# Patient Record
Sex: MALE | Race: WHITE | Employment: OTHER | URBAN - METROPOLITAN AREA
[De-identification: names, ages, dates, MRNs, and addresses within clinical notes are randomized per-mention and may not be internally consistent; named-entity substitution may affect disease eponyms.]

---

## 2017-01-05 ENCOUNTER — ANTICOAGULATION THERAPY VISIT (OUTPATIENT)
Dept: ANTICOAGULATION | Facility: CLINIC | Age: 82
End: 2017-01-05
Payer: COMMERCIAL

## 2017-01-05 DIAGNOSIS — Z79.01 LONG-TERM (CURRENT) USE OF ANTICOAGULANTS: Primary | ICD-10-CM

## 2017-01-05 DIAGNOSIS — I48.20 CHRONIC ATRIAL FIBRILLATION (H): ICD-10-CM

## 2017-01-05 LAB — INR PPP: 2.9

## 2017-01-05 PROCEDURE — 99207 ZZC NO CHARGE NURSE ONLY: CPT

## 2017-01-05 NOTE — PROGRESS NOTES
ANTICOAGULATION FOLLOW-UP CLINIC VISIT    Patient Name:  Francesco Killian  Date:  1/5/2017  Contact Type:  Telephone/ dosing faxed to [facility] and called to Jef in [place] [phone]    SUBJECTIVE:     Patient Findings     Positives No Problem Findings           OBJECTIVE    INR   Date Value Ref Range Status   01/05/2017 2.90  Final       ASSESSMENT / PLAN  INR assessment THER    Recheck INR In: 4 WEEKS    INR Location Outside lab      Anticoagulation Summary as of 1/5/2017     INR goal 2.0-3.0   Selected INR 2.90 (1/5/2017)   Maintenance plan 2 mg (2 mg x 1) on Tue; 4 mg (2 mg x 2) all other days   Full instructions 2 mg on Tue; 4 mg all other days   Weekly total 26 mg   Plan last modified Haley Beth RN (1/5/2017)   Next INR check 2/2/2017   Target end date Indefinite    Indications   Chronic atrial fibrillation (H) [I48.2]  Long-term (current) use of anticoagulants [Z79.01] [Z79.01]         Anticoagulation Episode Summary     INR check location     Preferred lab     Send INR reminders to [facility]    Comments             See the Encounter Report to view Anticoagulation Flowsheet and Dosing Calendar (Go to Encounters tab in chart review, and find the Anticoagulation Therapy Visit)        Haley Beth RN

## 2017-01-05 NOTE — MR AVS SNAPSHOT
Francesco Killian   1/5/2017 3:30 PM   Anticoagulation Therapy Visit    Description:  89 year old male   Provider:   ANTICOAGULATION CLINIC   Department:   Anti Coagulation           INR as of 1/5/2017     Selected INR 2.90 (1/5/2017)      Anticoagulation Summary as of 1/5/2017     INR goal 2.0-3.0   Selected INR 2.90 (1/5/2017)   Full instructions 2 mg on Tue; 4 mg all other days   Next INR check 2/2/2017    Indications   Chronic atrial fibrillation (H) [I48.2]  Long-term (current) use of anticoagulants [Z79.01] [Z79.01]         Description     Spoke with daughter in law and did not change dose as advised last time, is still taking 2mg tues and 4mg all other days.       Your next Anticoagulation Clinic appointment(s)     Feb 02, 2017 11:15 AM   Anticoagulation Visit with  ANTICOAGULATION CLINIC   [facility] ([facility]    [address] [address] [phone]              Contact Numbers     [facility]  Please call  [phone] to cancel and/or reschedule your appointment   Please call  [phone] with any problems or questions regarding your therapy.        January 2017 Details    Sun Mon Tue Wed Thu Fri Sat     1               2               3               4               5      4 mg   See details      6      4 mg         7      4 mg           8      4 mg         9      4 mg         10      2 mg         11      4 mg         12      4 mg         13      4 mg         14      4 mg           15      4 mg         16      4 mg         17      2 mg         18      4 mg         19      4 mg         20      4 mg         21      4 mg           22      4 mg         23      4 mg         24      2 mg         25      4 mg         26      4 mg         27      4 mg         28      4 mg           29      4 mg         30      4 mg         31      2 mg              Date Details   01/05 This INR check               How to take your warfarin  dose     To take:  2 mg Take 1 of the 2 mg tablets.    To take:  4 mg Take 2 of the 2 mg tablets.           February 2017 Details    Sun Mon Tue Wed Thu Fri Sat        1      4 mg         2            3               4                 5               6               7               8               9               10               11                 12               13               14               15               16               17               18                 19               20               21               22               23               24               25                 26               27               28                    Date Details   No additional details    Date of next INR:  2/2/2017         How to take your warfarin dose     To take:  4 mg Take 2 of the 2 mg tablets.

## 2017-01-06 DIAGNOSIS — I48.20 CHRONIC ATRIAL FIBRILLATION (H): Primary | ICD-10-CM

## 2017-01-06 RX ORDER — WARFARIN SODIUM 2 MG/1
TABLET ORAL
Qty: 180 TABLET | Refills: 0 | Status: SHIPPED | OUTPATIENT
Start: 2017-01-06 | End: 2017-04-05

## 2017-01-06 NOTE — TELEPHONE ENCOUNTER
warfarin (COUMADIN) 2 MG tablet    Last Written Prescription Date: 9/26/2016  Last Fill Qty: 180, # refills: 0  Last Office Visit with G, P or [facility] prescribing provider: 10/24/2016       Date and Result of Last PT/INR:   INR     2.90   1/5/2017  INR     3.04   12/15/2016  INR      2.2   11/18/2015  INR     1.68   11/11/2015

## 2017-02-02 ENCOUNTER — TRANSFERRED RECORDS (OUTPATIENT)
Dept: HEALTH INFORMATION MANAGEMENT | Facility: CLINIC | Age: 82
End: 2017-02-02

## 2017-02-02 LAB — INR PPP: 2.6 (ref 0.9–1.1)

## 2017-02-03 ENCOUNTER — APPOINTMENT (OUTPATIENT)
Dept: GENERAL RADIOLOGY | Facility: CLINIC | Age: 82
DRG: 202 | End: 2017-02-03
Attending: EMERGENCY MEDICINE
Payer: MEDICARE

## 2017-02-03 ENCOUNTER — HOSPITAL ENCOUNTER (INPATIENT)
Facility: CLINIC | Age: 82
LOS: 3 days | Discharge: HOME | DRG: 202 | End: 2017-02-06
Attending: EMERGENCY MEDICINE | Admitting: INTERNAL MEDICINE
Payer: MEDICARE

## 2017-02-03 DIAGNOSIS — J20.9 BRONCHITIS WITH BRONCHOSPASM: ICD-10-CM

## 2017-02-03 DIAGNOSIS — I10 ESSENTIAL HYPERTENSION, MALIGNANT: Primary | ICD-10-CM

## 2017-02-03 DIAGNOSIS — I50.9 ACUTE ON CHRONIC CONGESTIVE HEART FAILURE, UNSPECIFIED CONGESTIVE HEART FAILURE TYPE: ICD-10-CM

## 2017-02-03 LAB
ALBUMIN SERPL-MCNC: 3.6 G/DL (ref 3.4–5)
ALBUMIN UR-MCNC: 10 MG/DL
ALP SERPL-CCNC: 82 U/L (ref 40–150)
ALT SERPL W P-5'-P-CCNC: 23 U/L (ref 0–70)
ANION GAP SERPL CALCULATED.3IONS-SCNC: 8 MMOL/L (ref 3–14)
APPEARANCE UR: ABNORMAL
AST SERPL W P-5'-P-CCNC: 29 U/L (ref 0–45)
BASOPHILS # BLD AUTO: 0 10E9/L (ref 0–0.2)
BASOPHILS NFR BLD AUTO: 0.3 %
BILIRUB SERPL-MCNC: 0.3 MG/DL (ref 0.2–1.3)
BILIRUB UR QL STRIP: NEGATIVE
BUN SERPL-MCNC: 53 MG/DL (ref 7–30)
CALCIUM SERPL-MCNC: 8.7 MG/DL (ref 8.5–10.1)
CHLORIDE SERPL-SCNC: 103 MMOL/L (ref 94–109)
CO2 SERPL-SCNC: 23 MMOL/L (ref 20–32)
COLOR UR AUTO: YELLOW
CREAT SERPL-MCNC: 2.05 MG/DL (ref 0.66–1.25)
DIFFERENTIAL METHOD BLD: ABNORMAL
EOSINOPHIL # BLD AUTO: 0.1 10E9/L (ref 0–0.7)
EOSINOPHIL NFR BLD AUTO: 0.8 %
ERYTHROCYTE [DISTWIDTH] IN BLOOD BY AUTOMATED COUNT: 12.5 % (ref 10–15)
FLUAV+FLUBV AG SPEC QL: NEGATIVE
FLUAV+FLUBV AG SPEC QL: NORMAL
GFR SERPL CREATININE-BSD FRML MDRD: 30 ML/MIN/1.7M2
GLUCOSE SERPL-MCNC: 242 MG/DL (ref 70–99)
GLUCOSE UR STRIP-MCNC: 70 MG/DL
HCT VFR BLD AUTO: 40.9 % (ref 40–53)
HGB BLD-MCNC: 13.2 G/DL (ref 13.3–17.7)
HGB UR QL STRIP: NEGATIVE
HYALINE CASTS #/AREA URNS LPF: 1 /LPF (ref 0–2)
IMM GRANULOCYTES # BLD: 0 10E9/L (ref 0–0.4)
IMM GRANULOCYTES NFR BLD: 0.4 %
INR PPP: 3.23 (ref 0.86–1.14)
KETONES UR STRIP-MCNC: NEGATIVE MG/DL
LACTATE SERPL-SCNC: 1.5 MMOL/L (ref 0.4–2)
LEUKOCYTE ESTERASE UR QL STRIP: NEGATIVE
LYMPHOCYTES # BLD AUTO: 1.1 10E9/L (ref 0.8–5.3)
LYMPHOCYTES NFR BLD AUTO: 11 %
MCH RBC QN AUTO: 31.9 PG (ref 26.5–33)
MCHC RBC AUTO-ENTMCNC: 32.3 G/DL (ref 31.5–36.5)
MCV RBC AUTO: 99 FL (ref 78–100)
MONOCYTES # BLD AUTO: 0.8 10E9/L (ref 0–1.3)
MONOCYTES NFR BLD AUTO: 8 %
MUCOUS THREADS #/AREA URNS LPF: PRESENT /LPF
NEUTROPHILS # BLD AUTO: 8.1 10E9/L (ref 1.6–8.3)
NEUTROPHILS NFR BLD AUTO: 79.5 %
NITRATE UR QL: NEGATIVE
NRBC # BLD AUTO: 0 10*3/UL
NRBC BLD AUTO-RTO: 0 /100
NT-PROBNP SERPL-MCNC: 3710 PG/ML (ref 0–1800)
PH UR STRIP: 5 PH (ref 5–7)
PLATELET # BLD AUTO: 177 10E9/L (ref 150–450)
POTASSIUM SERPL-SCNC: 4.7 MMOL/L (ref 3.4–5.3)
PROT SERPL-MCNC: 8.2 G/DL (ref 6.8–8.8)
RBC # BLD AUTO: 4.14 10E12/L (ref 4.4–5.9)
RBC #/AREA URNS AUTO: 1 /HPF (ref 0–2)
SODIUM SERPL-SCNC: 134 MMOL/L (ref 133–144)
SP GR UR STRIP: 1.01 (ref 1–1.03)
SPECIMEN SOURCE: NORMAL
SQUAMOUS #/AREA URNS AUTO: <1 /HPF (ref 0–1)
TROPONIN I SERPL-MCNC: 0.03 UG/L (ref 0–0.04)
URN SPEC COLLECT METH UR: ABNORMAL
UROBILINOGEN UR STRIP-MCNC: NORMAL MG/DL (ref 0–2)
WBC # BLD AUTO: 10.2 10E9/L (ref 4–11)
WBC #/AREA URNS AUTO: <1 /HPF (ref 0–2)

## 2017-02-03 PROCEDURE — 25000125 ZZHC RX 250: Performed by: EMERGENCY MEDICINE

## 2017-02-03 PROCEDURE — 12000000 ZZH R&B MED SURG/OB

## 2017-02-03 PROCEDURE — A9270 NON-COVERED ITEM OR SERVICE: HCPCS | Mod: GY | Performed by: EMERGENCY MEDICINE

## 2017-02-03 PROCEDURE — 96375 TX/PRO/DX INJ NEW DRUG ADDON: CPT

## 2017-02-03 PROCEDURE — 71010 XR CHEST PORT 1 VW: CPT

## 2017-02-03 PROCEDURE — 25000128 H RX IP 250 OP 636: Performed by: EMERGENCY MEDICINE

## 2017-02-03 PROCEDURE — 87804 INFLUENZA ASSAY W/OPTIC: CPT | Performed by: EMERGENCY MEDICINE

## 2017-02-03 PROCEDURE — 25000132 ZZH RX MED GY IP 250 OP 250 PS 637: Mod: GY | Performed by: EMERGENCY MEDICINE

## 2017-02-03 PROCEDURE — 36415 COLL VENOUS BLD VENIPUNCTURE: CPT | Performed by: EMERGENCY MEDICINE

## 2017-02-03 PROCEDURE — 84484 ASSAY OF TROPONIN QUANT: CPT | Performed by: EMERGENCY MEDICINE

## 2017-02-03 PROCEDURE — 87040 BLOOD CULTURE FOR BACTERIA: CPT | Performed by: EMERGENCY MEDICINE

## 2017-02-03 PROCEDURE — 96374 THER/PROPH/DIAG INJ IV PUSH: CPT

## 2017-02-03 PROCEDURE — 83880 ASSAY OF NATRIURETIC PEPTIDE: CPT | Performed by: EMERGENCY MEDICINE

## 2017-02-03 PROCEDURE — 99223 1ST HOSP IP/OBS HIGH 75: CPT | Performed by: INTERNAL MEDICINE

## 2017-02-03 PROCEDURE — 80053 COMPREHEN METABOLIC PANEL: CPT | Performed by: EMERGENCY MEDICINE

## 2017-02-03 PROCEDURE — 81001 URINALYSIS AUTO W/SCOPE: CPT | Performed by: EMERGENCY MEDICINE

## 2017-02-03 PROCEDURE — 93005 ELECTROCARDIOGRAM TRACING: CPT | Mod: 76

## 2017-02-03 PROCEDURE — 85025 COMPLETE CBC W/AUTO DIFF WBC: CPT | Performed by: EMERGENCY MEDICINE

## 2017-02-03 PROCEDURE — 94640 AIRWAY INHALATION TREATMENT: CPT | Mod: 76

## 2017-02-03 PROCEDURE — 99285 EMERGENCY DEPT VISIT HI MDM: CPT | Mod: 25

## 2017-02-03 PROCEDURE — 83605 ASSAY OF LACTIC ACID: CPT | Performed by: EMERGENCY MEDICINE

## 2017-02-03 PROCEDURE — 85610 PROTHROMBIN TIME: CPT | Performed by: EMERGENCY MEDICINE

## 2017-02-03 RX ORDER — WARFARIN SODIUM 2 MG/1
2 TABLET ORAL WEEKLY
Status: ON HOLD | COMMUNITY
End: 2018-02-23

## 2017-02-03 RX ORDER — SENNOSIDES 8.6 MG
650 CAPSULE ORAL
COMMUNITY
End: 2018-02-28

## 2017-02-03 RX ORDER — ONDANSETRON 2 MG/ML
4 INJECTION INTRAMUSCULAR; INTRAVENOUS ONCE
Status: COMPLETED | OUTPATIENT
Start: 2017-02-03 | End: 2017-02-03

## 2017-02-03 RX ORDER — LIDOCAINE 40 MG/G
CREAM TOPICAL
Status: DISCONTINUED | OUTPATIENT
Start: 2017-02-03 | End: 2017-02-04

## 2017-02-03 RX ORDER — FUROSEMIDE 10 MG/ML
20 INJECTION INTRAMUSCULAR; INTRAVENOUS ONCE
Status: COMPLETED | OUTPATIENT
Start: 2017-02-03 | End: 2017-02-03

## 2017-02-03 RX ORDER — IPRATROPIUM BROMIDE AND ALBUTEROL SULFATE 2.5; .5 MG/3ML; MG/3ML
3 SOLUTION RESPIRATORY (INHALATION)
Status: DISCONTINUED | OUTPATIENT
Start: 2017-02-03 | End: 2017-02-04

## 2017-02-03 RX ORDER — ACETAMINOPHEN 325 MG/1
975 TABLET ORAL ONCE
Status: COMPLETED | OUTPATIENT
Start: 2017-02-03 | End: 2017-02-03

## 2017-02-03 RX ORDER — SODIUM CHLORIDE 9 MG/ML
1000 INJECTION, SOLUTION INTRAVENOUS CONTINUOUS
Status: DISCONTINUED | OUTPATIENT
Start: 2017-02-03 | End: 2017-02-03

## 2017-02-03 RX ADMIN — FUROSEMIDE 20 MG: 10 INJECTION, SOLUTION INTRAVENOUS at 20:41

## 2017-02-03 RX ADMIN — ONDANSETRON 4 MG: 2 INJECTION INTRAMUSCULAR; INTRAVENOUS at 20:03

## 2017-02-03 RX ADMIN — ACETAMINOPHEN 975 MG: 325 TABLET, FILM COATED ORAL at 19:57

## 2017-02-03 RX ADMIN — IPRATROPIUM BROMIDE AND ALBUTEROL SULFATE 3 ML: .5; 3 SOLUTION RESPIRATORY (INHALATION) at 19:37

## 2017-02-03 ASSESSMENT — ENCOUNTER SYMPTOMS
COUGH: 1
SHORTNESS OF BREATH: 1
FEVER: 1
CHILLS: 1
ROS GI COMMENTS: + INCONTINENCE

## 2017-02-03 NOTE — IP AVS SNAPSHOT
MRN:[id number]                      After Visit Summary   2/3/2017    Francesco Killian    MRN: [id number]           Thank you!     Thank you for choosing [facility] for your care. Our goal is always to provide you with excellent care. Hearing back from our patients is one way we can continue to improve our services. Please take a few minutes to complete the written survey that you may receive in the mail after you visit. If you would like to speak to someone directly about your visit please contact Patient Relations at [phone]. Thank you!          Patient Information     Date Of Birth          12/26/1920        About your hospital stay     You were admitted on:  February 3, 2017 You last received care in the:  [place] [address] [facility]    You were discharged on:  February 6, 2017        Reason for your hospital stay       Acute bronchitis with bronchospasm and acute on chronic renal failure                  Who to Call     For medical emergencies, please call 911.  For non-urgent questions about your medical care, please call your primary care provider or clinic, [phone]          Attending Provider     Provider    Eli Muse MD [place], Daniel L, MD       Primary Care Provider Office Phone # Fax #    Angel Corea [phone]       [facility] [address] [facility] [address]        After Care Instructions     Activity       Your activity upon discharge: activity as tolerated            Diet       Follow this diet upon discharge: Low salt                  Follow-up Appointments     Follow-up and recommended labs and tests        Follow up with primary care provider, Angel Corea, within 7 days for hospital follow- up.  The following labs/tests are recommended: INR and BMP.                  Your next 10 appointments already scheduled     Feb 09, 2017  3:20 PM   SHORT with Angel Corea MD   [facility]  "[facility]    [address] [id number] [phone]              Warfarin Instruction     You have started taking a medicine called warfarin. This is a blood-thinning medicine (anticoagulant). It helps prevent and treat blood clots.      Before leaving the hospital, make sure you know how much to take and how long to take it.      You will need regular blood tests to make sure your blood is clotting safely. It is very important to see your doctor for regular blood tests.    Talk to your doctor before taking any new medicine (this includes over-the-counter drugs and herbal products). Many medicines can interact with warfarin. This may cause more bleeding or too much clotting.     Eating a lot of vitamin K--found in green, leafy vegetables--can change the way warfarin works in your body. Do NOT avoid these foods. Instead, try to eat the same amount each day.     Bleeding is the most common side-effect of warfarin. You may notice bleeding gums, a bloody nose, bruises and bleeding longer when you cut yourself. See a doctor at once if:   o You cough up blood  o You find blood in your stool (poop)  o You have a deep cut, or a cut that bleeds longer than 10 minutes   o You have a bad cut, hard fall, accident or hit your head (go to urgent care or the emergency room).    For women who can get pregnant: This medicine can harm an unborn baby. Be very careful not to get pregnant while taking this medicine. If you think you might be pregnant, call your doctor right away.    For more information, read \"Guide to Warfarin Therapy,  the booklet you received in the hospital.        General Recommendations To Control Heart Failure When You Get Home     Instructions To Patients and Families: Please read and check off each of these important instructions as you do them when you get home.           Weight and symptoms      ___ Put a scale in your bathroom  ___ Post a weight " "chart or calendar next to the scale  ___Weigh yourself every day as soon as you you get up in the morning. You should only be wearing your pajamas. Write your weight on the chart/calendar.  ___ Bring your weight chart/calendar with you to all appointments    ___Call your doctor if you gain 2 pounds in 1 day or 5 pounds in 1 week from your \"dry\" weight (baseline weight). Also call your doctor if you have shortness of breath that gets worse over time, leg swelling or fatigue.         Medicines and diet     ___ Make sure to take your medicines as prescribed.    ___Bring a current list of your medicines and all of your medicine bottles with you to all appointments.    ___ Limit fluids if you still have swelling or shortness of breath, or if your doctor tells you to do so.  ___ Eat less than 2000 mg of sodium (salt) every day. Read food labels, and do not add salt to meals.   ___ Heart healthy diet with low fat and low cholesterol          Activity and suggested lifestyle changes    ___ Stay active. Talk to your doctor about an exercise program that is safe for your heart.    ___ Stop smoking. Reduce alcohol use.      ___ Lose weight if you are overweight. Extra weight puts a lot of stress on the heart.          Control for Leg Swelling   ___ Keep your legs elevated (raised) as needed for swelling. If swelling is uncomfortable or elevation doesn t help, ask your doctor about using ACE wrap or Jobst stockings.          Follow-up appointments   ___ Make a [facility] appointment with a basic metabolic panel lab draw 3 to 5 days after you leave the hospital. Call one of the following locations:   [facility]  [phone],  [facility] [phone],  [facility]  [phone].     ___ Make sure to take your medications as prescribed and bring an accurate list of your medications and your weight chart/calendar to your follow up " "appointment at the [facility] Clinic for continued education and adjustments          What is the C[facility] clinic?    The [facility].O.R.[facility] (Cardiomyopathy, Optimization, Rehabilitation, Education) Clinic is a heart failure specialty clinic within the [facility]. At [facility] you will work with nurse practitioners to carefully adjust medicines, get education and learn who and when to call if symptoms appear. [facility] nurses specialize in helping you:    better understand your disease.    slow the progress of your disease.    improve the length and quality of your life.    detect future heart problems before they become life threatening.    avoid hospital stays.            Pending Results     Date and Time Order Name Status Description    2/3/2017 1953 Blood culture Preliminary     2/3/2017 1929 Blood culture Preliminary             Statement of Approval     Ordered          02/06/17 1111  I have reviewed and agree with all the recommendations and orders detailed in this document.   EFFECTIVE NOW     Approved and electronically signed by:  Jordon Esparza MD             Admission Information        Provider Department Dept Phone    2/3/2017 Jordon Esparza MD  [facility] [phone]      Your Vitals Were     Blood Pressure Temperature Respirations    152/69 mmHg 97.7  F (36.5  C) (Oral) 16    Height Weight BMI (Body Mass Index)    1.727 m (5' 8\") 70.398 kg (155 lb 3.2 oz) 23.60 kg/m2    Pulse Oximetry          95%        MyChart Information     [employer] gives you secure access to your electronic health record. If you see a primary care provider, you can also send messages to your care team and make appointments. If you have questions, please call your primary care clinic.  If you do not have a primary care provider, please call [phone] and they will assist you.        Care EveryWhere ID     This is your Care EveryWhere ID. This could be used by other organizations to access " your [facility] medical records  [id number]           Review of your medicines      CONTINUE these medicines which may have CHANGED, or have new prescriptions. If we are uncertain of the size of tablets/capsules you have at home, strength may be listed as something that might have changed.        Dose / Directions    furosemide 20 MG tablet   Commonly known as:  LASIX   This may have changed:  when to take this   Used for:  Essential hypertension, malignant        Dose:  20 mg   Take 1 tablet (20 mg) by mouth daily   Quantity:  180 tablet   Refills:  3         CONTINUE these medicines which have NOT CHANGED        Dose / Directions    amLODIPine 2.5 MG tablet   Commonly known as:  NORVASC   Used for:  Essential hypertension with goal blood pressure less than 140/90        Dose:  2.5 mg   Take 1 tablet (2.5 mg) by mouth daily   Quantity:  90 tablet   Refills:  3       aspirin 81 MG tablet        Dose:  81 mg   Take 81 mg by mouth daily   Refills:  0       calcium carbonate 500 MG chewable tablet   Commonly known as:  TUMS        Dose:  1-2 chew tab   Take 1-2 chew tab by mouth 2 times daily as needed for heartburn   Refills:  0       diphenhydrAMINE-acetaminophen  MG tablet   Commonly known as:  TYLENOL PM        Dose:  1 tablet   Take 1 tablet by mouth At Bedtime   Refills:  0       ICAPS PO        Dose:  1 capsule   Take 1 capsule by mouth 2 times daily   Refills:  0       iron 325 (65 FE) MG tablet        Dose:  1 tablet   Take 1 tablet by mouth 2 times daily   Refills:  0       levothyroxine 100 MCG tablet   Commonly known as:  SYNTHROID/LEVOTHROID   Used for:  Other specified hypothyroidism        Dose:  100 mcg   Take 1 tablet (100 mcg) by mouth daily   Quantity:  90 tablet   Refills:  2       order for DME   Used for:  Chronic atrial fibrillation (H)        Testing being ordered: INR orders as directed to be done at [facility] To be done monthly as directed.   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        Dose:  12.5 mg   Take 0.5 tablets (12.5 mg) by mouth daily   Quantity:  45 tablet   Refills:  3       TYLENOL 8 HOUR 650 MG CR tablet   Generic drug:  acetaminophen        Dose:  650 mg   Take 650 mg by mouth every evening as needed for mild pain or fever   Refills:  0       * warfarin 2 MG tablet   Commonly known as:  COUMADIN        Dose:  2 mg   Take 2 mg by mouth once a week on Tuesday   Refills:  0       * warfarin 2 MG tablet   Commonly known as:  COUMADIN   Used for:  Chronic atrial fibrillation (H)        Take 4mg daily,except 2mg Tues , as directed by the anticoagulation clinic   Quantity:  180 tablet   Refills:  0       * Notice:  This list has 2 medication(s) that are the same as other medications prescribed for you. Read the directions carefully, and ask your doctor or other care provider to review them with you.      STOP taking     lisinopril 10 MG tablet   Commonly known as:  PRINIVIL/ZESTRIL                Where to get your medicines      Some of these will need a paper prescription and others can be bought over the counter. Ask your nurse if you have questions.     You don't need a prescription for these medications    - furosemide 20 MG tablet             Protect others around you: Learn how to safely use, store and throw away your medicines at www.disposemymeds.org.             Medication List: This is a list of all your medications and when to take them. Check marks below indicate your daily home schedule. Keep this list as a reference.      Medications           Morning Afternoon Evening Bedtime As Needed    amLODIPine 2.5 MG tablet   Commonly known as:  NORVASC   Take 1 tablet (2.5 mg) by mouth daily   Last time this was given:  2.5 mg on 2/6/2017 11:54 AM                                   aspirin 81 MG tablet   Take 81 mg by  mouth daily                                   calcium carbonate 500 MG chewable tablet   Commonly known as:  TUMS   Take 1-2 chew tab by mouth 2 times daily as needed for heartburn                                   diphenhydrAMINE-acetaminophen  MG tablet   Commonly known as:  TYLENOL PM   Take 1 tablet by mouth At Bedtime                                   furosemide 20 MG tablet   Commonly known as:  LASIX   Take 1 tablet (20 mg) by mouth daily   Last time this was given:  20 mg on 2/6/2017  8:43 AM                                   ICAPS PO   Take 1 capsule by mouth 2 times daily                                      iron 325 (65 FE) MG tablet   Take 1 tablet by mouth 2 times daily   Last time this was given:  325 mg on 2/6/2017  8:43 AM                                      levothyroxine 100 MCG tablet   Commonly known as:  SYNTHROID/LEVOTHROID   Take 1 tablet (100 mcg) by mouth daily   Last time this was given:  100 mcg on 2/6/2017  6:56 AM                                   order for DME   Testing being ordered: INR orders as directed to be done at [facility] To be done monthly as directed.                                spironolactone 25 MG tablet   Commonly known as:  ALDACTONE   Take 0.5 tablets (12.5 mg) by mouth daily   Last time this was given:  12.5 mg on 2/6/2017  8:43 AM                                   TYLENOL 8 HOUR 650 MG CR tablet   Take 650 mg by mouth every evening as needed for mild pain or fever   Generic drug:  acetaminophen                                   * warfarin 2 MG tablet   Commonly known as:  COUMADIN   Take 2 mg by mouth once a week on Tuesday                                * warfarin 2 MG tablet   Commonly known as:  COUMADIN   Take 4mg daily,except 2mg Tues , as directed by the anticoagulation clinic                                * Notice:  This list has 2 medication(s) that are the same as other medications prescribed for you. Read the directions  carefully, and ask your doctor or other care provider to review them with you.

## 2017-02-03 NOTE — LETTER
Transition Communication Hand-off for Care Transitions to Next Level of Care Provider    Name: Francesco Killian  MRN #: [id number]  Primary Care Provider: Angel Corea     Primary Clinic: [facility] [address] [facility] [address]     Reason for Hospitalization:  Bronchitis with bronchospasm [J20.9]  Acute on chronic congestive heart failure, unspecified congestive heart failure type (H) [I50.9]  Bronchitis [J40]  Admit Date/Time: 2/3/2017  7:21 PM  Discharge Date: 2/06/17   Payor Source: Payor: MEDICA / Plan: MEDICA PRIME SOLUTION / Product Type: Indemnity /     Reason for Communication Hand-off Referral: Fragility, hx of CHF    Discharge Plan:  Discharge Plan:       Most Recent Value    Concerns To Be Addressed care coordination/care conferences           Concern for non-adherence with plan of care:   None  Discharge Needs Assessment:  Needs       Most Recent Value    Equipment Currently Used at Home walker, rolling    Transportation Available family or friend will provide          Already enrolled in Tele-monitoring program and name of program:  No  Follow-up specialty is recommended: No    Follow-up plan:  Future Appointments  Date Time Provider Department Center   2/9/2017 3:20 PM Angel Corea MD [facility]   Follow up with primary care provider, Angel Corea, within 7 days for hospital follow- up.  The following labs/tests are recommended: INR and BMP    Any outstanding tests or procedures:              Ware Recommendations:  Frail, history of CHF, needs follow-up    Isatu Joel    AVS/Discharge Summary is the source of truth; this is a helpful guide for improved communication of patient story

## 2017-02-03 NOTE — IP AVS SNAPSHOT
[place] [address] [facility]    [address]    [facility] [address]    Phone:  [phone]    Fax:  [phone]                                       After Visit Summary   2/3/2017    Francesco Killian    MRN: [id number]           After Visit Summary Signature Page     I have received my discharge instructions, and my questions have been answered. I have discussed any challenges I see with this plan with the nurse or doctor.    ..........................................................................................................................................  Patient/Patient Representative Signature      ..........................................................................................................................................  Patient Representative Print Name and Relationship to Patient    ..................................................               ................................................  Date                                            Time    ..........................................................................................................................................  Reviewed by Signature/Title    ...................................................              ..............................................  Date                                                            Time

## 2017-02-04 ENCOUNTER — APPOINTMENT (OUTPATIENT)
Dept: CARDIOLOGY | Facility: CLINIC | Age: 82
DRG: 202 | End: 2017-02-04
Attending: INTERNAL MEDICINE
Payer: MEDICARE

## 2017-02-04 LAB
ANION GAP SERPL CALCULATED.3IONS-SCNC: 9 MMOL/L (ref 3–14)
BUN SERPL-MCNC: 61 MG/DL (ref 7–30)
CALCIUM SERPL-MCNC: 8.4 MG/DL (ref 8.5–10.1)
CHLORIDE SERPL-SCNC: 105 MMOL/L (ref 94–109)
CO2 SERPL-SCNC: 22 MMOL/L (ref 20–32)
CREAT SERPL-MCNC: 2.47 MG/DL (ref 0.66–1.25)
GFR SERPL CREATININE-BSD FRML MDRD: 24 ML/MIN/1.7M2
GLUCOSE SERPL-MCNC: 228 MG/DL (ref 70–99)
INTERPRETATION ECG - MUSE: NORMAL
INTERPRETATION ECG - MUSE: NORMAL
POTASSIUM SERPL-SCNC: 5.1 MMOL/L (ref 3.4–5.3)
SODIUM SERPL-SCNC: 136 MMOL/L (ref 133–144)

## 2017-02-04 PROCEDURE — 25000130 H RX MED GY IP 250 OP 259 PS 637: Mod: GY | Performed by: INTERNAL MEDICINE

## 2017-02-04 PROCEDURE — 40000274 ZZH STATISTIC RCP CONSULT EA 30 MIN

## 2017-02-04 PROCEDURE — 36415 COLL VENOUS BLD VENIPUNCTURE: CPT | Performed by: INTERNAL MEDICINE

## 2017-02-04 PROCEDURE — 25000128 H RX IP 250 OP 636: Performed by: INTERNAL MEDICINE

## 2017-02-04 PROCEDURE — 12000000 ZZH R&B MED SURG/OB

## 2017-02-04 PROCEDURE — A9270 NON-COVERED ITEM OR SERVICE: HCPCS | Mod: GY | Performed by: INTERNAL MEDICINE

## 2017-02-04 PROCEDURE — 80048 BASIC METABOLIC PNL TOTAL CA: CPT | Performed by: INTERNAL MEDICINE

## 2017-02-04 PROCEDURE — 93306 TTE W/DOPPLER COMPLETE: CPT

## 2017-02-04 PROCEDURE — 25000132 ZZH RX MED GY IP 250 OP 250 PS 637: Mod: GY | Performed by: INTERNAL MEDICINE

## 2017-02-04 PROCEDURE — 93306 TTE W/DOPPLER COMPLETE: CPT | Mod: 26 | Performed by: INTERNAL MEDICINE

## 2017-02-04 PROCEDURE — 99232 SBSQ HOSP IP/OBS MODERATE 35: CPT | Performed by: INTERNAL MEDICINE

## 2017-02-04 RX ORDER — AMLODIPINE BESYLATE 2.5 MG/1
2.5 TABLET ORAL DAILY
Status: DISCONTINUED | OUTPATIENT
Start: 2017-02-04 | End: 2017-02-04

## 2017-02-04 RX ORDER — POTASSIUM CHLORIDE 1.5 G/1.58G
20-40 POWDER, FOR SOLUTION ORAL
Status: DISCONTINUED | OUTPATIENT
Start: 2017-02-04 | End: 2017-02-06 | Stop reason: HOSPADM

## 2017-02-04 RX ORDER — POTASSIUM CHLORIDE 1500 MG/1
20-40 TABLET, EXTENDED RELEASE ORAL
Status: DISCONTINUED | OUTPATIENT
Start: 2017-02-04 | End: 2017-02-06 | Stop reason: HOSPADM

## 2017-02-04 RX ORDER — LEVOTHYROXINE SODIUM 100 UG/1
100 TABLET ORAL
Status: DISCONTINUED | OUTPATIENT
Start: 2017-02-04 | End: 2017-02-06 | Stop reason: HOSPADM

## 2017-02-04 RX ORDER — AMOXICILLIN 250 MG
1-2 CAPSULE ORAL 2 TIMES DAILY PRN
Status: DISCONTINUED | OUTPATIENT
Start: 2017-02-04 | End: 2017-02-06 | Stop reason: HOSPADM

## 2017-02-04 RX ORDER — ACETAMINOPHEN 500 MG
500 TABLET ORAL AT BEDTIME
Status: DISCONTINUED | OUTPATIENT
Start: 2017-02-04 | End: 2017-02-06 | Stop reason: HOSPADM

## 2017-02-04 RX ORDER — POTASSIUM CHLORIDE 29.8 MG/ML
20 INJECTION INTRAVENOUS
Status: DISCONTINUED | OUTPATIENT
Start: 2017-02-04 | End: 2017-02-04 | Stop reason: CLARIF

## 2017-02-04 RX ORDER — ONDANSETRON 2 MG/ML
4 INJECTION INTRAMUSCULAR; INTRAVENOUS EVERY 6 HOURS PRN
Status: DISCONTINUED | OUTPATIENT
Start: 2017-02-04 | End: 2017-02-06 | Stop reason: HOSPADM

## 2017-02-04 RX ORDER — ACETAMINOPHEN 325 MG/1
650 TABLET ORAL EVERY 4 HOURS PRN
Status: DISCONTINUED | OUTPATIENT
Start: 2017-02-04 | End: 2017-02-06 | Stop reason: HOSPADM

## 2017-02-04 RX ORDER — SPIRONOLACTONE 25 MG
12.5 TABLET ORAL DAILY
Status: DISCONTINUED | OUTPATIENT
Start: 2017-02-04 | End: 2017-02-04

## 2017-02-04 RX ORDER — FERROUS SULFATE 325(65) MG
325 TABLET ORAL 2 TIMES DAILY WITH MEALS
Status: DISCONTINUED | OUTPATIENT
Start: 2017-02-04 | End: 2017-02-06 | Stop reason: HOSPADM

## 2017-02-04 RX ORDER — IPRATROPIUM BROMIDE AND ALBUTEROL SULFATE 2.5; .5 MG/3ML; MG/3ML
3 SOLUTION RESPIRATORY (INHALATION) EVERY 4 HOURS PRN
Status: DISCONTINUED | OUTPATIENT
Start: 2017-02-04 | End: 2017-02-06 | Stop reason: HOSPADM

## 2017-02-04 RX ORDER — DIPHENHYDRAMINE HCL 25 MG
25 CAPSULE ORAL AT BEDTIME
Status: DISCONTINUED | OUTPATIENT
Start: 2017-02-04 | End: 2017-02-06 | Stop reason: HOSPADM

## 2017-02-04 RX ORDER — POTASSIUM CHLORIDE 7.45 MG/ML
10 INJECTION INTRAVENOUS
Status: DISCONTINUED | OUTPATIENT
Start: 2017-02-04 | End: 2017-02-06 | Stop reason: HOSPADM

## 2017-02-04 RX ORDER — SODIUM CHLORIDE 9 MG/ML
INJECTION, SOLUTION INTRAVENOUS CONTINUOUS
Status: DISCONTINUED | OUTPATIENT
Start: 2017-02-04 | End: 2017-02-04

## 2017-02-04 RX ORDER — NITROGLYCERIN 0.4 MG/1
0.4 TABLET SUBLINGUAL EVERY 5 MIN PRN
Status: DISCONTINUED | OUTPATIENT
Start: 2017-02-04 | End: 2017-02-06 | Stop reason: HOSPADM

## 2017-02-04 RX ORDER — ASPIRIN 81 MG/1
81 TABLET ORAL DAILY
Status: DISCONTINUED | OUTPATIENT
Start: 2017-02-04 | End: 2017-02-06 | Stop reason: HOSPADM

## 2017-02-04 RX ORDER — HYDROCODONE BITARTRATE AND ACETAMINOPHEN 5; 325 MG/1; MG/1
1-2 TABLET ORAL EVERY 4 HOURS PRN
Status: DISCONTINUED | OUTPATIENT
Start: 2017-02-04 | End: 2017-02-06 | Stop reason: HOSPADM

## 2017-02-04 RX ORDER — ONDANSETRON 4 MG/1
4 TABLET, ORALLY DISINTEGRATING ORAL EVERY 6 HOURS PRN
Status: DISCONTINUED | OUTPATIENT
Start: 2017-02-04 | End: 2017-02-06 | Stop reason: HOSPADM

## 2017-02-04 RX ORDER — SODIUM CHLORIDE 9 MG/ML
INJECTION, SOLUTION INTRAVENOUS CONTINUOUS
Status: ACTIVE | OUTPATIENT
Start: 2017-02-04 | End: 2017-02-04

## 2017-02-04 RX ORDER — LIDOCAINE 40 MG/G
CREAM TOPICAL
Status: DISCONTINUED | OUTPATIENT
Start: 2017-02-04 | End: 2017-02-06 | Stop reason: HOSPADM

## 2017-02-04 RX ORDER — ACETAMINOPHEN 325 MG/1
650 TABLET ORAL
Status: DISCONTINUED | OUTPATIENT
Start: 2017-02-04 | End: 2017-02-06 | Stop reason: HOSPADM

## 2017-02-04 RX ORDER — NALOXONE HYDROCHLORIDE 0.4 MG/ML
.1-.4 INJECTION, SOLUTION INTRAMUSCULAR; INTRAVENOUS; SUBCUTANEOUS
Status: DISCONTINUED | OUTPATIENT
Start: 2017-02-04 | End: 2017-02-06 | Stop reason: HOSPADM

## 2017-02-04 RX ADMIN — SODIUM CHLORIDE 250 ML: 9 INJECTION, SOLUTION INTRAVENOUS at 13:01

## 2017-02-04 RX ADMIN — ASPIRIN 81 MG: 81 TABLET, COATED ORAL at 08:48

## 2017-02-04 RX ADMIN — IRON 325 MG: 65 TABLET ORAL at 08:48

## 2017-02-04 RX ADMIN — Medication 12.5 MG: at 08:48

## 2017-02-04 RX ADMIN — SODIUM CHLORIDE: 9 INJECTION, SOLUTION INTRAVENOUS at 13:02

## 2017-02-04 RX ADMIN — LEVOTHYROXINE SODIUM 100 MCG: 100 TABLET ORAL at 07:15

## 2017-02-04 RX ADMIN — ACETAMINOPHEN 500 MG: 500 TABLET, FILM COATED ORAL at 21:19

## 2017-02-04 RX ADMIN — IRON 325 MG: 65 TABLET ORAL at 19:25

## 2017-02-04 RX ADMIN — DIPHENHYDRAMINE HYDROCHLORIDE 25 MG: 25 CAPSULE ORAL at 21:19

## 2017-02-04 NOTE — PHARMACY-ADMISSION MEDICATION HISTORY
Admission medication history interview status for this patient is complete. See [facility] admission navigator for allergy information, prior to admission medications and immunization status.     Medication history interview source(s):Family  Medication history resources (including written lists, pill bottles, clinic record):[facility] List  Primary pharmacy:[facility] [address]    Changes made to [facility] medication list:  Added: Tylenol PM  Deleted: Diphenhydramine  Changed: Tylenol 500mg to 650mg    Actions taken by pharmacist (provider contacted, etc):None     Additional medication history information:None    Medication reconciliation/reorder completed by provider prior to medication history? No    For patients on insulin therapy: NO    Prior to Admission medications    Medication Sig Last Dose Taking? Auth Provider   diphenhydrAMINE-acetaminophen (TYLENOL PM)  MG tablet Take 1 tablet by mouth At Bedtime 2/2/2017 at pm Yes Unknown, Entered By History   acetaminophen (TYLENOL 8 HOUR) 650 MG CR tablet Take 650 mg by mouth every evening as needed for mild pain or fever  Yes Unknown, Entered By History   warfarin (COUMADIN) 2 MG tablet Take 2 mg by mouth once a week on Tuesday 1/31/2017 at Unknown time Yes Unknown, Entered By History   warfarin (COUMADIN) 2 MG tablet Take 4mg daily,except 2mg Tues , as directed by the anticoagulation clinic 2/2/2017 at pm Yes Angel Corea MD   levothyroxine (SYNTHROID/LEVOTHROID) 100 MCG tablet Take 1 tablet (100 mcg) by mouth daily 2/3/2017 at am Yes Angel Corea MD   spironolactone (ALDACTONE) 25 MG tablet Take 0.5 tablets (12.5 mg) by mouth daily 2/3/2017 at am Yes Abby Thorpe APRN CNP   lisinopril (PRINIVIL,ZESTRIL) 10 MG tablet Take 1 tablet (10 mg) by mouth every evening 2/2/2017 at pm Yes Oliver Berry MD   amLODIPine (NORVASC) 2.5 MG tablet Take 1 tablet (2.5 mg) by mouth daily 2/2/2017 at pm Yes Oliver Berry MD   furosemide (LASIX) 20 MG tablet Take 1  tablet (20 mg) by mouth 2 times daily 2/3/2017 at am Yes Oliver Berry MD   order for DME Testing being ordered: INR orders as directed to be done at [facility]  To be done monthly as directed. Past Week at Unknown time Yes Angel Corea MD   Multiple Vitamins-Minerals (ICAPS PO) Take 1 capsule by mouth 2 times daily 2/3/2017 at am Yes Unknown, Entered By History   calcium carbonate (TUMS) 500 MG chewable tablet Take 1-2 chew tab by mouth 2 times daily as needed for heartburn 2/3/2017 at am Yes Unknown, Entered By History   Ferrous Sulfate (IRON) 325 (65 FE) MG tablet Take 1 tablet by mouth 2 times daily 2/3/2017 at am Yes Reported, Patient   aspirin 81 MG tablet Take 81 mg by mouth daily  2/3/2017 at am Yes Reported, Patient

## 2017-02-04 NOTE — PROGRESS NOTES
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  He is followed by [facility] and may have had doses of Lasix and Aldactone changed recently.  He presented to the ED on 2/3/17 for evaluation after several days of productive cough, worsening SOB and dyspnea on exertion. He was hypoxic in the upper 80% range and working very hard to breath.  He was tachycardic and had a temperature of 100.5 F at his assisted living. In the ED he was afebrile, in Afib with heart rate of 110s, wheezing, and satting 90% on RA.  CXR was clear of infiltrates and there was no elevated WBC. He did have elevated BNP of 3710 (baseline unknown), Troponin was 0.032, and Cr was 2.05. He was given a DuoNeb and 2L O2 via NC with improvement.  He was given one dose of IV Lasix in the ED and admitted for further evaluation and treatment.     Summary:  1. Acute bronchitis with acute bronchospasm.  This likely caused presenting symptoms of wheezing and shortness of breath.   Continue nebs as needed.  No pneumonia was identified so antibiotics are not being provided.  I suspect that bronchitis is viral.    2. History of chronic diastolic congestive heart failure.  He is somewhat fragile and tenuous with regards to volume status and had manipulation of diuretics.  Lasix and Aldactone doses may have been increased or changed prior to admission by cardiology.  With rising creatinine and marginal blood pressure he seems a bit dry today.  This is of particular concern because he  "only has onepartially patent cerebral vessel.  I talked with the patient's son who is a neurologist and he states that he generally does better if blood pressures are more in the 110-140 range.  I will stop Aldactone.  Lasix has been stopped.  I am holding amlodipine.  Some gentle IV fluid hydration hasen ordered.    3.  Acute renal failure with rise of creatinine to 2.5.  Lasix and Aldactone have been stopped.  Gentle IV fluid hydration has been ordered.  Repeat basic metabolic panel tomorrow. Patient has underlying chronic renal insufficiency.     4. Atrial fibrillation, now rate controlled.  Monitor on telemetry.  Continue coumadin.     5.  Supra-therapeutic INR.  Pharmacy to manage coumadin dosing.           DVT prophylaxis:  Covered with coumadin  Code Status: DNR/DNI    Disposition: I am hoping for discharge back home in 1-2 days if renal function improves        Interval History:      Patient is feeling better.  No more wheezing or shortness of breath.  Creatinine has risen.  Blood pressure has been low normal.  He has not had symptoms of cerebral hypoperfusion.                  Physical Exam:      Last Vital Signs:  /44 mmHg  Temp(Src) 98.6  F (37  C) (Oral)  Resp 20  Ht 1.727 m (5' 8\")  Wt 71.668 kg (158 lb)  BMI 24.03 kg/m2  SpO2 92%    Intake/Output Summary (Last 24 hours) at 02/04/17 1523  Last data filed at 02/04/17 1245   Gross per 24 hour   Intake    610 ml   Output    150 ml   Net    460 ml       GENERAL:  Comfortable. Cooperative.  PSYCH: pleasant, oriented, No acute distress.  EYES: PERRLA, Normal conjunctiva.  HEART:  Regular rate and rhythm. No JVD. Pulses normal. No edema.  LUNGS:  Clear to auscultation, normal Respiratory effort.  ABDOMEN:  Soft, no hepatosplenomegaly, normal bowel sounds.  EXTREMETIES: No clubbing, cyanosis or ischemia  SKIN:  Dry to touch, No rash.           Medications:      All current medications were reviewed.         Data:      All new lab and imaging data " was reviewed.   Labs:    Recent Labs  Lab 02/03/17 2013 02/03/17  1950   CULT No growth after 15 hours No growth after 15 hours          NA      136   2/4/2017  NA      134   2/3/2017  NA      137   10/24/2016 CHLORIDE      105   2/4/2017  CHLORIDE      103   2/3/2017  CHLORIDE      105   10/24/2016 BUN       61   2/4/2017  BUN       53   2/3/2017  BUN       53   10/24/2016   POTASSIUM      5.1   2/4/2017  POTASSIUM      4.7   2/3/2017  POTASSIUM      5.0   10/24/2016 CO2       22   2/4/2017  CO2       23   2/3/2017  CO2       25   10/24/2016 CR     2.47   2/4/2017  CR     2.05   2/3/2017  CR     1.72   10/24/2016       Recent Labs  Lab 02/03/17  1950   WBC 10.2   HGB 13.2*   HCT 40.9   MCV 99

## 2017-02-04 NOTE — PLAN OF CARE
Problem: Goal Outcome Summary  Goal: Goal Outcome Summary  Outcome: No Change  Pt assist x2  A&O x4, forgetful  TMAX:  100  Tachy  Denies pain  LS coarse.  RA  occ nonprod cough  +BS.  Passing flatus.  Denies n/v  Voiding w/urinal. Incont once at beginning of shift  TX;  Zosyn  TELE: A-fib w/BBB.

## 2017-02-04 NOTE — H&P
[facility]  History and Physical       Date of Admission:  2/3/2017    Assessment & Plan  Francesco Killian is a 89 year old male with PMHx significant for chronic Afib anticoagulated with coumadin, CAD s/p bypass, HTN, renal insufficiency - baseline Cr ~1.5, multiple CVAs and limited vision 2/2 macular degeneration who presents to the ED after several days of productive cough, worsening SOB and dyspnea on exertion. He was hypoxic in the upper 80s, working very hard to breath, tachycardic and had a temperature of 100.5 F at his assisted living. In the ED he was afebrile, in Afib at a rate of 110s - baseline of 70s, and wheezing and satting 90% on RA both of which improved with a DuoNeb and 2L O2 via NC. CXR was clear of infiltrates, no elevated WBC. He did have elevated BNP 3710 (baseline unknown), Troponins 0.032, and Cr 2.05. Francesco does not appear grossly volume overloaded at the time of our exam, with no LE edema or JVD noted. He was given one dose of Lasix in the ED.     He has no history of lung diseases. He is followed by [facility] cardiology, who may have changed his doses of Lasix and spironolactone recently per family. Last ECHO was 8/5/16 and was stable in comparison to 2015, as was EKG today.    1. Bronchiolitis with acute bronchospasm: hypoxic and wheezing with productive cough improved after DuoNeb & O2. Recent ill contacts with URI. CXR negative for infiltrate. Normal WBC.  -Continue DuoNebs PRN  -Oxygen via NC, wean as able  -Tylenol for pain or fever  -No steroids needed at this time    2. Acute exacerbation of underlying diastolic CHF, mild: BNP 3710 without baseline available, troponin elevated 0.032.  -ECHO 8/5/16: preserved LV EF 55-60%, moderate left ventricular hypertrophy, severe biatrial enlargement, mild mitral regurgitation, 2 to 3+ tricuspid regurgitation and moderate pulmonary hypertension and mild aortic stenosis.  -Will hold Lasix overnight, given renal insufficiency and  currently appearing euvolemic  -Continue home dose of amlodipine  -Continue spironolactone unless GFR <30 on recheck in a.m. --> then hold    3. Atrial fibrillation: no previous medications for rate control, baseline rate 70s, currently 100-110. INR 3.23 today, was 2.5 at assisted living earlier this week--> likely 2/2 to decreased PO intake  -Metoprolol PRN available for rates above 120  -Continue coumadin, per pharmacy recs    4. Renal Insufficiency: baseline Cr ~1.5, current Cr. 2.05.   -AM Cr lab draw  -Hold Lisinopril until Cr recheck in a.m.  -Re-evaluate for IVF in the a.m.    5.  Supra-therapeutic INR.  Pharmacy to manage coumadin dosing.     6.  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 They agree with the above stated assessment and plan.    Anuradha Haddad, MS4      --------------------------------------------------    Chief Complaint  SOB, cough    History obtained from daughter in-law, patient resting    History of Present Illness:  Francesco Killian is a 89 year old male with PMH significant for  PMHx significant for chonic Afib anticoagulated with coumadin, CAD s/p bypass, HTN, renal insufficiency - baseline Cr ~1.5, multiple CVAs and limited vision 2/2 macular degeneration who presents to the ED after several days of productive cough, worsening SOB and dyspnea on exertion. His wife recently was ill with a URI too. Staff at his assisted living measured O2 sats in upper 80s, tachycardia and near fever, and called EMS. In ED he was afebrile,  Afib at a rate of 110s - baseline of 70s, and wheezing and satting 90% on RA.    Per daughter in-law, he usually has a heart rate in the 70s, is in Afib, and has low blood pressure. She also mentions that his cardiologist had recently been making changes to his aldactone, lasix and amlodipine doses over the past several months and wonders if that could be affecting his kidneys and heart.       Medical History   I have reviewed this patient's medical history and updated it with pertinent information if needed.   PAST MEDICAL HISTORY:   Past Medical History   Diagnosis Date     AAA (abdominal aortic aneurysm) (H) 6/3/2013     CVA (cerebral infarction) 6/3/2013     DANILO and both vertebral art blocked-family son (neurologist) requests BP to run a bit higher since flow is via L ICA     Dysphagia 6/3/2013     HTN (hypertension) 6/3/2013     and RA stenosis, s/p stents at [facility] 6/3/2013     Hyperlipidemia LDL goal <130 6/3/2013     Chronic atrial fibrillation (H) 6/17/2013     Macular degeneration of both eyes      Unspecified cerebral artery occlusion with cerebral infarction      x 2, uses a cane     Carotid occlusion, right      see above note     Right bundle branch block (RBBB) 9/2/2015     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    PAST SURGICAL HISTORY:   Past Surgical History   Procedure Laterality Date     Gi surgery  1970s     duodenal ulcer reapair     Cardiac surgery  1980s     CABg     Eye surgery       bilat cataracts     Colonoscopy       normal exams     Hernia repair       bilat inguinal     Ir renal/visceral stent/atherect/pta       Turp       Esophagoscopy, gastroscopy, duodenoscopy (egd), combined  10/14/2013     Procedure: COMBINED ESOPHAGOSCOPY, GASTROSCOPY, DUODENOSCOPY (EGD);  COMBINED ESOPHAGOSCOPY, GASTROSCOPY,  "DUODENOSCOPY (EGD) ;  Surgeon: Aguilar Arechiga MD;  Location:  GI     Colonoscopy  11/4/2013     Procedure: COLONOSCOPY;  COLONOSCOPY ;  Surgeon: Aguilar Arechiga MD;  Location:  GI     C nonspecific procedure       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 [place] at time of RA stenting       FAMILY HISTORY:   Family History   Problem Relation Age of Onset     C.A.D. Mother      C.A.D. Father      C.A.D. Maternal Grandmother      C.A.D. Maternal Grandfather      C.A.D. Paternal Grandmother      C.A.D. Paternal Grandfather      C.A.D. Brother      C.A.D. Sister      Coronary Artery Disease Mother        SOCIAL HISTORY:   Social History   Substance Use Topics     Smoking status: Never Smoker      Smokeless tobacco: Never Used     Alcohol Use: No       ROS negative except for in HPI.       Physical Exam:    Blood pressure 128/69, temperature 99.5  F (37.5  C), temperature source Oral, resp. rate 22, height 1.727 m (5' 8\"), weight 71.668 kg (158 lb), SpO2 95 %.    GENERAL:  Resting quietly in NAD, head of bed at ~30 degrees  NECK:  no elevations in JVD noted  HEENT:  MMM  LUNGS:  no longer sounds wheezy, slightly dyspneic, rate 22-24, faint crackles on left base, no rhonchi  CARDIOVASCULAR: systolic murmur heard best at RUSB, tachycardic, in Afib, no gallops or rubs  ABDOMEN:  soft, non-distended, non-tender, BS+  EXTREMITIES:  little to no LE edema, L slightly > R, good pulses, pink  NEUROLOGIC:  patient resting, hard of hearing and has central blindness - some peripheral vision remains       Data:  Imaging:  CXR port 2/3/17:  IMPRESSION:  1. Cardiomegaly.  2. No active infiltrate    Labs:     Recent Labs  Lab 02/03/17 1950   WBC 10.2   HGB 13.2*   HCT 40.9   MCV 99          Recent Labs  Lab 02/03/17 1950      POTASSIUM 4.7   CHLORIDE 103   CO2 23   ANIONGAP 8   *   BUN 53*   CR 2.05*   GFRESTIMATED 30*   GFRESTBLACK 37*   KEZIA 8.7       Recent Labs  Lab 02/03/17 1950   NTBNPI 3710* "       Recent Labs  Lab 02/03/17 1950   CR 2.05*       Recent Labs  Lab 02/03/17  1950 02/03/17   INR 3.23* 2.60       Recent Labs  Lab 02/03/17  2112   LACT 1.5       Recent Labs  Lab 02/03/17 1950   TROPI 0.032     Staff note:  Patient was seen and examined by myself and discussed with student Anuradha.  I agree with the documentation presented above.    GENERAL:  Comfortable. Cooperative.  PSYCH: pleasant, oriented, No acute distress.  EYES: PERRLA, Normal conjunctiva.  HEART:  Regular rate and rhythm. No JVD. Pulses normal. No edema.  LUNGS:  Clear to auscultation, normal Respiratory effort.  ABDOMEN:  Soft, no hepatosplenomegaly, normal bowel sounds.  EXTREMETIES: No clubbing, cyanosis or ischemia  SKIN:  Dry to touch, No rash.

## 2017-02-04 NOTE — PHARMACY-ANTICOAGULATION SERVICE
Clinical Pharmacy - Warfarin Dosing Consult     Pharmacy has been consulted to manage this patient s warfarin therapy.  Indication: Atrial Fibrillation  Therapy Goal: INR 2-3  Warfarin Prior to Admission: Yes  Warfarin PTA Regimen: 2mg Tues, 4mg ROW  Recent documented change in oral intake/nutrition: Unknown  Dose Comments: Missed 2/3 dose    INR   Date Value Ref Range Status   02/03/2017 3.23* 0.86 - 1.14 Final   02/03/2017 2.60  Final       Recommend no warfarin overnight [overnight admission] d/t supratherapeutic INR.  Pharmacy will monitor Francesco Killian daily and order warfarin doses to achieve specified goal.      Please contact pharmacy as soon as possible if the warfarin needs to be held for a procedure or if the warfarin goals change.

## 2017-02-04 NOTE — ED PROVIDER NOTES
History     Chief Complaint:  Shortness of Breath       The history is provided by the EMS personnel and the patient.      Francesco Killian is a 89 year old male with history of atrial fibrillation, RBBB, aortic stenosis, CVA and past AAA on Coumadin who presents via EMS with shortness of breath.  Staff at the patient's assisted living facility found his oxygen saturation at 88% this evening so EMS was contacted.  They found his blood pressure at 200/110, heart rate 117, RR 28, and temperature of 100.5.  Oxygen via nasal cannula en route to the hospital was found to help.  Patient reports he has had shortness of breath for the last 4-5 days with a dry cough.  Currently endorses feeling chilled.  He denies chest pain or other complaint.  He has not had his lasix this evening.  Patient incontinence of urine en route to hospital.      Allergies:  No known drug allergies       Medications:    Coumadin  Levothyroxine  Spironolactone   Lisinopril  Amlodipine  Lasix  Multivitamin   Iron  Aspirin 81 mg    Past Medical History:    HTN  Hypothyroidism   Depression  Anticoagulated  Senile macular degeneration  RBBB  Recurrent falls  Anemia  Esophageal stricture  Atrial fibrillation  AAA  CVA  Hyperlipidemia  Aortic stenosis      Past Surgical History:    Duodenal ulcer repair  CABG  Bilateral cataract  Colonoscopy x 2  Bilateral inguinal herniorrhaphy   Turp  EGD     Family History:    CAD - Mother, Father, Brother, Sister     Social History:  Presents via EMS.  Daughter in law met in ED.   Tobacco use: Never  Alcohol use: Negative  PCP: Angel Corea    Marital Status:          Review of Systems   Constitutional: Positive for fever and chills.   Respiratory: Positive for cough and shortness of breath.    Cardiovascular: Negative for chest pain.   Gastrointestinal:        + incontinence   All other systems reviewed and are negative.      Physical Exam     Patient Vitals for the past 24 hrs:   BP Temp Temp src Heart  "Rate Resp SpO2 Height Weight   02/03/17 2230 119/69 mmHg - - 109 - 95 % - -   02/03/17 2216 - - - 97 - 95 % - -   02/03/17 2215 128/69 mmHg - - 102 - - - -   02/03/17 2200 102/65 mmHg - - 104 - 98 % - -   02/03/17 2145 114/65 mmHg - - - - - - -   02/03/17 2130 113/56 mmHg - - 94 - 93 % - -   02/03/17 2115 116/66 mmHg - - 83 - 93 % - -   02/03/17 2100 108/60 mmHg - - 104 - - - -   02/03/17 2045 112/74 mmHg - - 106 - 94 % - -   02/03/17 2030 136/78 mmHg - - - - 98 % - -   02/03/17 2015 170/89 mmHg - - - - 94 % - -   02/03/17 2000 (!) 200/107 mmHg - - - - - - -   02/03/17 1938 143/88 mmHg 99.5  F (37.5  C) Oral 103 22 96 % 1.727 m (5' 8\") 71.668 kg (158 lb)   02/03/17 1930 - - - - - 93 % - -       Physical Exam   Constitutional: He appears well-developed and well-nourished.   HENT:   Right Ear: External ear normal.   Left Ear: External ear normal.   Mouth/Throat: Oropharynx is clear and moist. No oropharyngeal exudate.   TM's clear bilaterally   Eyes: Conjunctivae and EOM are normal. Pupils are equal, round, and reactive to light. No scleral icterus.   Neck: Normal range of motion. Neck supple. No JVD present.   Cardiovascular: Normal heart sounds and intact distal pulses.  Exam reveals no gallop and no friction rub.    No murmur heard.  Irregularly irregular   Pulmonary/Chest: Effort normal. No respiratory distress. He has wheezes. He has rales.   Bibasilar crackles, mild expiratory wheeze.    Abdominal: Soft. Bowel sounds are normal. He exhibits no distension and no mass. There is no tenderness.   Musculoskeletal: Normal range of motion. He exhibits no edema.   Neurological: He is alert.   Speech clear  5/5 strength x 4   no focal weakness   Skin: Skin is warm and dry. No rash noted.   Psychiatric: He has a normal mood and affect.       Emergency Department Course   ECG (19:40:37):  Rate 107 bpm. ND interval *. QRS duration 150. QT/QTc 36/408. P-R-T axes * -11 9.  Atrial fibrillation with RVR.  RBBB.  Abnormal ECG.  " No significant change when compared to EKG dated 8/29/15.  Interpreted at 1945 by Eli Muse MD.     ECG (21:03:35):  Rate 98 bpm. MD interval *. QRS duration 156. QT/QTc 402/513. P-R-T axes * 0 17.  Atrial fibrillation.  RBBB.  Abnormal ECG.  Interpreted at 2105 by Eli Muse MD.     Imaging:  Radiographic findings were communicated with the patient and family who voiced understanding of the findings.    XR Chest:  IMPRESSION:  1. Cardiomegaly.  2. No active infiltrate.    Preliminary result per radiology.    Laboratory:  CBC: HGB 13.2 (L) ow WNL (WBC 10.2, )   CMP: Glucose 242 (H), BUN 53 (H), Creatinine 2.05 (H), GFR 30 (L) ow WNL    1950: Troponin: 0.032  INR: 3.23 (H)  Nt probnp: 3710 (H)  Blood culture x 2: pending    Influenza A/B: A Negative, B Negative    UA: Glucose 70, Albumin 10, Mucous present, o/w Negative     Interventions:  1937: Duoneb 3 mL nebulization  1957: Tylenol 975 mg PO  2003: Zofran 4 mg IV  2041: Lasix 20 mg IV    Emergency Department Course:  The patient arrived in the emergency department via EMS.  Past medical records, nursing notes, and vitals reviewed.  1924: I performed an exam of the patient as documented above.    IV inserted and blood drawn. The patient was placed on oxygen via nasal cannula and continuous cardiac monitoring and pulse oximetry.   The patient was sent for a XR while in the emergency department, findings above.   2050: I rechecked the patient. Explained findings to patient and family.   Second EKG obtained as first demonstrated artifacts.   I personally reviewed the laboratory results with the Patient and daughter and answered all related questions prior to admission.    Findings and plan explained to the Patient and daughter who consents to admission.     2209: Discussed the patient with Anuradha Chua, medical student working with Dr. Esparza, who will admit the patient to an in patient bed for further monitoring, evaluation, and treatment.       Impression & Plan    Medical Decision Making:  Francesco Killian is a 89 year old male presenting with fever, difficulty breathing, and coughing from assisted living.  He otherwise has some mild wheezing and coarse breath sounds at the bases initially so I did give him a neb which seemed to help.  He was able to lay down flat after this.  Patient is now comfortable and sleeping.  He does require oxygen via nasal cannula while laying down.  Flu swab was negative, lactate is normal.  I felt his symptoms are consistent with likely viral illness, possibly bronchitis due to lack of finding on XR.  He also has a mild bit of CHF so he received IV lasix.  He will need to be admitted to telemetry for further evaluation as he is in a-fib.  He initially was in rapid rate but has now calmed down quite a bit.  Family is updated and are comfortable with the plan.      Diagnosis:    ICD-10-CM    1. Bronchitis with bronchospasm and hypoxia J20.9    2. Acute on chronic congestive heart failure, unspecified congestive heart failure type (H) I50.9    3. Atrial fibrillation         Disposition:  Admitted to hospitalist service.      Chano Martinez  2/3/2017   [facility] EMERGENCY DEPARTMENT    I, Chano Martinez, am serving as a scribe at 7:24 PM on 2/3/2017 to document services personally performed by Eli Muse MD based on my observations and the provider's statements to me.      Eli Muse MD  02/04/17 0004

## 2017-02-04 NOTE — PROGRESS NOTES
"[facility] RCAT     Date:  2/4/17    Admission Dx:  Bronchitis / CHF    Pulmonary History  CHF    Home Nebulizer/MDI Use:  No    Home Oxygen:  No    Acuity Level (RCAT flow sheet):  4    Aerosol Therapy initiated:  Duoneb Q4 prn      Pulmonary Hygiene initiated: Coughing techniques       Volume Expansion initiated: Incentive spirometry      Current Oxygen Requirements: 1 L NC    Current SpO2:  94%    Re-evaluation date:  2/7/17    Patient Education:  Pt sleeping      See \"RT Assessments\" flow sheet for patient assessment scoring and Acuity Level Details.           "

## 2017-02-04 NOTE — CONSULTS
CTS:      Following for DC needs.  SWS is also following and met with patient/family.  I was updated by SW, there are no known needs at this time.  See SWS note for further detail.    Bella Blancas RN,BSN, CTS  [facility]  Care Coordination  [phone]

## 2017-02-04 NOTE — ED NOTES
A&Ox4. ABC's intact. Pt was found at assisted living with low O2 sats, cough  And coarse lung sounds.  Arrives via EMS. Denies pain at this time

## 2017-02-04 NOTE — PROGRESS NOTES
Care Transition Initial Assessment -   Reason For Consult: discharge planning  Met with: PATIENT and spoke with pt's son via phone    Active Problems:    CHF (congestive heart failure) (H)         DATA  Lives With: spouse  Living Arrangements: assisted living-[facility]  Who is your support system?: Wife, Children  Transportation Available: family or friend will provide    ASSESSMENT  Pt lives in [facility] with his wife.  He is a retired physician, his son Wilder is a neurologist, Wilder wife is a NP.  Pt receives meals, bathing, housekeeping services.  He has not had any falls within the last 6 months and uses a walker.       PLAN  Will continue to follow and assist with any d/c planning.  Pt/family deny any concerns at this time.  Please page SW if other needs arise.

## 2017-02-04 NOTE — PLAN OF CARE
Problem: Goal Outcome Summary  Goal: Goal Outcome Summary  Outcome: No Change  Ambulatory Status:  Pt up with assist of 1 and a walker; ambulated to the bathroom.   VS: BPs low; MD aware. Fluids running and aldactone discontinued.   Pain:  denies  Resp: LS coarse   GI:  Denies nausea.  Good appetite and on heart healthy diet diet.  BS active.  Passing flatus.  Last BM 2/3.  :  Continent of urine   Tx:  Rehydration   Labs:  Creat 2.47   Disposition:  TBD; 2/5 or 2/6

## 2017-02-05 LAB
ANION GAP SERPL CALCULATED.3IONS-SCNC: 9 MMOL/L (ref 3–14)
BUN SERPL-MCNC: 60 MG/DL (ref 7–30)
CALCIUM SERPL-MCNC: 8.2 MG/DL (ref 8.5–10.1)
CHLORIDE SERPL-SCNC: 109 MMOL/L (ref 94–109)
CO2 SERPL-SCNC: 21 MMOL/L (ref 20–32)
CREAT SERPL-MCNC: 2.03 MG/DL (ref 0.66–1.25)
GFR SERPL CREATININE-BSD FRML MDRD: 31 ML/MIN/1.7M2
GLUCOSE SERPL-MCNC: 82 MG/DL (ref 70–99)
INR PPP: 3.48 (ref 0.86–1.14)
POTASSIUM SERPL-SCNC: 4.3 MMOL/L (ref 3.4–5.3)
SODIUM SERPL-SCNC: 139 MMOL/L (ref 133–144)

## 2017-02-05 PROCEDURE — 99232 SBSQ HOSP IP/OBS MODERATE 35: CPT | Performed by: INTERNAL MEDICINE

## 2017-02-05 PROCEDURE — 80048 BASIC METABOLIC PNL TOTAL CA: CPT | Performed by: INTERNAL MEDICINE

## 2017-02-05 PROCEDURE — 25000130 H RX MED GY IP 250 OP 259 PS 637: Mod: GY | Performed by: INTERNAL MEDICINE

## 2017-02-05 PROCEDURE — 25000132 ZZH RX MED GY IP 250 OP 250 PS 637: Mod: GY | Performed by: INTERNAL MEDICINE

## 2017-02-05 PROCEDURE — 85610 PROTHROMBIN TIME: CPT | Performed by: INTERNAL MEDICINE

## 2017-02-05 PROCEDURE — A9270 NON-COVERED ITEM OR SERVICE: HCPCS | Mod: GY | Performed by: INTERNAL MEDICINE

## 2017-02-05 PROCEDURE — 36415 COLL VENOUS BLD VENIPUNCTURE: CPT | Performed by: INTERNAL MEDICINE

## 2017-02-05 PROCEDURE — 12000000 ZZH R&B MED SURG/OB

## 2017-02-05 RX ORDER — FUROSEMIDE 20 MG
20 TABLET ORAL DAILY
Status: DISCONTINUED | OUTPATIENT
Start: 2017-02-06 | End: 2017-02-06 | Stop reason: HOSPADM

## 2017-02-05 RX ORDER — SPIRONOLACTONE 25 MG
12.5 TABLET ORAL DAILY
Status: DISCONTINUED | OUTPATIENT
Start: 2017-02-05 | End: 2017-02-06 | Stop reason: HOSPADM

## 2017-02-05 RX ADMIN — IRON 325 MG: 65 TABLET ORAL at 17:26

## 2017-02-05 RX ADMIN — LEVOTHYROXINE SODIUM 100 MCG: 100 TABLET ORAL at 06:51

## 2017-02-05 RX ADMIN — DIPHENHYDRAMINE HYDROCHLORIDE 25 MG: 25 CAPSULE ORAL at 21:57

## 2017-02-05 RX ADMIN — ASPIRIN 81 MG: 81 TABLET, COATED ORAL at 08:06

## 2017-02-05 RX ADMIN — IRON 325 MG: 65 TABLET ORAL at 08:06

## 2017-02-05 RX ADMIN — Medication 12.5 MG: at 10:29

## 2017-02-05 RX ADMIN — ACETAMINOPHEN 500 MG: 500 TABLET, FILM COATED ORAL at 21:58

## 2017-02-05 NOTE — PLAN OF CARE
Problem: Goal Outcome Summary  Goal: Goal Outcome Summary  VSS, pt up with assist of 1 with walker/gait belt, A&Ox4 though forgetful, [place], tolerating cardiac diet, denies pain/nausea, LS clear with fine crackles in bases, room air, nonproductive cough, BS A&Ax4, passing gas, IVF, creat 2.47, will continue to monitor and provide supportive cares.

## 2017-02-05 NOTE — PROGRESS NOTES
[facility]  Hospitalist Progress Note  Patient Name: Francesco Killian    MRN: [id number]  Provider: Jordon Esparza MD  02/05/2017    Initial presenting complaint/issue to hospital (Diagnosis): shortness of breath         Assessment and Plan:      Summary:  Francesco Killian is a 89 year old male with history of chronic Afib anticoagulated with coumadin, CAD s/p bypass, HTN, renal insufficiency (baseline Cr ~1.5), multiple CVAs with several occluded cerebreal arteries, and limited vision 2/2 macular degeneration.  He is followed by [facility] and may have had doses of Lasix and Aldactone changed recently.  He presented to the ED on 2/3/17 for evaluation after several days of productive cough, worsening SOB and dyspnea on exertion. He was hypoxic in the upper 80% range and working very hard to breath.  He was tachycardic and had a temperature of 100.5 F at his assisted living. In the ED he was afebrile, in Afib with heart rate of 110s, wheezing, and satting 90% on RA.  CXR was clear of infiltrates and there was no elevated WBC. He did have elevated BNP of 3710 (baseline unknown), Troponin was 0.032, and Cr was 2.05. He was given a DuoNeb and 2L O2 via NC with improvement.  He was given one dose of IV Lasix in the ED and admitted for further evaluation and treatment. He seemed to improve most with neb treatment.  Lasix was not continued after admission.  Creatinine increased to 2.5 and BP became relatively low (100's systolic but patient has a goal SBP of 120-160 with his significant cerebrovascular disease) after admission. IVF was given.     Summary:  1. Acute bronchitis with acute bronchospasm.  This likely caused presenting symptoms of wheezing and shortness of breath.   Continue nebs as needed.  No pneumonia was identified so antibiotics are not being provided.  I suspect that bronchitis is viral.    2. History of chronic diastolic congestive heart failure.  He is somewhat fragile and tenuous with  "regards to volume status and had manipulation of diuretics.  Lasix and Aldactone doses may have been increased or changed prior to admission by cardiology.  With rising creatinine and marginal blood pressure diuretics were held and Francesco was hydrated some on 2/4.  BP is better and creatinine is improving. Amlodipine and Lisinopril (PTA meds) are on hold. Resume Aldactone at 12.5 mg daily today (PTA dose). Repeat BMP in am and resume Lasix at 20 mg daily tomorrow (PTA dose is 20 mg BID).     3.  Acute renal failure with chronic kidney failure.  Creatinine is down to 2 today. Resume Aldactone at 12.5 mg daily today (PTA dose). Repeat BMP in am and resume Lasix at 20 mg daily tomorrow (PTA dose is 20 mg BID). Lisinopril remains on hold.     4.  Hypertension.  Amlodipine, Lisinopril, Lasix, and Aldactone are all on hold.  BP was relatively low yesterday in 100's systolic (goal SBP is 120-160 with his cerebrovascular disease).  Resume aldactone today and Lasix tomorrow.  I suspect that Lisinopril will not be restarted while here but I am not sure about Amlodipine. Francesco will require close outpatient follow up.     5. Cerebrovascular disease.  Goal SBP is 120-160 since patient has occluded fight ICA and bilateral vertebral arteries (all cerebral flow is through left ICA).  Francesco has had watershed infarcts with low normal BP in the past according to his son who is a Neurologist at [facility] [employer].     6. Atrial fibrillation, now rate controlled.  Monitor on telemetry.  Continue coumadin.     7.  Supra-therapeutic INR.  Pharmacy to manage coumadin dosing.           DVT prophylaxis:  Covered with coumadin  Code Status: DNR/DNI    Disposition: I am hoping for discharge back home in 1-2 days if renal function improves        Interval History:      Patient is breathing well. No new problems.                   Physical Exam:      Last Vital Signs:  /59 mmHg  Temp(Src) 96.8  F (36  C) (Oral)  Resp 18  Ht 1.727 m (5' 8\")  Wt " 71.351 kg (157 lb 4.8 oz)  BMI 23.92 kg/m2  SpO2 96%    Intake/Output Summary (Last 24 hours) at 02/05/17 1037  Last data filed at 02/05/17 0941   Gross per 24 hour   Intake   1761 ml   Output    800 ml   Net    961 ml       GENERAL:  Comfortable. Cooperative.  PSYCH: pleasant, oriented, No acute distress.  EYES: PERRLA, Normal conjunctiva.  HEART:  Regular rate and rhythm. No JVD. Pulses normal. 1/4 symmetric LE edema.  LUNGS:  Clear to auscultation, normal Respiratory effort.  ABDOMEN:  Soft, no hepatosplenomegaly, normal bowel sounds.  EXTREMETIES: No clubbing, cyanosis or ischemia  SKIN:  Dry to touch, No rash.           Medications:      All current medications were reviewed.         Data:      All new lab and imaging data was reviewed.   Labs:    Recent Labs  Lab 02/03/17 2013 02/03/17  1950   CULT No growth after 2 days No growth after 2 days          NA      139   2/5/2017  NA      136   2/4/2017  NA      134   2/3/2017 CHLORIDE      109   2/5/2017  CHLORIDE      105   2/4/2017  CHLORIDE      103   2/3/2017 BUN       60   2/5/2017  BUN       61   2/4/2017  BUN       53   2/3/2017   POTASSIUM      4.3   2/5/2017  POTASSIUM      5.1   2/4/2017  POTASSIUM      4.7   2/3/2017 CO2       21   2/5/2017  CO2       22   2/4/2017  CO2       23   2/3/2017 CR     2.03   2/5/2017  CR     2.47   2/4/2017  CR     2.05   2/3/2017       Recent Labs  Lab 02/03/17  1950   WBC 10.2   HGB 13.2*   HCT 40.9   MCV 99

## 2017-02-05 NOTE — PLAN OF CARE
Problem: Goal Outcome Summary  Goal: Goal Outcome Summary  Outcome: Improving  Ambulatory Status:  Pt up with assist of 1 and walker.   VS:  /64, afebrile.   Pain:  Denies   Resp: LS clear, nonproductive cough, pt reports no SOB   GI:  Denies nausea.  Good appetite and on cardiac diet.  BS active.  Passing flatus.  Last BM 2/3.  :  WDL; aldactone restarted. Lasix to be re-started 2/6   Skin:  Dry/flaky BLE   Labs:  Creat 2.03, INR 3.48 (warfarin to be held today)   Disposition:  TBD based on labs 2/6

## 2017-02-06 ENCOUNTER — MYC MEDICAL ADVICE (OUTPATIENT)
Dept: INTERNAL MEDICINE | Facility: CLINIC | Age: 82
End: 2017-02-06

## 2017-02-06 VITALS
WEIGHT: 155.2 LBS | HEIGHT: 68 IN | TEMPERATURE: 97.7 F | BODY MASS INDEX: 23.52 KG/M2 | OXYGEN SATURATION: 95 % | SYSTOLIC BLOOD PRESSURE: 152 MMHG | RESPIRATION RATE: 16 BRPM | DIASTOLIC BLOOD PRESSURE: 69 MMHG

## 2017-02-06 PROBLEM — N17.9 ACUTE KIDNEY FAILURE (H): Status: ACTIVE | Noted: 2017-02-06

## 2017-02-06 LAB
ANION GAP SERPL CALCULATED.3IONS-SCNC: 9 MMOL/L (ref 3–14)
BUN SERPL-MCNC: 47 MG/DL (ref 7–30)
CALCIUM SERPL-MCNC: 8.8 MG/DL (ref 8.5–10.1)
CHLORIDE SERPL-SCNC: 110 MMOL/L (ref 94–109)
CO2 SERPL-SCNC: 20 MMOL/L (ref 20–32)
CREAT SERPL-MCNC: 1.49 MG/DL (ref 0.66–1.25)
GFR SERPL CREATININE-BSD FRML MDRD: 44 ML/MIN/1.7M2
GLUCOSE SERPL-MCNC: 86 MG/DL (ref 70–99)
INR PPP: 2.73 (ref 0.86–1.14)
POTASSIUM SERPL-SCNC: 4.3 MMOL/L (ref 3.4–5.3)
SODIUM SERPL-SCNC: 139 MMOL/L (ref 133–144)

## 2017-02-06 PROCEDURE — 99239 HOSP IP/OBS DSCHRG MGMT >30: CPT | Performed by: INTERNAL MEDICINE

## 2017-02-06 PROCEDURE — A9270 NON-COVERED ITEM OR SERVICE: HCPCS | Mod: GY | Performed by: INTERNAL MEDICINE

## 2017-02-06 PROCEDURE — 36415 COLL VENOUS BLD VENIPUNCTURE: CPT | Performed by: INTERNAL MEDICINE

## 2017-02-06 PROCEDURE — 25000132 ZZH RX MED GY IP 250 OP 250 PS 637: Mod: GY | Performed by: INTERNAL MEDICINE

## 2017-02-06 PROCEDURE — 80048 BASIC METABOLIC PNL TOTAL CA: CPT | Performed by: INTERNAL MEDICINE

## 2017-02-06 PROCEDURE — 85610 PROTHROMBIN TIME: CPT | Performed by: INTERNAL MEDICINE

## 2017-02-06 RX ORDER — AMLODIPINE BESYLATE 2.5 MG/1
2.5 TABLET ORAL DAILY
Status: DISCONTINUED | OUTPATIENT
Start: 2017-02-06 | End: 2017-02-06 | Stop reason: HOSPADM

## 2017-02-06 RX ORDER — FUROSEMIDE 20 MG
20 TABLET ORAL DAILY
Qty: 180 TABLET | Refills: 3
Start: 2017-02-06 | End: 2017-08-16

## 2017-02-06 RX ORDER — WARFARIN SODIUM 4 MG/1
4 TABLET ORAL
Status: DISCONTINUED | OUTPATIENT
Start: 2017-02-06 | End: 2017-02-06 | Stop reason: HOSPADM

## 2017-02-06 RX ADMIN — FUROSEMIDE 20 MG: 20 TABLET ORAL at 08:43

## 2017-02-06 RX ADMIN — LEVOTHYROXINE SODIUM 100 MCG: 100 TABLET ORAL at 06:56

## 2017-02-06 RX ADMIN — AMLODIPINE BESYLATE 2.5 MG: 2.5 TABLET ORAL at 11:54

## 2017-02-06 RX ADMIN — ASPIRIN 81 MG: 81 TABLET, COATED ORAL at 08:43

## 2017-02-06 RX ADMIN — IRON 325 MG: 65 TABLET ORAL at 08:43

## 2017-02-06 RX ADMIN — SENNOSIDES AND DOCUSATE SODIUM 1 TABLET: 8.6; 5 TABLET ORAL at 08:43

## 2017-02-06 RX ADMIN — Medication 12.5 MG: at 08:43

## 2017-02-06 NOTE — PROGRESS NOTES
SPIRITUAL HEALTH SERVICES  SPIRITUAL ASSESSMENT Progress Note  [facility].     PRIMARY FOCUS:     Goals of care    Emotional/spiritual/Episcopal distress    Support for coping    ILLNESS CIRCUMSTANCES:   Reviewed documentation. Reflective conversation shared with pt Cristopher which integrated elements of illness and family narratives.     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  He added that his blood pressure has been high.    Persons/Resources Involved - Cristopher named his spouse, their three children (all of them physicians), and six grandchildren as being central to his support [place].      DISTRESS:     Emotional/Existential/Relational Distress - Cristopher shared that his biggest concern is managing his blood pressure and keeping fluids off him.    Spiritual/Yazidi Distress - none expressed.    Social/Cultural/Economic Distress - none identified.    SPIRIT (Coping):     Hindu/Kika - Cristopher is Presbyterian and has kept his affiliation with the [employer] in [facility] [place], where he raised his family.    Spiritual Practice(s) - Cristopher welcomed prayer.    Emotional/Existential/Relational Connections - none mentioned beyond his family and kika.    SENSE-MAKING:    Goals of Care - Cristopher expects to discharge home later today.    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    PLAN: No further plans as pt expects to discharge soon.    Adrien Shields M.Div., [facility]  Staff   Pager [phone]

## 2017-02-06 NOTE — PLAN OF CARE
Problem: Goal Outcome Summary  Goal: Goal Outcome Summary  Outcome: No Change  Pt slept well during the night. VSS, afebrile. AO. Pt is [place] using bed alarms for safety. Pt denies any pain. Pt denies any sob. Transfers with ax1 and walker. Has infrequent nonproductive cough. Tele reads afib/a flutter with BBB and PVC's. Will continue to monitor.

## 2017-02-06 NOTE — PLAN OF CARE
Problem: Goal Outcome Summary  Goal: Goal Outcome Summary  Outcome: Adequate for Discharge Date Met:  02/06/17  Pt discharged home to [facility] with his daughter in law as transportation. Discharge instructions reviewed with patient and daughter in law in the room. Both verbalize understanding of instructions. Daughter in law states that her  (pts son) fills medications, medication changes reviewed with daughter in law who verbalizes understanding. No further questions at this time.

## 2017-02-06 NOTE — PHARMACY - DISCHARGE MEDICATION RECONCILIATION
Clinical Pharmacy- Warfarin Discharge Note    Warfarin discharge dose reviewed.  Patient is discharging on prior to admission dose of warfarin with INR follow up within 7 days.    Anticoagulation Dose History     Recent Dosing and Labs Latest Ref Rng 11/23/2016 12/15/2016 1/5/2017 2/3/2017 2/3/2017 2/5/2017 2/6/2017    INR 0.86 - 1.14 3.05 3.04 2.90 2.60 3.23(H) 3.48(H) 2.73(H)

## 2017-02-06 NOTE — DISCHARGE SUMMARY
"Discharge Summary    Francesco Killian MRN# [id number]   YOB: 1920 Age: 89 year old     Date of Admission:  2/3/2017  Date of Discharge:  2/6/2017  Admitting Physician:  Jordon Esparza MD  Discharge Physician:  Jordon Esparza MD  Discharging Service:  Hospitalist     Home clinic: [facility]  Primary Provider: Angel Corea          Discharge Diagnosis:   1. Acute bronchitis with acute bronchospasm.      2. History of chronic diastolic congestive heart failure, without acute exacerbation.    3.  Acute renal failure with chronic kidney failure. Improved.     4.  Hypertension.  Goal SBP is 120-160 with his cerebrovascular disease.      5. Cerebrovascular disease.     6. Atrial fibrillation, rate controlled.     7.  Supra-therapeutic INR.  INR is therapeutic on discharge.             Discharge Disposition:   Discharged to home           Allergies:   No Known Allergies             Condition on Discharge:   Discharge condition: Stable   Discharge vitals: Blood pressure 152/69, temperature 97.7  F (36.5  C), temperature source Oral, resp. rate 16, height 1.727 m (5' 8\"), weight 70.398 kg (155 lb 3.2 oz), SpO2 95 %.   Code status on discharge: DNR / DNI   Physical exam on day of discharge:   GENERAL:  Comfortable. Cooperative.  PSYCH: pleasant, oriented, No acute distress.  EYES: PERRLA, Normal conjunctiva.  HEART:  Regular rate and rhythm. No JVD. Pulses normal. No edema.  LUNGS:  Clear to auscultation, normal Respiratory effort.  ABDOMEN:  Soft, no hepatosplenomegaly, normal bowel sounds.  EXTREMETIES: No clubbing, cyanosis or ischemia  SKIN:  Dry to touch, No rash.         History of Present Illness and Hospital Course:     See detailed admission note for full details.  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  He is followed by [facility] and may have had doses of Lasix and Aldactone changed recently.  He presented to the ED on 2/3/17 for evaluation after several days of productive cough, worsening SOB and dyspnea on exertion. He was hypoxic in the upper 80% range and working very hard to breath.  He was tachycardic and had a temperature of 100.5 F at his assisted living. In the ED he was afebrile, in Afib with heart rate of 110s, wheezing, and satting 90% on RA.  CXR was clear of infiltrates and there was no elevated WBC. He did have elevated BNP of 3710 (baseline unknown), Troponin was 0.032, and Cr was 2.05. He was given a DuoNeb and 2L O2 via NC with improvement.  He was given one dose of IV Lasix in the ED and admitted for further evaluation and treatment. He seemed to improve most with neb treatment.  Lasix was not continued after admission.  Creatinine increased to 2.5 and BP became relatively low (100's systolic but patient has a goal SBP of 120-160 with his significant cerebrovascular disease) after admission. IVF was given and diuretics were held.  Creatinine improved over the next few days and blood pressure came up as well. Aldactone has been resumed at PTA dose of 12.5 mg daily.  I am recommending that he continue Lasix 20 mg daily instead of 20 mg BID as PTA.  Amlodipine has been restarted but Lisinopril will remain on hold.  Francesco will follow up with Dr. Cohn in clinic within one week for assessment of volume status and blood pressure as well as assessment of INR.           Procedures / Imaging:   Echocardiogram showed no systolic heart dysfunction and no significant change from prior in 8/16.           Consultations:   No consultations were requested during this admission             Pending Results:   None           Discharge Instructions and Follow-Up:   Discharge diet: Low salt   Discharge activity: Activity as tolerated   Discharge follow-up: Follow up with  primary care provider in <7 days with INR and BMP   Outpatient therapy: None    Other instructions: None             Discharge Medications:   Current Discharge Medication List      CONTINUE these medications which have CHANGED    Details   furosemide (LASIX) 20 MG tablet Take 1 tablet (20 mg) by mouth daily  Qty: 180 tablet, Refills: 3    Associated Diagnoses: Essential hypertension, malignant         CONTINUE these medications which have NOT CHANGED    Details   diphenhydrAMINE-acetaminophen (TYLENOL PM)  MG tablet Take 1 tablet by mouth At Bedtime      acetaminophen (TYLENOL 8 HOUR) 650 MG CR tablet Take 650 mg by mouth every evening as needed for mild pain or fever      !! warfarin (COUMADIN) 2 MG tablet Take 2 mg by mouth once a week on Tuesday      !! warfarin (COUMADIN) 2 MG tablet Take 4mg daily,except 2mg Tues , as directed by the anticoagulation clinic  Qty: 180 tablet, Refills: 0    Associated Diagnoses: Chronic atrial fibrillation (H)      levothyroxine (SYNTHROID/LEVOTHROID) 100 MCG tablet Take 1 tablet (100 mcg) by mouth daily  Qty: 90 tablet, Refills: 2    Associated Diagnoses: Other specified hypothyroidism      spironolactone (ALDACTONE) 25 MG tablet Take 0.5 tablets (12.5 mg) by mouth daily  Qty: 45 tablet, Refills: 3    Associated Diagnoses: Renovascular hypertension with goal blood pressure less than 130/85      amLODIPine (NORVASC) 2.5 MG tablet Take 1 tablet (2.5 mg) by mouth daily  Qty: 90 tablet, Refills: 3    Comments: Please note change in either dose, frequency or tablet strength and discontinue any other orders  for this medication.  Associated Diagnoses: Essential hypertension with goal blood pressure less than 140/90      order for DME Testing being ordered: INR orders as directed to be done at [facility]  To be done monthly as directed.  Qty: 1 Month, Refills: orn    Associated Diagnoses: Chronic atrial fibrillation (H)      Multiple Vitamins-Minerals (ICAPS  PO) Take 1 capsule by mouth 2 times daily      calcium carbonate (TUMS) 500 MG chewable tablet Take 1-2 chew tab by mouth 2 times daily as needed for heartburn      Ferrous Sulfate (IRON) 325 (65 FE) MG tablet Take 1 tablet by mouth 2 times daily      aspirin 81 MG tablet Take 81 mg by mouth daily        !! - Potential duplicate medications found. Please discuss with provider.      STOP taking these medications       lisinopril (PRINIVIL,ZESTRIL) 10 MG tablet Comments:   Reason for Stopping:                  Total time spent in face to face contact with the patient and coordinating discharge was:  35 Minutes

## 2017-02-06 NOTE — PLAN OF CARE
Problem: Cardiac: Heart Failure (Adult)  Goal: Signs and Symptoms of Listed Potential Problems Will be Absent or Manageable (Cardiac: Heart Failure)  Signs and symptoms of listed potential problems will be absent or manageable by discharge/transition of care (reference Cardiac: Heart Failure (Adult) CPG).  Pt up in chair and walking to bathroom with walker and assist of one. Good appetite for dinner. Denies pain. VSS and WNL. Trace lower extremity edema.

## 2017-02-06 NOTE — PLAN OF CARE
Problem: Goal Outcome Summary  Goal: Goal Outcome Summary  Outcome: Adequate for Discharge Date Met:  02/06/17  Pt VSS  Plan to D/C to home to AL facility by Daughter in law for transport  No new medications ordered for D/C  Using walker here normally using cane at home

## 2017-02-07 ENCOUNTER — CARE COORDINATION (OUTPATIENT)
Dept: CARE COORDINATION | Facility: CLINIC | Age: 82
End: 2017-02-07

## 2017-02-07 NOTE — PROGRESS NOTES
Transition Communication Hand-off for Care Transitions to Next Level of Care Provider    Name: Francesco Killian  MRN #: [id number]  Primary Care Provider: Angel Corea     Primary Clinic: [facility] [address] [facility] [address]     Reason for Hospitalization:  Bronchitis with bronchospasm [J20.9]  Acute on chronic congestive heart failure, unspecified congestive heart failure type (H) [I50.9]  Bronchitis [J40]  Admit Date/Time: 2/3/2017  7:21 PM  Discharge Date: 2/06/17   Payor Source: Payor: MEDICA / Plan: MEDICA PRIME SOLUTION / Product Type: Indemnity /     Reason for Communication Hand-off Referral: Fragility, hx of CHF    Discharge Plan:  Discharge Plan:       Most Recent Value    Concerns To Be Addressed care coordination/care conferences           Concern for non-adherence with plan of care:   None  Discharge Needs Assessment:  Needs       Most Recent Value    Equipment Currently Used at Home walker, rolling    Transportation Available family or friend will provide          Already enrolled in Tele-monitoring program and name of program:  No  Follow-up specialty is recommended: No    Follow-up plan:  Future Appointments  Date Time Provider Department Center   2/9/2017 3:20 PM Angel Corea MD [facility]   Follow up with primary care provider, Angel Corea, within 7 days for hospital follow- up.  The following labs/tests are recommended: INR and BMP    Any outstanding tests or procedures:              Ware Recommendations:  Frail, history of CHF, needs follow-up    Isatu Marycruz  [phone]    AVS/Discharge Summary is the source of truth; this is a helpful guide for improved communication of patient story

## 2017-02-07 NOTE — PROGRESS NOTES
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  Patient has a history of CHF, no exacerbation at this time. His acute renal failure has improved. Patient has a history multiple CVA's, has anticoagulation. He has limited vision due to macular degeneration. He is followed by [facility]. 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 He and his spouse live in assisted living with services as needed. His spouse has dementia.      Patient has an appointment with PCP on 2/9/17. Transportation will be provided by family.      Clinic care coordination is not indicated at this time.     Oriana Leger RN, CCM - Care Coordinator     2/7/2017    1:38 PM  [phone]

## 2017-02-09 ENCOUNTER — OFFICE VISIT (OUTPATIENT)
Dept: INTERNAL MEDICINE | Facility: CLINIC | Age: 82
End: 2017-02-09
Payer: COMMERCIAL

## 2017-02-09 VITALS
HEART RATE: 78 BPM | BODY MASS INDEX: 26.21 KG/M2 | OXYGEN SATURATION: 95 % | SYSTOLIC BLOOD PRESSURE: 150 MMHG | HEIGHT: 65 IN | DIASTOLIC BLOOD PRESSURE: 70 MMHG | TEMPERATURE: 98.2 F | WEIGHT: 157.3 LBS

## 2017-02-09 DIAGNOSIS — Z09 HOSPITAL DISCHARGE FOLLOW-UP: Primary | ICD-10-CM

## 2017-02-09 DIAGNOSIS — I50.9 CONGESTIVE HEART FAILURE, UNSPECIFIED CONGESTIVE HEART FAILURE CHRONICITY, UNSPECIFIED CONGESTIVE HEART FAILURE TYPE: ICD-10-CM

## 2017-02-09 DIAGNOSIS — I48.20 CHRONIC ATRIAL FIBRILLATION (H): ICD-10-CM

## 2017-02-09 LAB
BACTERIA SPEC CULT: NO GROWTH
BACTERIA SPEC CULT: NO GROWTH
Lab: NORMAL
Lab: NORMAL
MICRO REPORT STATUS: NORMAL
MICRO REPORT STATUS: NORMAL
SPECIMEN SOURCE: NORMAL
SPECIMEN SOURCE: NORMAL

## 2017-02-09 PROCEDURE — 99215 OFFICE O/P EST HI 40 MIN: CPT | Performed by: INTERNAL MEDICINE

## 2017-02-09 RX ORDER — LISINOPRIL 10 MG/1
10 TABLET ORAL DAILY
Qty: 90 TABLET | Refills: 3
Start: 2017-02-09 | End: 2017-08-10

## 2017-02-09 NOTE — MR AVS SNAPSHOT
After Visit Summary   2/9/2017    Francesco Killian    MRN: [id number]           Patient Information     Date Of Birth          12/26/1920        Visit Information        Provider Department      2/9/2017 3:20 PM Angel Corea MD [facility]        Today's Diagnoses     Hospital discharge follow-up    -  1     Congestive heart failure, unspecified congestive heart failure chronicity, unspecified congestive heart failure type (H)         Chronic atrial fibrillation (H)            Follow-ups after your visit        Future tests that were ordered for you today     Open Future Orders        Priority Expected Expires Ordered    Basic metabolic panel Routine 2/26/2017 3/31/2017 2/9/2017            Who to contact     If you have questions or need follow up information about today's clinic visit or your schedule please contact [facility] directly at [phone].  Normal or non-critical lab and imaging results will be communicated to you by [employer], letter or phone within 4 business days after the clinic has received the results. If you do not hear from us within 7 days, please contact the clinic through [employer] or phone. If you have a critical or abnormal lab result, we will notify you by phone as soon as possible.  Submit refill requests through [email or web address] or call your pharmacy and they will forward the refill request to us. Please allow 3 business days for your refill to be completed.          Additional Information About Your Visit        MyChart Information     [email or web address] gives you secure access to your electronic health record. If you see a primary care provider, you can also send messages to your care team and make appointments. If you have questions, please call your primary care clinic.  If you do not have a primary care provider, please call [phone] and they will assist you.        Care EveryWhere ID     This is your Care EveryWhere ID. This could be  "used by other organizations to access your [place] medical records  [id number]        Your Vitals Were     Pulse Temperature Height BMI (Body Mass Index) Pulse Oximetry       78 98.2  F (36.8  C) (Oral) 5' 5\" (1.651 m) 26.18 kg/m2 95%        Blood Pressure from Last 3 Encounters:   02/09/17 150/70   02/06/17 152/69   10/24/16 115/60    Weight from Last 3 Encounters:   02/09/17 157 lb 4.8 oz (71.351 kg)   02/06/17 155 lb 3.2 oz (70.398 kg)   10/24/16 159 lb 6.4 oz (72.303 kg)              We Performed the Following     DEPRESSION ACTION PLAN (DAP)          Today's Medication Changes          These changes are accurate as of: 2/9/17  3:50 PM.  If you have any questions, ask your nurse or doctor.               Start taking these medicines.        Dose/Directions    lisinopril 10 MG tablet   Commonly known as:  PRINIVIL/ZESTRIL   Used for:  Hospital discharge follow-up, Congestive heart failure, unspecified congestive heart failure chronicity, unspecified congestive heart failure type (H)   Started by:  Angel Corea MD        Dose:  10 mg   Take 1 tablet (10 mg) by mouth daily   Quantity:  90 tablet   Refills:  3            Where to get your medicines      Some of these will need a paper prescription and others can be bought over the counter.  Ask your nurse if you have questions.     You don't need a prescription for these medications    - lisinopril 10 MG tablet             Primary Care Provider Office Phone # Fax #    Angel Croea [phone]       [facility] [address] [place] [address]        Thank you!     Thank you for choosing [facility]  for your care. Our goal is always to provide you with excellent care. Hearing back from our patients is one way we can continue to improve our services. Please take a few minutes to complete the written survey that you may receive in the mail after your visit with us. Thank you!             Your Updated " Medication List - Protect others around you: Learn how to safely use, store and throw away your medicines at www.disposemymeds.org.          This list is accurate as of: 2/9/17  3:50 PM.  Always use your most recent med list.                   Brand Name Dispense Instructions for use    amLODIPine 2.5 MG tablet    NORVASC    90 tablet    Take 1 tablet (2.5 mg) by mouth daily       aspirin 81 MG tablet      Take 81 mg by mouth daily       calcium carbonate 500 MG chewable tablet    TUMS     Take 1-2 chew tab by mouth 2 times daily as needed for heartburn       diphenhydrAMINE-acetaminophen  MG tablet    TYLENOL PM     Take 1 tablet by mouth At Bedtime       furosemide 20 MG tablet    LASIX    180 tablet    Take 1 tablet (20 mg) by mouth daily       ICAPS PO      Take 1 capsule by mouth 2 times daily       iron 325 (65 FE) MG tablet      Take 1 tablet by mouth 2 times daily       levothyroxine 100 MCG tablet    SYNTHROID/LEVOTHROID    90 tablet    Take 1 tablet (100 mcg) by mouth daily       lisinopril 10 MG tablet    PRINIVIL/ZESTRIL    90 tablet    Take 1 tablet (10 mg) by mouth daily       order for DME     1 Month    Testing being ordered: INR orders as directed to be done at [facility] To be done monthly as directed.       spironolactone 25 MG tablet    ALDACTONE    45 tablet    Take 0.5 tablets (12.5 mg) by mouth daily       TYLENOL 8 HOUR 650 MG CR tablet   Generic drug:  acetaminophen      Take 650 mg by mouth every evening as needed for mild pain or fever       * warfarin 2 MG tablet    COUMADIN     Take 2 mg by mouth once a week on Tuesday       * warfarin 2 MG tablet    COUMADIN    180 tablet    Take 4mg daily,except 2mg Tues , as directed by the anticoagulation clinic       * Notice:  This list has 2 medication(s) that are the same as other medications prescribed for you. Read the directions carefully, and ask your doctor or other care provider to review them with you.

## 2017-02-09 NOTE — NURSING NOTE
"Chief Complaint   Patient presents with     Hospital F/U       Initial /70 mmHg  Pulse 78  Temp(Src) 98.2  F (36.8  C) (Oral)  Ht 5' 5\" (1.651 m)  Wt 157 lb 4.8 oz (71.351 kg)  BMI 26.18 kg/m2  SpO2 95% Estimated body mass index is 26.18 kg/(m^2) as calculated from the following:    Height as of this encounter: 5' 5\" (1.651 m).    Weight as of this encounter: 157 lb 4.8 oz (71.351 kg).  Medication Reconciliation: complete   Gypsy Baig CMA      "

## 2017-02-09 NOTE — Clinical Note
My Depression Action Plan  Name: Francesco Killian   Date of Birth 12/26/1920  Date: 2/9/2017    My doctor: Angel Coera   My clinic: [address] [address] [phone]          GREEN    ZONE   Good Control    What it looks like:     Things are going generally well. You have normal up s and down s. You may even feel depressed from time to time, but bad moods usually last less than a day.   What you need to do:  1. Continue to care for yourself (see self care plan)  2. Check your depression survival kit and update it as needed  3. Follow your physician s recommendations including any medication.  4. Do not stop taking medication unless you consult with your physician first.           YELLOW         ZONE Getting Worse    What it looks like:     Depression is starting to interfere with your life.     It may be hard to get out of bed; you may be starting to isolate yourself from others.    Symptoms of depression are starting to last most all day and this has happened for several days.     You may have suicidal thoughts but they are not constant.   What you need to do:     1. Call your care team, your response to treatment will improve if you keep your care team informed of your progress. Yellow periods are signs an adjustment may need to be made.     2. Continue your self-care, even if you have to fake it!    3. Talk to someone in your support network    4. Open up your depression survival kit           RED    ZONE Medical Alert - Get Help    What it looks like:     Depression is seriously interfering with your life.     You may experience these or other symptoms: You can t get out of bed most days, can t work or engage in other necessary activities, you have trouble taking care of basic hygiene, or basic responsibilities, thoughts of suicide or death that will not go away, self-injurious behavior.     What you need to do:  1. Call your care  team and request a same-day appointment. If they are not available (weekends or after hours) call your local crisis line, emergency room or 911.      Electronically signed by: Gypsy Baig, February 9, 2017    Depression Self Care Plan / Survival Kit    Self-Care for Depression  Here s the deal. Your body and mind are really not as separate as most people think.  What you do and think affects how you feel and how you feel influences what you do and think. This means if you do things that people who feel good do, it will help you feel better.  Sometimes this is all it takes.  There is also a place for medication and therapy depending on how severe your depression is, so be sure to consult with your medical provider and/ or Behavioral Health Consultant if your symptoms are worsening or not improving.     In order to better manage my stress, I will:    Exercise  Get some form of exercise, every day. This will help reduce pain and release endorphins, the  feel good  chemicals in your brain. This is almost as good as taking antidepressants!  This is not the same as joining a gym and then never going! (they count on that by the way ) It can be as simple as just going for a walk or doing some gardening, anything that will get you moving.      Hygiene   Maintain good hygiene (Get out of bed in the morning, Make your bed, Brush your teeth, Take a shower, and Get dressed like you were going to work, even if you are unemployed).  If your clothes don't fit try to get ones that do.    Diet  I will strive to eat foods that are good for me, drink plenty of water, and avoid excessive sugar, caffeine, alcohol, and other mood-altering substances.  Some foods that are helpful in depression are: complex carbohydrates, B vitamins, flaxseed, fish or fish oil, fresh fruits and vegetables.    Psychotherapy  I agree to participate in Individual Therapy (if recommended).    Medication  If prescribed medications, I agree to take them.   Missing doses can result in serious side effects.  I understand that drinking alcohol, or other illicit drug use, may cause potential side effects.  I will not stop my medication abruptly without first discussing it with my provider.    Staying Connected With Others  I will stay in touch with my friends, family members, and my primary care provider/team.    Use your imagination  Be creative.  We all have a creative side; it doesn t matter if it s oil painting, sand castles, or mud pies! This will also kick up the endorphins.    Witness Beauty  (AKA stop and smell the roses) Take a look outside, even in mid-winter. Notice colors, textures. Watch the squirrels and birds.     Service to others  Be of service to others.  There is always someone else in need.  By helping others we can  get out of ourselves  and remember the really important things.  This also provides opportunities for practicing all the other parts of the program.    Humor  Laugh and be silly!  Adjust your TV habits for less news and crime-drama and more comedy.    Control your stress  Try breathing deep, massage therapy, biofeedback, and meditation. Find time to relax each day.     My support system    Clinic Contact:  Phone number:    Contact 1:  Phone number:    Contact 2:  Phone number:    Sikhism/:  Phone number:    Therapist:  Phone number:    Local crisis center:    Phone number:    Other community support:  Phone number:

## 2017-02-09 NOTE — PROGRESS NOTES
SUBJECTIVE:                                                    Francesco Killian is a 89 year old male who presents to clinic today for the following health issues:    Hospital Follow-up Visit:    Hospital/Nursing Home/IP Rehab Facility: [facility]  Date of Admission: 02/03/17  Date of Discharge: 02/06/17  Reason(s) for Admission: CHF, shortness of breath            Problems taking medications regularly:  None       Medication changes since discharge: None       Problems adhering to non-medication therapy:  None    Summary of hospitalization:  [facility] discharge summary reviewed  Diagnostic Tests/Treatments reviewed.  Follow up needed: Cardiology  Other Healthcare Providers Involved in Patient s Care:         None  Update since discharge: stable.     Post Discharge Medication Reconciliation: discharge medications reconciled, continue medications without change.  Plan of care communicated with patient, family and caregiver     Coding guidelines for this visit:  Type of Medical   Decision Making Face-to-Face Visit       within 7 Days of discharge Face-to-Face Visit        within 14 days of discharge   Moderate Complexity 28108 [id number]   High Complexity 16198 85817            Problem list and histories reviewed & adjusted, as indicated.  Additional history: as documented    Patient Active Problem List   Diagnosis     Hyperlipidemia LDL goal <130     AAA (abdominal aortic aneurysm) (H)     Dysphagia     Cerebral infarction (H)     Chronic atrial fibrillation (H)     Health Care Home     ACP (advance care planning)     Esophageal stricture     Anemia     Other specified hypothyroidism     Right bundle branch block (RBBB)     Recurrent falls~occulovestibular syndrom     Wet senile macular degeneration (H)     Long-term (current) use of anticoagulants [Z79.01]     Major depressive disorder, single episode, mild (H)     Essential hypertension with goal blood pressure less than 140/90     Acquired  hypothyroidism     CHF (congestive heart failure) (H)     Acute kidney failure (H)     Past Surgical History   Procedure Laterality Date     Gi surgery  1970s     duodenal ulcer reapair     Cardiac surgery  1980s     CABg     Eye surgery       bilat cataracts     Colonoscopy       normal exams     Hernia repair       bilat inguinal     Ir renal/visceral stent/atherect/pta       Turp       Esophagoscopy, gastroscopy, duodenoscopy (egd), combined  10/14/2013     Procedure: COMBINED ESOPHAGOSCOPY, GASTROSCOPY, DUODENOSCOPY (EGD);  COMBINED ESOPHAGOSCOPY, GASTROSCOPY, DUODENOSCOPY (EGD) ;  Surgeon: Aguilar Arechiga MD;  Location:  GI     Colonoscopy  11/4/2013     Procedure: COLONOSCOPY;  COLONOSCOPY ;  Surgeon: Aguilar Arechiga MD;  Location:  GI     C nonspecific procedure       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 Mother      C.A.D. Father      C.A.D. Maternal Grandmother      C.A.D. Maternal Grandfather      C.A.D. Paternal Grandmother      C.A.D. Paternal Grandfather      C.A.D. Brother      C.A.D. Sister      Coronary Artery Disease Mother          Current Outpatient Prescriptions   Medication Sig Dispense Refill     furosemide (LASIX) 20 MG tablet Take 1 tablet (20 mg) by mouth daily 180 tablet 3     diphenhydrAMINE-acetaminophen (TYLENOL PM)  MG tablet Take 1 tablet by mouth At Bedtime       acetaminophen (TYLENOL 8 HOUR) 650 MG CR tablet Take 650 mg by mouth every evening as needed for mild pain or fever       warfarin (COUMADIN) 2 MG tablet Take 2 mg by mouth once a week on Tuesday       warfarin (COUMADIN) 2 MG tablet Take 4mg daily,except 2mg Tues , as directed by the anticoagulation clinic 180 tablet 0     levothyroxine (SYNTHROID/LEVOTHROID) 100 MCG tablet Take 1  "tablet (100 mcg) by mouth daily 90 tablet 2     spironolactone (ALDACTONE) 25 MG tablet Take 0.5 tablets (12.5 mg) by mouth daily 45 tablet 3     amLODIPine (NORVASC) 2.5 MG tablet Take 1 tablet (2.5 mg) by mouth daily 90 tablet 3     order for DME Testing being ordered: INR orders as directed to be done at [facility]  To be done monthly as directed. 1 Month orn     Multiple Vitamins-Minerals (ICAPS PO) Take 1 capsule by mouth 2 times daily       calcium carbonate (TUMS) 500 MG chewable tablet Take 1-2 chew tab by mouth 2 times daily as needed for heartburn       Ferrous Sulfate (IRON) 325 (65 FE) MG tablet Take 1 tablet by mouth 2 times daily       aspirin 81 MG tablet Take 81 mg by mouth daily        No Active Allergies  BP Readings from Last 3 Encounters:   02/06/17 152/69   10/24/16 115/60   08/22/16 136/64    Wt Readings from Last 3 Encounters:   02/06/17 155 lb 3.2 oz (70.398 kg)   10/24/16 159 lb 6.4 oz (72.303 kg)   08/22/16 155 lb (70.308 kg)                    ROS:  C: NEGATIVE for fever, chills, change in weight  E/M: NEGATIVE for ear, mouth and throat problems  R: NEGATIVE for significant cough or SOB  CV: NEGATIVE for chest pain, palpitations or peripheral edema  GI: NEGATIVE for nausea, abdominal pain, heartburn, or change in bowel habits  : NEGATIVE for frequency, dysuria, or hematuria  M: NEGATIVE for significant arthralgias or myalgia  H: NEGATIVE for bleeding problems  P: NEGATIVE for changes in mood or affect    OBJECTIVE:                                                    /70 mmHg  Pulse 78  Temp(Src) 98.2  F (36.8  C) (Oral)  Ht 5' 5\" (1.651 m)  Wt 157 lb 4.8 oz (71.351 kg)  BMI 26.18 kg/m2  SpO2 95%  Body mass index is 26.18 kg/(m^2).  GENERAL:  Alert, frail  EYES: Eyes grossly normal to inspection, extraocular movements - intact, and PERRL  HENT: ear canals- normal; TMs- normal; Nose- normal; Mouth- no ulcers, no lesions  NECK: no tenderness, no adenopathy, " no asymmetry, no masses, no stiffness; thyroid- normal to palpation  RESP: lungs clear to auscultation - no rales, no rhonchi, no wheezes  CV: irregular rates and rhythm, normal S1 S2 and no click or rub, noted AI  MS: extremities- no gross deformities noted  PSYCH: Alert and oriented times 3; speech- coherent , normal rate and volume; able to articulate logical thoughts, able to abstract reason, no tangential thoughts, no hallucinations or delusions, affect- normal  PSYCH: baseline memory changes noted    Component      Latest Ref Rng 2/5/2017 2/6/2017   Sodium      133 - 144 mmol/L 139 139   Potassium      3.4 - 5.3 mmol/L 4.3 4.3   Chloride      94 - 109 mmol/L 109 110 (H)   Carbon Dioxide      20 - 32 mmol/L 21 20   Anion Gap      3 - 14 mmol/L 9 9   Glucose      70 - 99 mg/dL 82 86   Urea Nitrogen      7 - 30 mg/dL 60 (H) 47 (H)   Creatinine      0.66 - 1.25 mg/dL 2.03 (H) 1.49 (H)   GFR Estimate      >60 mL/min/1.7m2 31 (L) 44 (L)   GFR Estimate If Black      >60 mL/min/1.7m2 37 (L) 53 (L)   Calcium      8.5 - 10.1 mg/dL 8.2 (L) 8.8   INR      0.86 - 1.14 3.48 (H) 2.73 (H)        ASSESSMENT/PLAN:                                                      (Z09) Hospital discharge follow-up  (primary encounter diagnosis)  Comment: stable and doing well  Plan: lisinopril (PRINIVIL/ZESTRIL) 10 MG tablet            (I50.9) Congestive heart failure, unspecified congestive heart failure chronicity, unspecified congestive heart failure type (H)  Comment: appears euvolemic  Plan: lisinopril (PRINIVIL/ZESTRIL) 10 MG tablet,         Basic metabolic panel        Start back low dose ACE    (I48.2) Chronic atrial fibrillation (H)  Comment: rate controlled  Plan:       See Patient Instructions    Angel Corea MD  [facility]    THE MEDICATION LIST HAS BEEN FULLY RECONCILED BY THE M.D. AND THE NURSING STAFF.

## 2017-02-28 ENCOUNTER — MEDICAL CORRESPONDENCE (OUTPATIENT)
Dept: HEALTH INFORMATION MANAGEMENT | Facility: CLINIC | Age: 82
End: 2017-02-28

## 2017-03-09 ENCOUNTER — ANTICOAGULATION THERAPY VISIT (OUTPATIENT)
Dept: ANTICOAGULATION | Facility: CLINIC | Age: 82
End: 2017-03-09
Payer: COMMERCIAL

## 2017-03-09 DIAGNOSIS — Z79.01 LONG-TERM (CURRENT) USE OF ANTICOAGULANTS: ICD-10-CM

## 2017-03-09 DIAGNOSIS — I48.20 CHRONIC ATRIAL FIBRILLATION (H): ICD-10-CM

## 2017-03-09 LAB — INR PPP: 3.44

## 2017-03-09 PROCEDURE — 99207 ZZC NO CHARGE NURSE ONLY: CPT

## 2017-03-09 NOTE — MR AVS SNAPSHOT
Francesco Killian   3/9/2017 3:30 PM   Anticoagulation Therapy Visit    Description:  89 year old male   Provider:   ANTICOAGULATION CLINIC   Department:   Anti Coagulation           INR as of 3/9/2017     Today's INR 3.44!      Anticoagulation Summary as of 3/9/2017     INR goal 2.0-3.0   Today's INR 3.44!   Full instructions 3/9: 2 mg; Otherwise 2 mg on Tue; 4 mg all other days   Next INR check 3/23/2017    Indications   Chronic atrial fibrillation (H) [I48.2]  Long-term (current) use of anticoagulants [Z79.01] [Z79.01]         Your next Anticoagulation Clinic appointment(s)     Mar 22, 2017  8:45 AM CDT   Anticoagulation Visit with  ANTICOAGULATION CLINIC   [facility]    [address] [place] [address] [phone]              Contact Numbers     [facility]  Please call  [phone] to cancel and/or reschedule your appointment   Please call  [phone] with any problems or questions regarding your therapy.        March 2017 Details    Sun Mon Tue Wed Thu Fri Sat        1               2               3               4                 5               6               7               8               9      2 mg   See details      10      4 mg         11      4 mg           12      4 mg         13      4 mg         14      2 mg         15      4 mg         16      4 mg         17      4 mg         18      4 mg           19      4 mg         20      4 mg         21      2 mg         22      4 mg         23            24               25                 26               27               28               29               30               31                 Date Details   03/09 This INR check       Date of next INR:  3/23/2017         How to take your warfarin dose     To take:  2 mg Take 1 of the 2 mg tablets.    To take:  4 mg Take 2 of the 2 mg tablets.

## 2017-03-09 NOTE — PROGRESS NOTES
ANTICOAGULATION FOLLOW-UP CLINIC VISIT    Patient Name:  Francesco Killian  Date:  3/9/2017  Contact Type:  Telephone/ dosing called to daughter in law lujan and faxed to [facility] [phone]    SUBJECTIVE:        OBJECTIVE    INR   Date Value Ref Range Status   02/06/2017 2.73 (H) 0.86 - 1.14 Final       ASSESSMENT / PLAN  No question data found.  Anticoagulation Summary as of 3/9/2017     INR goal 2.0-3.0   Today's INR    Plan last modified Haley Beth RN (1/5/2017)   Next INR check    Target end date Indefinite    Indications   Chronic atrial fibrillation (H) [I48.2]  Long-term (current) use of anticoagulants [Z79.01] [Z79.01]         Anticoagulation Episode Summary     INR check location     Preferred lab     Send INR reminders to [facility]    Comments             See the Encounter Report to view Anticoagulation Flowsheet and Dosing Calendar (Go to Encounters tab in chart review, and find the Anticoagulation Therapy Visit)    Dosage adjustment made based on physician directed care plan.    Haley Beth RN

## 2017-03-23 ENCOUNTER — ANTICOAGULATION THERAPY VISIT (OUTPATIENT)
Dept: ANTICOAGULATION | Facility: CLINIC | Age: 82
End: 2017-03-23
Payer: COMMERCIAL

## 2017-03-23 DIAGNOSIS — Z79.01 LONG-TERM (CURRENT) USE OF ANTICOAGULANTS: ICD-10-CM

## 2017-03-23 DIAGNOSIS — I48.20 CHRONIC ATRIAL FIBRILLATION (H): ICD-10-CM

## 2017-03-23 LAB — INR PPP: 3.21

## 2017-03-23 PROCEDURE — 99207 ZZC NO CHARGE NURSE ONLY: CPT

## 2017-03-23 NOTE — MR AVS SNAPSHOT
Francesco Killian   3/23/2017 9:45 AM   Anticoagulation Therapy Visit    Description:  89 year old male   Provider:   ANTICOAGULATION CLINIC   Department:   Anti Coagulation           INR as of 3/23/2017     Today's INR 3.21!      Anticoagulation Summary as of 3/23/2017     INR goal 2.0-3.0   Today's INR 3.21!   Full instructions 3/23: 2 mg; Otherwise 2 mg on Mon, Thu; 4 mg all other days   Next INR check 4/6/2017    Indications   Chronic atrial fibrillation (H) [I48.2]  Long-term (current) use of anticoagulants [Z79.01] [Z79.01]         Your next Anticoagulation Clinic appointment(s)     Apr 06, 2017 11:45 AM CDT   Anticoagulation Visit with  [facility]ULATION CLINIC   [facility]    [address] [place] [address] [phone]              Contact Numbers     [facility]  Please call  [phone] to cancel and/or reschedule your appointment   Please call  [phone] with any problems or questions regarding your therapy.        March 2017 Details    Sun Mon Tue Wed Thu Fri Sat        1               2               3               4                 5               6               7               8               9               10               11                 12               13               14               15               16               17               18                 19               20               21               22               23      2 mg   See details      24      4 mg         25      4 mg           26      4 mg         27      2 mg         28      4 mg         29      4 mg         30      2 mg         31      4 mg           Date Details   03/23 This INR check               How to take your warfarin dose     To take:  2 mg Take 1 of the 2 mg tablets.    To take:  4 mg Take 2 of the 2 mg tablets.           April 2017 Details    Sun Mon Tue Wed Thu Fri Sat           1      4 mg           2      4 mg          3      2 mg         4      4 mg         5      4 mg         6            7               8                 9               10               11               12               13               14               15                 16               17               18               19               20               21               22                 23               24               25               26               27               28               29                 30                      Date Details   No additional details    Date of next INR:  4/6/2017         How to take your warfarin dose     To take:  2 mg Take 1 of the 2 mg tablets.    To take:  4 mg Take 2 of the 2 mg tablets.

## 2017-03-23 NOTE — PROGRESS NOTES
ANTICOAGULATION FOLLOW-UP CLINIC VISIT    Patient Name:  Francesco Killian  Date:  3/23/2017  Contact Type:  Telephone/ dosing faxed to [facility], [phone] and called to Daughter in Law Sherman     SUBJECTIVE:     Patient Findings     Positives No Problem Findings           OBJECTIVE    INR   Date Value Ref Range Status   03/23/2017 3.21  Final       ASSESSMENT / PLAN  INR assessment SUPRA    Recheck INR In: 2 WEEKS    INR Location Outside lab      Anticoagulation Summary as of 3/23/2017     INR goal 2.0-3.0   Today's INR 3.21!   Maintenance plan 2 mg (2 mg x 1) on Mon, Thu; 4 mg (2 mg x 2) all other days   Full instructions 3/23: 2 mg; Otherwise 2 mg on Mon, Thu; 4 mg all other days   Weekly total 24 mg   Plan last modified Haley Beth RN (3/23/2017)   Next INR check 4/6/2017   Target end date Indefinite    Indications   Chronic atrial fibrillation (H) [I48.2]  Long-term (current) use of anticoagulants [Z79.01] [Z79.01]         Anticoagulation Episode Summary     INR check location     Preferred lab     Send [facility] reminders to [facility]    Comments             See the Encounter Report to view Anticoagulation Flowsheet and Dosing Calendar (Go to Encounters tab in chart review, and find the Anticoagulation Therapy Visit)    Dosage adjustment made based on physician directed care plan.    Haley Beth RN

## 2017-04-05 ENCOUNTER — TELEPHONE (OUTPATIENT)
Dept: INTERNAL MEDICINE | Facility: CLINIC | Age: 82
End: 2017-04-05

## 2017-04-05 ENCOUNTER — TRANSFERRED RECORDS (OUTPATIENT)
Dept: HEALTH INFORMATION MANAGEMENT | Facility: CLINIC | Age: 82
End: 2017-04-05

## 2017-04-05 DIAGNOSIS — I48.20 CHRONIC ATRIAL FIBRILLATION (H): ICD-10-CM

## 2017-04-05 RX ORDER — WARFARIN SODIUM 2 MG/1
TABLET ORAL
Qty: 180 TABLET | Refills: 0 | Status: SHIPPED | OUTPATIENT
Start: 2017-04-05 | End: 2017-07-02

## 2017-04-05 NOTE — TELEPHONE ENCOUNTER
warfarin (COUMADIN) 2 MG tablet    Last Written Prescription Date: 1/06/2017  Last Fill Qty: 180, # refills: 0  Last Office Visit with [facility], [facility] or [facility] prescribing provider: 2/09/2017       Date and Result of Last PT/INR:   Lab Results   Component Value Date    INR 3.21 03/23/2017    INR 3.44 03/09/2017

## 2017-04-05 NOTE — TELEPHONE ENCOUNTER
Prior authorization    Medication name warfarin  Insurance medicareblue  Insurance ID number [id number]  Prior authorization faxed through cover my meds

## 2017-04-06 ENCOUNTER — ANTICOAGULATION THERAPY VISIT (OUTPATIENT)
Dept: ANTICOAGULATION | Facility: CLINIC | Age: 82
End: 2017-04-06
Payer: COMMERCIAL

## 2017-04-06 DIAGNOSIS — I48.20 CHRONIC ATRIAL FIBRILLATION (H): ICD-10-CM

## 2017-04-06 DIAGNOSIS — Z79.01 LONG-TERM (CURRENT) USE OF ANTICOAGULANTS: ICD-10-CM

## 2017-04-06 LAB — INR PPP: 2.86

## 2017-04-06 PROCEDURE — 99207 ZZC NO CHARGE NURSE ONLY: CPT

## 2017-04-06 NOTE — MR AVS SNAPSHOT
Francesco Killian   4/6/2017 11:45 AM   Anticoagulation Therapy Visit    Description:  89 year old male   Provider:   ANTICOAGULATION CLINIC   Department:   Anti Coagulation           INR as of 4/6/2017     Today's INR 2.86      Anticoagulation Summary as of 4/6/2017     INR goal 2.0-3.0   Today's INR 2.86   Full instructions 2 mg on Mon, Thu; 4 mg all other days   Next INR check 4/27/2017    Indications   Chronic atrial fibrillation (H) [I48.2]  Long-term (current) use of anticoagulants [Z79.01] [Z79.01]         Your next Anticoagulation Clinic appointment(s)     Apr 27, 2017  9:15 AM CDT   Anticoagulation Visit with  ANTICOAGULATION CLINIC   [facility]    [address] [place] [address] [phone]              Contact Numbers     [facility]  Please call  [phone] to cancel and/or reschedule your appointment   Please call  [phone] with any problems or questions regarding your therapy.        April 2017 Details    Sun Mon Tue Wed Thu Fri Sat           1                 2               3               4               5               6      2 mg   See details      7      4 mg         8      4 mg           9      4 mg         10      2 mg         11      4 mg         12      4 mg         13      2 mg         14      4 mg         15      4 mg           16      4 mg         17      2 mg         18      4 mg         19      4 mg         20      2 mg         21      4 mg         22      4 mg           23      4 mg         24      2 mg         25      4 mg         26      4 mg         27            28               29                 30                      Date Details   04/06 This INR check       Date of next INR:  4/27/2017         How to take your warfarin dose     To take:  2 mg Take 1 of the 2 mg tablets.    To take:  4 mg Take 2 of the 2 mg tablets.

## 2017-04-06 NOTE — PROGRESS NOTES
ANTICOAGULATION FOLLOW-UP CLINIC VISIT    Patient Name:  Francesco Killian  Date:  4/6/2017  Contact Type:  Telephone/ faxed to [facility] [phone] and dosing called to daughter in law Sherman at [phone]    SUBJECTIVE:     Patient Findings     Positives No Problem Findings           OBJECTIVE    INR   Date Value Ref Range Status   04/06/2017 2.86  Final       ASSESSMENT / PLAN  INR assessment THER    Recheck INR In: 3 WEEKS    INR Location Outside lab      Anticoagulation Summary as of 4/6/2017     INR goal 2.0-3.0   Today's INR 2.86   Maintenance plan 2 mg (2 mg x 1) on Mon, Thu; 4 mg (2 mg x 2) all other days   Full instructions 2 mg on Mon, Thu; 4 mg all other days   Weekly total 24 mg   No change documented Haley Beth RN   Plan last modified Haley Beth RN (3/23/2017)   Next INR check 4/27/2017   Target end date Indefinite    Indications   Chronic atrial fibrillation (H) [I48.2]  Long-term (current) use of anticoagulants [Z79.01] [Z79.01]         Anticoagulation Episode Summary     INR check location     Preferred lab     Send INR reminders to [facility]    Comments             See the Encounter Report to view Anticoagulation Flowsheet and Dosing Calendar (Go to Encounters tab in chart review, and find the Anticoagulation Therapy Visit)    Dosage adjustment made based on physician directed care plan.    Haley Beth RN

## 2017-05-01 ENCOUNTER — MEDICAL CORRESPONDENCE (OUTPATIENT)
Dept: HEALTH INFORMATION MANAGEMENT | Facility: CLINIC | Age: 82
End: 2017-05-01

## 2017-05-08 ENCOUNTER — ANTICOAGULATION THERAPY VISIT (OUTPATIENT)
Dept: ANTICOAGULATION | Facility: CLINIC | Age: 82
End: 2017-05-08
Payer: COMMERCIAL

## 2017-05-08 DIAGNOSIS — I48.20 CHRONIC ATRIAL FIBRILLATION (H): ICD-10-CM

## 2017-05-08 DIAGNOSIS — Z79.01 LONG-TERM (CURRENT) USE OF ANTICOAGULANTS: ICD-10-CM

## 2017-05-08 LAB — INR PPP: 3.4

## 2017-05-08 PROCEDURE — 99207 ZZC NO CHARGE NURSE ONLY: CPT | Performed by: INTERNAL MEDICINE

## 2017-05-08 NOTE — MR AVS SNAPSHOT
Francesco Killian   5/8/2017   Anticoagulation Therapy Visit    Description:  89 year old male   Provider:  Angel Corea MD   Department:  Ox Anti Coagulation           INR as of 5/8/2017     Today's INR 3.4!      Anticoagulation Summary as of 5/8/2017     INR goal 2.0-3.0   Today's INR 3.4!   Full instructions 2 mg on Mon, Thu; 4 mg all other days   Next INR check 5/17/2017    Indications   Chronic atrial fibrillation (H) [I48.2]  Long-term (current) use of anticoagulants [Z79.01] [Z79.01]         Your next Anticoagulation Clinic appointment(s)     May 17, 2017  9:00 AM CDT   Anticoagulation Visit with  ANTICOAGULATION CLINIC   [facility]    [address] [place] [address] [phone]              Contact Numbers     [facility]  Please call  [phone] to cancel and/or reschedule your appointment   Please call  [phone] with any problems or questions regarding your therapy.        May 2017 Details    Sun Mon Tue Wed Thu Fri Sat      1               2               3               4               5               6                 7               8      2 mg   See details      9      4 mg         10      4 mg         11      2 mg         12      4 mg         13      4 mg           14      4 mg         15      2 mg         16      4 mg         17            18               19               20                 21               22               23               24               25               26               27                 28               29               30               31                   Date Details   05/08 This INR check       Date of next INR:  5/17/2017         How to take your warfarin dose     To take:  2 mg Take 1 of the 2 mg tablets.    To take:  4 mg Take 2 of the 2 mg tablets.

## 2017-05-08 NOTE — PROGRESS NOTES
ANTICOAGULATION FOLLOW-UP CLINIC VISIT    Patient Name:  Francesco Killian  Date:  5/8/2017  Contact Type:  Telephone/ dosing faxed and called to [facility]    SUBJECTIVE:     Patient Findings     Positives No Problem Findings    Comments Pt had INR checked on 4/27/17, fax never received.  Pt has had no changes.  Will keep dosing the same and recheck on 5/17/17           OBJECTIVE    INR   Date Value Ref Range Status   05/08/2017 3.4  Final       ASSESSMENT / PLAN  No question data found.  Anticoagulation Summary as of 5/8/2017     INR goal 2.0-3.0   Today's INR 3.4!   Maintenance plan 2 mg (2 mg x 1) on Mon, Thu; 4 mg (2 mg x 2) all other days   Full instructions 2 mg on Mon, Thu; 4 mg all other days   Weekly total 24 mg   Plan last modified Haley Beth RN (3/23/2017)   Next INR check 5/17/2017   Target end date Indefinite    Indications   Chronic atrial fibrillation (H) [I48.2]  Long-term (current) use of anticoagulants [Z79.01] [Z79.01]         Anticoagulation Episode Summary     INR check location     Preferred lab     Send INR reminders to [facility]    Comments             See the Encounter Report to view Anticoagulation Flowsheet and Dosing Calendar (Go to Encounters tab in chart review, and find the Anticoagulation Therapy Visit)    Dosage adjustment made based on physician directed care plan.    Haley Beth RN

## 2017-05-18 ENCOUNTER — ANTICOAGULATION THERAPY VISIT (OUTPATIENT)
Dept: ANTICOAGULATION | Facility: CLINIC | Age: 82
End: 2017-05-18
Payer: COMMERCIAL

## 2017-05-18 DIAGNOSIS — Z79.01 LONG-TERM (CURRENT) USE OF ANTICOAGULANTS: ICD-10-CM

## 2017-05-18 DIAGNOSIS — I48.20 CHRONIC ATRIAL FIBRILLATION (H): ICD-10-CM

## 2017-05-18 LAB — INR PPP: 3.51

## 2017-05-18 PROCEDURE — 99207 ZZC NO CHARGE NURSE ONLY: CPT

## 2017-05-18 NOTE — PROGRESS NOTES
ANTICOAGULATION FOLLOW-UP CLINIC VISIT    Patient Name:  Francesco Killian  Date:  5/18/2017  Contact Type:  Telephone/  faxed to [facility] [phone] and dosing called to daughter in law Sherman at [phone]    SUBJECTIVE:     Patient Findings     Positives No Problem Findings           OBJECTIVE    INR   Date Value Ref Range Status   05/18/2017 3.51  Final       ASSESSMENT / PLAN  No question data found.  Anticoagulation Summary as of 5/18/2017     INR goal 2.0-3.0   Today's INR 3.51!   Maintenance plan 2 mg (2 mg x 1) on Mon, Wed, Fri; 4 mg (2 mg x 2) all other days   Full instructions 5/18: 1 mg; Otherwise 2 mg on Mon, Wed, Fri; 4 mg all other days   Weekly total 22 mg   Plan last modified Haley Beth RN (5/18/2017)   Next INR check 6/1/2017   Target end date Indefinite    Indications   Chronic atrial fibrillation (H) [I48.2]  Long-term (current) use of anticoagulants [Z79.01] [Z79.01]         Anticoagulation Episode Summary     INR check location     Preferred lab     Send [facility] to [facility]    Comments             See the Encounter Report to view Anticoagulation Flowsheet and Dosing Calendar (Go to Encounters tab in chart review, and find the Anticoagulation Therapy Visit)    Dosage adjustment made based on physician directed care plan.    Haley Beth RN

## 2017-05-18 NOTE — MR AVS SNAPSHOT
Francesco Killian   5/18/2017 9:15 AM   Anticoagulation Therapy Visit    Description:  89 year old male   Provider:   ANTICOAGULATION CLINIC   Department:   Anti Coagulation           INR as of 5/18/2017     Today's INR 3.51!      Anticoagulation Summary as of 5/18/2017     INR goal 2.0-3.0   Today's INR 3.51!   Full instructions 5/18: 1 mg; Otherwise 2 mg on Mon, Wed, Fri; 4 mg all other days   Next INR check 6/1/2017    Indications   Chronic atrial fibrillation (H) [I48.2]  Long-term (current) use of anticoagulants [Z79.01] [Z79.01]         Your next Anticoagulation Clinic appointment(s)     May 18, 2017  9:15 AM CDT   Anticoagulation Visit with  ANTICOAGULATION CLINIC   [facility] ([facility]    [address] [place] [id number] [phone]            Jun 01, 2017  8:00 AM CDT   Anticoagulation Visit with  ANTICOAGULATION CLINIC   [facility] ([facility])    [address] [place] [id number] [phone]              Contact Numbers     [facility]  Please call  [phone] to cancel and/or reschedule your appointment   Please call  [phone] with any problems or questions regarding your therapy.        May 2017 Details    Sun Mon Tue Wed Thu Fri Sat      1               2               3               4               5               6                 7               8               9               10               11               12               13                 14               15               16               17               18      1 mg   See details      19      2 mg         20      4 mg           21      4 mg         22      2 mg         23      4 mg         24      2 mg         25      4 mg         26      2 mg         27      4 mg           28      4 mg         29      2 mg         30      4 mg         31      2 mg             Date Details   05/18 This  INR check               How to take your warfarin dose     To take:  1 mg Take 0.5 of a 2 mg tablet.    To take:  2 mg Take 1 of the 2 mg tablets.    To take:  4 mg Take 2 of the 2 mg tablets.           June 2017 Details    Sun Mon Tue Wed Thu Fri Sat         1            2               3                 4               5               6               7               8               9               10                 11               12               13               14               15               16               17                 18               19               20               21               22               23               24                 25               26               27               28               29               30                 Date Details   No additional details    Date of next INR:  6/1/2017         How to take your warfarin dose     To take:  4 mg Take 2 of the 2 mg tablets.

## 2017-06-01 ENCOUNTER — ANTICOAGULATION THERAPY VISIT (OUTPATIENT)
Dept: ANTICOAGULATION | Facility: CLINIC | Age: 82
End: 2017-06-01
Payer: COMMERCIAL

## 2017-06-01 DIAGNOSIS — Z79.01 LONG-TERM (CURRENT) USE OF ANTICOAGULANTS: ICD-10-CM

## 2017-06-01 DIAGNOSIS — I48.20 CHRONIC ATRIAL FIBRILLATION (H): ICD-10-CM

## 2017-06-01 LAB — INR PPP: 3.54

## 2017-06-01 PROCEDURE — 99207 ZZC NO CHARGE NURSE ONLY: CPT

## 2017-06-01 NOTE — MR AVS SNAPSHOT
Francesco Killian   6/1/2017 8:00 AM   Anticoagulation Therapy Visit    Description:  89 year old male   Provider:   ANTICOAGULATION CLINIC   Department:   Anti Coagulation           INR as of 6/1/2017     Today's INR 3.54!      Anticoagulation Summary as of 6/1/2017     INR goal 2.0-3.0   Today's INR 3.54!   Full instructions 6/1: 2 mg; Otherwise 4 mg on Mon, Wed, Fri; 2 mg all other days   Next INR check 6/15/2017    Indications   Chronic atrial fibrillation (H) [I48.2]  Long-term (current) use of anticoagulants [Z79.01] [Z79.01]         Your next Anticoagulation Clinic appointment(s)     Mohit 15, 2017  8:45 AM CDT   Anticoagulation Visit with  ANTICOAGULATION CLINIC   [facility] ([facility])    [address] [place] [id number] [phone]              Contact Numbers     [facility]  Please call  [phone] to cancel and/or reschedule your appointment   Please call  [phone] with any problems or questions regarding your therapy.        June 2017 Details    Sun Mon Tue Wed Thu Fri Sat         1      2 mg   See details      2      4 mg         3      2 mg           4      2 mg         5      4 mg         6      2 mg         7      4 mg         8      2 mg         9      4 mg         10      2 mg           11      2 mg         12      4 mg         13      2 mg         14      4 mg         15            16               17                 18               19               20               21               22               23               24                 25               26               27               28               29               30                 Date Details   06/01 This INR check       Date of next INR:  6/15/2017         How to take your warfarin dose     To take:  2 mg Take 1 of the 2 mg tablets.    To take:  4 mg Take 2 of the 2 mg tablets.

## 2017-06-01 NOTE — PROGRESS NOTES
ANTICOAGULATION FOLLOW-UP CLINIC VISIT    Patient Name:  Francesco Killian  Date:  6/1/2017  Contact Type:  Telephone/ dosing called to daughter in law Whit [phone] orders faxed to [facility] [phone]    SUBJECTIVE:     Patient Findings     Positives No Problem Findings           OBJECTIVE    INR   Date Value Ref Range Status   06/01/2017 3.54  Final       ASSESSMENT / PLAN  No question data found.  Anticoagulation Summary as of 6/1/2017     INR goal 2.0-3.0   Today's INR 3.54!   Maintenance plan 4 mg (2 mg x 2) on Mon, Wed, Fri; 2 mg (2 mg x 1) all other days   Full instructions 6/1: 2 mg; Otherwise 4 mg on Mon, Wed, Fri; 2 mg all other days   Weekly total 20 mg   Plan last modified Haley Beth RN (6/1/2017)   Next INR check 6/15/2017   Target end date Indefinite    Indications   Chronic atrial fibrillation (H) [I48.2]  Long-term (current) use of anticoagulants [Z79.01] [Z79.01]         Anticoagulation Episode Summary     INR check location     Preferred lab     Send INR reminders to [facility]    Comments             See the Encounter Report to view Anticoagulation Flowsheet and Dosing Calendar (Go to Encounters tab in chart review, and find the Anticoagulation Therapy Visit)    Dosage adjustment made based on physician directed care plan.    Haley Beth RN

## 2017-06-14 ENCOUNTER — TRANSFERRED RECORDS (OUTPATIENT)
Dept: HEALTH INFORMATION MANAGEMENT | Facility: CLINIC | Age: 82
End: 2017-06-14

## 2017-06-14 ENCOUNTER — ANTICOAGULATION THERAPY VISIT (OUTPATIENT)
Dept: ANTICOAGULATION | Facility: CLINIC | Age: 82
End: 2017-06-14
Payer: COMMERCIAL

## 2017-06-14 DIAGNOSIS — I48.20 CHRONIC ATRIAL FIBRILLATION (H): ICD-10-CM

## 2017-06-14 DIAGNOSIS — Z79.01 LONG-TERM (CURRENT) USE OF ANTICOAGULANTS: ICD-10-CM

## 2017-06-14 LAB — INR PPP: 2.26

## 2017-06-14 PROCEDURE — 99207 ZZC NO CHARGE NURSE ONLY: CPT

## 2017-06-14 NOTE — MR AVS SNAPSHOT
Francesco Killian   6/14/2017 2:30 PM   Anticoagulation Therapy Visit    Description:  89 year old male   Provider:   ANTICOAGULATION CLINIC   Department:   Anti Coagulation           INR as of 6/14/2017     Today's INR 2.26      Anticoagulation Summary as of 6/14/2017     INR goal 2.0-3.0   Today's INR 2.26   Full instructions 4 mg on Mon, Wed, Fri; 2 mg all other days   Next INR check 6/28/2017    Indications   Chronic atrial fibrillation (H) [I48.2]  Long-term (current) use of anticoagulants [Z79.01] [Z79.01]         Contact Numbers     [facility]  Please call  [phone] to cancel and/or reschedule your appointment   Please call  [phone] with any problems or questions regarding your therapy.        June 2017 Details    Sun Mon Tue Wed Thu Fri Sat         1               2               3                 4               5               6               7               8               9               10                 11               12               13               14      4 mg   See details      15      2 mg         16      4 mg         17      2 mg           18      2 mg         19      4 mg         20      2 mg         21      4 mg         22      2 mg         23      4 mg         24      2 mg           25      2 mg         26      4 mg         27      2 mg         28            29               30                 Date Details   06/14 This INR check       Date of next INR:  6/28/2017         How to take your warfarin dose     To take:  2 mg Take 1 of the 2 mg tablets.    To take:  4 mg Take 2 of the 2 mg tablets.

## 2017-06-15 NOTE — PROGRESS NOTES
ANTICOAGULATION FOLLOW-UP CLINIC VISIT    Patient Name:  Francesco Killian  Date:  6/14/2017  Contact Type:  Telephone    SUBJECTIVE:     Patient Findings     Positives No Problem Findings           OBJECTIVE    INR   Date Value Ref Range Status   06/14/2017 2.26  Final       ASSESSMENT / PLAN  INR assessment THER    Recheck INR In: 2 WEEKS    INR Location [facility]      Anticoagulation Summary as of 6/14/2017     INR goal 2.0-3.0   Today's INR 2.26   Maintenance plan 4 mg (2 mg x 2) on Mon, Wed, Fri; 2 mg (2 mg x 1) all other days   Full instructions 4 mg on Mon, Wed, Fri; 2 mg all other days   Weekly total 20 mg   No change documented Annamarie Dejesus, RN   Plan last modified Haley Beth RN (6/1/2017)   Next INR check 6/28/2017   Target end date Indefinite    Indications   Chronic atrial fibrillation (H) [I48.2]  Long-term (current) use of anticoagulants [Z79.01] [Z79.01]         Anticoagulation Episode Summary     INR check location     Preferred lab     Send INR reminders to [facility]    Comments             See the Encounter Report to view Anticoagulation Flowsheet and Dosing Calendar (Go to Encounters tab in chart review, and find the Anticoagulation Therapy Visit)        Annamarie Dejesus, RN

## 2017-06-19 ENCOUNTER — TELEPHONE (OUTPATIENT)
Dept: NURSING | Facility: CLINIC | Age: 82
End: 2017-06-19

## 2017-06-19 NOTE — TELEPHONE ENCOUNTER
Patient's daughter-in-law, Whit, calling concerned of patient's recent low blood pressure readings.  On Friday patient's BP and pulse were as follows; 90/46 and 64 BPM in AM, 114/68 and 74 BPM in PM.  Today BP was 82/52 with pulse of 54.  Patient asymptomatic.  Denies dizziness, light-headedness, etc.  Whit is asking if you would like to change any of patient's medications.  Informed Whit that you are out of town until Wednesday but that I could pass message along to partner.  Whit declined and asked that you address issue on Wednesday.  Advised Whit to call if patient's condition changes.  Whit understood.  Please advise.

## 2017-07-11 ENCOUNTER — OFFICE VISIT (OUTPATIENT)
Dept: INTERNAL MEDICINE | Facility: CLINIC | Age: 82
End: 2017-07-11
Payer: COMMERCIAL

## 2017-07-11 VITALS
WEIGHT: 143.7 LBS | BODY MASS INDEX: 23.91 KG/M2 | SYSTOLIC BLOOD PRESSURE: 118 MMHG | TEMPERATURE: 98.2 F | DIASTOLIC BLOOD PRESSURE: 59 MMHG | OXYGEN SATURATION: 99 % | HEART RATE: 61 BPM

## 2017-07-11 DIAGNOSIS — I48.20 CHRONIC ATRIAL FIBRILLATION (H): ICD-10-CM

## 2017-07-11 DIAGNOSIS — F39 EPISODIC MOOD DISORDER (H): ICD-10-CM

## 2017-07-11 DIAGNOSIS — I10 ESSENTIAL HYPERTENSION WITH GOAL BLOOD PRESSURE LESS THAN 140/90: Primary | ICD-10-CM

## 2017-07-11 DIAGNOSIS — I50.9 CONGESTIVE HEART FAILURE, UNSPECIFIED CONGESTIVE HEART FAILURE CHRONICITY, UNSPECIFIED CONGESTIVE HEART FAILURE TYPE: ICD-10-CM

## 2017-07-11 LAB — HGB BLD-MCNC: 10.8 G/DL (ref 13.3–17.7)

## 2017-07-11 PROCEDURE — 80048 BASIC METABOLIC PNL TOTAL CA: CPT | Performed by: INTERNAL MEDICINE

## 2017-07-11 PROCEDURE — 36415 COLL VENOUS BLD VENIPUNCTURE: CPT | Performed by: INTERNAL MEDICINE

## 2017-07-11 PROCEDURE — 84439 ASSAY OF FREE THYROXINE: CPT | Performed by: INTERNAL MEDICINE

## 2017-07-11 PROCEDURE — 99214 OFFICE O/P EST MOD 30 MIN: CPT | Performed by: INTERNAL MEDICINE

## 2017-07-11 PROCEDURE — 85018 HEMOGLOBIN: CPT | Performed by: INTERNAL MEDICINE

## 2017-07-11 PROCEDURE — 84443 ASSAY THYROID STIM HORMONE: CPT | Performed by: INTERNAL MEDICINE

## 2017-07-11 RX ORDER — AMLODIPINE BESYLATE 2.5 MG/1
2.5 TABLET ORAL DAILY
Qty: 90 TABLET | Refills: 3 | Status: SHIPPED | OUTPATIENT
Start: 2017-07-11 | End: 2017-08-18

## 2017-07-11 RX ORDER — CITALOPRAM HYDROBROMIDE 10 MG/1
10 TABLET ORAL DAILY
Qty: 90 TABLET | Refills: 1 | Status: SHIPPED | OUTPATIENT
Start: 2017-07-11 | End: 2017-12-22

## 2017-07-11 NOTE — PROGRESS NOTES
SUBJECTIVE:                                                    Francesco Killian is a 89 year old male who presents to clinic today for the following health issues:    Hypertension Follow-up- holding lisinopril       Outpatient blood pressures are being checked at home.  Results are 110-80/50's.    Low Salt Diet: low salt      Amount of exercise or physical activity: None    Problems taking medications regularly: No    Medication side effects: none    Diet: regular (no restrictions)    Other concerns:  1. Family members have noticed that patient is more withdrawn recently. Pt states he has been more fatigued.   2. Updated POLST brought in for provider review/ signature     Problem list and histories reviewed & adjusted, as indicated.  Additional history: as documented    Patient Active Problem List   Diagnosis     Hyperlipidemia LDL goal <130     AAA (abdominal aortic aneurysm) (H)     Dysphagia     Cerebral infarction (H)     Chronic atrial fibrillation (H)     Health Care Home     ACP (advance care planning)     Esophageal stricture     Anemia     Other specified hypothyroidism     Right bundle branch block (RBBB)     Recurrent falls~occulovestibular syndrom     Wet senile macular degeneration (H)     Long-term (current) use of anticoagulants [Z79.01]     Major depressive disorder, single episode, mild (H)     Essential hypertension with goal blood pressure less than 140/90     Acquired hypothyroidism     CHF (congestive heart failure) (H)     Acute kidney failure (H)     Past Surgical History:   Procedure Laterality Date     C NONSPECIFIC PROCEDURE      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 [place] at time of RA stenting     CARDIAC SURGERY  1980s    CABg     COLONOSCOPY      normal exams     COLONOSCOPY  11/4/2013    Procedure: COLONOSCOPY;  COLONOSCOPY ;  Surgeon: Aguilar Arechiga MD;  Location:  GI     ESOPHAGOSCOPY, GASTROSCOPY, DUODENOSCOPY (EGD), COMBINED  10/14/2013    Procedure: COMBINED  ESOPHAGOSCOPY, GASTROSCOPY, DUODENOSCOPY (EGD);  COMBINED ESOPHAGOSCOPY, GASTROSCOPY, DUODENOSCOPY (EGD) ;  Surgeon: Aguilar Arechiga MD;  Location:  GI     EYE SURGERY      bilat cataracts     GI SURGERY  1970s    duodenal ulcer reapair     HERNIA REPAIR      bilat inguinal     IR RENAL/VISCERAL STENT/ATHERECT/PTA       TURP         Social History   Substance Use Topics     Smoking status: Never Smoker     Smokeless tobacco: Never Used     Alcohol use No     Family History   Problem Relation Age of Onset     C.A.D. Mother      C.A.D. Father      C.A.D. Maternal Grandmother      C.A.D. Maternal Grandfather      C.A.D. Paternal Grandmother      C.A.D. Paternal Grandfather      C.A.D. Brother      C.A.D. Sister      Coronary Artery Disease Mother          Current Outpatient Prescriptions   Medication Sig Dispense Refill     warfarin (COUMADIN) 2 MG tablet TAKE 1 TABLET BY MOUTH ON MON/THURS AND 2 TABLETS ALL OTHER DAYS AS DIRECTED BY THE ANTICOAGULATION CLINIC 180 tablet 0     furosemide (LASIX) 20 MG tablet Take 1 tablet (20 mg) by mouth daily 180 tablet 3     diphenhydrAMINE-acetaminophen (TYLENOL PM)  MG tablet Take 1 tablet by mouth At Bedtime       warfarin (COUMADIN) 2 MG tablet Take 2 mg by mouth once a week on Tuesday       levothyroxine (SYNTHROID/LEVOTHROID) 100 MCG tablet Take 1 tablet (100 mcg) by mouth daily 90 tablet 2     spironolactone (ALDACTONE) 25 MG tablet Take 0.5 tablets (12.5 mg) by mouth daily 45 tablet 3     Multiple Vitamins-Minerals (ICAPS PO) Take 1 capsule by mouth 2 times daily       calcium carbonate (TUMS) 500 MG chewable tablet Take 1-2 chew tab by mouth 2 times daily as needed for heartburn       Ferrous Sulfate (IRON) 325 (65 FE) MG tablet Take 1 tablet by mouth 2 times daily       aspirin 81 MG tablet Take 81 mg by mouth daily        lisinopril (PRINIVIL/ZESTRIL) 10 MG tablet Take 1 tablet (10 mg) by mouth daily 90 tablet 3     acetaminophen (TYLENOL 8 HOUR) 650 MG CR  tablet Take 650 mg by mouth every evening as needed for mild pain or fever       amLODIPine (NORVASC) 2.5 MG tablet Take 1 tablet (2.5 mg) by mouth daily 90 tablet 3     order for DME Testing being ordered: [facility]R [facility] as [facility]  To be done monthly as directed. 1 Month orn     No Known Allergies  BP Readings from Last 3 Encounters:   02/09/17 150/70   02/06/17 152/69   10/24/16 115/60    Wt Readings from Last 3 Encounters:   02/09/17 157 lb 4.8 oz (71.4 kg)   02/06/17 155 lb 3.2 oz (70.4 kg)   10/24/16 159 lb 6.4 oz (72.3 kg)            Reviewed and updated as needed this visit by clinical staff       Reviewed and updated as needed this visit by Provider       ROS:  C: NEGATIVE for fever, chills, change in weight  E/M: NEGATIVE for ear, mouth and throat problems  R: NEGATIVE for significant cough or SOB  CV: NEGATIVE for chest pain, palpitations or peripheral edema  GI: NEGATIVE for nausea, abdominal pain, heartburn, or change in bowel habits  : NEGATIVE for frequency, dysuria, or hematuria  M: NEGATIVE for significant arthralgias or myalgia  H: NEGATIVE for bleeding problems  P: NEGATIVE for changes in mood or affect of question as discussed.    OBJECTIVE:                                                    /59  Pulse 61  Temp 98.2  F (36.8  C) (Oral)  Wt 143 lb 11.2 oz (65.2 kg)  SpO2 99%  BMI 23.91 kg/m2  Body mass index is 23.91 kg/(m^2).  GENERAL:  alert and no distress  EYES: Eyes grossly normal to inspection, extraocular movements - intact, and PERRL  HENT: ear canals- normal; TMs- normal; Nose- normal; Mouth- no ulcers, no lesions  NECK: no tenderness, no adenopathy, no asymmetry, no masses, no stiffness; thyroid- normal to palpation  RESP: lungs clear to auscultation - no rales, no rhonchi, no wheezes  CV: irregular rates and rhythm, normal S1 S2, no click or rub -  ABDOMEN: soft, no tenderness, no  hepatosplenomegaly, no masses, normal bowel  sounds  MS: extremities- no gross deformities noted  NEURO:  No focal changes  PSYCH: Alert and oriented times 3; speech- coherent , normal rate and volume; able to articulate logical thoughts, able to abstract reason, no tangential thoughts, no hallucinations or delusions, affect- flat       ASSESSMENT/PLAN:                                                      (I10) Essential hypertension with goal blood pressure less than 140/90  (primary encounter diagnosis)  Comment: stable at present off ACE and holding Amlodipine  Plan: amLODIPine (NORVASC) 2.5 MG tablet, Basic         metabolic panel, Hemoglobin            (I48.2) Chronic atrial fibrillation (H)  Comment: rate controlled on therapy  Plan:     (I50.9) Congestive heart failure, unspecified congestive heart failure chronicity, unspecified congestive heart failure type (H)  Comment: appear euvolmeic to a little volume down  Plan: Hemoglobin, TSH with free T4 reflex            (F39) Episodic mood disorder (H)  Comment: discussed with family  Plan: citalopram (CELEXA) 10 MG tablet        Suggested low dose trial.  F/u 3 months    I've explained to him that drugs of the SSRI class can have side effects such as weight gain, sexual dysfunction, insomnia, headache, nausea. These medications are generally effective at alleviating symptoms of anxiety and/or depression. Let me know if significant side effects do occur.    See Patient Instructions and POST reviewed and signed and scanned    Angel Corea MD  [facility]    THE MEDICATION LIST HAS BEEN FULLY RECONCILED BY THE [facility] AND THE NURSING STAFF.  25 minutes spent with this patient, face to face, discussing treatment options for listed problems above as well as side effects of appropriate medications.  Counseling time extended beyond 50% of the clinic visit.  Medication dosing, treatment plan and follow-up were discussed. Also reviewed need for primary care testing for patient.

## 2017-07-11 NOTE — NURSING NOTE
"Chief Complaint   Patient presents with     Hypertension       Initial /59  Pulse 61  Temp 98.2  F (36.8  C) (Oral)  Wt 143 lb 11.2 oz (65.2 kg)  SpO2 99%  BMI 23.91 kg/m2 Estimated body mass index is 23.91 kg/(m^2) as calculated from the following:    Height as of 2/9/17: 5' 5\" (1.651 m).    Weight as of this encounter: 143 lb 11.2 oz (65.2 kg).  Medication Reconciliation: complete   Gypsy Baig CMA      "

## 2017-07-11 NOTE — MR AVS SNAPSHOT
After Visit Summary   7/11/2017    Francesco Killian    MRN: [id number]           Patient Information     Date Of Birth          12/26/1920        Visit Information        Provider Department      7/11/2017 2:40 PM Angel Corea MD [facility]        Today's Diagnoses     Essential hypertension with goal blood pressure less than 140/90    -  1    Chronic atrial fibrillation (H)        Congestive heart failure, unspecified congestive heart failure chronicity, unspecified congestive heart failure type (H)        Episodic mood disorder (H)           Follow-ups after your visit        Follow-up notes from your care team     Return if symptoms worsen or fail to improve.      Who to contact     If you have questions or need follow up information about today's clinic visit or your schedule please contact [facility] directly at [phone].  Normal or non-critical lab and imaging results will be communicated to you by MyChart, letter or phone within 4 business days after the clinic has received the results. If you do not hear from us within 7 days, please contact the clinic through [employer] or phone. If you have a critical or abnormal lab result, we will notify you by phone as soon as possible.  Submit refill requests through [employer] or call your pharmacy and they will forward the refill request to us. Please allow 3 business days for your refill to be completed.          Additional Information About Your Visit        MyChart Information     [employer] gives you secure access to your electronic health record. If you see a primary care provider, you can also send messages to your care team and make appointments. If you have questions, please call your primary care clinic.  If you do not have a primary care provider, please call [phone] and they will assist you.        Care EveryWhere ID     This is your Care EveryWhere ID. This could be used by other  organizations to access your [place] medical records  [id number]        Your Vitals Were     Pulse Temperature Pulse Oximetry BMI (Body Mass Index)          61 98.2  F (36.8  C) (Oral) 99% 23.91 kg/m2         Blood Pressure from Last 3 Encounters:   07/11/17 118/59   02/09/17 150/70   02/06/17 152/69    Weight from Last 3 Encounters:   07/11/17 143 lb 11.2 oz (65.2 kg)   02/09/17 157 lb 4.8 oz (71.4 kg)   02/06/17 155 lb 3.2 oz (70.4 kg)              We Performed the Following     Basic metabolic panel     Hemoglobin     TSH with free T4 reflex          Today's Medication Changes          These changes are accurate as of: 7/11/17  3:35 PM.  If you have any questions, ask your nurse or doctor.               Start taking these medicines.        Dose/Directions    citalopram 10 MG tablet   Commonly known as:  celeXA   Used for:  Episodic mood disorder (H)   Started by:  Angel Corea MD        Dose:  10 mg   Take 1 tablet (10 mg) by mouth daily   Quantity:  90 tablet   Refills:  1            Where to get your medicines      These medications were sent to [facility] [address] [place] [address] [address] AT [place] [address], [facility] [address]    Hours:  24-hours Phone:  [phone]     amLODIPine 2.5 MG tablet    citalopram 10 MG tablet                Primary Care Provider Office Phone # Fax #    Angel Corea [phone]       [facility] [address] [facility] [address]        Equal Access to Services     IMAN MULTANI AH: Hadii aditi clinton Sokathleen, waaxda luqadaha, qaybta kaalmada ishan, singh lemon. So [facility] [phone].    ATENCIÓN: Si habla español, tiene a costa disposición servicios gratuitos de asistencia lingüística. Llame al [phone].    We comply with applicable federal civil rights laws and [place] laws. We do not discriminate on the basis of race, color, national origin, age,  disability sex, sexual orientation or gender identity.            Thank you!     Thank you for choosing [facility]  for your care. Our goal is always to provide you with excellent care. Hearing back from our patients is one way we can continue to improve our services. Please take a few minutes to complete the written survey that you may receive in the mail after your visit with us. Thank you!             Your Updated Medication List - Protect others around you: Learn how to safely use, store and throw away your medicines at www.disposemymeds.org.          This list is accurate as of: 7/11/17  3:35 PM.  Always use your most recent med list.                   Brand Name Dispense Instructions for use Diagnosis    amLODIPine 2.5 MG tablet    NORVASC    90 tablet    Take 1 tablet (2.5 mg) by mouth daily    Essential hypertension with goal blood pressure less than 140/90       aspirin 81 MG tablet      Take 81 mg by mouth daily        calcium carbonate 500 MG chewable tablet    TUMS     Take 1-2 chew tab by mouth 2 times daily as needed for heartburn        citalopram 10 MG tablet    celeXA    90 tablet    Take 1 tablet (10 mg) by mouth daily    Episodic mood disorder (H)       diphenhydrAMINE-acetaminophen  MG tablet    TYLENOL PM     Take 1 tablet by mouth At Bedtime        furosemide 20 MG tablet    LASIX    180 tablet    Take 1 tablet (20 mg) by mouth daily    Essential hypertension, malignant       ICAPS PO      Take 1 capsule by mouth 2 times daily        iron 325 (65 FE) MG tablet      Take 1 tablet by mouth 2 times daily        levothyroxine 100 MCG tablet    SYNTHROID/LEVOTHROID    90 tablet    Take 1 tablet (100 mcg) by mouth daily    Other specified hypothyroidism       lisinopril 10 MG tablet    PRINIVIL/ZESTRIL    90 tablet    Take 1 tablet (10 mg) by mouth daily    Hospital discharge follow-up, Congestive heart failure, unspecified congestive heart failure chronicity,  unspecified congestive heart failure type (H)       order for DME     1 Month    Testing being ordered: INR orders as directed to be done at [facility] To be done monthly as directed.    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 Read the directions carefully, and ask your doctor or other care provider to review them with you.

## 2017-07-12 ENCOUNTER — TELEPHONE (OUTPATIENT)
Dept: INTERNAL MEDICINE | Facility: CLINIC | Age: 82
End: 2017-07-12

## 2017-07-12 DIAGNOSIS — E87.5 HYPERKALEMIA: Primary | ICD-10-CM

## 2017-07-12 LAB
ANION GAP SERPL CALCULATED.3IONS-SCNC: 9 MMOL/L (ref 3–14)
BUN SERPL-MCNC: 39 MG/DL (ref 7–30)
CALCIUM SERPL-MCNC: 8.8 MG/DL (ref 8.5–10.1)
CHLORIDE SERPL-SCNC: 105 MMOL/L (ref 94–109)
CO2 SERPL-SCNC: 23 MMOL/L (ref 20–32)
CREAT SERPL-MCNC: 1.89 MG/DL (ref 0.66–1.25)
GFR SERPL CREATININE-BSD FRML MDRD: 33 ML/MIN/1.7M2
GLUCOSE SERPL-MCNC: 104 MG/DL (ref 70–99)
POTASSIUM SERPL-SCNC: 6.1 MMOL/L (ref 3.4–5.3)
SODIUM SERPL-SCNC: 137 MMOL/L (ref 133–144)
TSH SERPL DL<=0.005 MIU/L-ACNC: 0.26 MU/L (ref 0.4–4)

## 2017-07-13 ENCOUNTER — ANTICOAGULATION THERAPY VISIT (OUTPATIENT)
Dept: ANTICOAGULATION | Facility: CLINIC | Age: 82
End: 2017-07-13
Payer: COMMERCIAL

## 2017-07-13 ENCOUNTER — TELEPHONE (OUTPATIENT)
Dept: INTERNAL MEDICINE | Facility: CLINIC | Age: 82
End: 2017-07-13

## 2017-07-13 DIAGNOSIS — D50.9 IRON DEFICIENCY ANEMIA, UNSPECIFIED IRON DEFICIENCY ANEMIA TYPE: ICD-10-CM

## 2017-07-13 DIAGNOSIS — Z79.01 LONG-TERM (CURRENT) USE OF ANTICOAGULANTS: ICD-10-CM

## 2017-07-13 DIAGNOSIS — E87.5 HYPERKALEMIA: ICD-10-CM

## 2017-07-13 DIAGNOSIS — I48.20 CHRONIC ATRIAL FIBRILLATION (H): ICD-10-CM

## 2017-07-13 DIAGNOSIS — D50.9 IRON DEFICIENCY ANEMIA, UNSPECIFIED IRON DEFICIENCY ANEMIA TYPE: Primary | ICD-10-CM

## 2017-07-13 LAB
ANION GAP SERPL CALCULATED.3IONS-SCNC: 8 MMOL/L (ref 3–14)
BASOPHILS # BLD AUTO: 0 10E9/L (ref 0–0.2)
BASOPHILS NFR BLD AUTO: 0.5 %
BUN SERPL-MCNC: 40 MG/DL (ref 7–30)
CALCIUM SERPL-MCNC: 8.7 MG/DL (ref 8.5–10.1)
CHLORIDE SERPL-SCNC: 102 MMOL/L (ref 94–109)
CO2 SERPL-SCNC: 25 MMOL/L (ref 20–32)
CREAT SERPL-MCNC: 1.91 MG/DL (ref 0.66–1.25)
DIFFERENTIAL METHOD BLD: ABNORMAL
EOSINOPHIL # BLD AUTO: 0.3 10E9/L (ref 0–0.7)
EOSINOPHIL NFR BLD AUTO: 4.2 %
ERYTHROCYTE [DISTWIDTH] IN BLOOD BY AUTOMATED COUNT: 12.8 % (ref 10–15)
GFR SERPL CREATININE-BSD FRML MDRD: 33 ML/MIN/1.7M2
GLUCOSE SERPL-MCNC: 100 MG/DL (ref 70–99)
HCT VFR BLD AUTO: 32.1 % (ref 40–53)
HGB BLD-MCNC: 10.2 G/DL (ref 13.3–17.7)
INR PPP: 2.53
LYMPHOCYTES # BLD AUTO: 1.3 10E9/L (ref 0.8–5.3)
LYMPHOCYTES NFR BLD AUTO: 20.3 %
MCH RBC QN AUTO: 32.8 PG (ref 26.5–33)
MCHC RBC AUTO-ENTMCNC: 31.8 G/DL (ref 31.5–36.5)
MCV RBC AUTO: 103 FL (ref 78–100)
MONOCYTES # BLD AUTO: 0.8 10E9/L (ref 0–1.3)
MONOCYTES NFR BLD AUTO: 12.9 %
NEUTROPHILS # BLD AUTO: 3.9 10E9/L (ref 1.6–8.3)
NEUTROPHILS NFR BLD AUTO: 62.1 %
PLATELET # BLD AUTO: 160 10E9/L (ref 150–450)
POTASSIUM SERPL-SCNC: 5.3 MMOL/L (ref 3.4–5.3)
RBC # BLD AUTO: 3.11 10E12/L (ref 4.4–5.9)
RETICS # AUTO: 28.5 10E9/L (ref 25–95)
RETICS/RBC NFR AUTO: 0.9 % (ref 0.5–2)
SODIUM SERPL-SCNC: 135 MMOL/L (ref 133–144)
T4 FREE SERPL-MCNC: 1.35 NG/DL (ref 0.76–1.46)
VIT B12 SERPL-MCNC: 1109 PG/ML (ref 193–986)
WBC # BLD AUTO: 6.2 10E9/L (ref 4–11)

## 2017-07-13 PROCEDURE — 84165 PROTEIN E-PHORESIS SERUM: CPT | Performed by: INTERNAL MEDICINE

## 2017-07-13 PROCEDURE — 00000402 ZZHCL STATISTIC TOTAL PROTEIN: Performed by: INTERNAL MEDICINE

## 2017-07-13 PROCEDURE — 85060 BLOOD SMEAR INTERPRETATION: CPT | Performed by: INTERNAL MEDICINE

## 2017-07-13 PROCEDURE — 82607 VITAMIN B-12: CPT | Performed by: INTERNAL MEDICINE

## 2017-07-13 PROCEDURE — 80048 BASIC METABOLIC PNL TOTAL CA: CPT | Performed by: INTERNAL MEDICINE

## 2017-07-13 PROCEDURE — 85045 AUTOMATED RETICULOCYTE COUNT: CPT | Performed by: INTERNAL MEDICINE

## 2017-07-13 PROCEDURE — 83550 IRON BINDING TEST: CPT | Performed by: INTERNAL MEDICINE

## 2017-07-13 PROCEDURE — 99207 ZZC NO CHARGE NURSE ONLY: CPT

## 2017-07-13 PROCEDURE — 83540 ASSAY OF IRON: CPT | Performed by: INTERNAL MEDICINE

## 2017-07-13 PROCEDURE — 82728 ASSAY OF FERRITIN: CPT | Performed by: INTERNAL MEDICINE

## 2017-07-13 PROCEDURE — 85025 COMPLETE CBC W/AUTO DIFF WBC: CPT | Performed by: INTERNAL MEDICINE

## 2017-07-13 PROCEDURE — 36415 COLL VENOUS BLD VENIPUNCTURE: CPT | Performed by: INTERNAL MEDICINE

## 2017-07-13 NOTE — TELEPHONE ENCOUNTER
Got lexa from lab, delay in running BMP, potassium now 6.1, discussed with son who is Neurologist, will dose Kayexalate 30 g po times 1.  Dose called to pharmacy, son will . Repeat orders for BMP placed for AM tomorrow.  Advised to hold Spironolactone/ACR therapy for now

## 2017-07-13 NOTE — PROGRESS NOTES
ANTICOAGULATION FOLLOW-UP CLINIC VISIT    Patient Name:  Francesco Killian  Date:  7/13/2017  Contact Type:  Telephone/ dosing faxed to [place] [phone] and called to RAMIRO Sherman [phone]    SUBJECTIVE:     Patient Findings     Positives No Problem Findings           OBJECTIVE    INR   Date Value Ref Range Status   07/13/2017 2.53  Final       ASSESSMENT / PLAN  No question data found.  Anticoagulation Summary as of 7/13/2017     INR goal 2.0-3.0   Today's INR 2.53   Maintenance plan 4 mg (2 mg x 2) on Mon, Wed, Fri; 2 mg (2 mg x 1) all other days   Full instructions 4 mg on Mon, Wed, Fri; 2 mg all other days   Weekly total 20 mg   No change documented Haley Beth RN   Plan last modified Haley Beth RN (6/1/2017)   Next INR check 8/3/2017   Target end date Indefinite    Indications   Chronic atrial fibrillation (H) [I48.2]  Long-term (current) use of anticoagulants [Z79.01] [Z79.01]         Anticoagulation Episode Summary     INR check location     Preferred lab     Send INR reminders to [facility]    Comments             See the Encounter Report to view Anticoagulation Flowsheet and Dosing Calendar (Go to Encounters tab in chart review, and find the Anticoagulation Therapy Visit)    Dosage adjustment made based on physician directed care plan.    Haley Beth RN

## 2017-07-13 NOTE — MR AVS SNAPSHOT
Francesco Killian   7/13/2017 8:15 AM   Anticoagulation Therapy Visit    Description:  89 year old male   Provider:   ANTICOAGULATION CLINIC   Department:   Anti Coagulation           INR as of 7/13/2017     Today's INR 2.53      Anticoagulation Summary as of 7/13/2017     INR goal 2.0-3.0   Today's INR 2.53   Full instructions 4 mg on Mon, Wed, Fri; 2 mg all other days   Next INR check 8/3/2017    Indications   Chronic atrial fibrillation (H) [I48.2]  Long-term (current) use of anticoagulants [Z79.01] [Z79.01]         Your next Anticoagulation Clinic appointment(s)     Jul 13, 2017  8:15 AM CDT   [facility]    [address] [address] [phone]            Aug 03, 2017 12:00 PM CDT   Anticoagulation Visit [facility]  [facility]IC   [facility]    [address] [address] [phone]              Contact Numbers     [facility]  Please call  [phone] to cancel and/or reschedule your appointment   Please call  [phone] with any problems or questions regarding your therapy.        July 2017 Details    Sun Mon Tue Wed Thu Fri Sat           1                 2               3               4               5               6               7               8                 9               10               11               12               13      2 mg   See details      14      4 mg         15      2 mg           16      2 mg         17      4 mg         18      2 mg         19      4 mg         20      2 mg         21      4 mg         22      2 mg           23      2 mg         24      4 mg         25      2 mg         26      4 mg         27      2 mg         28      4 mg         29      2 mg           30      2 mg         31      4 mg               Date Details    07/13 This INR check               How to take your warfarin dose     To take:  2 mg Take 1 of the 2 mg tablets.    To take:  4 mg Take 2 of the 2 mg tablets.           August 2017 Details    Sun Mon Tue Wed Thu Fri Sat       1      2 mg         2      4 mg         3            4               5                 6               7               8               9               10               11               12                 13               14               15               16               17               18               19                 20               21               22               23               24               25               26                 27               28               29               30               31                  Date Details   No additional details    Date of next INR:  8/3/2017         How to take your warfarin dose     To take:  2 mg Take 1 of the 2 mg tablets.    To take:  4 mg Take 2 of the 2 mg tablets.

## 2017-07-14 ENCOUNTER — TRANSFERRED RECORDS (OUTPATIENT)
Dept: HEALTH INFORMATION MANAGEMENT | Facility: CLINIC | Age: 82
End: 2017-07-14

## 2017-07-14 LAB
ALBUMIN SERPL ELPH-MCNC: 3.8 G/DL (ref 3.7–5.1)
ALPHA1 GLOB SERPL ELPH-MCNC: 0.3 G/DL (ref 0.2–0.4)
ALPHA2 GLOB SERPL ELPH-MCNC: 0.7 G/DL (ref 0.5–0.9)
B-GLOBULIN SERPL ELPH-MCNC: 1 G/DL (ref 0.6–1)
COPATH REPORT: NORMAL
CREAT SERPL-MCNC: 1.84 MG/DL (ref 0.7–1.3)
FERRITIN SERPL-MCNC: 70 NG/ML (ref 26–388)
GAMMA GLOB SERPL ELPH-MCNC: 1.1 G/DL (ref 0.7–1.6)
GFR SERPL CREATININE-BSD FRML MDRD: 34 ML/MIN/1.73M2
GLUCOSE SERPL-MCNC: 110 MG/DL (ref 70–125)
IRON SATN MFR SERPL: 19 % (ref 15–46)
IRON SERPL-MCNC: 60 UG/DL (ref 35–180)
M PROTEIN SERPL ELPH-MCNC: 0 G/DL
POTASSIUM SERPL-SCNC: 4.5 MMOL/L (ref 3.5–5)
PROT PATTERN SERPL ELPH-IMP: NORMAL
TIBC SERPL-MCNC: 312 UG/DL (ref 240–430)

## 2017-08-03 ENCOUNTER — ANTICOAGULATION THERAPY VISIT (OUTPATIENT)
Dept: ANTICOAGULATION | Facility: CLINIC | Age: 82
End: 2017-08-03
Payer: COMMERCIAL

## 2017-08-03 DIAGNOSIS — Z79.01 LONG-TERM (CURRENT) USE OF ANTICOAGULANTS: ICD-10-CM

## 2017-08-03 DIAGNOSIS — I48.20 CHRONIC ATRIAL FIBRILLATION (H): ICD-10-CM

## 2017-08-03 LAB — INR PPP: 2.82

## 2017-08-03 PROCEDURE — 99207 ZZC NO CHARGE NURSE ONLY: CPT

## 2017-08-03 NOTE — MR AVS SNAPSHOT
Francesco Killian   8/3/2017 12:00 PM   Anticoagulation Therapy Visit    Description:  89 year old male   Provider:   ANTICOAGULATION CLINIC   Department:   Anti Coagulation           INR as of 8/3/2017     Today's INR 2.82      Anticoagulation Summary as of 8/3/2017     INR goal 2.0-3.0   Today's INR 2.82   Full instructions 4 mg on Mon, Wed, Fri; 2 mg all other days   Next INR check 8/30/2017    Indications   Chronic atrial fibrillation (H) [I48.2]  Long-term (current) use of anticoagulants [Z79.01] [Z79.01]         Contact Numbers     [facility]  Please call  [phone] to cancel and/or reschedule your appointment   Please call  [phone] with any problems or questions regarding your therapy.        August 2017 Details    Sun Mon Tue Wed Thu Fri Sat       1               2               3      2 mg   See details      4      4 mg         5      2 mg           6      2 mg         7      4 mg         8      2 mg         9      4 mg         10      2 mg         11      4 mg         12      2 mg           13      2 mg         14      4 mg         15      2 mg         16      4 mg         17      2 mg         18      4 mg         19      2 mg           20      2 mg         21      4 mg         22      2 mg         23      4 mg         24      2 mg         25      4 mg         26      2 mg           27      2 mg         28      4 mg         29      2 mg         30            31                  Date Details   08/03 This INR check       Date of next INR:  8/30/2017         How to take your warfarin dose     To take:  2 mg Take 1 of the 2 mg tablets.    To take:  4 mg Take 2 of the 2 mg tablets.

## 2017-08-03 NOTE — PROGRESS NOTES
ANTICOAGULATION FOLLOW-UP CLINIC VISIT    Patient Name:  Francesco Killian  Date:  8/3/2017  Contact Type:  Telephone/ dosing faxed to [facility] and called to RAMIRO Sherman.     SUBJECTIVE:     Patient Findings     Positives No Problem Findings           OBJECTIVE    INR   Date Value Ref Range Status   08/03/2017 2.82  Final       ASSESSMENT / PLAN  No question data found.  Anticoagulation Summary as of 8/3/2017     INR goal 2.0-3.0   Today's INR 2.82   Maintenance plan 4 mg (2 mg x 2) on Mon, Wed, Fri; 2 mg (2 mg x 1) all other days   Full instructions 4 mg on Mon, Wed, Fri; 2 mg all other days   Weekly total 20 mg   Plan last modified Haley Beth RN (6/1/2017)   Next INR check 8/31/2017   Target end date Indefinite    Indications   Chronic atrial fibrillation (H) [I48.2]  Long-term (current) use of anticoagulants [Z79.01] [Z79.01]         Anticoagulation Episode Summary     INR check location     Preferred lab     Send INR reminders to  [facility]    Comments             See the Encounter Report to view Anticoagulation Flowsheet and Dosing Calendar (Go to Encounters tab in chart review, and find the Anticoagulation Therapy Visit)    Dosage adjustment made based on physician directed care plan.    Haley Beth RN

## 2017-08-10 DIAGNOSIS — Z09 HOSPITAL DISCHARGE FOLLOW-UP: ICD-10-CM

## 2017-08-10 DIAGNOSIS — I50.9 CONGESTIVE HEART FAILURE, UNSPECIFIED CONGESTIVE HEART FAILURE CHRONICITY, UNSPECIFIED CONGESTIVE HEART FAILURE TYPE: ICD-10-CM

## 2017-08-10 RX ORDER — LISINOPRIL 10 MG/1
10 TABLET ORAL DAILY
Qty: 90 TABLET | Refills: 3 | Status: ON HOLD | OUTPATIENT
Start: 2017-08-10 | End: 2018-02-22

## 2017-08-10 NOTE — TELEPHONE ENCOUNTER
Routing refill request to provider for review/approval because:  Labs out of range:  creatinine

## 2017-08-10 NOTE — TELEPHONE ENCOUNTER
Refills done prior, unclear of request:    lisinopril (PRINIVIL/ZESTRIL) 10 MG tablet 90 tablet 3 2/9/2017  --   Sig: Take 1 tablet (10 mg) by mouth daily   Class: No Print Out   Route: Oral   Order: [id number]

## 2017-08-10 NOTE — TELEPHONE ENCOUNTER
lisinopril (PRINIVIL/ZESTRIL) 10 MG tablet      Last Written Prescription Date: 2/09/2017  Last Fill Quantity: 90, # refills: 3  Last Office Visit with G, P or [facility] prescribing provider: 7/114/2017       Potassium   Date Value Ref Range Status   07/13/2017 5.3 3.4 - 5.3 mmol/L Final     Creatinine   Date Value Ref Range Status   07/13/2017 1.91 (H) 0.66 - 1.25 mg/dL Final     BP Readings from Last 3 Encounters:   07/11/17 118/59   02/09/17 150/70   02/06/17 152/69

## 2017-08-10 NOTE — TELEPHONE ENCOUNTER
"The prescription done on 2/9/17 was done as 'No print out\" so did not go to any pharmacy.  Ok to fill thru 2/18?  "

## 2017-08-13 DIAGNOSIS — E03.8 OTHER SPECIFIED HYPOTHYROIDISM: ICD-10-CM

## 2017-08-14 NOTE — TELEPHONE ENCOUNTER
LEVOTHYROXINE 0.100MG (100MCG) TAB     Last Written Prescription Date: 12/05/16  Last Quantity: 90, # refills: 2  Last Office Visit with G, P or [facility] prescribing provider: 07/11/17        TSH   Date Value Ref Range Status   07/11/2017 0.26 (L) 0.40 - 4.00 mU/L Final

## 2017-08-15 RX ORDER — LEVOTHYROXINE SODIUM 100 UG/1
TABLET ORAL
Qty: 90 TABLET | Refills: 0 | Status: SHIPPED | OUTPATIENT
Start: 2017-08-15 | End: 2017-11-06

## 2017-08-16 ENCOUNTER — TELEPHONE (OUTPATIENT)
Dept: INTERNAL MEDICINE | Facility: CLINIC | Age: 82
End: 2017-08-16

## 2017-08-16 DIAGNOSIS — I10 ESSENTIAL HYPERTENSION, MALIGNANT: ICD-10-CM

## 2017-08-16 RX ORDER — FUROSEMIDE 20 MG
20 TABLET ORAL DAILY
Qty: 180 TABLET | Refills: 3 | Status: SHIPPED | OUTPATIENT
Start: 2017-08-16 | End: 2018-03-02

## 2017-08-16 NOTE — TELEPHONE ENCOUNTER
Patient had a fall yesterday, le states that he did not hurt himself, says patient has been weak lately, has no energy she would like for you to give Deepak a call @ [phone] to discuss a possible step for patient.

## 2017-08-16 NOTE — TELEPHONE ENCOUNTER
Called patient's son Deepak (emergency contact/consent to communicate).  Deepak reports patient fell last evening while Deepak's spouse Lea was with patient.  Patient did not hit his head, he fell back onto his buttocks.  Denies any injury.  Deepak stated his spouse Lea who is a NP assessed patient and did not feel the patient needed to be evaluated in the ER.  Deepak requesting for patient be seen in clinic to assess increased fatigue and weakness and have labs drawn.  Patient has been experiencing fatigue and weakness over the past few months.  Appointment made for earliest available - Friday 8/18/17 with PA.  Wondering if patient can be worked into PCP's schedule tomorrow.  Advised if patient's symptoms worsen, change, or new symptoms develop to call back as soon as possible.  Deepak stated understanding.

## 2017-08-16 NOTE — TELEPHONE ENCOUNTER
furosemide (LASIX) 20 MG tablet      Last Written Prescription Date: 02/06/2017  Last Fill Quantity: 180, # refills: 3  Last Office Visit with G, P or [facility] prescribing provider: 07/11/2017       Potassium   Date Value Ref Range Status   07/14/2017 4.5 3.5 - 5.0 mmol/L Final     Creatinine   Date Value Ref Range Status   07/14/2017 1.84 (H) 0.70 - 1.30 mg/dL Final     BP Readings from Last 3 Encounters:   07/11/17 118/59   02/09/17 150/70   02/06/17 152/69

## 2017-08-16 NOTE — TELEPHONE ENCOUNTER
Routing refill request to provider for review/approval because:  Labs out of range:  penelope

## 2017-08-17 ENCOUNTER — TELEPHONE (OUTPATIENT)
Dept: INTERNAL MEDICINE | Facility: CLINIC | Age: 82
End: 2017-08-17

## 2017-08-17 NOTE — TELEPHONE ENCOUNTER
Prior authorization    Medication name furesemide  Insurance medicareblue  Insurance ID number [id number]  Prior authorization faxed through cover my meds

## 2017-08-18 ENCOUNTER — OFFICE VISIT (OUTPATIENT)
Dept: INTERNAL MEDICINE | Facility: CLINIC | Age: 82
End: 2017-08-18
Payer: COMMERCIAL

## 2017-08-18 VITALS
WEIGHT: 145.4 LBS | BODY MASS INDEX: 24.22 KG/M2 | HEART RATE: 64 BPM | HEIGHT: 65 IN | SYSTOLIC BLOOD PRESSURE: 120 MMHG | DIASTOLIC BLOOD PRESSURE: 60 MMHG | OXYGEN SATURATION: 96 % | TEMPERATURE: 98.2 F

## 2017-08-18 DIAGNOSIS — L98.9 SKIN LESION: ICD-10-CM

## 2017-08-18 DIAGNOSIS — D64.9 ANEMIA, UNSPECIFIED TYPE: Primary | ICD-10-CM

## 2017-08-18 DIAGNOSIS — Z85.828 HX OF SQUAMOUS CELL CARCINOMA OF SKIN: ICD-10-CM

## 2017-08-18 PROCEDURE — 99213 OFFICE O/P EST LOW 20 MIN: CPT | Performed by: PHYSICIAN ASSISTANT

## 2017-08-18 NOTE — NURSING NOTE
"Chief Complaint   Patient presents with     Fatigue     Derm Problem       Initial /60 (BP Location: Left arm, Patient Position: Chair, Cuff Size: Adult Regular)  Pulse 64  Temp 98.2  F (36.8  C) (Oral)  Ht 5' 5\" (1.651 m)  Wt 145 lb 6.4 oz (66 kg)  SpO2 96%  BMI 24.2 kg/m2 Estimated body mass index is 24.2 kg/(m^2) as calculated from the following:    Height as of this encounter: 5' 5\" (1.651 m).    Weight as of this encounter: 145 lb 6.4 oz (66 kg).  Medication Reconciliation: complete     Noe LEONE      "

## 2017-08-18 NOTE — PROGRESS NOTES
SUBJECTIVE:   Francesco Killian is a 89 year old male who presents to clinic today for the following health issues:      Rash      Duration: 1 month    Description  Location: Upper R cheek.  Itching: no    Intensity:  moderate    Accompanying signs and symptoms: Mild crusting    History (similar episodes/previous evaluation): None    Precipitating or alleviating factors:  New exposures:  None  Recent travel: no      Therapies tried and outcome: Just keeping it cleaned.  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    Fatigue      Duration: Many years, worsened over past 2 months.    Description (location/character/radiation): Has been worked up for Anemia and advised Hematology consult earlier    Intensity:  severe    Accompanying signs and symptoms: Feeling weak and Frail    History (similar episodes/previous evaluation): yes    Precipitating or alleviating factors: Walking    Therapies tried and outcome:  Has had workup with PCP regarding fatigue/ anemia.     See EPIC. Son with today to appt ( he is neurologist ) and plan was to get referral to hematology as next step.     Labs so far showing likely small amount of blood loss ( on two blood thinners), decreased kidney function, and probably anemia of chronic disease.     Family would like to consider EPO to help with anemia - patient has been having weakness and falls            -------------------------------------    Problem list and histories reviewed & adjusted, as indicated.  Additional history: as documented    Labs reviewed in EPIC    Reviewed and updated as needed this visit by clinical staff     Reviewed and updated as needed this visit by Provider  Allergies  Meds         ROS:  Constitutional, HEENT, cardiovascular, pulmonary, gi and gu systems are negative, except as otherwise  "noted.      OBJECTIVE:   /60 (BP Location: Left arm, Patient Position: Chair, Cuff Size: Adult Regular)  Pulse 64  Temp 98.2  F (36.8  C) (Oral)  Ht 5' 5\" (1.651 m)  Wt 145 lb 6.4 oz (66 kg)  SpO2 96%  BMI 24.2 kg/m2  Body mass index is 24.2 kg/(m^2).  GENERAL: healthy, alert and no distress  RESP: lungs clear to auscultation - no rales, rhonchi or wheezes  CV: regular rates and rhythm and normal S1 S2, no S3 or S4  SKIN: right face TMJ area there is a dark scabbed skin lesion noted.  Several actinic keratosis noted.      Diagnostic Test Results:  None for this visit.     ASSESSMENT/PLAN:             1. Anemia, unspecified type    - ONC/HEME ADULT REFERRAL    2. Skin lesion  Non healing lesion   Hx of skin cancer   - DERMATOLOGY REFERRAL    See Patient Instructions    Whit Mcintosh PA-C  [facility]    "

## 2017-08-18 NOTE — MR AVS SNAPSHOT
After Visit Summary   8/18/2017    Francesco Killian    MRN: [id number]           Patient Information     Date Of Birth          12/26/1920        Visit Information        Provider Department      8/18/2017 3:20 PM Whit Mcintosh PA-C [facility] [place]        Today's Diagnoses     Anemia, unspecified type    -  1    Skin lesion           Follow-ups after your visit        Additional Services     DERMATOLOGY REFERRAL       Your provider has referred you to: [facility]: [facility] Dermatology [facility] [phone]   [email or web address]      Please be aware that coverage of these services is subject to the terms and limitations of your health insurance plan.  Call member services at your health plan with any benefit or coverage questions.      Please bring the following with you to your appointment:    (1) Any X-Rays, CTs or MRIs which have been performed.  Contact the facility where they were done to arrange for  prior to your scheduled appointment.    (2) List of current medications  (3) This referral request   (4) Any documents/labs given to you for this referral            ONC/HEME ADULT REFERRAL       Your provider has referred you to: [facility]: [facility] [phone]   [email or web address]    Please be aware that coverage of these services is subject to the terms and limitations of your health insurance plan.  Call member services at your health plan with any benefit or coverage questions.      Please bring the following with you to your appointment:    (1) Any X-Rays, CTs or MRIs which have been performed.  Contact the facility where they were done to arrange for  prior to your scheduled appointment.   (2) List of current medications  (3) This referral request   (4) Any documents/labs given to you for this referral          "         Your next 10 appointments already scheduled     Aug 31, 2017 12:00 PM CDT   Anticoagulation Visit with [facility] ANTICOAGULATION CLINIC   [facility]    [address] [address] [phone]              Who to contact     If you have questions or need follow up information about today's clinic visit or your schedule please contact [facility] directly at [phone].  Normal or non-critical lab and imaging results will be communicated to you by [employer]shart, letter or phone within 4 business days after the clinic has received the results. If you do not hear from us within 7 days, please contact the clinic through LumaStreamt or phone. If you have a critical or abnormal lab result, we will notify you by phone as soon as possible.  Submit refill requests through Froont or call your pharmacy and they will forward the refill request to us. Please allow 3 business days for your refill to be completed.          Additional Information About Your Visit        [employer]art Information     Froont gives you secure access to your electronic health record. If you see a primary care provider, you can also send messages to your care team and make appointments. If you have questions, please call your primary care clinic.  If you do not have a primary care provider, please call [phone] and they will assist you.        Care EveryWhere ID     This is your Care EveryWhere ID. This could be used by other organizations to access your [place] medical records  [id number]        Your Vitals Were     Pulse Temperature Height Pulse Oximetry BMI (Body Mass Index)       64 98.2  F (36.8  C) (Oral) 5' 5\" (1.651 m) 96% 24.2 kg/m2        Blood Pressure from Last 3 Encounters:   08/18/17 120/60   07/11/17 118/59   02/09/17 150/70    Weight from Last 3 Encounters:   08/18/17 145 lb 6.4 oz (66 kg)   07/11/17 143 lb 11.2 oz (65.2 kg) "   02/09/17 157 lb 4.8 oz (71.4 kg)              We Performed the Following     DERMATOLOGY REFERRAL     ONC/HEME ADULT REFERRAL        Primary Care Provider Office Phone # Fax #    Angel Corea [phone]       [address] [facility] [address]        Equal Access to Services     IMAN MULTANI : Hadii aad ku hadasho Soomaali, waaxda luqadaha, qaybta kaalmada adeegyada, waxay idiin hayaan adefeli ellis la'aan . So [facility] [phone].    ATENCIÓN: Si habla español, tiene a costa disposición servicios gratuitos de asistencia lingüística. Rossame al [phone].    We comply with applicable federal civil rights laws and [place] laws. We do not discriminate on the basis of race, color, national origin, age, disability sex, sexual orientation or gender identity.            Thank you!     Thank you for choosing [facility]  for your care. Our goal is always to provide you with excellent care. Hearing back from our patients is one way we can continue to improve our services. Please take a few minutes to complete the written survey that you may receive in the mail after your visit with us. Thank you!             Your Updated Medication List - Protect others around you: Learn how to safely use, store and throw away your medicines at www.disposemymeds.org.          This list is accurate as of: 8/18/17  3:57 PM.  Always use your most recent med list.                   Brand Name Dispense Instructions for use Diagnosis    amLODIPine 2.5 MG tablet    NORVASC    90 tablet    Take 1 tablet (2.5 mg) by mouth daily    Essential hypertension with goal blood pressure less than 140/90       aspirin 81 MG tablet      Take 81 mg by mouth daily        calcium carbonate 500 MG chewable tablet    TUMS     Take 1-2 chew tab by mouth 2 times daily as needed for heartburn        citalopram 10 MG tablet    celeXA    90 tablet    Take 1 tablet (10 mg) by mouth daily    Episodic mood disorder (H)        diphenhydrAMINE-acetaminophen  MG tablet    TYLENOL PM     Take 1 tablet by mouth At Bedtime        furosemide 20 MG tablet    LASIX    180 tablet    Take 1 tablet (20 mg) by mouth daily    Essential hypertension, malignant       ICAPS PO      Take 1 capsule by mouth 2 times daily        iron 325 (65 FE) MG tablet      Take 1 tablet by mouth 2 times daily        levothyroxine 100 MCG tablet    SYNTHROID/LEVOTHROID    90 tablet    TAKE 1 TABLET BY MOUTH EVERY DAY    Other specified hypothyroidism       lisinopril 10 MG tablet    PRINIVIL/ZESTRIL    90 tablet    Take 1 tablet (10 mg) by mouth daily    Hospital discharge follow-up, Congestive heart failure, unspecified congestive heart failure chronicity, unspecified congestive heart failure type (H)       order for DME     1 Month    Testing being ordered: INR orders as directed to be done at [facility] To be done monthly as directed.    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 Read the directions carefully, and ask your doctor or other care provider to review them with you.

## 2017-08-30 LAB — INR PPP: 2.18

## 2017-08-31 ENCOUNTER — ANTICOAGULATION THERAPY VISIT (OUTPATIENT)
Dept: ANTICOAGULATION | Facility: CLINIC | Age: 82
End: 2017-08-31
Payer: COMMERCIAL

## 2017-08-31 DIAGNOSIS — I48.20 CHRONIC ATRIAL FIBRILLATION (H): ICD-10-CM

## 2017-08-31 DIAGNOSIS — Z79.01 LONG-TERM (CURRENT) USE OF ANTICOAGULANTS: ICD-10-CM

## 2017-08-31 PROCEDURE — 99207 ZZC NO CHARGE NURSE ONLY: CPT

## 2017-08-31 NOTE — MR AVS SNAPSHOT
Francesco Killian   8/31/2017 12:00 PM   Anticoagulation Therapy Visit    Description:  89 year old male   Provider:   ANTICOAGULATION CLINIC   Department:   Anti Coagulation           INR as of 8/31/2017     Today's INR 2.18 (8/30/2017)      Anticoagulation Summary as of 8/31/2017     INR goal 2.0-3.0   Today's INR 2.18 (8/30/2017)   Full instructions 4 mg on Mon, Wed, Fri; 2 mg all other days   Next INR check 9/28/2017    Indications   Chronic atrial fibrillation (H) [I48.2]  Long-term (current) use of anticoagulants [Z79.01] [Z79.01]         Your next Anticoagulation Clinic appointment(s)     Aug 31, 2017 12:00 PM CDT   Anticoagulation Visit with  ANTICOAGULATION CLINIC   [facility]    [address] [address] [phone]            Sep 28, 2017  4:15 PM CDT   Anticoagulation Visit with  ANTICOAGULATION CLINIC   [facility] ([facility])    [address] [place] [address] [phone]              Contact Numbers     [facility]  Please call  [phone] to cancel and/or reschedule your appointment   Please call  [phone] with any problems or questions regarding your therapy.        August 2017 Details    Sun Mon Tue Wed Thu Fri Sat       1               2               3               4               5                 6               7               8               9               10               11               12                 13               14               15               16               17               18               19                 20               21               22               23               24               25               26                 27               28               29               30               31      2 mg   See details         Date Details   08/31 This INR check               How to take your  warfarin dose     To take:  2 mg Take 1 of the 2 mg tablets.           September 2017 Details    Sun Mon Tue Wed Thu Fri Sat          1      4 mg         2      2 mg           3      2 mg         4      4 mg         5      2 mg         6      4 mg         7      2 mg         8      4 mg         9      2 mg           10      2 mg         11      4 mg         12      2 mg         13      4 mg         14      2 mg         15      4 mg         16      2 mg           17      2 mg         18      4 mg         19      2 mg         20      4 mg         21      2 mg         22      4 mg         23      2 mg           24      2 mg         25      4 mg         26      2 mg         27      4 mg         28            29               30                Date Details   No additional details    Date of next INR:  9/28/2017         How to take your warfarin dose     To take:  2 mg Take 1 of the 2 mg tablets.    To take:  4 mg Take 2 of the 2 mg tablets.

## 2017-08-31 NOTE — PROGRESS NOTES
ANTICOAGULATION FOLLOW-UP CLINIC VISIT    Patient Name:  Francesco Killian  Date:  8/30/2017  Contact Type:  Telephone    SUBJECTIVE:     Patient Findings     Positives No Problem Findings           OBJECTIVE    INR   Date Value Ref Range Status   08/30/2017 2.18  Final       ASSESSMENT / PLAN  INR assessment THER    Recheck INR In: 4 WEEKS    INR Location [facility]      Anticoagulation Summary as of 8/31/2017     INR goal 2.0-3.0   Today's INR 2.18 (8/30/2017)   Maintenance plan 4 mg (2 mg x 2) on Mon, Wed, Fri; 2 mg (2 mg x 1) all other days   Full instructions 4 mg on Mon, Wed, Fri; 2 mg all other days   Weekly total 20 mg   No change documented Annamarie Dejesus, RN   Plan last modified Haley Beth RN (6/1/2017)   Next INR check 9/28/2017   Target end date Indefinite    Indications   Chronic atrial fibrillation (H) [I48.2]  Long-term (current) use of anticoagulants [Z79.01] [Z79.01]         Anticoagulation Episode Summary     INR check location     Preferred lab     Send INR reminders to [facility]    Comments             See the Encounter Report to view Anticoagulation Flowsheet and Dosing Calendar (Go to Encounters tab in chart review, and find the Anticoagulation Therapy Visit)        Annamarie Dejesus, RN

## 2017-09-07 ENCOUNTER — HOSPITAL ENCOUNTER (OUTPATIENT)
Facility: CLINIC | Age: 82
Setting detail: SPECIMEN
Discharge: HOME | End: 2017-09-07
Attending: INTERNAL MEDICINE | Admitting: INTERNAL MEDICINE
Payer: MEDICARE

## 2017-09-07 ENCOUNTER — ONCOLOGY VISIT (OUTPATIENT)
Dept: ONCOLOGY | Facility: CLINIC | Age: 82
End: 2017-09-07
Attending: INTERNAL MEDICINE
Payer: COMMERCIAL

## 2017-09-07 VITALS
RESPIRATION RATE: 16 BRPM | BODY MASS INDEX: 23.66 KG/M2 | TEMPERATURE: 97.8 F | HEIGHT: 65 IN | DIASTOLIC BLOOD PRESSURE: 67 MMHG | SYSTOLIC BLOOD PRESSURE: 134 MMHG | HEART RATE: 60 BPM | OXYGEN SATURATION: 97 % | WEIGHT: 142 LBS

## 2017-09-07 DIAGNOSIS — D53.9 MACROCYTIC ANEMIA: Primary | ICD-10-CM

## 2017-09-07 LAB
ALBUMIN SERPL-MCNC: 3.6 G/DL (ref 3.4–5)
ALP SERPL-CCNC: 105 U/L (ref 40–150)
ALT SERPL W P-5'-P-CCNC: 22 U/L (ref 0–70)
ANION GAP SERPL CALCULATED.3IONS-SCNC: 7 MMOL/L (ref 3–14)
AST SERPL W P-5'-P-CCNC: 26 U/L (ref 0–45)
BASOPHILS # BLD AUTO: 0.1 10E9/L (ref 0–0.2)
BASOPHILS NFR BLD AUTO: 0.7 %
BILIRUB SERPL-MCNC: 0.4 MG/DL (ref 0.2–1.3)
BUN SERPL-MCNC: 28 MG/DL (ref 7–30)
CALCIUM SERPL-MCNC: 9 MG/DL (ref 8.5–10.1)
CHLORIDE SERPL-SCNC: 101 MMOL/L (ref 94–109)
CO2 SERPL-SCNC: 28 MMOL/L (ref 20–32)
CREAT SERPL-MCNC: 1.51 MG/DL (ref 0.66–1.25)
DIFFERENTIAL METHOD BLD: ABNORMAL
EOSINOPHIL # BLD AUTO: 0.2 10E9/L (ref 0–0.7)
EOSINOPHIL NFR BLD AUTO: 3 %
ERYTHROCYTE [DISTWIDTH] IN BLOOD BY AUTOMATED COUNT: 12.4 % (ref 10–15)
FOLATE SERPL-MCNC: 90 NG/ML
GFR SERPL CREATININE-BSD FRML MDRD: 43 ML/MIN/1.7M2
GLUCOSE SERPL-MCNC: 101 MG/DL (ref 70–99)
HCT VFR BLD AUTO: 41.7 % (ref 40–53)
HGB BLD-MCNC: 13.7 G/DL (ref 13.3–17.7)
IMM GRANULOCYTES # BLD: 0 10E9/L (ref 0–0.4)
IMM GRANULOCYTES NFR BLD: 0.3 %
LYMPHOCYTES # BLD AUTO: 1.2 10E9/L (ref 0.8–5.3)
LYMPHOCYTES NFR BLD AUTO: 16.8 %
MCH RBC QN AUTO: 32.6 PG (ref 26.5–33)
MCHC RBC AUTO-ENTMCNC: 32.9 G/DL (ref 31.5–36.5)
MCV RBC AUTO: 99 FL (ref 78–100)
MONOCYTES # BLD AUTO: 0.8 10E9/L (ref 0–1.3)
MONOCYTES NFR BLD AUTO: 11.4 %
NEUTROPHILS # BLD AUTO: 5 10E9/L (ref 1.6–8.3)
NEUTROPHILS NFR BLD AUTO: 67.8 %
NRBC # BLD AUTO: 0 10*3/UL
NRBC BLD AUTO-RTO: 0 /100
PLATELET # BLD AUTO: 161 10E9/L (ref 150–450)
POTASSIUM SERPL-SCNC: 4.4 MMOL/L (ref 3.4–5.3)
PROT SERPL-MCNC: 8.1 G/DL (ref 6.8–8.8)
RBC # BLD AUTO: 4.2 10E12/L (ref 4.4–5.9)
RETICS # AUTO: 23.9 10E9/L (ref 25–95)
RETICS/RBC NFR AUTO: 0.6 % (ref 0.5–2)
SODIUM SERPL-SCNC: 136 MMOL/L (ref 133–144)
VIT B12 SERPL-MCNC: 972 PG/ML (ref 193–986)
WBC # BLD AUTO: 7.3 10E9/L (ref 4–11)

## 2017-09-07 PROCEDURE — 99204 OFFICE O/P NEW MOD 45 MIN: CPT | Performed by: INTERNAL MEDICINE

## 2017-09-07 PROCEDURE — 82607 VITAMIN B-12: CPT | Performed by: INTERNAL MEDICINE

## 2017-09-07 PROCEDURE — 40000847 ZZHCL STATISTIC MORPHOLOGY W/INTERP HISTOLOGY TC 85060: Performed by: INTERNAL MEDICINE

## 2017-09-07 PROCEDURE — 82746 ASSAY OF FOLIC ACID SERUM: CPT | Performed by: INTERNAL MEDICINE

## 2017-09-07 PROCEDURE — 85025 COMPLETE CBC W/AUTO DIFF WBC: CPT | Performed by: INTERNAL MEDICINE

## 2017-09-07 PROCEDURE — 85060 BLOOD SMEAR INTERPRETATION: CPT | Performed by: INTERNAL MEDICINE

## 2017-09-07 PROCEDURE — 99211 OFF/OP EST MAY X REQ PHY/QHP: CPT

## 2017-09-07 PROCEDURE — 80053 COMPREHEN METABOLIC PANEL: CPT | Performed by: INTERNAL MEDICINE

## 2017-09-07 PROCEDURE — 82668 ASSAY OF ERYTHROPOIETIN: CPT | Performed by: INTERNAL MEDICINE

## 2017-09-07 PROCEDURE — 85045 AUTOMATED RETICULOCYTE COUNT: CPT | Performed by: INTERNAL MEDICINE

## 2017-09-07 ASSESSMENT — PAIN SCALES - GENERAL: PAINLEVEL: NO PAIN (0)

## 2017-09-07 NOTE — MR AVS SNAPSHOT
After Visit Summary   9/7/2017    Francesco Killian    MRN: [id number]           Patient Information     Date Of Birth          12/26/1920        Visit Information        Provider Department      9/7/2017 1:45 PM Julissa Martinez MD [facility]  Oncology [facility]      Today's Diagnoses     Macrocytic anemia    -  1      Care Instructions        -labs today- BC    -schedule abdomen ultrasound scheduled Annamarie Guerrero    -return to clinic in 3 months with labs scheduled Annamarie Guerrero          Follow-ups after your visit        Your next 10 appointments already scheduled     Sep 25, 2017 10:30 AM CDT   US ABDOMEN COMPLETE with [email or web address]   [facility] [place] ([facility])    [address] [address] [phone]           Please bring a list of your medicines (including vitamins, minerals and over-the-counter drugs). Also, tell your doctor about any allergies you may have. Wear comfortable clothes and leave your valuables at home.  Adults: No eating or drinking for 8 hours before the exam. You may take medicine with a small sip of water.  Children: - Children 6+ years: No food or drink for 6 hours before exam. - Children 1-5 years: No food or drink for 4 hours before exam. - Infants, breast-fed: may have breast milk up to 2 hours before exam. - Infants, formula: may have bottle until 4 hours before exam.  Please call the Imaging Department at your exam site with any questions.            Sep 28, 2017  4:15 PM CDT   Anticoagulation Visit with OX ANTICOAGULATION CLINIC   [facility] ([facility])    [address] [address] [phone]            Oct 03, 2017  4:15 PM CDT   New Visit with Miranda Avendaño PA-C   [facility] ([facility])    [address] [address]  "  [phone]            Dec 07, 2017  2:15 PM CST   Return Visit with Julissa Martinez MD   [facility] ([facility])    [facility]  [address]  [place] [address] [phone]              Future tests that were ordered for you today     Open Future Orders        Priority Expected Expires Ordered    US Abdomen Complete Routine  9/7/2018 9/7/2017            Who to contact     If you have questions or need follow up information about today's clinic visit or your schedule please contact [facility] directly at [phone].  Normal or non-critical lab and imaging results will be communicated to you by Narvaloushart, letter or phone within 4 business days after the clinic has received the results. If you do not hear from us within 7 days, please contact the clinic through Odd Geologyt or phone. If you have a critical or abnormal lab result, we will notify you by phone as soon as possible.  Submit refill requests through Regaalo or call your pharmacy and they will forward the refill request to us. Please allow 3 business days for your refill to be completed.          Additional Information About Your Visit        NarvalousharVice Media Information     Regaalo gives you secure access to your electronic health record. If you see a primary care provider, you can also send messages to your care team and make appointments. If you have questions, please call your primary care clinic.  If you do not have a primary care provider, please call [phone] and they will assist you.        Care EveryWhere ID     This is your Care EveryWhere ID. This could be used by other organizations to access your [place] medical records  [id number]        Your Vitals Were     Pulse Temperature Respirations Height Pulse Oximetry BMI (Body Mass Index)    60 97.8  F (36.6  C) (Tympanic) 16 1.651 m (5' 5\") 97% 23.63 kg/m2       Blood Pressure from Last 3 " Encounters:   09/07/17 134/67   08/18/17 120/60   07/11/17 118/59    Weight from Last 3 Encounters:   09/07/17 64.4 kg (142 lb)   08/18/17 66 kg (145 lb 6.4 oz)   07/11/17 65.2 kg (143 lb 11.2 oz)              We Performed the Following     Blood Morphology Pathologist Review     CBC with platelets differential     Comprehensive metabolic panel     Erythropoietin     Folate     Reticulocyte count     Vitamin B12        Primary Care Provider Office Phone # Fax #    Angel Corea [phone]       [address] [facility] [address]        Equal Access to Services     [facility] : Hadii aad sohan hadasho Sokathleen, waaxda luqadaha, qaybta kaalmada adefeliyada, singh allan . So [facility] [phone].    ATENCIÓN: Si habla español, tiene a costa disposición servicios gratuitos de asistencia lingüística. [facility] [phone].    We comply with applicable federal civil rights laws and [place] laws. We do not discriminate on the basis of race, color, national origin, age, disability sex, sexual orientation or gender identity.            Thank you!     Thank you for choosing [facility]  for your care. Our goal is always to provide you with excellent care. Hearing back from our patients is one way we can continue to improve our services. Please take a few minutes to complete the written survey that you may receive in the mail after your visit with us. Thank you!             Your Updated Medication List - Protect others around you: Learn how to safely use, store and throw away your medicines at www.disposemymeds.org.          This list is accurate as of: 9/7/17  3:32 PM.  Always use your most recent med list.                   Brand Name Dispense Instructions for use Diagnosis    aspirin 81 MG tablet      Take 81 mg by mouth daily        calcium carbonate 500 MG chewable tablet    TUMS     Take 1-2 chew tab by mouth 2 times daily as needed for heartburn         citalopram 10 MG tablet    celeXA    90 tablet    Take 1 tablet (10 mg) by mouth daily    Episodic mood disorder (H)       diphenhydrAMINE-acetaminophen  MG tablet    TYLENOL PM     Take 1 tablet by mouth At Bedtime        furosemide 20 MG tablet    LASIX    180 tablet    Take 1 tablet (20 mg) by mouth daily    Essential hypertension, malignant       ICAPS PO      Take 1 capsule by mouth 2 times daily        iron 325 (65 FE) MG tablet      Take 1 tablet by mouth 2 times daily        levothyroxine 100 MCG tablet    SYNTHROID/LEVOTHROID    90 tablet    TAKE 1 TABLET BY MOUTH EVERY DAY    Other specified hypothyroidism       lisinopril 10 MG tablet    PRINIVIL/ZESTRIL    90 tablet    Take 1 tablet (10 mg) by mouth daily    Hospital discharge follow-up, Congestive heart failure, unspecified congestive heart failure chronicity, unspecified congestive heart failure type (H)       order for DME     1 Month    Testing being ordered: INR orders as directed to be done at [facility] To be done monthly as directed.    Chronic atrial fibrillation (H)       TYLENOL 8 HOUR 650 MG CR tablet   Generic drug:  acetaminophen      Take 650 mg by mouth every evening as needed for mild pain or fever        * warfarin 2 MG tablet    COUMADIN     Take 2 mg by mouth once a week on Tuesday        * warfarin 2 MG tablet    COUMADIN    180 tablet    TAKE 1 TABLET BY MOUTH ON MON/THURS AND 2 TABLETS ALL OTHER DAYS AS DIRECTED BY THE ANTICOAGULATION CLINIC    Chronic atrial fibrillation (H)       * Notice:  This list has 2 medication(s) that are the same as other medications prescribed for you. Read the directions carefully, and ask your doctor or other care provider to review them with you.

## 2017-09-07 NOTE — LETTER
9/7/2017         RE: Francesco Killian  [address]   [place] [address]        Dear Colleague,    Thank you for referring your patient, Francesco Killian, to the [facility]. Please see a copy of my visit note below.    [facility]    Hematology/Oncology New Patient Note      Today's Date: 09/07/17    Reason for Consult: anemia      HISTORY OF PRESENT ILLNESS: Francesco Killian is a 89 year old male with PMHx of hypothyroidism, HLD, HTN, CVA, atrial fibrillation, aortic stenosis, abdominal aortic aneurysm, CAD s/p CABG, who presents with anemia.  On chart review, he has had a mild anemia since at least 2013.  He developed macrocytosis more recently in 2017, but his MCV had been increasing since 2013.  He has had mild thrombocytopenia off and on as well.      Today, he is here with his son.  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  His son is a neurologist.  He lives with his wife.  He says that he overall feels well.  He has some fatigue.  Appetite is good.  He has not have fevers or gets frequent infections.  He denies alcohol use or smoking.          REVIEW OF SYSTEMS:   14 point ROS was reviewed and is negative other than as noted above in HPI.       HOME MEDICATIONS:  Current Outpatient Prescriptions   Medication Sig Dispense Refill     furosemide (LASIX) 20 MG tablet Take 1 tablet (20 mg) by mouth daily 180 tablet 3     levothyroxine (SYNTHROID/LEVOTHROID) 100 MCG tablet TAKE 1 TABLET BY MOUTH EVERY DAY 90 tablet 0     lisinopril (PRINIVIL/ZESTRIL) 10 MG tablet Take 1 tablet (10 mg) by mouth daily 90 tablet 3     citalopram (CELEXA) 10 MG tablet Take 1 tablet (10 mg) by mouth daily 90 tablet 1     warfarin (COUMADIN) 2 MG tablet TAKE 1 TABLET BY MOUTH ON MON/THURS AND 2 TABLETS ALL OTHER DAYS AS DIRECTED BY THE ANTICOAGULATION CLINIC 180 tablet 0     diphenhydrAMINE-acetaminophen (TYLENOL PM)   MG tablet Take 1 tablet by mouth At Bedtime       acetaminophen (TYLENOL 8 HOUR) 650 MG CR tablet Take 650 mg by mouth every evening as needed for mild pain or fever       warfarin (COUMADIN) 2 MG tablet Take 2 mg by mouth once a week on Tuesday       order for DME Testing being ordered: INR [facility] as [facility] to be [facility] at [facility]  To be done monthly as directed. 1 Month orn     Multiple Vitamins-Minerals (ICAPS PO) Take 1 capsule by mouth 2 times daily       calcium carbonate (TUMS) 500 MG chewable tablet Take 1-2 chew tab by mouth 2 times daily as needed for heartburn       Ferrous Sulfate (IRON) 325 (65 FE) MG tablet Take 1 tablet by mouth 2 times daily       aspirin 81 MG tablet Take 81 mg by mouth daily            ALLERGIES:  No Known Allergies      PAST MEDICAL HISTORY:  Past Medical History:   Diagnosis Date     AAA (abdominal aortic aneurysm) (H) 6/3/2013     Aortic stenosis      Bradycardia     Dr Stanford didn't think pacer needed at this time     Carotid occlusion, right     see above note     Chronic atrial fibrillation (H) 6/17/2013     CVA (cerebral infarction) 6/3/2013    DANILO and both vertebral art blocked-family son (neurologist) requests BP to run a bit higher since flow is via L ICA     Dysphagia 6/3/2013     HTN (hypertension) 6/3/2013    and RA stenosis, s/p stents at [place]     Hyperlipidemia LDL goal <130 6/3/2013     Hypothyroidism 6/3/2013     Macular degeneration of both eyes      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     Right bundle branch block (RBBB) 9/2/2015     Unspecified cerebral artery occlusion with cerebral infarction     x 2, uses a cane         PAST SURGICAL HISTORY:  Past Surgical History:   Procedure Laterality Date     C NONSPECIFIC PROCEDURE      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 [place] at time of RA stenting     CARDIAC SURGERY  1980s    CABg     COLONOSCOPY      normal exams     COLONOSCOPY  11/4/2013    Procedure:  "COLONOSCOPY;  COLONOSCOPY ;  Surgeon: Aguilar Arechiga MD;  Location:  GI     ESOPHAGOSCOPY, GASTROSCOPY, DUODENOSCOPY (EGD), COMBINED  10/14/2013    Procedure: COMBINED ESOPHAGOSCOPY, GASTROSCOPY, DUODENOSCOPY (EGD);  COMBINED ESOPHAGOSCOPY, GASTROSCOPY, DUODENOSCOPY (EGD) ;  Surgeon: Aguilar Arechiga MD;  Location:  GI     EYE SURGERY      bilat cataracts     GI SURGERY  1970s    duodenal ulcer reapair     HERNIA REPAIR      bilat inguinal     IR RENAL/VISCERAL STENT/ATHERECT/PTA       TURP           SOCIAL HISTORY:  Social History     Social History     Marital status:      Spouse name: N/A     Number of children: N/A     Years of education: N/A     Occupational History     Not on file.     Social History Main Topics     Smoking status: Never Smoker     Smokeless tobacco: Never Used     Alcohol use No     Drug use: No     Sexual activity: No     Other Topics Concern     Caffeine Concern No     decaff tea/green tea     Sleep Concern No     Special Diet No     low carbs     Exercise Yes     extremities - stretches every day,  some walking     Social History Narrative         FAMILY HISTORY:  Family History   Problem Relation Age of Onset     C.A.D. Mother      Coronary Artery Disease Mother      C.A.D. Father      C.A.D. Maternal Grandmother      C.A.D. Maternal Grandfather      C.A.D. Paternal Grandmother      C.A.D. Paternal Grandfather      C.A.D. Brother      C.A.D. Sister          PHYSICAL EXAM:  Vital signs:  /67  Pulse 60  Temp 97.8  F (36.6  C) (Tympanic)  Resp 16  Ht 1.651 m (5' 5\")  Wt 64.4 kg (142 lb)  SpO2 97%  BMI 23.63 kg/m2   GENERAL/CONSTITUTIONAL: No acute distress. Accompanied by son.  EYES: No scleral icterus.  ENT: Hard of hearing.  RESPIRATORY: Clear to auscultation bilaterally. No crackles or wheezing.   CARDIOVASCULAR: Irregular irregular; +murmur.  GASTROINTESTINAL: No tenderness. The patient has normal bowel sounds. No guarding.  No distention.  MUSCULOSKELETAL: " Warm and well-perfused.  NEUROLOGIC: Alert, oriented, answers questions appropriately.  INTEGUMENTARY: No jaundice.      LABS:  CBC RESULTS:   Recent Labs   Lab Test  09/07/17   1512   WBC  7.3   RBC  4.20*   HGB  13.7   HCT  41.7   MCV  99   MCH  32.6   MCHC  32.9   RDW  12.4   PLT  161     Recent Labs   Lab Test  09/07/17   1512 07/14/17 07/13/17   1500   NA  136   --   135   POTASSIUM  4.4  4.5  5.3   CHLORIDE  101   --   102   CO2  28   --   25   ANIONGAP  7   --   8   GLC  101*  110  100*   BUN  28   --   40*   CR  1.51*  1.84*  1.91*   KEZIA  9.0   --   8.7     Lab Results   Component Value Date    AST 26 09/07/2017     Lab Results   Component Value Date    ALT 22 09/07/2017     No results found for: BILICONJ   Lab Results   Component Value Date    BILITOTAL 0.4 09/07/2017     Lab Results   Component Value Date    ALBUMIN 3.6 09/07/2017     Lab Results   Component Value Date    PROTTOTAL 8.1 09/07/2017      Lab Results   Component Value Date    ALKPHOS 105 09/07/2017       Component      Latest Ref Rng & Units 7/13/2017   Iron      35 - 180 ug/dL 60   Iron Binding Cap      240 - 430 ug/dL 312   Iron Saturation Index      15 - 46 % 19   % Retic      0.5 - 2.0 % 0.9   Absolute Retic      25 - 95 10e9/L 28.5   Ferritin      26 - 388 ng/mL 70   Vitamin B12      193 - 986 pg/mL 1109 (H)       PATHOLOGY:  Peripheral smear 7/13/17:  FINAL DIAGNOSIS:   Peripheral blood demonstrating macrocytic anemia (see comment)     COMMENT:   Causes for macrocytic anemia typically include B12/folate deficiency,   liver disease, alcohol, medication reactions, hypothyroidism,   myelodysplasia, and reticulocytosis.  Neither dysplastic features or   reticulocytosis are identified in the current material. Clinical   correlation is required.           ASSESSMENT/PLAN:  Francesco Killian is a 89 year old male with:    1) Macrocytic anemia: He has had a chronic mild anemia since at least 2013, and more recently developed a macrocytosis.  With  his age, he could have a myelodysplastic syndrome, which can often manifest as a macrocytic anemia.  His vitamin and B12 levels were normal.  He has no history of liver disease or new medications.  He has history of hypothyroidism, but that is being controlled with medications.  He has chronic disease, which could be contributing to the chronic anemia, but usually not macrocytic.  We discussed that with this mild level of anemia, I would not do any specific interventions.  If we wanted a more definitive diagnosis, we could pursue a bone marrow biopsy.  Cristopher says that he does not feel strongly about this, especially as it likely would not change his management.  Even if he were diagnosed with MDS, we would continue observing his CBC's and provide supportive cares.  He would not be a candidate for chemotherapy.  We would consider hypomethylating agents, but would not be indicated unless he was requiring frequent transfusions and symptomatic, which he is not and it would not affect his overall survival.  We also discussed erythropoietin, but I also did not recommend it at this time, as his anemia is mild, and it comes with risk of thrombosis and stroke, and he has history of both heart disease and stroke.  With his advanced age and co-morbidities, I would be more conservative, and monitor his counts for now, and he and his son agree.    He is willing to undergo abdomen ultrasound to evaluate his liver and spleen, so he will get that scheduled.    -Will repeat labs today  -RTC in 3 months with repeat CBC        I spent a total of 45 minutes with the patient, with over >50% of the time in counseling and/or coordination of care.       Julissa Martinez MD  Hematology/Oncology  [facility]      Again, thank you for allowing me to participate in the care of your patient.        Sincerely,        Julissa Martinez MD

## 2017-09-07 NOTE — PROGRESS NOTES
[facility]    Hematology/Oncology New Patient Note      Today's Date: 09/07/17    Reason for Consult: anemia      HISTORY OF PRESENT ILLNESS: Francesco Killian is a 89 year old male with PMHx of hypothyroidism, HLD, HTN, CVA, atrial fibrillation, aortic stenosis, abdominal aortic aneurysm, CAD s/p CABG, who presents with anemia.  On chart review, he has had a mild anemia since at least 2013.  He developed macrocytosis more recently in 2017, but his MCV had been increasing since 2013.  He has had mild thrombocytopenia off and on as well.      Today, he is here with his son.  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  His son is a neurologist.  He lives with his wife.  He says that he overall feels well.  He has some fatigue.  Appetite is good.  He has not have fevers or gets frequent infections.  He denies alcohol use or smoking.          REVIEW OF SYSTEMS:   14 point ROS was reviewed and is negative other than as noted above in HPI.       HOME MEDICATIONS:  Current Outpatient Prescriptions   Medication Sig Dispense Refill     furosemide (LASIX) 20 MG tablet Take 1 tablet (20 mg) by mouth daily 180 tablet 3     levothyroxine (SYNTHROID/LEVOTHROID) 100 MCG tablet TAKE 1 TABLET BY MOUTH EVERY DAY 90 tablet 0     lisinopril (PRINIVIL/ZESTRIL) 10 MG tablet Take 1 tablet (10 mg) by mouth daily 90 tablet 3     citalopram (CELEXA) 10 MG tablet Take 1 tablet (10 mg) by mouth daily 90 tablet 1     warfarin (COUMADIN) 2 MG tablet TAKE 1 TABLET BY MOUTH ON MON/THURS AND 2 TABLETS ALL OTHER DAYS AS DIRECTED BY THE ANTICOAGULATION CLINIC 180 tablet 0     diphenhydrAMINE-acetaminophen (TYLENOL PM)  MG tablet Take 1 tablet by mouth At Bedtime       acetaminophen (TYLENOL 8 HOUR) 650 MG CR tablet Take 650 mg by mouth every evening as needed for mild pain or fever       warfarin (COUMADIN) 2 MG tablet Take 2 mg by mouth once a week on Tuesday       order for  DME Testing being ordered: INR orders as directed to be done at [facility]  To be done monthly as directed. 1 Month orn     Multiple Vitamins-Minerals (ICAPS PO) Take 1 capsule by mouth 2 times daily       calcium carbonate (TUMS) 500 MG chewable tablet Take 1-2 chew tab by mouth 2 times daily as needed for heartburn       Ferrous Sulfate (IRON) 325 (65 FE) MG tablet Take 1 tablet by mouth 2 times daily       aspirin 81 MG tablet Take 81 mg by mouth daily            ALLERGIES:  No Known Allergies      PAST MEDICAL HISTORY:  Past Medical History:   Diagnosis Date     AAA (abdominal aortic aneurysm) (H) 6/3/2013     Aortic stenosis      Bradycardia     Dr Stanford didn't think pacer needed at this time     Carotid occlusion, right     see above note     Chronic atrial fibrillation (H) 6/17/2013     CVA (cerebral infarction) 6/3/2013    DANILO and both vertebral art blocked-family son (neurologist) requests BP to run a bit higher since flow is via L ICA     Dysphagia 6/3/2013     HTN (hypertension) 6/3/2013    and RA stenosis, s/p stents at [place]     Hyperlipidemia LDL goal <130 6/3/2013     Hypothyroidism 6/3/2013     Macular degeneration of both eyes      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     Right bundle branch block (RBBB) 9/2/2015     Unspecified cerebral artery occlusion with cerebral infarction     x 2, uses a cane         PAST SURGICAL HISTORY:  Past Surgical History:   Procedure Laterality Date     C NONSPECIFIC PROCEDURE      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 [place] at time of RA stenting     CARDIAC SURGERY  1980s    CABg     COLONOSCOPY      normal exams     COLONOSCOPY  11/4/2013    Procedure: COLONOSCOPY;  COLONOSCOPY ;  Surgeon: Aguilar Arechiga MD;  Location:  GI     ESOPHAGOSCOPY, GASTROSCOPY, DUODENOSCOPY (EGD), COMBINED  10/14/2013    Procedure: COMBINED ESOPHAGOSCOPY, GASTROSCOPY, DUODENOSCOPY (EGD);  COMBINED ESOPHAGOSCOPY, GASTROSCOPY, DUODENOSCOPY  "(EGD) ;  Surgeon: Aguilar Arechiga MD;  Location:  GI     EYE SURGERY      bilat cataracts     GI SURGERY  1970s    duodenal ulcer reapair     HERNIA REPAIR      bilat inguinal     IR RENAL/VISCERAL STENT/ATHERECT/PTA       TURP           SOCIAL HISTORY:  Social History     Social History     Marital status:      Spouse name: N/A     Number of children: N/A     Years of education: N/A     Occupational History     Not on file.     Social History Main Topics     Smoking status: Never Smoker     Smokeless tobacco: Never Used     Alcohol use No     Drug use: No     Sexual activity: No     Other Topics Concern     Caffeine Concern No     decaff tea/green tea     Sleep Concern No     Special Diet No     low carbs     Exercise Yes     extremities - stretches every day,  some walking     Social History Narrative         FAMILY HISTORY:  Family History   Problem Relation Age of Onset     C.A.D. Mother      Coronary Artery Disease Mother      C.A.D. Father      C.A.D. Maternal Grandmother      C.A.D. Maternal Grandfather      C.A.D. Paternal Grandmother      C.A.D. Paternal Grandfather      C.A.D. Brother      C.A.D. Sister          PHYSICAL EXAM:  Vital signs:  /67  Pulse 60  Temp 97.8  F (36.6  C) (Tympanic)  Resp 16  Ht 1.651 m (5' 5\")  Wt 64.4 kg (142 lb)  SpO2 97%  BMI 23.63 kg/m2   GENERAL/CONSTITUTIONAL: No acute distress. Accompanied by son.  EYES: No scleral icterus.  ENT: Hard of hearing.  RESPIRATORY: Clear to auscultation bilaterally. No crackles or wheezing.   CARDIOVASCULAR: Irregular irregular; +murmur.  GASTROINTESTINAL: No tenderness. The patient has normal bowel sounds. No guarding.  No distention.  MUSCULOSKELETAL: Warm and well-perfused.  NEUROLOGIC: Alert, oriented, answers questions appropriately.  INTEGUMENTARY: No jaundice.      LABS:  CBC RESULTS:   Recent Labs   Lab Test  09/07/17   1512   WBC  7.3   RBC  4.20*   HGB  13.7   HCT  41.7   MCV  99   MCH  32.6   MCHC  32.9   RDW  " 12.4   PLT  161     Recent Labs   Lab Test  09/07/17   1512 07/14/17 07/13/17   1500   NA  136   --   135   POTASSIUM  4.4  4.5  5.3   CHLORIDE  101   --   102   CO2  28   --   25   ANIONGAP  7   --   8   GLC  101*  110  100*   BUN  28   --   40*   CR  1.51*  1.84*  1.91*   KEZIA  9.0   --   8.7     Lab Results   Component Value Date    AST 26 09/07/2017     Lab Results   Component Value Date    ALT 22 09/07/2017     No results found for: BILICONJ   Lab Results   Component Value Date    BILITOTAL 0.4 09/07/2017     Lab Results   Component Value Date    ALBUMIN 3.6 09/07/2017     Lab Results   Component Value Date    PROTTOTAL 8.1 09/07/2017      Lab Results   Component Value Date    ALKPHOS 105 09/07/2017       Component      Latest Ref Rng & Units 7/13/2017   Iron      35 - 180 ug/dL 60   Iron Binding Cap      240 - 430 ug/dL 312   Iron Saturation Index      15 - 46 % 19   % Retic      0.5 - 2.0 % 0.9   Absolute Retic      25 - 95 10e9/L 28.5   Ferritin      26 - 388 ng/mL 70   Vitamin B12      193 - 986 pg/mL 1109 (H)       PATHOLOGY:  Peripheral smear 7/13/17:  FINAL DIAGNOSIS:   Peripheral blood demonstrating macrocytic anemia (see comment)     COMMENT:   Causes for macrocytic anemia typically include B12/folate deficiency,   liver disease, alcohol, medication reactions, hypothyroidism,   myelodysplasia, and reticulocytosis.  Neither dysplastic features or   reticulocytosis are identified in the current material. Clinical   correlation is required.           ASSESSMENT/PLAN:  Francesco Killian is a 89 year old male with:    1) Macrocytic anemia: He has had a chronic mild anemia since at least 2013, and more recently developed a macrocytosis.  With his age, he could have a myelodysplastic syndrome, which can often manifest as a macrocytic anemia.  His vitamin and B12 levels were normal.  He has no history of liver disease or new medications.  He has history of hypothyroidism, but that is being controlled with  medications.  He has chronic disease, which could be contributing to the chronic anemia, but usually not macrocytic.  We discussed that with this mild level of anemia, I would not do any specific interventions.  If we wanted a more definitive diagnosis, we could pursue a bone marrow biopsy.  Cristopher says that he does not feel strongly about this, especially as it likely would not change his management.  Even if he were diagnosed with MDS, we would continue observing his CBC's and provide supportive cares.  He would not be a candidate for chemotherapy.  We would consider hypomethylating agents, but would not be indicated unless he was requiring frequent transfusions and symptomatic, which he is not and it would not affect his overall survival.  We also discussed erythropoietin, but I also did not recommend it at this time, as his anemia is mild, and it comes with risk of thrombosis and stroke, and he has history of both heart disease and stroke.  With his advanced age and co-morbidities, I would be more conservative, and monitor his counts for now, and he and his son agree.    He is willing to undergo abdomen ultrasound to evaluate his liver and spleen, so he will get that scheduled.    -Will repeat labs today  -RTC in 3 months with repeat CBC        I spent a total of 45 minutes with the patient, with over >50% of the time in counseling and/or coordination of care.       Julissa Martinez MD  Hematology/Oncology  [facility]

## 2017-09-07 NOTE — NURSING NOTE
"Oncology Rooming Note    September 7, 2017 2:26 PM   Francesco Killian is a 89 year old male who presents for:    Chief Complaint   Patient presents with     Oncology Clinic Visit     New Patient     Initial Vitals: /67  Pulse 60  Temp 97.8  F (36.6  C) (Tympanic)  Resp 16  Ht 1.651 m (5' 5\")  Wt 64.4 kg (142 lb)  SpO2 97%  BMI 23.63 kg/m2 Estimated body mass index is 23.63 kg/(m^2) as calculated from the following:    Height as of this encounter: 1.651 m (5' 5\").    Weight as of this encounter: 64.4 kg (142 lb). Body surface area is 1.72 meters squared.  No Pain (0) Comment: Data Unavailable   No LMP for male patient.  Allergies reviewed: Yes  Medications reviewed: Yes    Medications: Medication refills not needed today.  Pharmacy [facility] entered into [employer] [address] [place] [address] [address] AT [facility] [address]    Clinical concerns: Follow up     8 minutes for nursing intake (face to face time)     Lashell Pantoja CMA     DISCHARGE PLAN:  Next appointments: See patient instruction section  Departure Mode: Ambulatory  Accompanied by: self  0 minutes for nursing discharge (face to face time)   Lashell Pantoja CMA                  "

## 2017-09-07 NOTE — PATIENT INSTRUCTIONS
-labs today- BC    -schedule abdomen ultrasound scheduled Annamarie Guerrero    -return to clinic in 3 months with labs scheduled Annamarie Guerrero

## 2017-09-08 LAB
COPATH REPORT: NORMAL
EPO SERPL-ACNC: 10 MU/ML (ref 4–27)

## 2017-09-25 ENCOUNTER — HOSPITAL ENCOUNTER (OUTPATIENT)
Dept: ULTRASOUND IMAGING | Facility: CLINIC | Age: 82
Discharge: HOME | End: 2017-09-25
Attending: INTERNAL MEDICINE | Admitting: INTERNAL MEDICINE
Payer: MEDICARE

## 2017-09-25 DIAGNOSIS — D53.9 MACROCYTIC ANEMIA: ICD-10-CM

## 2017-09-25 PROCEDURE — 76700 US EXAM ABDOM COMPLETE: CPT

## 2017-09-28 LAB — INR PPP: 2.17

## 2017-09-29 ENCOUNTER — ANTICOAGULATION THERAPY VISIT (OUTPATIENT)
Dept: ANTICOAGULATION | Facility: CLINIC | Age: 82
End: 2017-09-29
Payer: COMMERCIAL

## 2017-09-29 DIAGNOSIS — Z79.01 LONG-TERM (CURRENT) USE OF ANTICOAGULANTS: Primary | ICD-10-CM

## 2017-09-29 PROCEDURE — 99207 ZZC NO CHARGE NURSE ONLY: CPT

## 2017-09-29 NOTE — PROGRESS NOTES
ANTICOAGULATION FOLLOW-UP CLINIC VISIT    Patient Name:  Francesco Killian  Date:  9/29/2017  Contact Type:  Telephone    SUBJECTIVE:     Patient Findings     Positives No Problem Findings           OBJECTIVE    INR   Date Value Ref Range Status   09/28/2017 2.17  Final       ASSESSMENT / PLAN  INR assessment THER    Recheck INR In: 4 WEEKS    INR Location Clinic      Anticoagulation Summary as of 9/29/2017     INR goal 2.0-3.0   Today's INR 2.17 (9/28/2017)   Maintenance plan 4 mg (2 mg x 2) on Mon, Wed, Fri; 2 mg (2 mg x 1) all other days   Full instructions 4 mg on Mon, Wed, Fri; 2 mg all other days   Weekly total 20 mg   No change documented Jeni Juan, RN   Plan last modified Haley Beth RN (6/1/2017)   Next INR check 10/27/2017   Target end date Indefinite    Indications   Chronic atrial fibrillation (H) [I48.2]  Long-term (current) use of anticoagulants [Z79.01] [Z79.01]         Anticoagulation Episode Summary     INR check location     Preferred lab     Send INR reminders to [facility]    Comments             See the Encounter Report to view Anticoagulation Flowsheet and Dosing Calendar (Go to Encounters tab in chart review, and find the Anticoagulation Therapy Visit)        Jeni Juan, RN

## 2017-09-29 NOTE — MR AVS SNAPSHOT
Francesco Killian   9/29/2017 12:15 PM   Anticoagulation Therapy Visit    Description:  89 year old male   Provider:   ANTICOAGULATION CLINIC   Department:   Anti Coagulation           INR as of 9/29/2017     Today's INR 2.17 (9/28/2017)      Anticoagulation Summary as of 9/29/2017     INR goal 2.0-3.0   Today's INR 2.17 (9/28/2017)   Full instructions 4 mg on Mon, Wed, Fri; 2 mg all other days   Next INR check 10/27/2017    Indications   Chronic atrial fibrillation (H) [I48.2]  Long-term (current) use of anticoagulants [Z79.01] [Z79.01]         Your next Anticoagulation Clinic appointment(s)     Sep 29, 2017 12:15 PM CDT   Anticoagulation Visit with  ANTICOAGULATION CLINIC   [facility] ([facility])    [address] [address] [phone]            Oct 26, 2017  1:45 PM CDT   Anticoagulation Visit with  ANTICOAGULATION CLINIC   [facility] ([facility])    [address] [place] [address] [phone]              Contact Numbers     [facility]  Please call  [phone] to cancel and/or reschedule your appointment   Please call  [phone] with any problems or questions regarding your therapy.        September 2017 Details    Sun Mon Tue Wed Thu Fri Sat          1               2                 3               4               5               6               7               8               9                 10               11               12               13               14               15               16                 17               18               19               20               21               22               23                 24               25               26               27               28               29      4 mg   See details      30      2 mg          Date Details   09/29 This INR check               How to take your warfarin dose      To take:  2 mg Take 1 of the 2 mg tablets.    To take:  4 mg Take 2 of the 2 mg tablets.           October 2017 Details    Sun Mon Tue Wed Thu Fri Sat     1      2 mg         2      4 mg         3      2 mg         4      4 mg         5      2 mg         6      4 mg         7      2 mg           8      2 mg         9      4 mg         10      2 mg         11      4 mg         12      2 mg         13      4 mg         14      2 mg           15      2 mg         16      4 mg         17      2 mg         18      4 mg         19      2 mg         20      4 mg         21      2 mg           22      2 mg         23      4 mg         24      2 mg         25      4 mg         26      2 mg         27            28                 29               30               31                    Date Details   No additional details    Date of next INR:  10/27/2017         How to take your warfarin dose     To take:  2 mg Take 1 of the 2 mg tablets.    To take:  4 mg Take 2 of the 2 mg tablets.

## 2017-10-01 DIAGNOSIS — I48.20 CHRONIC ATRIAL FIBRILLATION (H): ICD-10-CM

## 2017-10-02 RX ORDER — WARFARIN SODIUM 2 MG/1
TABLET ORAL
Qty: 180 TABLET | Refills: 0 | Status: SHIPPED | OUTPATIENT
Start: 2017-10-02 | End: 2018-01-09

## 2017-10-02 NOTE — TELEPHONE ENCOUNTER
warfarin (COUMADIN) 2 MG tablet    Last Written Prescription Date: 7/05/2017  Last Fill Qty: 180, # refills: 0  Last Office Visit with G, [facility] or [facility] prescribing provider: 8/18/2017  Next 5 appointments (look out 90 days)     Dec 07, 2017  2:15 PM CST   Return Visit with Julissa Martinez MD   [facility] ([facility])    [facility] [place]  [address]  [facility] [id number] [phone]                   Date and Result of Last PT/INR:   Lab Results   Component Value Date    INR 2.17 09/28/2017    INR 2.18 08/30/2017

## 2017-10-03 ENCOUNTER — OFFICE VISIT (OUTPATIENT)
Dept: DERMATOLOGY | Facility: CLINIC | Age: 82
End: 2017-10-03
Payer: COMMERCIAL

## 2017-10-03 VITALS — HEART RATE: 80 BPM | OXYGEN SATURATION: 97 % | DIASTOLIC BLOOD PRESSURE: 90 MMHG | SYSTOLIC BLOOD PRESSURE: 187 MMHG

## 2017-10-03 DIAGNOSIS — D48.5 NEOPLASM OF UNCERTAIN BEHAVIOR OF SKIN: Primary | ICD-10-CM

## 2017-10-03 DIAGNOSIS — L21.9 DERMATITIS, SEBORRHEIC: ICD-10-CM

## 2017-10-03 PROCEDURE — 11100 HC BIOPSY SKIN/SUBQ/MUC MEM, SINGLE LESION: CPT | Performed by: PHYSICIAN ASSISTANT

## 2017-10-03 PROCEDURE — 88305 TISSUE EXAM BY PATHOLOGIST: CPT | Performed by: PHYSICIAN ASSISTANT

## 2017-10-03 PROCEDURE — 99213 OFFICE O/P EST LOW 20 MIN: CPT | Mod: 25 | Performed by: PHYSICIAN ASSISTANT

## 2017-10-03 PROCEDURE — 11101 HC BIOPSY SKIN/SUBQ/MUC MEM, EACH ADDTL LESION: CPT | Performed by: PHYSICIAN ASSISTANT

## 2017-10-03 RX ORDER — KETOCONAZOLE 20 MG/G
CREAM TOPICAL
Qty: 30 G | Refills: 1 | Status: SHIPPED | OUTPATIENT
Start: 2017-10-03

## 2017-10-03 NOTE — PATIENT INSTRUCTIONS
Wound Care Instructions     FOR SUPERFICIAL WOUNDS     [facility] [phone]                 AFTER 24 HOURS YOU SHOULD REMOVE THE BANDAGE AND BEGIN DAILY DRESSING CHANGES AS FOLLOWS:     1) Remove Dressing.     2) Clean and dry the area with tap water using a Q-tip or sterile gauze pad.     3) Apply Vaseline, Aquaphor, Polysporin ointment or Bacitracin ointment over entire wound.  Do NOT use Neosporin ointment.     4) Cover the wound with a band-aid, or a sterile non-stick gauze pad and micropore paper tape      REPEAT THESE INSTRUCTIONS AT LEAST ONCE A DAY UNTIL THE WOUND HAS COMPLETELY HEALED.    It is an old wives tale that a wound heals better when it is exposed to air and allowed to dry out. The wound will heal faster with a better cosmetic result if it is kept moist with ointment and covered with a bandage.    **Do not let the wound dry out.**      Supplies Needed:      *Cotton tipped applicators (Q-tips)    *Polysporin Ointment or Bacitracin Ointment (NOT NEOSPORIN)    *Band-aids or non-stick gauze pads and micropore paper tape.      PATIENT INFORMATION:    During the healing process you will notice a number of changes. All wounds develop a small halo of redness surrounding the wound.  This means healing is occurring. Severe itching with extensive redness usually indicates sensitivity to the ointment or bandage tape used to dress the wound.  You should call our office if this develops.      Swelling  and/or discoloration around your surgical site is common, particularly when performed around the eye.    All wounds normally drain.  The larger the wound the more drainage there will be.  After 7-10 days, you will notice the wound beginning to shrink and new skin will begin to grow.  The wound is healed when you can see skin has formed over the entire area.  A healed wound has a healthy, shiny look to the surface and is red to dark pink in color to normalize.  Wounds may take approximately 4-6  weeks to heal.  Larger wounds may take 6-8 weeks.  After the wound is healed you may discontinue dressing changes.    You may experience a sensation of tightness as your wound heals. This is normal and will gradually subside.    Your healed wound may be sensitive to temperature changes. This sensitivity improves with time, but if you re having a lot of discomfort, try to avoid temperature extremes.    Patients frequently experience itching after their wound appears to have healed because of the continue healing under the skin.  Plain Vaseline will help relieve the itching.        POSSIBLE COMPLICATIONS    BLEEDIN. Leave the bandage in place.  2. Use tightly rolled up gauze or a cloth to apply direct pressure over the bandage for 30  minutes.  3. Reapply pressure for an additional 30 minutes if necessary  4. Use additional gauze and tape to maintain pressure once the bleeding has stopped.

## 2017-10-03 NOTE — PROGRESS NOTES
HPI:   Francesco Killian is a 89 year old male who presents for evaluation of a spot on the face   chief complaint  Location: right cheek - is an are that will not heal   Condition present for:  months.   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 Here today with daughter in law who is an NP at [employer]    Review Of Systems  Eyes: negative  Ears/Nose/Throat: negative  Respiratory: No shortness of breath, dyspnea on exertion, cough, or hemoptysis  Cardiovascular: negative  Gastrointestinal: negative  Genitourinary: negative  Musculoskeletal: negative  Neurologic: negative  Psychiatric: negative        PHYSICAL EXAM:      Skin exam performed as follows: Type 2 skin. Mood appropriate  Alert and Oriented X 3. Well developed, well nourished in no distress.  General appearance: Normal  Head including face: Normal  Eyes: conjunctiva and lids: Normal  Mouth: Lips, teeth, gums: Normal  Neck: Normal  Chest-breast/axillae: Normal  Back: Normal  Spleen and liver: Normal  Cardiovascular: Exam of peripheral vascular system by observation for swelling, varicosities, edema: Normal  Genitalia: groin, buttocks: Normal  Extremities: digits/nails (clubbing): Normal  Eccrine and Apocrine glands: Normal  Right upper extremity: Normal  Left upper extremity: Normal  Right lower extremity: Normal  Left lower extremity: Normal  Skin: Scalp and body hair: See below    1. 10 mm pink ulcerated plaque on right zygoma  2. 14 mm pink plaque on right forearm  3. 6 mm pink papule on right hairline    ASSESSMENT/PLAN:     1. R/o BCC/SCC on right zygoma, right forearm, right hairline. Shave bx in typical fashion .  Area cleaned with betadyne and anesthetized with 1% lidocaine with epi .  Dermablade used to remove the lesion and sent to pathology. Bleeding was cauterized. Pt tolerated procedure well.  2. Seborrheic dermatitis - advised on chronic, recurrent condition. Discussed that it is a reaction to the  normal yeast on the skin. Has tried nothing in the past.   --Start ketoconazole cream QD-BID as needed          Follow-up: pending path/PRN sooner  CC:   Scribed By: Miranda Avendaño, MS, PA-C

## 2017-10-03 NOTE — NURSING NOTE
"Chief Complaint   Patient presents with     Derm Problem     lesion on right cheek - h/o SCC on right ear        Initial /90  Pulse 80  SpO2 97% Estimated body mass index is 23.63 kg/(m^2) as calculated from the following:    Height as of 9/7/17: 1.651 m (5' 5\").    Weight as of 9/7/17: 64.4 kg (142 lb).  Medication Reconciliation: complete    "

## 2017-10-03 NOTE — MR AVS SNAPSHOT
After Visit Summary   10/3/2017    Francesco Killian    MRN: [id number]           Patient Information     Date Of Birth          12/26/1920        Visit Information        Provider Department      10/3/2017 4:15 PM Miranda Avendaño PA-C [facility]        Today's Diagnoses     Neoplasm of uncertain behavior of skin    -  1    Dermatitis, seborrheic          Care Instructions      Wound Care Instructions     FOR SUPERFICIAL WOUNDS     [facility] [phone]                 AFTER 24 HOURS YOU SHOULD REMOVE THE BANDAGE AND BEGIN DAILY DRESSING CHANGES AS FOLLOWS:     1) Remove Dressing.     2) Clean and dry the area with tap water using a Q-tip or sterile gauze pad.     3) Apply Vaseline, Aquaphor, Polysporin ointment or Bacitracin ointment over entire wound.  Do NOT use Neosporin ointment.     4) Cover the wound with a band-aid, or a sterile non-stick gauze pad and micropore paper tape      REPEAT THESE INSTRUCTIONS AT LEAST ONCE A DAY UNTIL THE WOUND HAS COMPLETELY HEALED.    It is an old wives tale that a wound heals better when it is exposed to air and allowed to dry out. The wound will heal faster with a better cosmetic result if it is kept moist with ointment and covered with a bandage.    **Do not let the wound dry out.**      Supplies Needed:      *Cotton tipped applicators (Q-tips)    *Polysporin Ointment or Bacitracin Ointment (NOT NEOSPORIN)    *Band-aids or non-stick gauze pads and micropore paper tape.      PATIENT INFORMATION:    During the healing process you will notice a number of changes. All wounds develop a small halo of redness surrounding the wound.  This means healing is occurring. Severe itching with extensive redness usually indicates sensitivity to the ointment or bandage tape used to dress the wound.  You should call our office if this develops.      Swelling  and/or discoloration around your surgical site is common, particularly when  performed around the eye.    All wounds normally drain.  The larger the wound the more drainage there will be.  After 7-10 days, you will notice the wound beginning to shrink and new skin will begin to grow.  The wound is healed when you can see skin has formed over the entire area.  A healed wound has a healthy, shiny look to the surface and is red to dark pink in color to normalize.  Wounds may take approximately 4-6 weeks to heal.  Larger wounds may take 6-8 weeks.  After the wound is healed you may discontinue dressing changes.    You may experience a sensation of tightness as your wound heals. This is normal and will gradually subside.    Your healed wound may be sensitive to temperature changes. This sensitivity improves with time, but if you re having a lot of discomfort, try to avoid temperature extremes.    Patients frequently experience itching after their wound appears to have healed because of the continue healing under the skin.  Plain Vaseline will help relieve the itching.        POSSIBLE COMPLICATIONS    BLEEDIN. Leave the bandage in place.  2. Use tightly rolled up gauze or a cloth to apply direct pressure over the bandage for 30  minutes.  3. Reapply pressure for an additional 30 minutes if necessary  4. Use additional gauze and tape to maintain pressure once the bleeding has stopped.            Follow-ups after your visit        Your next 10 appointments already scheduled     Oct 26, 2017  1:45 PM CDT   Antico[facility]    [address] [address]   [phone]            Dec 07, 2017  2:15 PM CST   Return Visit with Julissa Martinez MD   [facility] ([facility])    [facility]  [address]  [facility] [address]   [phone]              Who to contact     If you have  questions or need follow up information about today's clinic visit or your schedule please contact [facility] directly at [phone].  Normal or non-critical lab and imaging results will be communicated to you by [employer], letter or phone within 4 business days after the clinic has received the results. If you do not hear from us within 7 days, please contact the clinic through [employer] or phone. If you have a critical or abnormal lab result, we will notify you by phone as soon as possible.  Submit refill requests through [employer] or call your pharmacy and they will forward the refill request to us. Please allow 3 business days for your refill to be completed.          Additional Information About Your Visit        [employer] Information     [employer] gives you secure access to your electronic health record. If you see a primary care provider, you can also send messages to your care team and make appointments. If you have questions, please call your primary care clinic.  If you do not have a primary care provider, please call [phone] and they will assist you.        Care EveryWhere ID     This is your Care EveryWhere ID. This could be used by other organizations to access your [place] medical records  [id number]        Your Vitals Were     Pulse Pulse Oximetry                80 97%           Blood Pressure from Last 3 Encounters:   10/03/17 187/90   09/07/17 134/67   08/18/17 120/60    Weight from Last 3 Encounters:   09/07/17 64.4 kg (142 lb)   08/18/17 66 kg (145 lb 6.4 oz)   07/11/17 65.2 kg (143 lb 11.2 oz)              We Performed the Following     BIOPSY SKIN/SUBQ/MUC MEM, EACH ADDTL LESION     BIOPSY SKIN/SUBQ/MUC MEM, SINGLE LESION     Surgical pathology exam          Today's Medication Changes          These changes are accurate as of: 10/3/17  5:20 PM.  If you have any questions, ask your nurse or doctor.               Start taking these medicines.        Dose/Directions     ketoconazole 2 % cream   Commonly known as:  NIZORAL   Used for:  Dermatitis, seborrheic   Started by:  Miranda Avendaño PA-C        Apply to face BID PRN   Quantity:  30 g   Refills:  1         These medicines have changed or have updated prescriptions.        Dose/Directions    furosemide 20 MG tablet   Commonly known as:  LASIX   This may have changed:  when to take this   Used for:  Essential hypertension, malignant        Dose:  20 mg   Take 1 tablet (20 mg) by mouth daily   Quantity:  180 tablet   Refills:  3            Where to get your medicines      These medications were sent to [facility] [address] AT [place] [address], [facility] [address]    Hours:  24-hours Phone:  [phone]     ketoconazole 2 % cream                Primary Care Provider Office Phone # Fax #    Angel Corea [phone]       [address] [facility] [address]        Equal Access to Services     IMAN MULTANI AH: Hadii aad ku hadasho Soomaali, waaxda luqadaha, qaybta kaalmada adeegyada, waxay idiin hayaan loretta khcecilia lemon. So [facility] [phone].    ATENCIÓN: Si habla español, tiene a costa disposición servicios gratuitos de asistencia lingüística. Llame al [phone].    We comply with applicable federal civil rights laws and [place] laws. We do not discriminate on the basis of race, color, national origin, age, disability, sex, sexual orientation, or gender identity.            Thank you!     Thank you for choosing [facility]  for your care. Our goal is always to provide you with excellent care. Hearing back from our patients is one way we can continue to improve our services. Please take a few minutes to complete the written survey that you may receive in the mail after your visit with us. Thank you!             Your Updated Medication List - Protect others around you: Learn how to safely use, store and  throw away your medicines at www.disposemymeds.org.          This list is accurate as of: 10/3/17  5:20 PM.  Always use your most recent med list.                   Brand Name Dispense Instructions for use Diagnosis    aspirin 81 MG tablet      Take 81 mg by mouth daily        calcium carbonate 500 MG chewable tablet    TUMS     Take 1-2 chew tab by mouth 2 times daily as needed for heartburn        citalopram 10 MG tablet    celeXA    90 tablet    Take 1 tablet (10 mg) by mouth daily    Episodic mood disorder (H)       diphenhydrAMINE-acetaminophen  MG tablet    TYLENOL PM     Take 1 tablet by mouth At Bedtime        furosemide 20 MG tablet    LASIX    180 tablet    Take 1 tablet (20 mg) by mouth daily    Essential hypertension, malignant       ICAPS PO      Take 1 capsule by mouth 2 times daily        iron 325 (65 FE) MG tablet      Take 1 tablet by mouth 2 times daily        ketoconazole 2 % cream    NIZORAL    30 g    Apply to face BID PRN    Dermatitis, seborrheic       levothyroxine 100 MCG tablet    SYNTHROID/LEVOTHROID    90 tablet    TAKE 1 TABLET BY MOUTH EVERY DAY    Other specified hypothyroidism       lisinopril 10 MG tablet    PRINIVIL/ZESTRIL    90 tablet    Take 1 tablet (10 mg) by mouth daily    Hospital discharge follow-up, Congestive heart failure, unspecified congestive heart failure chronicity, unspecified congestive heart failure type (H)       order for DME     1 Month    Testing being ordered: INR orders as directed to be done at [facility] To be done monthly as directed.    Chronic atrial fibrillation (H)       TYLENOL 8 HOUR 650 MG CR tablet   Generic drug:  acetaminophen      Take 650 mg by mouth every evening as needed for mild pain or fever        * warfarin 2 MG tablet    COUMADIN     Take 2 mg by mouth once a week on Tuesday        * warfarin 2 MG tablet    COUMADIN    180 tablet    Take one tablet daily except 2 tablets on Mon/Wed/Fri as directed by the  anticoagulation clinic    Chronic atrial fibrillation (H)       * Notice:  This list has 2 medication(s) that are the same as other medications prescribed for you. Read the directions carefully, and ask your doctor or other care provider to review them with you.

## 2017-10-04 ENCOUNTER — APPOINTMENT (OUTPATIENT)
Dept: CT IMAGING | Facility: CLINIC | Age: 82
End: 2017-10-04
Attending: EMERGENCY MEDICINE
Payer: MEDICARE

## 2017-10-04 ENCOUNTER — HOSPITAL ENCOUNTER (EMERGENCY)
Facility: CLINIC | Age: 82
Discharge: HOME | End: 2017-10-05
Attending: EMERGENCY MEDICINE | Admitting: EMERGENCY MEDICINE
Payer: MEDICARE
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
## 2017-10-04 DIAGNOSIS — I10 ESSENTIAL HYPERTENSION: ICD-10-CM

## 2017-10-04 LAB
ANION GAP SERPL CALCULATED.3IONS-SCNC: 2 MMOL/L (ref 3–14)
BASOPHILS # BLD AUTO: 0 10E9/L (ref 0–0.2)
BASOPHILS NFR BLD AUTO: 0.7 %
BUN SERPL-MCNC: 30 MG/DL (ref 7–30)
CALCIUM SERPL-MCNC: 9.1 MG/DL (ref 8.5–10.1)
CHLORIDE SERPL-SCNC: 104 MMOL/L (ref 94–109)
CO2 SERPL-SCNC: 32 MMOL/L (ref 20–32)
CREAT SERPL-MCNC: 1.5 MG/DL (ref 0.66–1.25)
DIFFERENTIAL METHOD BLD: ABNORMAL
EOSINOPHIL # BLD AUTO: 0.3 10E9/L (ref 0–0.7)
EOSINOPHIL NFR BLD AUTO: 4.9 %
ERYTHROCYTE [DISTWIDTH] IN BLOOD BY AUTOMATED COUNT: 12.5 % (ref 10–15)
GFR SERPL CREATININE-BSD FRML MDRD: 43 ML/MIN/1.7M2
GLUCOSE SERPL-MCNC: 105 MG/DL (ref 70–99)
HCT VFR BLD AUTO: 41.3 % (ref 40–53)
HGB BLD-MCNC: 13.5 G/DL (ref 13.3–17.7)
IMM GRANULOCYTES # BLD: 0 10E9/L (ref 0–0.4)
IMM GRANULOCYTES NFR BLD: 0.3 %
INR PPP: 1.78 (ref 0.86–1.14)
LYMPHOCYTES # BLD AUTO: 1.4 10E9/L (ref 0.8–5.3)
LYMPHOCYTES NFR BLD AUTO: 23.6 %
MCH RBC QN AUTO: 32.3 PG (ref 26.5–33)
MCHC RBC AUTO-ENTMCNC: 32.7 G/DL (ref 31.5–36.5)
MCV RBC AUTO: 99 FL (ref 78–100)
MONOCYTES # BLD AUTO: 0.8 10E9/L (ref 0–1.3)
MONOCYTES NFR BLD AUTO: 13.4 %
NEUTROPHILS # BLD AUTO: 3.4 10E9/L (ref 1.6–8.3)
NEUTROPHILS NFR BLD AUTO: 57.1 %
NRBC # BLD AUTO: 0 10*3/UL
NRBC BLD AUTO-RTO: 0 /100
PLATELET # BLD AUTO: 138 10E9/L (ref 150–450)
POTASSIUM SERPL-SCNC: 5 MMOL/L (ref 3.4–5.3)
RBC # BLD AUTO: 4.18 10E12/L (ref 4.4–5.9)
SODIUM SERPL-SCNC: 138 MMOL/L (ref 133–144)
WBC # BLD AUTO: 5.9 10E9/L (ref 4–11)

## 2017-10-04 PROCEDURE — 85025 COMPLETE CBC W/AUTO DIFF WBC: CPT | Performed by: EMERGENCY MEDICINE

## 2017-10-04 PROCEDURE — 99285 EMERGENCY DEPT VISIT HI MDM: CPT | Mod: 25

## 2017-10-04 PROCEDURE — 70450 CT HEAD/BRAIN W/O DYE: CPT

## 2017-10-04 PROCEDURE — 85610 PROTHROMBIN TIME: CPT | Performed by: EMERGENCY MEDICINE

## 2017-10-04 PROCEDURE — 80048 BASIC METABOLIC PNL TOTAL CA: CPT | Performed by: EMERGENCY MEDICINE

## 2017-10-04 PROCEDURE — 93005 ELECTROCARDIOGRAM TRACING: CPT

## 2017-10-04 ASSESSMENT — ENCOUNTER SYMPTOMS
HEADACHES: 0
NECK PAIN: 0
DIZZINESS: 0
COUGH: 0
NAUSEA: 0
LIGHT-HEADEDNESS: 0
WOUND: 1
BLOOD IN STOOL: 0
BACK PAIN: 0

## 2017-10-04 NOTE — ED AVS SNAPSHOT
[facility]    [address]    [facility] [address]    Phone:  [phone]    Fax:  [phone]                                       Francesco Killian   MRN: [id number]    Department:  [facility]   Date of Visit:  10/4/2017           After Visit Summary Signature Page     I have received my discharge instructions, and my questions have been answered. I have discussed any challenges I see with this plan with the nurse or doctor.    ..........................................................................................................................................  Patient/Patient Representative Signature      ..........................................................................................................................................  Patient Representative Print Name and Relationship to Patient    ..................................................               ................................................  Date                                            Time    ..........................................................................................................................................  Reviewed by Signature/Title    ...................................................              ..............................................  Date                                                            Time

## 2017-10-04 NOTE — ED AVS SNAPSHOT
[facility] Emergency Department    [address]    [facility] [address]    Phone:  [phone]    Fax:  [phone]                                       Francesco Killian   MRN: [id number]    Department:  [facility] Emergency Department   Date of Visit:  10/4/2017           Patient Information     Date Of Birth          12/26/1920        Your diagnoses for this visit were:     Fall, initial encounter     Essential hypertension        You were seen by Kiki Jaffe MD.      Follow-up Information     Follow up with Angel Corea MD. Schedule an appointment as soon as possible for a visit in 2 days.    Specialty:  Internal Medicine    Why:  For recheck    Contact information:    [address] [facility] [address] [phone]          Discharge Instructions        should have his blood pressure checked daily. He should have his INR rechecked on Friday and results called to the provider that manages his INR.    If he has any different or worse symptoms as listed below he should return immediately to the ER.    Discharge Instructions  Head Injury    You have been seen today for a head injury. Your evaluation included a history and physical examination. You may have had a CT (CAT) scan performed, though most head injuries do not require a scan. Based on this evaluation, your provider today does not feel that your head injury is serious.    Generally, every Emergency Department visit should have a follow-up clinic visit with either a primary or a specialty clinic/provider. Please follow-up as instructed by your emergency provider today.  Return to the Emergency Department if:    You are confused or you are not acting right.    Your headache gets worse or you start to have a really bad headache even with your recommended treatment plan.    You vomit (throw up) more than once.    You have a seizure.    You have trouble walking.    You have weakness or paralysis  (cannot move) in an arm or a leg.    You have blood or fluid coming from your ears or nose.    You have new symptoms or anything that worries you.    Sleeping:  It is okay for you to sleep, but someone should wake you up if instructed by your provider, and someone should check on you at your usual time to wake up.     Activity:    Do not drive for at least 24 hours.    Do not drive if you have dizzy spells or trouble concentrating, or remembering things.    Do not return to any contact sports until cleared by your regular provider.     MORE INFORMATION:    Concussion:  A concussion is a minor head injury that may cause temporary problems with the way the brain works. Although concussions are important, they are generally not an emergency or a reason that a person needs to be hospitalized. Some concussion symptoms include confusion, amnesia (forgetful), nausea (sick to your stomach) and vomiting (throwing up), dizziness, fatigue, memory or concentration problems, irritability and sleep problems. For most people, concussions are mild and temporary but some will have more severe and persistent symptoms that require on-going care and treatment.  CT Scans: Your evaluation today may have included a CT scan (CAT scan) to look for things like bleeding or a skull fracture (broken bone).  CT scans involve radiation and too many CT scans can cause serious health problems like cancer, especially in children.  Because of this, your provider may not have ordered a CT scan today if they think you are at low risk for a serious or life threatening problem.    If you were given a prescription for medicine here today, be sure to read all of the information (including the package insert) that comes with your prescription.  This will include important information about the medicine, its side effects, and any warnings that you need to know about.  The pharmacist who fills the prescription can provide more information and answer questions  you may have about the medicine.  If you have questions or concerns that the pharmacist cannot address, please call or return to the Emergency Department.     Remember that you can always come back to the Emergency Department if you are not able to see your regular provider in the amount of time listed above, if you get any new symptoms, or if there is anything that worries you.      Established High Blood Pressure    High blood pressure (hypertension) is a chronic disease. Often, healthcare providers don t know what causes it. But it can be caused by certain health conditions and medicines.  If you have high blood pressure, you may not have any symptoms. If you do have symptoms, they may include headache, dizziness, changes in your vision, chest pain, and shortness of breath. But even without symptoms, high blood pressure that s not treated raises your risk for heart attack and stroke. High blood pressure is a serious health risk and shouldn t be ignored.  A blood pressure reading is made up of two numbers: a higher number over a lower number. The top number is the systolic pressure. The bottom number is the diastolic pressure. A normal blood pressure is a systolic pressure of  less than 120 over a diastolic pressure of less than 80. You will see your blood pressure readings written together. For example, a person with a systolic pressure of 188 and a diastolic pressure of 78 will have 118/78 written in the medical record.  High blood pressure is when either the top number is 140 or higher, or the bottom number is 90 or higher. This must be the result when taking your blood pressure a number of times. The blood pressures between normal and high are called prehypertension.  Home care  If you have high blood pressure, you should do what is listed below to lower your blood pressure. If you are taking medicines for high blood pressure, these methods may reduce or end your need for medicines in the future.    Begin a  weight-loss program if you are overweight.    Cut back on how much salt you get in your diet. Here s how to do this:    Don t eat foods that have a lot of salt. These include olives, pickles, smoked meats, and salted potato chips.    Don t add salt to your food at the table.    Use only small amounts of salt when cooking.    Start an exercise program. Talk with your healthcare provider about the type of exercise program that would be best for you. It doesn't have to be hard. Even brisk walking for 20 minutes 3 times a week is a good form of exercise.    Don t take medicines that stimulate the heart. This includes many over-the-counter cold and sinus decongestant pills and sprays, as well as diet pills. Check the warnings about hypertension on the label. Before buying any over-the-counter medicines or supplements, always ask the pharmacist about the product's potential interaction with your high blood pressure and your high blood pressure medicines.    Stimulants such as amphetamine or cocaine could be deadly for someone with high blood pressure. Never take these.    Limit how much caffeine you get in your diet. Switch to caffeine-free products.    Stop smoking. If you are a long-time smoker, this can be hard. Talk to your healthcare provider about medicines and nicotine replacement options to help you. Also, enroll in a stop-smoking program to make it more likely that you will quit for good.    Learn how to handle stress. This is an important part of any program to lower blood pressure. Learn about relaxation methods like meditation, yoga, or biofeedback.    If your provider prescribed medicines, take them exactly as directed. Missing doses may cause your blood pressure get out of control.    If you miss a dose or doses, check with your healthcare provider or pharmacist about what to do.    Consider buying an automatic blood pressure machine. Ask your provider for a recommendation. You can get one of these at most  pharmacies.     The American Heart Association recommends the following guidelines for home blood pressure monitoring:    Don't smoke or drink coffee for 30 minutes before taking your blood pressure.    Go to the bathroom before the test.    Relax for 5 minutes before taking the measurement.    Sit with your back supported (don't sit on a couch or soft chair); keep your feet on the floor uncrossed. Place your arm on a solid flat surface (like a table) with the upper part of the arm at heart level. Place the middle of the cuff directly above the eye of the elbow. Check the monitor's instruction manual for an illustration.    Take multiple readings. When you measure, take 2 to 3 readings one minute apart and record all of the results.    Take your blood pressure at the same time every day, or as your healthcare provider recommends.    Record the date, time, and blood pressure reading.    Take the record with you to your next medical appointment. If your blood pressure monitor has a built-in memory, simply take the monitor with you to your next appointment.    Call your provider if you have several high readings. Don't be frightened by a single high blood pressure reading, but if you get several high readings, check in with your healthcare provider.    Note: When blood pressure reaches a systolic (top number) of 180 or higher OR diastolic (bottom number) of 110 or higher, seek emergency medical treatment.  Follow-up care  You will need to see your healthcare provider regularly. This is to check your blood pressure and to make changes to your medicines. Make a follow-up appointment as directed. Bring the record of your home blood pressure readings to the appointment.  When to seek medical advice  Call your healthcare provider right away if any of these occur:    Blood pressure reaches a systolic (upper number) of 180 or higher OR a diastolic (bottom number) of 110 or higher    Chest pain or shortness of breath    Severe  headache    Throbbing or rushing sound in the ears    Nosebleed    Sudden severe pain in your belly (abdomen)    Extreme drowsiness, confusion, or fainting    Dizziness or spinning sensation (vertigo)    Weakness of an arm or leg or one side of the face    You have problems speaking or seeing   Date Last Reviewed: 12/1/2016 2000-2017 [employer]. [address] [place] [address]. All rights reserved. This information is not intended as a substitute for professional medical care. Always follow your healthcare professional's instructions.          Future Appointments        Provider Department Dept Phone Center    10/26/2017 1:45 PM [facility] [phone]     12/7/2017 2:15 PM Julissa Martinez MD [facility] [phone] [facility]      24 Hour Appointment Hotline       To make an appointment at any [facility], call [phone] ([phone]). If you don't have a family doctor or clinic, we will help you find one. [facility] ([facility] are conveniently located to serve the needs of you and your family.             Review of your medicines      Our records show that you are taking the medicines listed below. If these are incorrect, please call your family doctor or clinic.        Dose / Directions Last dose taken    aspirin 81 MG tablet   Dose:  81 mg        Take 81 mg by mouth daily   Refills:  0        calcium carbonate 500 MG chewable tablet   Commonly known as:  TUMS   Dose:  1-2 chew tab        Take 1-2 chew tab by mouth 2 times daily as needed for heartburn   Refills:  0        citalopram 10 MG tablet   Commonly known as:  celeXA   Dose:  10 mg   Quantity:  90 tablet        Take 1 tablet (10 mg) by mouth daily   Refills:  1        diphenhydrAMINE-acetaminophen  MG tablet   Commonly known as:  TYLENOL PM   Dose:  1 tablet        Take 1 tablet by mouth At Bedtime   Refills:  0         furosemide 20 MG tablet   Commonly known as:  LASIX   Dose:  20 mg   Quantity:  180 tablet        Take 1 tablet (20 mg) by mouth daily   Refills:  3        ICAPS PO   Dose:  1 capsule        Take 1 capsule by mouth 2 times daily   Refills:  0        iron 325 (65 FE) MG tablet   Dose:  1 tablet        Take 1 tablet by mouth 2 times daily   Refills:  0        ketoconazole 2 % cream   Commonly known as:  NIZORAL   Quantity:  30 g        Apply to face BID PRN   Refills:  1        levothyroxine 100 MCG tablet   Commonly known as:  SYNTHROID/LEVOTHROID   Quantity:  90 tablet        TAKE 1 TABLET BY MOUTH EVERY DAY   Refills:  0        lisinopril 10 MG tablet   Commonly known as:  PRINIVIL/ZESTRIL   Dose:  10 mg   Quantity:  90 tablet        Take 1 tablet (10 mg) by mouth daily   Refills:  3        order for DME   Quantity:  1 Month        Testing being ordered: INR orders as directed to be done at [facility] To be done monthly as directed.   Refills:  orn        TYLENOL 8 HOUR 650 MG CR tablet   Dose:  650 mg   Generic drug:  acetaminophen        Take 650 mg by mouth every evening as needed for mild pain or fever   Refills:  0        * warfarin 2 MG tablet   Commonly known as:  COUMADIN   Dose:  2 mg        Take 2 mg by mouth once a week on Tuesday   Refills:  0        * warfarin 2 MG tablet   Commonly known as:  COUMADIN   Quantity:  180 tablet        Take one tablet daily except 2 tablets on Mon/Wed/Fri as directed by the anticoagulation clinic   Refills:  0        * Notice:  This list has 2 medication(s) that are the same as other medications prescribed for you. Read the directions carefully, and ask your doctor or other care provider to review them with you.            Procedures and tests performed during your visit     Basic metabolic panel    CBC with platelets differential    EKG 12-lead, tracing only    Head CT w/o contrast    INR    Strict intake and output      Orders Needing Specimen  Collection     None      Pending Results     Date and Time Order Name Status Description    10/4/2017 2235 EKG 12-lead, tracing only Preliminary     10/3/2017 1702 SURGICAL PATHOLOGY EXAM In process             Pending Culture Results     Date and Time Order Name Status Description    10/3/2017 1702 SURGICAL PATHOLOGY EXAM In process             Pending Results Instructions     If you had any lab results that were not finalized at the time of your Discharge, you can call the ED Lab Result RN at [phone]. You will be contacted by this team for any positive Lab results or changes in treatment. The nurses are available 7 days a week from 10A to 6:30P.  You can leave a message 24 hours per day and they will return your call.        Test Results From Your Hospital Stay        10/4/2017 10:58 PM      Component Results     Component Value Ref Range & Units Status    WBC 5.9 4.0 - 11.0 10e9/L Final    RBC Count 4.18 (L) 4.4 - 5.9 10e12/L Final    Hemoglobin 13.5 13.3 - 17.7 g/dL Final    Hematocrit 41.3 40.0 - 53.0 % Final    MCV 99 78 - 100 fl Final    MCH 32.3 26.5 - 33.0 pg Final    MCHC 32.7 31.5 - 36.5 g/dL Final    RDW 12.5 10.0 - 15.0 % Final    Platelet Count 138 (L) 150 - 450 10e9/L Final    Diff Method Automated Method  Final    % Neutrophils 57.1 % Final    % Lymphocytes 23.6 % Final    % Monocytes 13.4 % Final    % Eosinophils 4.9 % Final    % Basophils 0.7 % Final    % Immature Granulocytes 0.3 % Final    Nucleated RBCs 0 0 /100 Final    Absolute Neutrophil 3.4 1.6 - 8.3 10e9/L Final    Absolute Lymphocytes 1.4 0.8 - 5.3 10e9/L Final    Absolute Monocytes 0.8 0.0 - 1.3 10e9/L Final    Absolute Eosinophils 0.3 0.0 - 0.7 10e9/L Final    Absolute Basophils 0.0 0.0 - 0.2 10e9/L Final    Abs Immature Granulocytes 0.0 0 - 0.4 10e9/L Final    Absolute Nucleated RBC 0.0  Final         10/4/2017 11:11 PM      Component Results     Component Value Ref Range & Units Status    INR 1.78 (H) 0.86 - 1.14 Final          10/4/2017 11:12 PM      Component Results     Component Value Ref Range & Units Status    Sodium 138 133 - 144 mmol/L Final    Potassium 5.0 3.4 - 5.3 mmol/L Final    Chloride 104 94 - 109 mmol/L Final    Carbon Dioxide 32 20 - 32 mmol/L Final    Anion Gap 2 (L) 3 - 14 mmol/L Final    Glucose 105 (H) 70 - 99 mg/dL Final    Urea Nitrogen 30 7 - 30 mg/dL Final    Creatinine 1.50 (H) 0.66 - 1.25 mg/dL Final    GFR Estimate 43 (L) >60 mL/min/1.7m2 Final    Non  GFR Calc    GFR Estimate If Black 52 (L) >60 mL/min/1.7m2 Final    African American GFR Calc    Calcium 9.1 8.5 - 10.1 mg/dL Final         10/5/2017 12:39 AM      Narrative     CT HEAD W/O CONTRAST  10/4/2017 11:15 PM     HISTORY: CHI on Coumadin.    TECHNIQUE: Axial images of the head and coronal reformations without  IV contrast material. Radiation dose for this scan was reduced using  automated exposure control, adjustment of the mA and/or kV according  to patient size, or iterative reconstruction technique.    COMPARISON: 8/29/2015.    FINDINGS: No intracranial hemorrhage, mass or mass effect. No acute  infarct identified. No shift of midline structures.    Cortical atrophy. Patchy low attenuation areas are present in white  matter of the cerebral hemispheres that are nonspecific but consistent  with chronic small vessel ischemic changes in this age patient.  Moderate-sized area of encephalomalacia right occipital region  compatible with old infarct. Mild right posterior scalp soft tissue  swelling/hematoma. No skull fracture.         Impression     IMPRESSION:  1. No acute intracranial findings.  2. 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  3. Mild right posterior scalp soft tissue swelling/hematoma.    EDGARD ABTES MD                Clinical Quality Measure: Blood Pressure Screening     Your blood pressure was checked while you were in the emergency  department today. The last reading we obtained was  BP: 143/89 . Please read the guidelines below about what these numbers mean and what you should do about them.  If your systolic blood pressure (the top number) is less than 120 and your diastolic blood pressure (the bottom number) is less than 80, then your blood pressure is normal. There is nothing more that you need to do about it.  If your systolic blood pressure (the top number) is 120-139 or your diastolic blood pressure (the bottom number) is 80-89, your blood pressure may be higher than it should be. You should have your blood pressure rechecked within a year by a primary care provider.  If your systolic blood pressure (the top number) is 140 or greater or your diastolic blood pressure (the bottom number) is 90 or greater, you may have high blood pressure. High blood pressure is treatable, but if left untreated over time it can put you at risk for heart attack, stroke, or kidney failure. You should have your blood pressure rechecked by a primary care provider within the next 4 weeks.  If your provider in the emergency department today gave you specific instructions to follow-up with your doctor or provider even sooner than that, you should follow that instruction and not wait for up to 4 weeks for your follow-up visit.        Thank you for choosing [facility]       Thank you for choosing [facility] for your care. Our goal is always to provide you with excellent care. Hearing back from our patients is one way we can continue to improve our services. Please take a few minutes to complete the written survey that you may receive in the mail after you visit with us. Thank you!        [employer] Information     [employer] gives you secure access to your electronic health record. If you see a primary care provider, you can also send messages to your care team and make appointments. If you have questions, please call your primary care clinic.  If you do not have a primary  care provider, please call [phone] and they will assist you.        Care EveryWhere ID     This is your Care EveryWhere ID. This could be used by other organizations to access your [place] medical records  [id number]        Equal Access to Services     IMAN MULTANI : Anant Salmon, wamasha daugherty, gely kaalleelee olmstead, singh lemon. So [facility] [phone].    ATENCIÓN: Si habla español, tiene a costa disposición servicios gratuitos de asistencia lingüística. Llame al [phone].    We comply with applicable federal civil rights laws and [place] laws. We do not discriminate on the basis of race, color, national origin, age, disability, sex, sexual orientation, or gender identity.            After Visit Summary       This is your record. Keep this with you and show to your community pharmacist(s) and doctor(s) at your next visit.

## 2017-10-05 VITALS
RESPIRATION RATE: 14 BRPM | OXYGEN SATURATION: 97 % | HEART RATE: 66 BPM | TEMPERATURE: 98.3 F | DIASTOLIC BLOOD PRESSURE: 89 MMHG | SYSTOLIC BLOOD PRESSURE: 143 MMHG

## 2017-10-05 LAB — INTERPRETATION ECG - MUSE: NORMAL

## 2017-10-05 PROCEDURE — 25000132 ZZH RX MED GY IP 250 OP 250 PS 637: Mod: GY | Performed by: EMERGENCY MEDICINE

## 2017-10-05 PROCEDURE — A9270 NON-COVERED ITEM OR SERVICE: HCPCS | Mod: GY | Performed by: EMERGENCY MEDICINE

## 2017-10-05 RX ORDER — CLONIDINE HYDROCHLORIDE 0.1 MG/1
0.1 TABLET ORAL ONCE
Status: COMPLETED | OUTPATIENT
Start: 2017-10-05 | End: 2017-10-05

## 2017-10-05 RX ORDER — FUROSEMIDE 20 MG
20 TABLET ORAL ONCE
Status: COMPLETED | OUTPATIENT
Start: 2017-10-05 | End: 2017-10-05

## 2017-10-05 RX ADMIN — CLONIDINE HYDROCHLORIDE 0.1 MG: 0.1 TABLET ORAL at 03:06

## 2017-10-05 RX ADMIN — FUROSEMIDE 20 MG: 20 TABLET ORAL at 01:52

## 2017-10-05 RX ADMIN — CLONIDINE HYDROCHLORIDE 0.1 MG: 0.1 TABLET ORAL at 01:49

## 2017-10-05 ASSESSMENT — ENCOUNTER SYMPTOMS: COLOR CHANGE: 1

## 2017-10-05 NOTE — ED PROVIDER NOTES
History     Chief Complaint:  Fall    The history is provided by the patient and a relative.      Francesco Killian is an anticoagulated 89 year old male on Coumadin who presents after a fall. Approximately 7 hours prior patient suffered a fall while getting out of chair in which he hit his posterior head on a chair. He denies loss of consciousness. Prior to the fall he did not have any chest pain, dizziness, or lightheadedness. He has subsequently had tenderness at the spot he struck but denies true headache. Patient has been ambulatory since the fall. Daughter-in-law was concerned about area of contusion where he struck his head given anticoagulated status prompting visit to the emergency department. Currently denies any complaint including neck or back pain after the fall, visual disturbance, nausea, black/bloody stools, or recent cough/cold symptoms. INR last checked 9/28/17 was 2.17.     Daughter in law reports that he is not currently on antihypertensives since the makayla of July and his assisted living facility is monitoring pressures. Patient also has several wounds on face and arm from skin biopsies performed yesterday.  Son later also confirms the patient is known to have very labile blood pressures.  He typically is close to normal at baseline but blood pressures can go very high under conditions stress, but typically resolved very quickly.  They checked daily blood pressures and there has been no trend of increased recently.    Allergies:  No known drug allergies     Medications:    Nizoral cream  Coumadin  Lasix  Levothyroxine  Lisinopril  Citalopram   MVI  Tums  Iron  Aspirin 81 mg    Past Medical History:    AAA  Aortic stenosis  Bradycardia  Carotid occlusion, right  Chronic atrial fibrillation  CVA  Dysphagia  HTN  HLD  Hypothyroidism   Macular degeneration of both eyes  Recurrent falls - oculovestibular syndrome  RBBB  Squamous cell carcinoma   Esophageal stricture   Major depressive  disorder  Macrocytic anemia   CHF    Past Surgical History:    Surgical repair of left arm pseudo aneurysm  CABG  Colonoscopy   EGD  Bilateral cataract surgery  Duodenal ulcer repair  Bilateral inguinal hernia repair  IR Renal/visceral stent/atherect/pta  TURP    Family History:    CAD    Social History:  Presents with daughter in law   Former Primary Care Physician  Tobacco use: Never  Alcohol use: Negative  PCP: Angel Corea    Marital Status:      Review of Systems   HENT: Negative for congestion.    Eyes: Negative for visual disturbance.   Respiratory: Negative for cough.    Gastrointestinal: Negative for blood in stool and nausea.   Musculoskeletal: Negative for back pain and neck pain.   Skin: Positive for color change and wound.   Neurological: Negative for dizziness, light-headedness and headaches.   All other systems reviewed and are negative.      Physical Exam     Patient Vitals for the past 24 hrs:   BP Temp Temp src Pulse Resp SpO2   10/05/17 0347 143/89 - - - - 97 %   10/05/17 0344 132/87 - - - - 91 %   10/05/17 0336 141/83 - - - - 95 %   10/05/17 0332 137/87 - - - - 97 %   10/05/17 0331 184/79 - - - - 97 %   10/05/17 0315 - - - - - 94 %   10/05/17 0300 - - - - - 96 %   10/05/17 0256 (!) 197/93 - - - - 96 %   10/05/17 0245 - - - - - 92 %   10/05/17 0230 - - - - - 95 %   10/05/17 0215 - - - - - 95 %   10/05/17 0200 - - - - - 96 %   10/05/17 0145 - - - - - 95 %   10/05/17 0130 - - - - - 95 %   10/05/17 0115 - - - - - 94 %   10/05/17 0100 - - - - - 96 %   10/05/17 0059 (!) 208/100 - - 66 14 -   10/05/17 0045 - - - - - 98 %   10/05/17 0030 - - - - - 96 %   10/05/17 0015 - - - - - 96 %   10/05/17 0000 - - - - - 95 %   10/04/17 2324 (!) 219/102 - - - - 98 %   10/04/17 2244 (!) 208/101 - - - - 98 %   10/04/17 2200 (!) 202/111 98.3  F (36.8  C) Oral 70 18 97 %      Physical Exam  Constitutional:  Frail elderly male, completely comfortable appearing, GCS = 15   Eyes:  PERRL, conjunctiva normal,  EOMI  HENT: No hemotympanum. 3x4 cm contusion over the right occipital scalp, mild underlying edema but no crepitus.  Respiratory:  No respiratory distress, normal breath sounds, no wheezing.   Cardiovascular:  RRR. 3/4 hollow systolic murmur heard throughout.  GI:  Abdomen is nondistended, soft, nontender to palpation  Musculoskeletal:  Otherwise, no gross deformities of bilateral UE or LE noted. Otherwise able to range bilateral UE and LE without difficulty. No pain with ROM. C-spine: No midline tenderness to palpation. Meets NEXUS criteria. T-spine and L-spine are without midline ttp, stepoff, contusion or abrasion.   Integument:  Dressing are clean, dry, and intact over 2 facial biopsy sites. 1 cm biopsy site on the right forearm has mild oozing, no kike bleeding.    Neurologic: Alert & oriented x 3, CN 2-12 normal, normal motor function, normal sensory function, no focal deficits noted   Psychiatric:  Normal affect.      Emergency Department Course   ECG (22:50:04):  Rate 67 bpm. WV interval 136. QRS duration 166. QT/QTc 480/507. P-R-T axes * -38 21. Sinus rhythm with premature atrial complexes with aberrant conduction. Left axis deviation. RBBB. Abnormal ECG. Agree with computer interpretation. No significant change when compared to EKG dated 2/3/12. Interpreted at 2255 by Kiki Jaffe MD.     Imaging:  Radiographic findings were communicated with the patient and family who voiced understanding of the findings.    CT Head without contrast:  IMPRESSION:  1. No acute intracranial findings.  2. Chronic small vessel ischemic changes and moderate encephalomalacia right occipital region compatible with an old infarct.  3. Mild right posterior scalp soft tissue swelling/hematoma.    Imaging independently reviewed and agree with radiologist interpretation.      Laboratory:  CBC:  (L) ow WNL (WBC 5.9, HGB 13.5)   BMP: AG 2 (L), Glucose 105 (H), Creatinine 1.50 (H), GFR 43 (L) ow WNL    INR: 1.78  (H)    Interventions:  0149: Clonidine 0.1 mg PO  0152: Lasix 20 mg PO     0306: Clonidine 0.1 mg PO     Emergency Department Course:  Past medical records, nursing notes, and vitals reviewed.  2216: I performed an exam of the patient and obtained history, as documented above.  IV inserted and blood drawn.   Above interventions provided.   The patient was sent for a CT while in the emergency department, findings above.   0020: I rechecked the patient. Explained findings to patient and son.   0259: I rechecked the patient. Explained findings to patient. He is feeling well without any pain.   I personally reviewed the laboratory results with the Patient and son and answered all related questions prior to discharge.    0404: I rechecked the patient.  He is resting comfortably, easily arousable, has no complaints.  Findings and plan explained to the Patient and son. Patient discharged home with instructions regarding supportive care, medications, and reasons to return. The importance of close follow-up was reviewed.      Impression & Plan    Medical Decision Making:  Francesco Killian is a 89 year old male who presents for evaluation of a fall.  This was clearly a mechanical fall, not syncope. There were no prodromal symptoms so I doubt stroke, cardiac arrhythmia or other serious etiology. Detailed exam shows contusion to occipital scalp.  Given anticoagulated status I did do basic blood work and measured INR. Results as above. I had the tech ambulate the patient and he did well. I doubt serious underlying fractures, intracerebral issues, spinal fractures.  I would not do workup for syncope, stroke, ACS, PE at this point. Patient was found to be hypertensive here in the emergency department. He is not currently on any antihypertensives though does have history of hypertension. The workup here is negative and the patient does not have any clinical, laboratory, ecg or historical signs of end-organ dysfunction. I talked at  length with son who is a physician and he checks pressure everyday. It has been running around 120 regularly without recent trend up.  This appears to be his typical pattern of very high numbers under stress. Will continue to have son monitor.  He had a slightly subtherapeutic INR, but given his head injury with evidence of contusion, would not give him an extra dose of warfarin at this time.  Rather, they will check his INR closely.  We discussed symptoms of delayed intracranial bleeding to watch for.  Supportive outpatient management is therefore indicated with close follow-up of primary care physician.  The patient and his son are comfortable with plan.    Diagnosis:    ICD-10-CM    1. Fall, initial encounter W19.XXXA    2. Essential hypertension I10        Disposition:  Discharged to home with plan as outlined.      Chano Martinez  10/4/2017   [facility] EMERGENCY DEPARTMENT  I, Chano Martinez, am serving as a scribe at 10:16 PM on 10/4/2017 to document services personally performed by Kiki Jaffe MD based on my observations and the provider's statements to me.       Kiki Jaffe MD  10/05/17 0702

## 2017-10-05 NOTE — DISCHARGE INSTRUCTIONS
should have his blood pressure checked daily. He should have his INR rechecked on Friday and results called to the provider that manages his INR.    If he has any different or worse symptoms as listed below he should return immediately to the ER.    Discharge Instructions  Head Injury    You have been seen today for a head injury. Your evaluation included a history and physical examination. You may have had a CT (CAT) scan performed, though most head injuries do not require a scan. Based on this evaluation, your provider today does not feel that your head injury is serious.    Generally, every Emergency Department visit should have a follow-up clinic visit with either a primary or a specialty clinic/provider. Please follow-up as instructed by your emergency provider today.  Return to the Emergency Department if:    You are confused or you are not acting right.    Your headache gets worse or you start to have a really bad headache even with your recommended treatment plan.    You vomit (throw up) more than once.    You have a seizure.    You have trouble walking.    You have weakness or paralysis (cannot move) in an arm or a leg.    You have blood or fluid coming from your ears or nose.    You have new symptoms or anything that worries you.    Sleeping:  It is okay for you to sleep, but someone should wake you up if instructed by your provider, and someone should check on you at your usual time to wake up.     Activity:    Do not drive for at least 24 hours.    Do not drive if you have dizzy spells or trouble concentrating, or remembering things.    Do not return to any contact sports until cleared by your regular provider.     MORE INFORMATION:    Concussion:  A concussion is a minor head injury that may cause temporary problems with the way the brain works. Although concussions are important, they are generally not an emergency or a reason that a person needs to be hospitalized. Some concussion symptoms  include confusion, amnesia (forgetful), nausea (sick to your stomach) and vomiting (throwing up), dizziness, fatigue, memory or concentration problems, irritability and sleep problems. For most people, concussions are mild and temporary but some will have more severe and persistent symptoms that require on-going care and treatment.  CT Scans: Your evaluation today may have included a CT scan (CAT scan) to look for things like bleeding or a skull fracture (broken bone).  CT scans involve radiation and too many CT scans can cause serious health problems like cancer, especially in children.  Because of this, your provider may not have ordered a CT scan today if they think you are at low risk for a serious or life threatening problem.    If you were given a prescription for medicine here today, be sure to read all of the information (including the package insert) that comes with your prescription.  This will include important information about the medicine, its side effects, and any warnings that you need to know about.  The pharmacist who fills the prescription can provide more information and answer questions you may have about the medicine.  If you have questions or concerns that the pharmacist cannot address, please call or return to the Emergency Department.     Remember that you can always come back to the Emergency Department if you are not able to see your regular provider in the amount of time listed above, if you get any new symptoms, or if there is anything that worries you.      Established High Blood Pressure    High blood pressure (hypertension) is a chronic disease. Often, healthcare providers don t know what causes it. But it can be caused by certain health conditions and medicines.  If you have high blood pressure, you may not have any symptoms. If you do have symptoms, they may include headache, dizziness, changes in your vision, chest pain, and shortness of breath. But even without symptoms, high  blood pressure that s not treated raises your risk for heart attack and stroke. High blood pressure is a serious health risk and shouldn t be ignored.  A blood pressure reading is made up of two numbers: a higher number over a lower number. The top number is the systolic pressure. The bottom number is the diastolic pressure. A normal blood pressure is a systolic pressure of  less than 120 over a diastolic pressure of less than 80. You will see your blood pressure readings written together. For example, a person with a systolic pressure of 188 and a diastolic pressure of 78 will have 118/78 written in the medical record.  High blood pressure is when either the top number is 140 or higher, or the bottom number is 90 or higher. This must be the result when taking your blood pressure a number of times. The blood pressures between normal and high are called prehypertension.  Home care  If you have high blood pressure, you should do what is listed below to lower your blood pressure. If you are taking medicines for high blood pressure, these methods may reduce or end your need for medicines in the future.    Begin a weight-loss program if you are overweight.    Cut back on how much salt you get in your diet. Here s how to do this:    Don t eat foods that have a lot of salt. These include olives, pickles, smoked meats, and salted potato chips.    Don t add salt to your food at the table.    Use only small amounts of salt when cooking.    Start an exercise program. Talk with your healthcare provider about the type of exercise program that would be best for you. It doesn't have to be hard. Even brisk walking for 20 minutes 3 times a week is a good form of exercise.    Don t take medicines that stimulate the heart. This includes many over-the-counter cold and sinus decongestant pills and sprays, as well as diet pills. Check the warnings about hypertension on the label. Before buying any over-the-counter medicines or  supplements, always ask the pharmacist about the product's potential interaction with your high blood pressure and your high blood pressure medicines.    Stimulants such as amphetamine or cocaine could be deadly for someone with high blood pressure. Never take these.    Limit how much caffeine you get in your diet. Switch to caffeine-free products.    Stop smoking. If you are a long-time smoker, this can be hard. Talk to your healthcare provider about medicines and nicotine replacement options to help you. Also, enroll in a stop-smoking program to make it more likely that you will quit for good.    Learn how to handle stress. This is an important part of any program to lower blood pressure. Learn about relaxation methods like meditation, yoga, or biofeedback.    If your provider prescribed medicines, take them exactly as directed. Missing doses may cause your blood pressure get out of control.    If you miss a dose or doses, check with your healthcare provider or pharmacist about what to do.    Consider buying an automatic blood pressure machine. Ask your provider for a recommendation. You can get one of these at most pharmacies.     The American Heart Association recommends the following guidelines for home blood pressure monitoring:    Don't smoke or drink coffee for 30 minutes before taking your blood pressure.    Go to the bathroom before the test.    Relax for 5 minutes before taking the measurement.    Sit with your back supported (don't sit on a couch or soft chair); keep your feet on the floor uncrossed. Place your arm on a solid flat surface (like a table) with the upper part of the arm at heart level. Place the middle of the cuff directly above the eye of the elbow. Check the monitor's instruction manual for an illustration.    Take multiple readings. When you measure, take 2 to 3 readings one minute apart and record all of the results.    Take your blood pressure at the same time every day, or as your  healthcare provider recommends.    Record the date, time, and blood pressure reading.    Take the record with you to your next medical appointment. If your blood pressure monitor has a built-in memory, simply take the monitor with you to your next appointment.    Call your provider if you have several high readings. Don't be frightened by a single high blood pressure reading, but if you get several high readings, check in with your healthcare provider.    Note: When blood pressure reaches a systolic (top number) of 180 or higher OR diastolic (bottom number) of 110 or higher, seek emergency medical treatment.  Follow-up care  You will need to see your healthcare provider regularly. This is to check your blood pressure and to make changes to your medicines. Make a follow-up appointment as directed. Bring the record of your home blood pressure readings to the appointment.  When to seek medical advice  Call your healthcare provider right away if any of these occur:    Blood pressure reaches a systolic (upper number) of 180 or higher OR a diastolic (bottom number) of 110 or higher    Chest pain or shortness of breath    Severe headache    Throbbing or rushing sound in the ears    Nosebleed    Sudden severe pain in your belly (abdomen)    Extreme drowsiness, confusion, or fainting    Dizziness or spinning sensation (vertigo)    Weakness of an arm or leg or one side of the face    You have problems speaking or seeing   Date Last Reviewed: 12/1/2016 2000-2017 [employer]. [address] [place] [address], [place] [address]. All rights reserved. This information is not intended as a substitute for professional medical care. Always follow your healthcare professional's instructions.

## 2017-10-05 NOTE — ED NOTES
Here because of a fall about 7 hours ago  Patient on coumadin  Struck the back of his head when he fell lives in asssited living. Has several biopsy sites that are bleeding since yesterday  Patient says he had a INR about 3 to 5 days ago . Daughter says his BP meds are on hold or are DC because he did not need them

## 2017-10-06 LAB — COPATH REPORT: NORMAL

## 2017-10-09 ENCOUNTER — OFFICE VISIT (OUTPATIENT)
Dept: INTERNAL MEDICINE | Facility: CLINIC | Age: 82
End: 2017-10-09
Payer: COMMERCIAL

## 2017-10-09 VITALS
DIASTOLIC BLOOD PRESSURE: 69 MMHG | SYSTOLIC BLOOD PRESSURE: 155 MMHG | TEMPERATURE: 97.7 F | HEART RATE: 75 BPM | BODY MASS INDEX: 24 KG/M2 | OXYGEN SATURATION: 96 % | WEIGHT: 144.2 LBS
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
## 2017-10-09 DIAGNOSIS — I10 ESSENTIAL HYPERTENSION WITH GOAL BLOOD PRESSURE LESS THAN 140/90: ICD-10-CM

## 2017-10-09 DIAGNOSIS — Z79.01 ANTICOAGULATED ON COUMADIN: ICD-10-CM

## 2017-10-09 PROCEDURE — 99214 OFFICE O/P EST MOD 30 MIN: CPT | Performed by: PHYSICIAN ASSISTANT

## 2017-10-09 NOTE — PROGRESS NOTES
"  SUBJECTIVE:   Francesco Killian is a 89 year old male who presents to clinic today for the following health issues:  lisinopril is on hold    ED/UC Followup:    Facility:  [facility]  Date of visit: 10/4/17  Reason for visit: fall, htn  Current Status: pt has been feeling well, denies headache     Review of charts shows patient was seen at [facility] on the 4th following a fall.     \"Francesco Killian is a 89 year old male who presents for evaluation of a fall.  This was clearly a mechanical fall, not syncope. There were no prodromal symptoms so I doubt stroke, cardiac arrhythmia or other serious etiology. Detailed exam shows contusion to occipital scalp.  Given anticoagulated status I did do basic blood work and measured INR. Results as above. I had the tech ambulate the patient and he did well. I doubt serious underlying fractures, intracerebral issues, spinal fractures.  I would not do workup for syncope, stroke, ACS, PE at this point. Patient was found to be hypertensive here in the emergency department. He is not currently on any antihypertensives though does have history of hypertension. The workup here is negative and the patient does not have any clinical, laboratory, ecg or historical signs of end-organ dysfunction. I talked at length with son who is a physician and he checks pressure everyday. It has been running around 120 regularly without recent trend up.  This appears to be his typical pattern of very high numbers under stress. Will continue to have son monitor.  He had a slightly subtherapeutic INR, but given his head injury with evidence of contusion, would not give him an extra dose of warfarin at this time.  Rather, they will check his INR closely.  We discussed symptoms of delayed intracranial bleeding to watch for.  Supportive outpatient management is therefore indicated with close follow-up of primary care physician.  The patient and his son are comfortable with plan.\"    Patient has been getting his " blood pressure checked at his assisted living facility by ab LEONE on staff. His blood pressure ranges of 125-140/70 at home. It is noted he gets quite worked up in the clinic. Francesco reports that he has been feeling good without any new symptoms.  He has had no headache, lightheadedness or dizziness. He also denies chest pain, shortness of breath or any pain. It is noted that overall he has been declining. There has been some weight loss, increase falls and unsteadiness- unchanged since ER.  Its reported that his area of bruising has enlarged on the back of his head.     Problem list and histories reviewed & adjusted, as indicated.  Additional history: as documented    Reviewed and updated as needed this visit by clinical staff  Tobacco  Allergies  Meds  Med Hx  Soc Hx      Reviewed and updated as needed this visit by Provider  Tobacco  Meds  Med Hx  Soc Hx      ROS: as above in HPI       OBJECTIVE:   /69  Pulse 75  Temp 97.7  F (36.5  C) (Oral)  Wt 144 lb 3.2 oz (65.4 kg)  SpO2 96%  BMI 24 kg/m2  Body mass index is 24 kg/(m^2).  GENERAL: healthy, alert and no distress  EYES: Eyes grossly normal to inspection, PERRL and conjunctivae and sclerae normal  HENT: head- large area of ecchymosis on posterior scalp- no bone deformity or excessive swelling-some tenderness.  ear canals and TM's normal, nose and mouth without ulcers or lesions  NECK: no adenopathy, no asymmetry, masses, or scars and thyroid normal to palpation  RESP: lungs clear to auscultation - no rales, rhonchi or wheezes  CV: regular rate and rhythm, normal S1 S2, no S3 or S4, no murmur, click or rub, l  MS: no gross musculoskeletal defects noted, no edema  NEURO: Normal strength and tone, mentation intact and speech normal. CN2-12 intact. Point to point touch intact, but not perfect his finger hits a mm below my tip. DIAMOND itnact. No pronator drift.   PSYCH: mentation appears normal, affect normal/bright    Diagnostic Test Results:  none      ASSESSMENT/PLAN:       1. Fall, subsequent encounter  2. Anticoagulated on Coumadin  -s/p fall on 10/04/17. Patient was seen in the ER with normal imaging of the brain. He is now asymptomatic outside of continued bruising and tenderness to the bruised area. I do not identify focal neurological abnormalities that would prompt repeat imaging. Continue to monitor for now and if symptoms change or worsen, follow up necessary.     3.Hypertension:  -good control at home despite elevation in clinic.  -as long as blood pressure remains stable within the home, I do not see a benefit to adding on extra therapy.      Patient agreed to the above plan and all questions were answered. The AVS was printed and reviewed with the patient.        Geni Jacome PA-C  [facility]

## 2017-10-09 NOTE — Clinical Note
Dr. Shields,   I saw Cristopher for ER follow up. He wanted me to check in with you. Specifically, he was concerned for rebound bleeding s/p fall on 10/04/17. He does continue to have post-cephalic bruising with tenderness, but otherwise is asymptomatic with a normal neurological exam. I did not think further imaging was needed, but did offer that I would check in with you as well. He had a normal CT in the ER.   Thank you,  Geni SCHNEIDER

## 2017-10-09 NOTE — MR AVS SNAPSHOT
After Visit Summary   10/9/2017    Francesco Killian    MRN: [id number]           Patient Information     Date Of Birth          12/26/1920        Visit Information        Provider Department      10/9/2017 3:40 PM Geni Jacome PA-C [facility]        Care Instructions    196/76 initial reading  Repeat reading [id number]   Normal neurological exam.                  Follow-ups after your visit        Your next 10 appointments already scheduled     Oct 26, 2017  1:45 PM CDT   Anticoagulation Visit with [facility]   [facility] ([facility]    [address] [place] [id number]   [phone]            Dec 07, 2017  7:00 AM CST   MOHS with Noble Pozo MD   [facility] ([facility]    [address] [place] [id number] [phone]            Dec 07, 2017  2:15 PM CST   Return Visit with Julissa Martinez MD   [facility] ([facility])    [facility]  [address]  [facility] [address]  [facility] [id number] [phone]              Who to contact     If you have questions or need follow up information about today's clinic visit or your schedule please contact [facility] directly at [phone].  Normal or non-critical lab and imaging results will be communicated to you by MyChart, letter or phone within 4 business days after the clinic has received the results. If you do not hear from us within 7 days, please contact the clinic through MyChart or phone. If you have a critical or abnormal lab result, we will notify you by phone as soon as possible.  Submit refill requests through [employer] or call your pharmacy and they will forward the refill request to us. Please allow 3 business days for your refill to be completed.          Additional  Information About Your Visit        [employer] Information     [employer] gives you secure access to your electronic health record. If you see a primary care provider, you can also send messages to your care team and make appointments. If you have questions, please call your primary care clinic.  If you do not have a primary care provider, please call [phone] and they will assist you.        Care EveryWhere ID     This is your Care EveryWhere ID. This could be used by other organizations to access your [facility] medical records  [id number]        Your Vitals Were     Pulse Temperature Pulse Oximetry BMI (Body Mass Index)          75 97.7  F (36.5  C) (Oral) 96% 24 kg/m2         Blood Pressure from Last 3 Encounters:   10/09/17 155/69   10/05/17 143/89   10/03/17 187/90    Weight from Last 3 Encounters:   10/09/17 144 lb 3.2 oz (65.4 kg)   09/07/17 142 lb (64.4 kg)   08/18/17 145 lb 6.4 oz (66 kg)              Today, you had the following     No orders found for display         Today's Medication Changes          These changes are accurate as of: 10/9/17  4:35 PM.  If you have any questions, ask your nurse or doctor.               These medicines have changed or have updated prescriptions.        Dose/Directions    furosemide 20 MG tablet   Commonly known as:  LASIX   This may have changed:  when to take this   Used for:  Essential hypertension, malignant        Dose:  20 mg   Take 1 tablet (20 mg) by mouth daily   Quantity:  180 tablet   Refills:  3                Primary Care Provider Office Phone # Fax #    Angel Corea [phone]       [address] [place] [phone]        Equal Access to Services     [facility]: Hadii aditi Salmon, waaxda luqadaha, qaybta kaalmada singh olmstead. So [facility] [phone].    ATENCIÓN: Si habla español, tiene a costa disposición servicios gratuitos de asistencia lingüística. Llame al [phone].    We  comply with applicable federal civil rights laws and [place] laws. We do not discriminate on the basis of race, color, national origin, age, disability, sex, sexual orientation, or gender identity.            Thank you!     Thank you for choosing [facility]  for your care. Our goal is always to provide you with excellent care. Hearing back from our patients is one way we can continue to improve our services. Please take a few minutes to complete the written survey that you may receive in the mail after your visit with us. Thank you!             Your Updated Medication List - Protect others around you: Learn how to safely use, store and throw away your medicines at www.disposemymeds.org.          This list is accurate as of: 10/9/17  4:35 PM.  Always use your most recent med list.                   Brand Name Dispense Instructions for use Diagnosis    aspirin 81 MG tablet      Take 81 mg by mouth daily        calcium carbonate 500 MG chewable tablet    TUMS     Take 1-2 chew tab by mouth 2 times daily as needed for heartburn        citalopram 10 MG tablet    celeXA    90 tablet    Take 1 tablet (10 mg) by mouth daily    Episodic mood disorder (H)       diphenhydrAMINE-acetaminophen  MG tablet    TYLENOL PM     Take 1 tablet by mouth At Bedtime        furosemide 20 MG tablet    LASIX    180 tablet    Take 1 tablet (20 mg) by mouth daily    Essential hypertension, malignant       ICAPS PO      Take 1 capsule by mouth 2 times daily        iron 325 (65 FE) MG tablet      Take 1 tablet by mouth 2 times daily        ketoconazole 2 % cream    NIZORAL    30 g    Apply to face BID PRN    Dermatitis, seborrheic       levothyroxine 100 MCG tablet    SYNTHROID/LEVOTHROID    90 tablet    TAKE 1 TABLET BY MOUTH EVERY DAY    Other specified hypothyroidism       lisinopril 10 MG tablet    PRINIVIL/ZESTRIL    90 tablet    Take 1 tablet (10 mg) by mouth daily    Hospital discharge follow-up, Congestive  heart failure, unspecified congestive heart failure chronicity, unspecified congestive heart failure type (H)       order for DME     1 Month    Testing being ordered: INR orders as directed to be done at [facility] To be done monthly as directed.    Chronic atrial fibrillation (H)       TYLENOL 8 HOUR 650 MG CR tablet   Generic drug:  acetaminophen      Take 650 mg by mouth every evening as needed for mild pain or fever        * warfarin 2 MG tablet    COUMADIN     Take 2 mg by mouth once a week on Tuesday        * warfarin 2 MG tablet    COUMADIN    180 tablet    Take one tablet daily except 2 tablets on Mon/Wed/Fri as directed by the anticoagulation clinic    Chronic atrial fibrillation (H)       * Notice:  This list has 2 medication(s) that are the same as other medications prescribed for you. Read the directions carefully, and ask your doctor or other care provider to review them with you.

## 2017-10-09 NOTE — NURSING NOTE
"Chief Complaint   Patient presents with     ER F/U       Initial /76  Pulse 75  Temp 97.7  F (36.5  C) (Oral)  Wt 144 lb 3.2 oz (65.4 kg)  SpO2 96%  BMI 24 kg/m2 Estimated body mass index is 24 kg/(m^2) as calculated from the following:    Height as of 9/7/17: 5' 5\" (1.651 m).    Weight as of this encounter: 144 lb 3.2 oz (65.4 kg).  Medication Reconciliation: complete   Kimberly Wiggins CMA  '    "

## 2017-11-06 DIAGNOSIS — E03.8 OTHER SPECIFIED HYPOTHYROIDISM: ICD-10-CM

## 2017-11-07 RX ORDER — LEVOTHYROXINE SODIUM 100 UG/1
TABLET ORAL
Qty: 90 TABLET | Refills: 0 | Status: SHIPPED | OUTPATIENT
Start: 2017-11-07 | End: 2018-02-04

## 2017-11-30 ENCOUNTER — ANTICOAGULATION THERAPY VISIT (OUTPATIENT)
Dept: ANTICOAGULATION | Facility: CLINIC | Age: 82
End: 2017-11-30
Payer: COMMERCIAL

## 2017-11-30 DIAGNOSIS — I48.20 CHRONIC ATRIAL FIBRILLATION (H): ICD-10-CM

## 2017-11-30 DIAGNOSIS — Z79.01 LONG-TERM (CURRENT) USE OF ANTICOAGULANTS: ICD-10-CM

## 2017-11-30 PROCEDURE — 99207 ZZC NO CHARGE NURSE ONLY: CPT

## 2017-11-30 NOTE — MR AVS SNAPSHOT
Francesco Killian   11/30/2017 4:15 PM   Anticoagulation Therapy Visit    Description:  89 year old male   Provider:   ANTICOAGULATION CLINIC   Department:   Anti Coagulation           INR as of 11/30/2017     Today's INR 2.18      Anticoagulation Summary as of 11/30/2017     INR goal 2.0-3.0   Today's INR 2.18   Full instructions 4 mg on Mon, Wed, Fri; 2 mg all other days   Next INR check 12/29/2017    Indications   Chronic atrial fibrillation (H) [I48.2]  Long-term (current) use of anticoagulants [Z79.01] [Z79.01]         Contact Numbers     [facility]  Please call  [phone] to cancel and/or reschedule your appointment   Please call  [phone] with any problems or questions regarding your therapy.        November 2017 Details    Sun Mon Tue Wed Thu Fri Sat        1               2               3               4                 5               6               7               8               9               10               11                 12               13               14               15               16               17               18                 19               20               21               22               23               24               25                 26               27               28               29               30      2 mg   See details         Date Details   11/30 This INR check               How to take your warfarin dose     To take:  2 mg Take 1 of the 2 mg tablets.           December 2017 Details    Sun Mon Tue Wed Thu Fri Sat          1      4 mg         2      2 mg           3      2 mg         4      4 mg         5      2 mg         6      4 mg         7      2 mg         8      4 mg         9      2 mg           10      2 mg         11      4 mg         12      2 mg         13      4 mg         14      2 mg         15      4 mg         16      2 mg           17      2 mg         18      4 mg         19      2 mg         20      4 mg         21      2 mg          22      4 mg         23      2 mg           24      2 mg         25      4 mg         26      2 mg         27      4 mg         28      2 mg         29            30                 31                      Date Details   No additional details    Date of next INR:  12/29/2017         How to take your warfarin dose     To take:  2 mg Take 1 of the 2 mg tablets.    To take:  4 mg Take 2 of the 2 mg tablets.

## 2017-12-01 LAB — INR PPP: 2.18

## 2017-12-02 NOTE — PROGRESS NOTES
ANTICOAGULATION FOLLOW-UP CLINIC VISIT    Patient Name:  Francesco Killian  Date:  12/1/2017  Contact Type:  Face to Face    SUBJECTIVE:     Patient Findings     Positives No Problem Findings           OBJECTIVE    INR   Date Value Ref Range Status   11/30/2017 2.18  Final       ASSESSMENT / PLAN  INR assessment THER    Recheck INR In: 4 WEEKS    INR Location [facility]      Anticoagulation Summary as of 11/30/2017     INR goal 2.0-3.0   Today's INR 2.18   Maintenance plan 4 mg (2 mg x 2) on Mon, Wed, Fri; 2 mg (2 mg x 1) all other days   Full instructions 4 mg on Mon, Wed, Fri; 2 mg all other days   Weekly total 20 mg   Plan last modified Haley Beth RN (6/1/2017)   Next INR check    Target end date Indefinite    Indications   Chronic atrial fibrillation (H) [I48.2]  Long-term (current) use of anticoagulants [Z79.01] [Z79.01]         Anticoagulation Episode Summary     INR check location     Preferred lab     Send INR reminders to  [facility]    Comments             See the Encounter Report to view Anticoagulation Flowsheet and Dosing Calendar (Go to Encounters tab in chart review, and find the Anticoagulation Therapy Visit)    Dosage adjustment made based on physician directed care plan.    Annamarie Dejesus RN

## 2017-12-07 ENCOUNTER — OFFICE VISIT (OUTPATIENT)
Dept: DERMATOLOGY | Facility: CLINIC | Age: 82
End: 2017-12-07
Payer: COMMERCIAL

## 2017-12-07 VITALS — TEMPERATURE: 98.6 F | WEIGHT: 144 LBS | BODY MASS INDEX: 23.96 KG/M2 | RESPIRATION RATE: 12 BRPM

## 2017-12-07 DIAGNOSIS — D04.39 SQUAMOUS CELL CARCINOMA IN SITU OF SKIN OF FOREHEAD: Primary | ICD-10-CM

## 2017-12-07 DIAGNOSIS — C44.319 BASAL CELL CARCINOMA OF RIGHT CHEEK: ICD-10-CM

## 2017-12-07 PROCEDURE — 17311 MOHS 1 STAGE H/N/HF/G: CPT | Mod: 59 | Performed by: DERMATOLOGY

## 2017-12-07 PROCEDURE — 17311 MOHS 1 STAGE H/N/HF/G: CPT | Performed by: DERMATOLOGY

## 2017-12-07 NOTE — PROGRESS NOTES
Francesco Killian is a 89 year old year old male patient here today for evaluation and managment of squamous cell carcinoma in situ and basal cell carcinoma.  Associated symptoms: none.  Patient has no other skin complaints today.  Remainder of the HPI, Meds, PMH, Allergies, FH, and SH was reviewed in chart.      Past Medical History:   Diagnosis Date     AAA (abdominal aortic aneurysm) (H) 6/3/2013     Aortic stenosis      Bradycardia     Dr Stanford didn't think pacer needed at this time     Carotid occlusion, right     see above note     Chronic atrial fibrillation (H) 6/17/2013     CVA (cerebral infarction) 6/3/2013    DANILO and both vertebral art blocked-family son (neurologist) requests BP to run a bit higher since flow is via L ICA     Dysphagia 6/3/2013     HTN (hypertension) 6/3/2013    and RA stenosis, s/p stents at [facility]     Hyperlipidemia LDL goal <130 6/3/2013     Hypothyroidism 6/3/2013     Macular degeneration of both eyes      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     Right bundle branch block (RBBB) 9/2/2015     Squamous cell carcinoma      Unspecified cerebral artery occlusion with cerebral infarction     x 2, uses a cane       Past Surgical History:   Procedure Laterality Date     C NONSPECIFIC PROCEDURE      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 [facility] at time of RA stenting     CARDIAC SURGERY  1980s    CABg     COLONOSCOPY      normal exams     COLONOSCOPY  11/4/2013    Procedure: COLONOSCOPY;  COLONOSCOPY ;  Surgeon: Aguilar Arechiga MD;  Location:  GI     ESOPHAGOSCOPY, GASTROSCOPY, DUODENOSCOPY (EGD), COMBINED  10/14/2013    Procedure: COMBINED ESOPHAGOSCOPY, GASTROSCOPY, DUODENOSCOPY (EGD);  COMBINED ESOPHAGOSCOPY, GASTROSCOPY, DUODENOSCOPY (EGD) ;  Surgeon: Aguilar Arechiga MD;  Location:  GI     EYE SURGERY      bilat cataracts     GI SURGERY  1970s    duodenal ulcer reapair     HERNIA REPAIR      bilat inguinal     IR RENAL/VISCERAL STENT/ATHERECT/PTA       TURP           Family History   Problem Relation Age of Onset     C.A.D. Mother      Coronary Artery Disease Mother      C.A.D. Father      C.A.D. Maternal Grandmother      CANDE. Maternal Grandfather      CANDE. Paternal Grandmother      DELILAHACECILIA. Paternal Grandfather      CAMRON.ACECILIA. Brother      C.A.TOBIN. Sister        Social History     Social History     Marital status:      Spouse name: N/A     Number of children: N/A     Years of education: N/A     Occupational History     Not on file.     Social History Main Topics     Smoking status: Never Smoker     Smokeless tobacco: Never Used     Alcohol use No     Drug use: No     Sexual activity: No     Other Topics Concern     Caffeine Concern No     decaff tea/green tea     Sleep Concern No     Special Diet No     low carbs     Exercise Yes     extremities - stretches every day,  some walking     Social History Narrative       Outpatient Encounter Prescriptions as of 12/7/2017   Medication Sig Dispense Refill     levothyroxine (SYNTHROID/LEVOTHROID) 100 MCG tablet TAKE 1 TABLET BY MOUTH EVERY DAY. 90 tablet 0     ketoconazole (NIZORAL) 2 % cream Apply to face BID PRN 30 g 1     warfarin (COUMADIN) 2 MG tablet Take one tablet daily except 2 tablets on Mon/Wed/Fri as directed by the anticoagulation clinic 180 tablet 0     furosemide (LASIX) 20 MG tablet Take 1 tablet (20 mg) by mouth daily (Patient taking differently: Take 20 mg by mouth 2 times daily ) 180 tablet 3     lisinopril (PRINIVIL/ZESTRIL) 10 MG tablet Take 1 tablet (10 mg) by mouth daily 90 tablet 3     citalopram (CELEXA) 10 MG tablet Take 1 tablet (10 mg) by mouth daily 90 tablet 1     diphenhydrAMINE-acetaminophen (TYLENOL PM)  MG tablet Take 1 tablet by mouth At Bedtime       acetaminophen (TYLENOL 8 HOUR) 650 MG CR tablet Take 650 mg by mouth every evening as needed for mild pain or fever       warfarin (COUMADIN) 2 MG tablet Take 2 mg by mouth once a week on Tuesday       order for DME Testing being ordered:  [facility]R [facility] as [facility]  To be done monthly as directed. 1 Month orn     Multiple Vitamins-Minerals (ICAPS PO) Take 1 capsule by mouth 2 times daily       calcium carbonate (TUMS) 500 MG chewable tablet Take 1-2 chew tab by mouth 2 times daily as needed for heartburn       Ferrous Sulfate (IRON) 325 (65 FE) MG tablet Take 1 tablet by mouth 2 times daily       aspirin 81 MG tablet Take 81 mg by mouth daily        No facility-administered encounter medications on file as of 12/7/2017.              Review Of Systems  Skin: As above  Eyes: negative  Ears/Nose/Throat: negative  Respiratory: No shortness of breath, dyspnea on exertion, cough, or hemoptysis  Cardiovascular: negative  Gastrointestinal: negative  Genitourinary: negative  Musculoskeletal: negative  Neurologic: negative  Psychiatric: negative  Hematologic/Lymphatic/Immunologic: negative  Endocrine: negative      O:   NAD, WDWN, Alert & Oriented, Mood & Affect wnl, Vitals stable   Here today alone   Temp 98.6  F (37  C) (Oral)  Resp 12  Wt 65.3 kg (144 lb)  BMI 23.96 kg/m2   General appearance normal   Vitals stable   Alert, oriented and in no acute distress      Following lymph nodes palpated: Occipital, Cervical, Supraclavicular no lad   r hairline 6mm ill-deifned scaly papule    R cheek 10mm scaly papule       Eyes: Conjunctivae/lids:Normal     ENT: Lips, buccal mucosa, tongue: normal    MSK:Normal    Cardiovascular: peripheral edema none    Pulm: Breathing Normal    Neuro/Psych: Orientation:Normal; Mood/Affect:Normal      A/P:  1. 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 Indication for Mohs was Location. Patient confirmed the site with Dr. Pozo.  After anesthesia with LEC, the tumor was excised using standard Mohs technique in 1 stages(s).  CLEAR MARGINS OBTAINED and Final defect size was 1.4* cm.       REPAIR  SECOND INTENT: We discussed the options for wound management in full with the patient including risks/benefits/possible outcomes. Decision made to allow the wound to heal by second intention. EBL minimal; complications none; wound care routine.  The patient was discharged in good condition and will return in one month or prn for wound evaluation.    2. R cheek basal cell carcinoma   MOHS:   Ill-defined margins    After PGACAC discussed with patient, decision for Mohs surgery was made. Indication for Mohs was Ill-defined margins. Patient confirmed the site with Dr. Pozo.  After anesthesia with LEC, the tumor was excised using standard Mohs technique in 1 stages(s).  CLEAR MARGINS OBTAINED and Final defect size was 1.2 cm.       REPAIR SECOND INTENT: We discussed the options for wound management in full with the patient including risks/benefits/possible outcomes. Decision made to allow the wound to heal by second intention. EBL minimal; complications none; wound care routine.  The patient was discharged in good condition and will return in one month or prn for wound evaluation.    BENIGN LESIONS DISCUSSED WITH PATIENT:  I discussed the specifics of tumor, prognosis, and genetics of benign lesions.  I explained that treatment of these lesions would be purely cosmetic and not medically neccessary.  I discussed with patient different removal options including excision, cautery and /or laser.      Nature and genetics of benign skin lesions dicussed with patient.  Signs and Symptoms of skin cancer discussed with patient.  Patient encouraged to perform monthly skin exams.  UV precautions reviewed with patient.  Skin care regimen reviewed with patient: Eliminate harsh soaps, i.e. Dial, zest, irsih spring; Mild soaps such as Cetaphil or Dove sensitive skin, avoid hot or cold showers, aggressive use of emollients including vanicream, cetaphil or cerave discussed with patient.    Risks of non-melanoma skin cancer discussed  with patient   Return to clinic 6 months

## 2017-12-07 NOTE — MR AVS SNAPSHOT
After Visit Summary   2017    Francesco Killian    MRN: [id number]           Patient Information     Date Of Birth          1920        Visit Information        Provider Department      2017 7:00 AM Noble Pozo MD [facility]        Care Instructions    Open Wound Care     for __RIGHT FOREHEAD AND RIGHT CHEEK____________        ? No strenuous activity for 48 hours    ? Take Tylenol as needed for discomfort.                                                .         ? Do not drink alcoholic beverages for 48 hours.    ? Keep the pressure bandage in place for 24 hours. If the bandage becomes blood tinged or loose, reinforce it with gauze and tape.        (Refer to the reverse side of this page for management of bleeding).    ? Remove bandage in 24 hours and begin wound care as follows:     1. Clean area with tap water using a Q tip or gauze pad, (shower / bathe normally)  2. Dry wound with Q tip or gauze pad  3. Apply Aquaphor, Vaseline, Polysporin or Bacitracin Ointment with a Q tip    Do NOT use Neosporin Ointment *  4. Cover the wound with a band-aid or nonstick gauze pad and paper tape.  5. Repeat wound care once a day until wound is completely healed.    It is an old wives tale that a wound heals better when it is exposed to air and allowed to dry out. The wound will heal faster with a better cosmetic result if it is kept moist with ointment and covered with a bandage.  Do not let the wound dry out.      Supplies Needed:                Qtips or gauze pads                Polysporin or Bacitracin Ointment                Bandaids or nonstick gauze pads and paper tape    Wound care kits and brown paper tape are available for purchase at   the pharmacy.    BLEEDIN. Use tightly rolled up gauze or cloth to apply direct pressure over the bandage for 20   minutes.  2. Reapply pressure for an additional 20 minutes if necessary  3. Call the office or go to  the nearest emergency room if pressure fails to stop the bleeding.  4. Use additional gauze and tape to maintain pressure once the bleeding has stopped.  5. Begin wound care 24 hours after surgery as directed.                  WOUND HEALING    1. One week after surgery a pink / red halo will form around the outside of the wound.   This is new skin.  2. The center of the wound will appear yellowish white and produce some drainage.  3. The pink halo will slowly migrate in toward the center of the wound until the wound is covered with new shiny pink skin.  4. There will be no more drainage when the wound is completely healed.  5. It will take six months to one year for the redness to fade.  6. The scar may be itchy, tight and sensitive to extreme temperatures for a year after the surgery.  7. Massaging the area several times a day for several minutes after the wound is completely healed will help the scar soften and normalize faster. Begin massage only after healing is complete.      In case of emergency call: Dr Pozo: [phone]     [facility]: [phone]    [facility]: [phone]            Follow-ups after your visit        Who to contact     If you have questions or need follow up information about today's clinic visit or your schedule please contact [facility] directly at [phone].  Normal or non-critical lab and imaging results will be communicated to you by MyChart, letter or phone within 4 business days after the clinic has received the results. If you do not hear from us within 7 days, please contact the clinic through MyChart or phone. If you have a critical or abnormal lab result, we will notify you by phone as soon as possible.  Submit refill requests through [employer] or call your pharmacy and they will forward the refill request to us. Please allow 3 business days for your refill to be completed.          Additional Information About Your Visit         [employer] Information     [employer] gives you secure access to your electronic health record. If you see a primary care provider, you can also send messages to your care team and make appointments. If you have questions, please call your primary care clinic.  If you do not have a primary care provider, please call [phone] and they will assist you.        Care EveryWhere ID     This is your Care EveryWhere ID. This could be used by other organizations to access your [facility] medical records  [id number]         Blood Pressure from Last 3 Encounters:   10/09/17 155/69   10/05/17 143/89   10/03/17 187/90    Weight from Last 3 Encounters:   10/09/17 65.4 kg (144 lb 3.2 oz)   09/07/17 64.4 kg (142 lb)   08/18/17 66 kg (145 lb 6.4 oz)              Today, you had the following     No orders found for display         Today's Medication Changes          These changes are accurate as of: 12/7/17  8:22 AM.  If you have any questions, ask your nurse or doctor.               These medicines have changed or have updated prescriptions.        Dose/Directions    furosemide 20 MG tablet   Commonly known as:  LASIX   This may have changed:  when to take this   Used for:  Essential hypertension, malignant        Dose:  20 mg   Take 1 tablet (20 mg) by mouth daily   Quantity:  180 tablet   Refills:  3                Primary Care Provider Office Phone # Fax #    Angel Corea [phone]       [address] [facility] [address]        Equal Access to Services     IMAN MULTANI AH: Anant clinton Sokathleen, waaxda luqadaha, qaybta kaalmada adeegyada, waxjordy sandrita lemon. So [facility] [phone].    ATENCIÓN: Si habla español, tiene a costa disposición servicios gratuitos de asistencia lingüística. Llame al [phone].    We comply with applicable federal civil rights laws and [place] laws. We do not discriminate on the basis of race, color, national origin, age, disability, sex, sexual  orientation, or gender identity.            Thank you!     Thank you for choosing [facility]  for your care. Our goal is always to provide you with excellent care. Hearing back from our patients is one way we can continue to improve our services. Please take a few minutes to complete the written survey that you may receive in the mail after your visit with us. Thank you!             Your Updated Medication List - Protect others around you: Learn how to safely use, store and throw away your medicines at www.disposemymeds.org.          This list is accurate as of: 12/7/17  8:22 AM.  Always use your most recent med list.                   Brand Name Dispense Instructions for use Diagnosis    aspirin 81 MG tablet      Take 81 mg by mouth daily        calcium carbonate 500 MG chewable tablet    TUMS     Take 1-2 chew tab by mouth 2 times daily as needed for heartburn        citalopram 10 MG tablet    celeXA    90 tablet    Take 1 tablet (10 mg) by mouth daily    Episodic mood disorder (H)       diphenhydrAMINE-acetaminophen  MG tablet    TYLENOL PM     Take 1 tablet by mouth At Bedtime        furosemide 20 MG tablet    LASIX    180 tablet    Take 1 tablet (20 mg) by mouth daily    Essential hypertension, malignant       ICAPS PO      Take 1 capsule by mouth 2 times daily        iron 325 (65 FE) MG tablet      Take 1 tablet by mouth 2 times daily        ketoconazole 2 % cream    NIZORAL    30 g    Apply to face BID PRN    Dermatitis, seborrheic       levothyroxine 100 MCG tablet    SYNTHROID/LEVOTHROID    90 tablet    TAKE 1 TABLET BY MOUTH EVERY DAY.    Other specified hypothyroidism       lisinopril 10 MG tablet    PRINIVIL/ZESTRIL    90 tablet    Take 1 tablet (10 mg) by mouth daily    Hospital discharge follow-up, Congestive heart failure, unspecified congestive heart failure chronicity, unspecified congestive heart failure type (H)       order for DME     1 Month    Testing being  ordered: INR orders as directed to be done at [facility] To be done monthly as directed.    Chronic atrial fibrillation (H)       TYLENOL 8 HOUR 650 MG CR tablet   Generic drug:  acetaminophen      Take 650 mg by mouth every evening as needed for mild pain or fever        * warfarin 2 MG tablet    COUMADIN     Take 2 mg by mouth once a week on Tuesday        * warfarin 2 MG tablet    COUMADIN    180 tablet    Take one tablet daily except 2 tablets on Mon/Wed/Fri as directed by the anticoagulation clinic    Chronic atrial fibrillation (H)       * Notice:  This list has 2 medication(s) that are the same as other medications prescribed for you. Read the directions carefully, and ask your doctor or other care provider to review them with you.

## 2017-12-07 NOTE — PATIENT INSTRUCTIONS
Open Wound Care     for __RIGHT FOREHEAD AND RIGHT CHEEK____________        ? No strenuous activity for 48 hours    ? Take Tylenol as needed for discomfort.                                                .         ? Do not drink alcoholic beverages for 48 hours.    ? Keep the pressure bandage in place for 24 hours. If the bandage becomes blood tinged or loose, reinforce it with gauze and tape.        (Refer to the reverse side of this page for management of bleeding).    ? Remove bandage in 24 hours and begin wound care as follows:     1. Clean area with tap water using a Q tip or gauze pad, (shower / bathe normally)  2. Dry wound with Q tip or gauze pad  3. Apply Aquaphor, Vaseline, Polysporin or Bacitracin Ointment with a Q tip    Do NOT use Neosporin Ointment *  4. Cover the wound with a band-aid or nonstick gauze pad and paper tape.  5. Repeat wound care once a day until wound is completely healed.    It is an old wives tale that a wound heals better when it is exposed to air and allowed to dry out. The wound will heal faster with a better cosmetic result if it is kept moist with ointment and covered with a bandage.  Do not let the wound dry out.      Supplies Needed:                Qtips or gauze pads                Polysporin or Bacitracin Ointment                Bandaids or nonstick gauze pads and paper tape    Wound care kits and brown paper tape are available for purchase at   the pharmacy.    BLEEDIN. Use tightly rolled up gauze or cloth to apply direct pressure over the bandage for 20   minutes.  2. Reapply pressure for an additional 20 minutes if necessary  3. Call the office or go to the nearest emergency room if pressure fails to stop the bleeding.  4. Use additional gauze and tape to maintain pressure once the bleeding has stopped.  5. Begin wound care 24 hours after surgery as directed.                  WOUND HEALING    1. One week after surgery a pink / red halo will form around the outside  of the wound.   This is new skin.  2. The center of the wound will appear yellowish white and produce some drainage.  3. The pink halo will slowly migrate in toward the center of the wound until the wound is covered with new shiny pink skin.  4. There will be no more drainage when the wound is completely healed.  5. It will take six months to one year for the redness to fade.  6. The scar may be itchy, tight and sensitive to extreme temperatures for a year after the surgery.  7. Massaging the area several times a day for several minutes after the wound is completely healed will help the scar soften and normalize faster. Begin massage only after healing is complete.      In case of emergency call: Dr Pozo: [phone]     [facility]: [phone]    [facility]: [phone]

## 2017-12-07 NOTE — LETTER
12/7/2017         RE: Francesco Killian  [address]   [facility] [place] [address]        Dear Colleague,    Thank you for referring your patient, Francesco Killian, to the [facility]. Please see a copy of my visit note below.    Francesco Killian is a 89 year old year old male patient here today for evaluation and managment of squamous cell carcinoma in situ and basal cell carcinoma.  Associated symptoms: none.  Patient has no other skin complaints today.  Remainder of the HPI, Meds, PMH, Allergies, FH, and SH was reviewed in chart.      Past Medical History:   Diagnosis Date     AAA (abdominal aortic aneurysm) (H) 6/3/2013     Aortic stenosis      Bradycardia     Dr Stanford didn't think pacer needed at this time     Carotid occlusion, right     see above note     Chronic atrial fibrillation (H) 6/17/2013     CVA (cerebral infarction) 6/3/2013    ADNILO and both vertebral art blocked-family son (neurologist) requests BP to run a bit higher since flow is via L ICA     Dysphagia 6/3/2013     HTN (hypertension) 6/3/2013    and RA stenosis, s/p stents at [place]     Hyperlipidemia LDL goal <130 6/3/2013     Hypothyroidism 6/3/2013     Macular degeneration of both eyes      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     Right bundle branch block (RBBB) 9/2/2015     Squamous cell carcinoma      Unspecified cerebral artery occlusion with cerebral infarction     x 2, uses a cane       Past Surgical History:   Procedure Laterality Date     C NONSPECIFIC PROCEDURE      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 [place] at time of RA stenting     CARDIAC SURGERY  1980s    CABg     COLONOSCOPY      normal exams     COLONOSCOPY  11/4/2013    Procedure: COLONOSCOPY;  COLONOSCOPY ;  Surgeon: Aguilar Arechiga MD;  Location:  GI     ESOPHAGOSCOPY, GASTROSCOPY, DUODENOSCOPY (EGD), COMBINED  10/14/2013    Procedure: COMBINED ESOPHAGOSCOPY, GASTROSCOPY, DUODENOSCOPY (EGD);  COMBINED  ESOPHAGOSCOPY, GASTROSCOPY, DUODENOSCOPY (EGD) ;  Surgeon: Aguilar Arechiga MD;  Location:  GI     EYE SURGERY      bilat cataracts     GI SURGERY  1970s    duodenal ulcer reapair     HERNIA REPAIR      bilat inguinal     IR RENAL/VISCERAL STENT/ATHERECT/PTA       TURP          Family History   Problem Relation Age of Onset     C.A.D. Mother      Coronary Artery Disease Mother      C.A.D. Father      C.A.D. Maternal Grandmother      C.A.D. Maternal Grandfather      C.A.D. Paternal Grandmother      C.A.D. Paternal Grandfather      C.A.D. Brother      C.A.D. Sister        Social History     Social History     Marital status:      Spouse name: N/A     Number of children: N/A     Years of education: N/A     Occupational History     Not on file.     Social History Main Topics     Smoking status: Never Smoker     Smokeless tobacco: Never Used     Alcohol use No     Drug use: No     Sexual activity: No     Other Topics Concern     Caffeine Concern No     decaff tea/green tea     Sleep Concern No     Special Diet No     low carbs     Exercise Yes     extremities - stretches every day,  some walking     Social History Narrative       Outpatient Encounter Prescriptions as of 12/7/2017   Medication Sig Dispense Refill     levothyroxine (SYNTHROID/LEVOTHROID) 100 MCG tablet TAKE 1 TABLET BY MOUTH EVERY DAY. 90 tablet 0     ketoconazole (NIZORAL) 2 % cream Apply to face BID PRN 30 g 1     warfarin (COUMADIN) 2 MG tablet Take one tablet daily except 2 tablets on Mon/Wed/Fri as directed by the anticoagulation clinic 180 tablet 0     furosemide (LASIX) 20 MG tablet Take 1 tablet (20 mg) by mouth daily (Patient taking differently: Take 20 mg by mouth 2 times daily ) 180 tablet 3     lisinopril (PRINIVIL/ZESTRIL) 10 MG tablet Take 1 tablet (10 mg) by mouth daily 90 tablet 3     citalopram (CELEXA) 10 MG tablet Take 1 tablet (10 mg) by mouth daily 90 tablet 1     diphenhydrAMINE-acetaminophen (TYLENOL PM)  MG tablet  Take 1 tablet by mouth At Bedtime       acetaminophen (TYLENOL 8 HOUR) 650 MG CR tablet Take 650 mg by mouth every evening as needed for mild pain or fever       warfarin (COUMADIN) 2 MG tablet Take 2 mg by mouth once a week on Tuesday       order for DME Testing being ordered: INR orders as directed to be done at [facility]  To be done monthly as directed. 1 Month orn     Multiple Vitamins-Minerals (ICAPS PO) Take 1 capsule by mouth 2 times daily       calcium carbonate (TUMS) 500 MG chewable tablet Take 1-2 chew tab by mouth 2 times daily as needed for heartburn       Ferrous Sulfate (IRON) 325 (65 FE) MG tablet Take 1 tablet by mouth 2 times daily       aspirin 81 MG tablet Take 81 mg by mouth daily        No facility-administered encounter medications on file as of 12/7/2017.              Review Of Systems  Skin: As above  Eyes: negative  Ears/Nose/Throat: negative  Respiratory: No shortness of breath, dyspnea on exertion, cough, or hemoptysis  Cardiovascular: negative  Gastrointestinal: negative  Genitourinary: negative  Musculoskeletal: negative  Neurologic: negative  Psychiatric: negative  Hematologic/Lymphatic/Immunologic: negative  Endocrine: negative      O:   NAD, WDWN, Alert & Oriented, Mood & Affect wnl, Vitals stable   Here today alone   Temp 98.6  F (37  C) (Oral)  Resp 12  Wt 65.3 kg (144 lb)  BMI 23.96 kg/m2   General appearance normal   Vitals stable   Alert, oriented and in no acute distress      Following lymph nodes palpated: Occipital, Cervical, Supraclavicular no lad   r hairline 6mm ill-deifned scaly papule    R cheek 10mm scaly papule       Eyes: Conjunctivae/lids:Normal     ENT: Lips, buccal mucosa, tongue: normal    MSK:Normal    Cardiovascular: peripheral edema none    Pulm: Breathing Normal    Neuro/Psych: Orientation:Normal; Mood/Affect:Normal      A/P:  1. 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 Indication for Mohs was Location. Patient confirmed the site with Dr. Pozo.  After anesthesia with LEC, the tumor was excised using standard Mohs technique in 1 stages(s).  CLEAR MARGINS OBTAINED and Final defect size was 1.4* cm.       REPAIR SECOND INTENT: We discussed the options for wound management in full with the patient including risks/benefits/possible outcomes. Decision made to allow the wound to heal by second intention. EBL minimal; complications none; wound care routine.  The patient was discharged in good condition and will return in one month or prn for wound evaluation.    2. R cheek basal cell carcinoma   MOHS:   Ill-defined margins    After PGACAC discussed with patient, decision for Mohs surgery was made. Indication for Mohs was Ill-defined margins. Patient confirmed the site with Dr. Pozo.  After anesthesia with LEC, the tumor was excised using standard Mohs technique in 1 stages(s).  CLEAR MARGINS OBTAINED and Final defect size was 1.2 cm.       REPAIR SECOND INTENT: We discussed the options for wound management in full with the patient including risks/benefits/possible outcomes. Decision made to allow the wound to heal by second intention. EBL minimal; complications none; wound care routine.  The patient was discharged in good condition and will return in one month or prn for wound evaluation.    BENIGN LESIONS DISCUSSED WITH PATIENT:  I discussed the specifics of tumor, prognosis, and genetics of benign lesions.  I explained that treatment of these lesions would be purely cosmetic and not medically neccessary.  I discussed with patient different removal options including excision, cautery and /or laser.      Nature and genetics of benign skin lesions dicussed with patient.  Signs and Symptoms of skin cancer discussed with patient.  Patient encouraged to perform monthly skin exams.  UV precautions reviewed with patient.  Skin care regimen reviewed with  patient: Eliminate harsh soaps, i.e. Dial, zest, irsih spring; Mild soaps such as Cetaphil or Dove sensitive skin, avoid hot or cold showers, aggressive use of emollients including vanicream, cetaphil or cerave discussed with patient.    Risks of non-melanoma skin cancer discussed with patient   Return to clinic 6 months      Again, thank you for allowing me to participate in the care of your patient.        Sincerely,        Noble Pozo MD

## 2017-12-09 ENCOUNTER — HOSPITAL ENCOUNTER (EMERGENCY)
Facility: CLINIC | Age: 82
Discharge: HOME | End: 2017-12-09
Attending: EMERGENCY MEDICINE | Admitting: EMERGENCY MEDICINE
Payer: MEDICARE

## 2017-12-09 ENCOUNTER — APPOINTMENT (OUTPATIENT)
Dept: CT IMAGING | Facility: CLINIC | Age: 82
End: 2017-12-09
Attending: EMERGENCY MEDICINE
Payer: MEDICARE

## 2017-12-09 VITALS
HEART RATE: 74 BPM | RESPIRATION RATE: 16 BRPM | HEIGHT: 68 IN | BODY MASS INDEX: 24.25 KG/M2 | TEMPERATURE: 97.8 F | WEIGHT: 160 LBS | SYSTOLIC BLOOD PRESSURE: 168 MMHG | DIASTOLIC BLOOD PRESSURE: 67 MMHG | OXYGEN SATURATION: 97 %

## 2017-12-09 DIAGNOSIS — I10 ESSENTIAL HYPERTENSION: ICD-10-CM

## 2017-12-09 DIAGNOSIS — S09.90XA CLOSED HEAD INJURY, INITIAL ENCOUNTER: ICD-10-CM

## 2017-12-09 DIAGNOSIS — L76.21 POSTOPERATIVE HEMORRHAGE OF SKIN FOLLOWING DERMATOLOGIC PROCEDURE: ICD-10-CM

## 2017-12-09 DIAGNOSIS — Z79.01 WARFARIN ANTICOAGULATION: ICD-10-CM

## 2017-12-09 LAB — INR PPP: 2.28 (ref 0.86–1.14)

## 2017-12-09 PROCEDURE — A9270 NON-COVERED ITEM OR SERVICE: HCPCS | Mod: GY | Performed by: EMERGENCY MEDICINE

## 2017-12-09 PROCEDURE — 99284 EMERGENCY DEPT VISIT MOD MDM: CPT | Mod: 25

## 2017-12-09 PROCEDURE — 85610 PROTHROMBIN TIME: CPT | Performed by: EMERGENCY MEDICINE

## 2017-12-09 PROCEDURE — 25000125 ZZHC RX 250: Performed by: EMERGENCY MEDICINE

## 2017-12-09 PROCEDURE — 70450 CT HEAD/BRAIN W/O DYE: CPT

## 2017-12-09 PROCEDURE — 36415 COLL VENOUS BLD VENIPUNCTURE: CPT | Performed by: EMERGENCY MEDICINE

## 2017-12-09 PROCEDURE — A9270 NON-COVERED ITEM OR SERVICE: HCPCS | Performed by: EMERGENCY MEDICINE

## 2017-12-09 PROCEDURE — 25000132 ZZH RX MED GY IP 250 OP 250 PS 637: Mod: GY | Performed by: EMERGENCY MEDICINE

## 2017-12-09 RX ORDER — FUROSEMIDE 20 MG
20 TABLET ORAL ONCE
Status: COMPLETED | OUTPATIENT
Start: 2017-12-09 | End: 2017-12-09

## 2017-12-09 RX ADMIN — FUROSEMIDE 20 MG: 20 TABLET ORAL at 13:45

## 2017-12-09 RX ADMIN — SILVER NITRATE APPLICATORS 4 APPLICATOR: 25; 75 STICK TOPICAL at 13:46

## 2017-12-09 ASSESSMENT — ENCOUNTER SYMPTOMS
COLOR CHANGE: 0
WOUND: 1
FACIAL SWELLING: 0
HEADACHES: 0
DIZZINESS: 0
BACK PAIN: 0

## 2017-12-09 NOTE — ED AVS SNAPSHOT
[facility] Emergency Department    [address]    [facility] [address]    Phone:  [phone]    Fax:  [phone]                                       Francesco Killian   MRN: [id number]    Department:  [facility] Emergency Department   Date of Visit:  12/9/2017           Patient Information     Date Of Birth          12/26/1920        Your diagnoses for this visit were:     Postoperative hemorrhage of skin following dermatologic procedure     Closed head injury, initial encounter     Essential hypertension     Warfarin anticoagulation        You were seen by Sarah Rosas MD.      Follow-up Information     Follow up with Your dermatologist.    Why:  As needed        Follow up with [facility] Emergency Department.    Specialty:  EMERGENCY MEDICINE    Why:  As needed, If symptoms worsen    Contact information:    [address]  [facility] [address] [phone]        Discharge Instructions       Leave the bandage in place for two days. Do not try to remove the mesh overlying the wound, eventually it should fall off. You could cause the bleeding to start again if you do so.     Discharge Instructions  Head Injury    You have been seen today for a head injury. Your evaluation included a history and physical examination. You may have had a CT (CAT) scan performed, though most head injuries do not require a scan. Based on this evaluation, your provider today does not feel that your head injury is serious.    Generally, every Emergency Department visit should have a follow-up clinic visit with either a primary or a specialty clinic/provider. Please follow-up as instructed by your emergency provider today.  Return to the Emergency Department if:    You are confused or you are not acting right.    Your headache gets worse or you start to have a really bad headache even with your recommended treatment plan.    You vomit (throw up) more than  once.    You have a seizure.    You have trouble walking.    You have weakness or paralysis (cannot move) in an arm or a leg.    You have blood or fluid coming from your ears or nose.    You have new symptoms or anything that worries you.    Sleeping:  It is okay for you to sleep, but someone should wake you up if instructed by your provider, and someone should check on you at your usual time to wake up.     Activity:    Do not drive for at least 24 hours.    Do not drive if you have dizzy spells or trouble concentrating, or remembering things.    Do not return to any contact sports until cleared by your regular provider.     MORE INFORMATION:    Concussion:  A concussion is a minor head injury that may cause temporary problems with the way the brain works. Although concussions are important, they are generally not an emergency or a reason that a person needs to be hospitalized. Some concussion symptoms include confusion, amnesia (forgetful), nausea (sick to your stomach) and vomiting (throwing up), dizziness, fatigue, memory or concentration problems, irritability and sleep problems. For most people, concussions are mild and temporary but some will have more severe and persistent symptoms that require on-going care and treatment.  CT Scans: Your evaluation today may have included a CT scan (CAT scan) to look for things like bleeding or a skull fracture (broken bone).  CT scans involve radiation and too many CT scans can cause serious health problems like cancer, especially in children.  Because of this, your provider may not have ordered a CT scan today if they think you are at low risk for a serious or life threatening problem.    If you were given a prescription for medicine here today, be sure to read all of the information (including the package insert) that comes with your prescription.  This will include important information about the medicine, its side effects, and any warnings that you need to know about.   The pharmacist who fills the prescription can provide more information and answer questions you may have about the medicine.  If you have questions or concerns that the pharmacist cannot address, please call or return to the Emergency Department.     Remember that you can always come back to the Emergency Department if you are not able to see your regular provider in the amount of time listed above, if you get any new symptoms, or if there is anything that worries you.    Discharge Instructions  Hypertension - High Blood Pressure    During you visit to the Emergency Department, your blood pressure was higher than the recommended blood pressure.  This may be related to stress, pain, medication or other temporary conditions. In these cases, your blood pressure may return to normal on its own. If you have a history of high blood pressure, you may need to have your provider adjust your medications. Sometimes, your high measurement here may indicate that you have developed high blood pressure that will stay high unless it is treated. As a general rule, high blood pressure causes problems over years rather than days, weeks, or months. So, while it is important to treat blood pressure, it is rarely important to treat blood pressure immediately. Occasionally we will begin a medication in the Emergency Department; more often we will recommend close follow-up for medications with a primary doctor/clinic.    Generally, every Emergency Department visit should have a follow-up clinic visit with either a primary or a specialty clinic/provider. Please follow-up as instructed by your emergency provider today.    Return to the Emergency Department if you start to have:    A severe headache.    Chest pain.    Shortness of breath.    Weakness or numbness that affects one part of the body.    Confusion.    Vision changes.    Significant swelling of legs and/or eyes.    A reaction to any medication started in the Emergency  Department.    What can I do to help myself?    Avoid alcohol.    Take any blood pressure medicine that you are prescribed.    Get a good night s sleep.    Lower your salt intake.    Exercise.    Lose weight.    Manage stress.    See your doctor regularly    If blood pressure medication was started in the Emergency Department:    The medicine may not have an immediate effect. The body and brain determine what blood pressure you have. The medicine s job is to retrain the body s  thermostat  to a lower blood pressure.    You will need to follow up with your provider to see how this medicine is working for you.  If you were given a prescription for medicine here today, be sure to read all of the information (including the package insert) that comes with your prescription.  This will include important information about the medicine, its side effects, and any warnings that you need to know about.  The pharmacist who fills the prescription can provide more information and answer questions you may have about the medicine.  If you have questions or concerns that the pharmacist cannot address, please call or return to the Emergency Department.   Remember that you can always come back to the Emergency Department if you are not able to see your regular provider in the amount of time listed above, if you get any new symptoms, or if there is anything that worries you.      24 Hour Appointment Hotline       To make an appointment at any [facility], call [phone] ([phone]). If you don't have a family doctor or clinic, we will help you find one. [facility] are conveniently located to serve the needs of you and your family.             Review of your medicines      Our records show that you are taking the medicines listed below. If these are incorrect, please call your family doctor or clinic.        Dose / Directions Last dose taken    aspirin 81 MG tablet   Dose:  81 mg        Take 81 mg by mouth daily    Refills:  0        calcium carbonate 500 MG chewable tablet   Commonly known as:  TUMS   Dose:  1-2 chew tab        Take 1-2 chew tab by mouth 2 times daily as needed for heartburn   Refills:  0        citalopram 10 MG tablet   Commonly known as:  celeXA   Dose:  10 mg   Quantity:  90 tablet        Take 1 tablet (10 mg) by mouth daily   Refills:  1        diphenhydrAMINE-acetaminophen  MG tablet   Commonly known as:  TYLENOL PM   Dose:  1 tablet        Take 1 tablet by mouth At Bedtime   Refills:  0        furosemide 20 MG tablet   Commonly known as:  LASIX   Dose:  20 mg   Quantity:  180 tablet        Take 1 tablet (20 mg) by mouth daily   Refills:  3        ICAPS PO   Dose:  1 capsule        Take 1 capsule by mouth 2 times daily   Refills:  0        iron 325 (65 FE) MG tablet   Dose:  1 tablet        Take 1 tablet by mouth 2 times daily   Refills:  0        ketoconazole 2 % cream   Commonly known as:  NIZORAL   Quantity:  30 g        Apply to face BID PRN   Refills:  1        levothyroxine 100 MCG tablet   Commonly known as:  SYNTHROID/LEVOTHROID   Quantity:  90 tablet        TAKE 1 TABLET BY MOUTH EVERY DAY.   Refills:  0        lisinopril 10 MG tablet   Commonly known as:  PRINIVIL/ZESTRIL   Dose:  10 mg   Quantity:  90 tablet        Take 1 tablet (10 mg) by mouth daily   Refills:  3        order for DME   Quantity:  1 Month        Testing being ordered: INR orders as directed to be done at [facility] To be done monthly as directed.   Refills:  orn        TYLENOL 8 HOUR 650 MG CR tablet   Dose:  650 mg   Generic drug:  acetaminophen        Take 650 mg by mouth every evening as needed for mild pain or fever   Refills:  0        * warfarin 2 MG tablet   Commonly known as:  COUMADIN   Dose:  2 mg        Take 2 mg by mouth once a week on Tuesday   Refills:  0        * warfarin 2 MG tablet   Commonly known as:  COUMADIN   Quantity:  180 tablet        Take one tablet daily except 2 tablets  on Mon/Wed/Fri as directed by the anticoagulation clinic   Refills:  0        * Notice:  This list has 2 medication(s) that are the same as other medications prescribed for you. Read the directions carefully, and ask your doctor or other care provider to review them with you.            Procedures and tests performed during your visit     CT Head w/o Contrast    INR      Orders Needing Specimen Collection     None      Pending Results     No orders found from 12/7/2017 to 12/10/2017.            Pending Culture Results     No orders found from 12/7/2017 to 12/10/2017.            Pending Results Instructions     If you had any lab results that were not finalized at the time of your Discharge, you can call the ED Lab Result RN at [phone]. You will be contacted by this team for any positive Lab results or changes in treatment. The nurses are available 7 days a week from 10A to 6:30P.  You can leave a message 24 hours per day and they will return your call.        Test Results From Your Hospital Stay        12/9/2017 12:27 PM      Component Results     Component Value Ref Range & Units Status    INR 2.28 (H) 0.86 - 1.14 Final         12/9/2017 12:50 PM      Narrative     CT HEAD W/O CONTRAST   12/9/2017 12:15 PM     HISTORY: trauma, head injury;     TECHNIQUE: Axial images of the head without IV contrast material.  Radiation dose for this scan was reduced using automated exposure  control, adjustment of the mA and/or kV according to patient size, or  iterative reconstruction technique.    COMPARISON: CT dated 10/4/2017    FINDINGS: There is a chronic right occipital infarct.  There is  generalized atrophy of the brain.  Areas of low attenuation are  present in the white matter of the cerebral hemispheres that are  consistent with small vessel ischemic disease in this age patient.  There is no evidence of intracranial hemorrhage, mass, acute infarct  or anomaly. The visualized portions of the sinuses and mastoids  appear  normal. There is no evidence of trauma.        Impression     IMPRESSION:   1. No acute pathology. No bleed or fracture identified.  2. Age related changes including diffuse brain atrophy.  White matter  changes consistent with small vessel ischemic disease.   3. Chronic right occipital infarct.    FATUMA DAHL MD                Clinical Quality Measure: Blood Pressure Screening     Your blood pressure was checked while you were in the emergency department today. The last reading we obtained was  BP: 184/87 . Please read the guidelines below about what these numbers mean and what you should do about them.  If your systolic blood pressure (the top number) is less than 120 and your diastolic blood pressure (the bottom number) is less than 80, then your blood pressure is normal. There is nothing more that you need to do about it.  If your systolic blood pressure (the top number) is 120-139 or your diastolic blood pressure (the bottom number) is 80-89, your blood pressure may be higher than it should be. You should have your blood pressure rechecked within a year by a primary care provider.  If your systolic blood pressure (the top number) is 140 or greater or your diastolic blood pressure (the bottom number) is 90 or greater, you may have high blood pressure. High blood pressure is treatable, but if left untreated over time it can put you at risk for heart attack, stroke, or kidney failure. You should have your blood pressure rechecked by a primary care provider within the next 4 weeks.  If your provider in the emergency department today gave you specific instructions to follow-up with your doctor or provider even sooner than that, you should follow that instruction and not wait for up to 4 weeks for your follow-up visit.        Thank you for choosing [facility]       Thank you for choosing [facility] for your care. Our goal is always to provide you with excellent care. Hearing back from our patients is one way we  can continue to improve our services. Please take a few minutes to complete the written survey that you may receive in the mail after you visit with us. Thank you!        [employer] Information     [email or web address] gives you secure access to your electronic health record. If you see a primary care provider, you can also send messages to your care team and make appointments. If you have questions, please call your primary care clinic.  If you do not have a primary care provider, please call [phone] and they will assist you.        Care EveryWhere ID     This is your Care EveryWhere ID. This could be used by other organizations to access your [facility] medical records  [id number]        Equal Access to Services     IMAN MULTANI : Anant Salomn, marcus daugherty, gely olmstead, singh lemon. So [facility] [phone].    ATENCIÓN: Si habla español, tiene a costa disposición servicios gratuitos de asistencia lingüística. Llame al [phone].    We comply with applicable federal civil rights laws and [place] laws. We do not discriminate on the basis of race, color, national origin, age, disability, sex, sexual orientation, or gender identity.            After Visit Summary       This is your record. Keep this with you and show to your community pharmacist(s) and doctor(s) at your next visit.

## 2017-12-09 NOTE — ED PROVIDER NOTES
History     Chief Complaint:  Bleeding wound    History limited by: poor history given by EMS and patient.      Francesco Killian is a 89 year old male on Coumadin who presents with concerns for ongoing bleeding from a surgical site where a skin abnormality was removed. The patient is bought in via EMS. It was reported that the patient fell yesterday and hit the side of his face on a chair and fell to the ground per wife. This seemed to trigger bleeding from the surgical site on his face. The patient reports that he had a cancer excision on his right face 2 days prior and that the wound is still bleeding since it restarted with the fall. The patient has 2 bandaged wounds on his face with one superficial abrasion on his forehead and the other being from his cancer excision on his right face. He denies headache, vomiting, confusion, or any trauma from the fall. He denies chest pain, shortness of breath or dizziness.    Allergies:  No known drug allergies.    Medications:    warfarin (COUMADIN) 2 MG tablet  Donepezil 10 mg   acetaminophen (TYLENOL 8 HOUR) 650 MG CR tablet  Multiple Vitamins-Minerals (ICAPS PO)  calcium carbonate (TUMS) 500 MG chewable tablet  Ferrous Sulfate (IRON) 325 (65 FE) MG tablet  Amlodipine 500 MG  Aspirin 81 mg   Loratadine 10 mg  Memantine 5 mg  Mirtazapine 15 mg  Senna-lAx 8.6 mg  Sertraline 25 mg  Tab-A-Marci    Past Medical History:    Abdominal aortic aneurysm  Aortic stenosis  Bradycardia  Carotid occlusion  Chronic atrial fibrillation  CVA  Dysphagia  Hypertension   Hyperlipidemia  Hypothyroidism  Macular degeneration of bilateral eyes  Oculovestibular syndrome  Right bundle branch block   Squamous cell carcinoma  Unspecified cerebral artery occlusion with cerebral infarction     Past Surgical History:    Surgical repair of left arm  coronary artery bypass graft  Colonoscopy  Esophagoscopy, Gastroscopy, Duodenoscopy, Combined  Bilateral cataract surgery  Duodenal ulcer repair  Bilateral  "inguinal hernia repair  Renal/visceral stent  Transurethral prostatectomy    Family History:    Mother-coronary artery disease  Father-coronary artery disease  Grandfather-coronary artery disease  Grandmother-coronary artery disease  Brother-coronary artery disease  Sister-coronary artery disease    Social History:  Smoking status: Never smoker  Alcohol use: No  Marital Status:        Review of Systems   HENT: Negative for facial swelling and mouth sores.    Eyes: Negative for visual disturbance.   Cardiovascular: Negative for chest pain.   Musculoskeletal: Negative for back pain.   Skin: Positive for wound. Negative for color change.   Neurological: Negative for dizziness and headaches.   All other systems reviewed and are negative.    Physical Exam     Patient Vitals for the past 24 hrs:   BP Temp Temp src Pulse Resp SpO2 Height Weight   12/09/17 1330 184/87 - - - - - - -   12/09/17 1300 191/85 - - - - - - -   12/09/17 1230 (!) 194/93 - - - - 99 % - -   12/09/17 1200 (!) 194/99 - - - - 100 % - -   12/09/17 1145 186/86 - - - - 100 % - -   12/09/17 1130 192/82 - - - - 98 % - -   12/09/17 1115 (!) 188/105 - - - - 98 % - -   12/09/17 1101 (!) 204/103 97.8  F (36.6  C) Oral 74 18 98 % 1.727 m (5' 8\") 72.6 kg (160 lb)       Physical Exam  General: elderly male sitting upright.  Eyes: PERRL, Conjunctive within normal limits.  HENT: no palpable scalp hematoma. Superficial wound to the right forehead, no active bleeding. Deeper wound over the right lateral face with bright red bleeding, non pulsatile, slow oozing from the superior aspect.  Neck: Nontender. Normal AROM.  ENT: Moist mucous membranes, oropharynx clear.   Resp: Clear to auscultation bilaterally, no wheezes, rales or rhonchi. Normal respiratory effort.  MSK: No edema. Nontender. Normal active range of motion.  Skin: Warm and dry. As noted in HENT  Neuro: Alert and oriented. Responds appropriately to all questions and commands. No focal findings " appreciated. Normal muscle tone. GCS 15.  Psych: Normal mood and affect. Pleasant.    Emergency Department Course   Imaging:  Head CT without contrast:  IMPRESSION:   1. No acute pathology. No bleed or fracture identified.  2. Age related changes including diffuse brain atrophy.  White matter  changes consistent with small vessel ischemic disease.   3. Chronic right occipital infarct.  Report per radiology.      Laboratory:  (1155) INR: 2.28 (H)    Interventions:  (1346) Silver Nitrate, 4 applicators, Topical  (1345) Lasix 20 mg, PO    Emergency Department Course:  Nursing notes and vitals reviewed.  (1130) I performed an exam of the patient as documented above.    The patient was sent for a CT scan while in the emergency department, findings above.   Blood was drawn from the patient. This was sent for laboratory testing, findings above.     Findings and plan explained to the patient. Patient discharged home with instructions regarding supportive care, medications, and reasons to return. The importance of close follow-up was reviewed.   Impression & Plan    Medical Decision Making:  Francesco Killian is a 89 year old male on Coumadin who presents to the ED for concerns for ongoing bleeding from a dermatologic procedure to the right face. This was prompted by a fall yesterday where he hit his head on a chair and the floor. Head CT here did not show any evidence of an intracranial bleed. The wound bleeding was controlled with surgicel and pressure bandages. No further bleeding prior to discharge. He was hypertensive throughout his stay but this did improve slowly over time. He had missed his morning blood pressure med's. There is no indication for rapid lowering of his blood pressure. He is neurovascularly intact with his son who is a physician and with appropriate follow up instructions and return precautions. All questions answered prior to discharge.      Diagnosis:    ICD-10-CM    1. Postoperative hemorrhage of skin  following dermatologic procedure L76.21    2. Closed head injury, initial encounter S09.90XA    3. Essential hypertension I10    4. Warfarin anticoagulation Z79.01        Disposition:  Patient is discharged to home.      I, Toro Arcos, am serving as a scribe on 12/9/2017 at 11:30 AM to personally document services performed by Dr. Rosas based on my observations and the provider's statements to me.       Toro Arcos  12/9/2017   [facility] EMERGENCY DEPARTMENT       Sarah Rosas MD  12/10/17 0655

## 2017-12-09 NOTE — DISCHARGE INSTRUCTIONS
Leave the bandage in place for two days. Do not try to remove the mesh overlying the wound, eventually it should fall off. You could cause the bleeding to start again if you do so.     Discharge Instructions  Head Injury    You have been seen today for a head injury. Your evaluation included a history and physical examination. You may have had a CT (CAT) scan performed, though most head injuries do not require a scan. Based on this evaluation, your provider today does not feel that your head injury is serious.    Generally, every Emergency Department visit should have a follow-up clinic visit with either a primary or a specialty clinic/provider. Please follow-up as instructed by your emergency provider today.  Return to the Emergency Department if:    You are confused or you are not acting right.    Your headache gets worse or you start to have a really bad headache even with your recommended treatment plan.    You vomit (throw up) more than once.    You have a seizure.    You have trouble walking.    You have weakness or paralysis (cannot move) in an arm or a leg.    You have blood or fluid coming from your ears or nose.    You have new symptoms or anything that worries you.    Sleeping:  It is okay for you to sleep, but someone should wake you up if instructed by your provider, and someone should check on you at your usual time to wake up.     Activity:    Do not drive for at least 24 hours.    Do not drive if you have dizzy spells or trouble concentrating, or remembering things.    Do not return to any contact sports until cleared by your regular provider.     MORE INFORMATION:    Concussion:  A concussion is a minor head injury that may cause temporary problems with the way the brain works. Although concussions are important, they are generally not an emergency or a reason that a person needs to be hospitalized. Some concussion symptoms include confusion, amnesia (forgetful), nausea (sick to your stomach) and  vomiting (throwing up), dizziness, fatigue, memory or concentration problems, irritability and sleep problems. For most people, concussions are mild and temporary but some will have more severe and persistent symptoms that require on-going care and treatment.  CT Scans: Your evaluation today may have included a CT scan (CAT scan) to look for things like bleeding or a skull fracture (broken bone).  CT scans involve radiation and too many CT scans can cause serious health problems like cancer, especially in children.  Because of this, your provider may not have ordered a CT scan today if they think you are at low risk for a serious or life threatening problem.    If you were given a prescription for medicine here today, be sure to read all of the information (including the package insert) that comes with your prescription.  This will include important information about the medicine, its side effects, and any warnings that you need to know about.  The pharmacist who fills the prescription can provide more information and answer questions you may have about the medicine.  If you have questions or concerns that the pharmacist cannot address, please call or return to the Emergency Department.     Remember that you can always come back to the Emergency Department if you are not able to see your regular provider in the amount of time listed above, if you get any new symptoms, or if there is anything that worries you.    Discharge Instructions  Hypertension - High Blood Pressure    During you visit to the Emergency Department, your blood pressure was higher than the recommended blood pressure.  This may be related to stress, pain, medication or other temporary conditions. In these cases, your blood pressure may return to normal on its own. If you have a history of high blood pressure, you may need to have your provider adjust your medications. Sometimes, your high measurement here may indicate that you have developed high  blood pressure that will stay high unless it is treated. As a general rule, high blood pressure causes problems over years rather than days, weeks, or months. So, while it is important to treat blood pressure, it is rarely important to treat blood pressure immediately. Occasionally we will begin a medication in the Emergency Department; more often we will recommend close follow-up for medications with a primary doctor/clinic.    Generally, every Emergency Department visit should have a follow-up clinic visit with either a primary or a specialty clinic/provider. Please follow-up as instructed by your emergency provider today.    Return to the Emergency Department if you start to have:    A severe headache.    Chest pain.    Shortness of breath.    Weakness or numbness that affects one part of the body.    Confusion.    Vision changes.    Significant swelling of legs and/or eyes.    A reaction to any medication started in the Emergency Department.    What can I do to help myself?    Avoid alcohol.    Take any blood pressure medicine that you are prescribed.    Get a good night s sleep.    Lower your salt intake.    Exercise.    Lose weight.    Manage stress.    See your doctor regularly    If blood pressure medication was started in the Emergency Department:    The medicine may not have an immediate effect. The body and brain determine what blood pressure you have. The medicine s job is to retrain the body s  thermostat  to a lower blood pressure.    You will need to follow up with your provider to see how this medicine is working for you.  If you were given a prescription for medicine here today, be sure to read all of the information (including the package insert) that comes with your prescription.  This will include important information about the medicine, its side effects, and any warnings that you need to know about.  The pharmacist who fills the prescription can provide more information and answer questions you  may have about the medicine.  If you have questions or concerns that the pharmacist cannot address, please call or return to the Emergency Department.   Remember that you can always come back to the Emergency Department if you are not able to see your regular provider in the amount of time listed above, if you get any new symptoms, or if there is anything that worries you.

## 2017-12-09 NOTE — ED AVS SNAPSHOT
[facility] Emergency Department    [address]    [facility] [address]    Phone:  [phone]    Fax:  [phone]                                       Francesco Killian   MRN: [id number]    Department:  [facility] Emergency Department   Date of Visit:  12/9/2017           After Visit Summary Signature Page     I have received my discharge instructions, and my questions have been answered. I have discussed any challenges I see with this plan with the nurse or doctor.    ..........................................................................................................................................  Patient/Patient Representative Signature      ..........................................................................................................................................  Patient Representative Print Name and Relationship to Patient    ..................................................               ................................................  Date                                            Time    ..........................................................................................................................................  Reviewed by Signature/Title    ...................................................              ..............................................  Date                                                            Time

## 2017-12-22 DIAGNOSIS — F39 EPISODIC MOOD DISORDER (H): ICD-10-CM

## 2017-12-22 RX ORDER — CITALOPRAM HYDROBROMIDE 10 MG/1
TABLET ORAL
Qty: 90 TABLET | Refills: 1 | Status: SHIPPED | OUTPATIENT
Start: 2017-12-22 | End: 2018-01-01 | Stop reason: DRUGHIGH

## 2018-01-01 ENCOUNTER — NURSING HOME VISIT (OUTPATIENT)
Dept: GERIATRICS | Facility: CLINIC | Age: 83
End: 2018-01-01
Payer: COMMERCIAL

## 2018-01-01 ENCOUNTER — PATIENT OUTREACH (OUTPATIENT)
Dept: INTERNAL MEDICINE | Facility: CLINIC | Age: 83
End: 2018-01-01

## 2018-01-01 ENCOUNTER — RECORDS - HEALTHEAST (OUTPATIENT)
Dept: LAB | Facility: CLINIC | Age: 83
End: 2018-01-01

## 2018-01-01 ENCOUNTER — HOSPITAL ENCOUNTER (INPATIENT)
Facility: CLINIC | Age: 83
LOS: 4 days | Discharge: NURSING, REHAB OR LONG-TERM CARE | DRG: 871 | End: 2018-11-03
Attending: EMERGENCY MEDICINE | Admitting: INTERNAL MEDICINE
Payer: MEDICARE

## 2018-01-01 ENCOUNTER — TELEPHONE (OUTPATIENT)
Dept: GERIATRICS | Facility: CLINIC | Age: 83
End: 2018-01-01

## 2018-01-01 ENCOUNTER — TRANSFERRED RECORDS (OUTPATIENT)
Dept: HEALTH INFORMATION MANAGEMENT | Facility: CLINIC | Age: 83
End: 2018-01-01

## 2018-01-01 ENCOUNTER — APPOINTMENT (OUTPATIENT)
Dept: PHYSICAL THERAPY | Facility: CLINIC | Age: 83
DRG: 871 | End: 2018-01-01
Attending: PHYSICIAN ASSISTANT
Payer: MEDICARE

## 2018-01-01 ENCOUNTER — TELEPHONE (OUTPATIENT)
Dept: CARDIOLOGY | Facility: CLINIC | Age: 83
End: 2018-01-01

## 2018-01-01 ENCOUNTER — DISCHARGE SUMMARY NURSING HOME (OUTPATIENT)
Dept: GERIATRICS | Facility: CLINIC | Age: 83
End: 2018-01-01
Payer: COMMERCIAL

## 2018-01-01 ENCOUNTER — APPOINTMENT (OUTPATIENT)
Dept: CARDIOLOGY | Facility: CLINIC | Age: 83
DRG: 871 | End: 2018-01-01
Attending: PHYSICIAN ASSISTANT
Payer: MEDICARE

## 2018-01-01 ENCOUNTER — APPOINTMENT (OUTPATIENT)
Dept: CT IMAGING | Facility: CLINIC | Age: 83
DRG: 871 | End: 2018-01-01
Attending: EMERGENCY MEDICINE
Payer: MEDICARE

## 2018-01-01 ENCOUNTER — HOSPITAL ENCOUNTER (EMERGENCY)
Facility: CLINIC | Age: 83
Discharge: HOME | End: 2018-05-09
Attending: EMERGENCY MEDICINE | Admitting: EMERGENCY MEDICINE
Payer: MEDICARE

## 2018-01-01 ENCOUNTER — TELEPHONE (OUTPATIENT)
Dept: CARE COORDINATION | Facility: CLINIC | Age: 83
End: 2018-01-01

## 2018-01-01 ENCOUNTER — APPOINTMENT (OUTPATIENT)
Dept: SPEECH THERAPY | Facility: CLINIC | Age: 83
DRG: 871 | End: 2018-01-01
Payer: MEDICARE

## 2018-01-01 ENCOUNTER — APPOINTMENT (OUTPATIENT)
Dept: CT IMAGING | Facility: CLINIC | Age: 83
End: 2018-01-01
Attending: EMERGENCY MEDICINE
Payer: MEDICARE

## 2018-01-01 ENCOUNTER — APPOINTMENT (OUTPATIENT)
Dept: GENERAL RADIOLOGY | Facility: CLINIC | Age: 83
DRG: 871 | End: 2018-01-01
Attending: PHYSICIAN ASSISTANT
Payer: MEDICARE

## 2018-01-01 ENCOUNTER — MEDICAL CORRESPONDENCE (OUTPATIENT)
Dept: HEALTH INFORMATION MANAGEMENT | Facility: CLINIC | Age: 83
End: 2018-01-01

## 2018-01-01 ENCOUNTER — APPOINTMENT (OUTPATIENT)
Dept: GENERAL RADIOLOGY | Facility: CLINIC | Age: 83
DRG: 871 | End: 2018-01-01
Attending: EMERGENCY MEDICINE
Payer: MEDICARE

## 2018-01-01 ENCOUNTER — APPOINTMENT (OUTPATIENT)
Dept: SPEECH THERAPY | Facility: CLINIC | Age: 83
DRG: 871 | End: 2018-01-01
Attending: PHYSICIAN ASSISTANT
Payer: MEDICARE

## 2018-01-01 VITALS
WEIGHT: 147.05 LBS | HEART RATE: 55 BPM | BODY MASS INDEX: 22.29 KG/M2 | DIASTOLIC BLOOD PRESSURE: 78 MMHG | HEIGHT: 68 IN | RESPIRATION RATE: 16 BRPM | OXYGEN SATURATION: 94 % | SYSTOLIC BLOOD PRESSURE: 161 MMHG | TEMPERATURE: 98.4 F

## 2018-01-01 VITALS
DIASTOLIC BLOOD PRESSURE: 68 MMHG | BODY MASS INDEX: 21.87 KG/M2 | HEART RATE: 55 BPM | RESPIRATION RATE: 15 BRPM | WEIGHT: 144.3 LBS | SYSTOLIC BLOOD PRESSURE: 132 MMHG | TEMPERATURE: 97.7 F | OXYGEN SATURATION: 97 % | HEIGHT: 68 IN

## 2018-01-01 VITALS
HEART RATE: 66 BPM | WEIGHT: 138.6 LBS | TEMPERATURE: 97 F | TEMPERATURE: 98.4 F | BODY MASS INDEX: 21.01 KG/M2 | OXYGEN SATURATION: 98 % | SYSTOLIC BLOOD PRESSURE: 141 MMHG | HEIGHT: 68 IN | DIASTOLIC BLOOD PRESSURE: 87 MMHG | BODY MASS INDEX: 22.35 KG/M2 | SYSTOLIC BLOOD PRESSURE: 155 MMHG | RESPIRATION RATE: 18 BRPM | OXYGEN SATURATION: 94 % | DIASTOLIC BLOOD PRESSURE: 76 MMHG | HEART RATE: 71 BPM | WEIGHT: 147 LBS | RESPIRATION RATE: 22 BRPM

## 2018-01-01 VITALS
RESPIRATION RATE: 12 BRPM | WEIGHT: 139.4 LBS | DIASTOLIC BLOOD PRESSURE: 54 MMHG | HEIGHT: 68 IN | OXYGEN SATURATION: 94 % | SYSTOLIC BLOOD PRESSURE: 114 MMHG | DIASTOLIC BLOOD PRESSURE: 65 MMHG | SYSTOLIC BLOOD PRESSURE: 155 MMHG | HEART RATE: 55 BPM | BODY MASS INDEX: 21.13 KG/M2 | TEMPERATURE: 97.4 F | OXYGEN SATURATION: 94 % | HEIGHT: 68 IN | BODY MASS INDEX: 20.82 KG/M2 | HEART RATE: 64 BPM | WEIGHT: 137.4 LBS | TEMPERATURE: 97.8 F | RESPIRATION RATE: 15 BRPM

## 2018-01-01 VITALS
RESPIRATION RATE: 18 BRPM | SYSTOLIC BLOOD PRESSURE: 133 MMHG | WEIGHT: 148.9 LBS | DIASTOLIC BLOOD PRESSURE: 98 MMHG | TEMPERATURE: 97 F | OXYGEN SATURATION: 92 % | HEART RATE: 66 BPM | BODY MASS INDEX: 22.57 KG/M2 | HEIGHT: 68 IN

## 2018-01-01 VITALS
TEMPERATURE: 97 F | SYSTOLIC BLOOD PRESSURE: 110 MMHG | WEIGHT: 145.6 LBS | DIASTOLIC BLOOD PRESSURE: 56 MMHG | OXYGEN SATURATION: 97 % | BODY MASS INDEX: 22.07 KG/M2 | HEIGHT: 68 IN | HEART RATE: 78 BPM | RESPIRATION RATE: 16 BRPM

## 2018-01-01 VITALS
RESPIRATION RATE: 18 BRPM | OXYGEN SATURATION: 93 % | BODY MASS INDEX: 21.29 KG/M2 | HEART RATE: 70 BPM | DIASTOLIC BLOOD PRESSURE: 76 MMHG | TEMPERATURE: 97.7 F | WEIGHT: 140 LBS | SYSTOLIC BLOOD PRESSURE: 138 MMHG

## 2018-01-01 VITALS
WEIGHT: 131.2 LBS | OXYGEN SATURATION: 94 % | SYSTOLIC BLOOD PRESSURE: 123 MMHG | TEMPERATURE: 97.6 F | HEART RATE: 67 BPM | BODY MASS INDEX: 19.88 KG/M2 | RESPIRATION RATE: 12 BRPM | DIASTOLIC BLOOD PRESSURE: 81 MMHG | HEIGHT: 68 IN

## 2018-01-01 VITALS
HEART RATE: 50 BPM | TEMPERATURE: 98.1 F | WEIGHT: 136.6 LBS | RESPIRATION RATE: 13 BRPM | BODY MASS INDEX: 20.7 KG/M2 | SYSTOLIC BLOOD PRESSURE: 145 MMHG | DIASTOLIC BLOOD PRESSURE: 78 MMHG | TEMPERATURE: 97.1 F | RESPIRATION RATE: 15 BRPM | DIASTOLIC BLOOD PRESSURE: 72 MMHG | HEIGHT: 68 IN | HEIGHT: 68 IN | WEIGHT: 143.6 LBS | SYSTOLIC BLOOD PRESSURE: 140 MMHG | OXYGEN SATURATION: 94 % | OXYGEN SATURATION: 94 % | HEART RATE: 78 BPM | BODY MASS INDEX: 21.76 KG/M2

## 2018-01-01 VITALS
BODY MASS INDEX: 20.54 KG/M2 | TEMPERATURE: 97.6 F | RESPIRATION RATE: 16 BRPM | OXYGEN SATURATION: 94 % | WEIGHT: 135.5 LBS | DIASTOLIC BLOOD PRESSURE: 72 MMHG | HEIGHT: 68 IN | HEART RATE: 40 BPM | SYSTOLIC BLOOD PRESSURE: 152 MMHG

## 2018-01-01 VITALS
WEIGHT: 147 LBS | HEART RATE: 57 BPM | HEIGHT: 68 IN | TEMPERATURE: 97.2 F | RESPIRATION RATE: 22 BRPM | SYSTOLIC BLOOD PRESSURE: 152 MMHG | DIASTOLIC BLOOD PRESSURE: 80 MMHG | BODY MASS INDEX: 22.28 KG/M2 | OXYGEN SATURATION: 97 %

## 2018-01-01 VITALS
TEMPERATURE: 97.8 F | BODY MASS INDEX: 20.82 KG/M2 | WEIGHT: 137.4 LBS | SYSTOLIC BLOOD PRESSURE: 135 MMHG | HEIGHT: 68 IN | OXYGEN SATURATION: 94 % | HEART RATE: 62 BPM | DIASTOLIC BLOOD PRESSURE: 59 MMHG | RESPIRATION RATE: 15 BRPM

## 2018-01-01 VITALS
OXYGEN SATURATION: 94 % | RESPIRATION RATE: 15 BRPM | DIASTOLIC BLOOD PRESSURE: 74 MMHG | HEART RATE: 55 BPM | TEMPERATURE: 97.5 F | HEIGHT: 68 IN | SYSTOLIC BLOOD PRESSURE: 150 MMHG | WEIGHT: 142.4 LBS | BODY MASS INDEX: 21.58 KG/M2

## 2018-01-01 VITALS
DIASTOLIC BLOOD PRESSURE: 72 MMHG | BODY MASS INDEX: 21.28 KG/M2 | WEIGHT: 140.4 LBS | HEIGHT: 68 IN | RESPIRATION RATE: 18 BRPM | SYSTOLIC BLOOD PRESSURE: 138 MMHG

## 2018-01-01 VITALS
TEMPERATURE: 97.1 F | HEART RATE: 55 BPM | OXYGEN SATURATION: 94 % | RESPIRATION RATE: 15 BRPM | WEIGHT: 136.6 LBS | DIASTOLIC BLOOD PRESSURE: 58 MMHG | BODY MASS INDEX: 20.7 KG/M2 | HEIGHT: 68 IN | SYSTOLIC BLOOD PRESSURE: 130 MMHG

## 2018-01-01 VITALS
BODY MASS INDEX: 21.44 KG/M2 | OXYGEN SATURATION: 93 % | HEART RATE: 70 BPM | WEIGHT: 141 LBS | DIASTOLIC BLOOD PRESSURE: 66 MMHG | SYSTOLIC BLOOD PRESSURE: 132 MMHG | RESPIRATION RATE: 18 BRPM | TEMPERATURE: 97.7 F

## 2018-01-01 VITALS
WEIGHT: 146 LBS | DIASTOLIC BLOOD PRESSURE: 70 MMHG | HEART RATE: 52 BPM | OXYGEN SATURATION: 94 % | SYSTOLIC BLOOD PRESSURE: 132 MMHG | BODY MASS INDEX: 22.2 KG/M2 | TEMPERATURE: 97.2 F | RESPIRATION RATE: 18 BRPM

## 2018-01-01 VITALS
TEMPERATURE: 98.3 F | HEIGHT: 68 IN | OXYGEN SATURATION: 98 % | WEIGHT: 133 LBS | HEART RATE: 65 BPM | DIASTOLIC BLOOD PRESSURE: 58 MMHG | RESPIRATION RATE: 15 BRPM | BODY MASS INDEX: 20.16 KG/M2 | SYSTOLIC BLOOD PRESSURE: 116 MMHG

## 2018-01-01 VITALS
OXYGEN SATURATION: 94 % | WEIGHT: 137.4 LBS | BODY MASS INDEX: 20.82 KG/M2 | HEIGHT: 68 IN | DIASTOLIC BLOOD PRESSURE: 38 MMHG | TEMPERATURE: 99.6 F | SYSTOLIC BLOOD PRESSURE: 76 MMHG | RESPIRATION RATE: 14 BRPM | HEART RATE: 91 BPM

## 2018-01-01 VITALS
HEART RATE: 83 BPM | SYSTOLIC BLOOD PRESSURE: 141 MMHG | BODY MASS INDEX: 20.7 KG/M2 | WEIGHT: 136.6 LBS | TEMPERATURE: 98.4 F | OXYGEN SATURATION: 94 % | RESPIRATION RATE: 16 BRPM | DIASTOLIC BLOOD PRESSURE: 69 MMHG | HEIGHT: 68 IN

## 2018-01-01 VITALS
HEIGHT: 68 IN | SYSTOLIC BLOOD PRESSURE: 118 MMHG | DIASTOLIC BLOOD PRESSURE: 54 MMHG | BODY MASS INDEX: 21.49 KG/M2 | OXYGEN SATURATION: 94 % | HEART RATE: 50 BPM | TEMPERATURE: 97.7 F | RESPIRATION RATE: 15 BRPM | WEIGHT: 141.8 LBS

## 2018-01-01 VITALS
WEIGHT: 137.6 LBS | HEIGHT: 68 IN | DIASTOLIC BLOOD PRESSURE: 76 MMHG | RESPIRATION RATE: 16 BRPM | OXYGEN SATURATION: 98 % | SYSTOLIC BLOOD PRESSURE: 135 MMHG | TEMPERATURE: 98.6 F | BODY MASS INDEX: 20.85 KG/M2 | HEART RATE: 76 BPM

## 2018-01-01 VITALS
HEIGHT: 68 IN | TEMPERATURE: 97.8 F | HEART RATE: 53 BPM | DIASTOLIC BLOOD PRESSURE: 61 MMHG | SYSTOLIC BLOOD PRESSURE: 142 MMHG | WEIGHT: 141.8 LBS | RESPIRATION RATE: 15 BRPM | BODY MASS INDEX: 21.49 KG/M2 | OXYGEN SATURATION: 98 %

## 2018-01-01 VITALS
RESPIRATION RATE: 15 BRPM | WEIGHT: 131.4 LBS | OXYGEN SATURATION: 94 % | TEMPERATURE: 98.4 F | HEIGHT: 68 IN | HEART RATE: 54 BPM | BODY MASS INDEX: 19.91 KG/M2 | SYSTOLIC BLOOD PRESSURE: 138 MMHG | DIASTOLIC BLOOD PRESSURE: 54 MMHG

## 2018-01-01 VITALS
HEART RATE: 67 BPM | SYSTOLIC BLOOD PRESSURE: 149 MMHG | WEIGHT: 143.5 LBS | DIASTOLIC BLOOD PRESSURE: 61 MMHG | TEMPERATURE: 97.9 F | OXYGEN SATURATION: 95 % | RESPIRATION RATE: 24 BRPM | BODY MASS INDEX: 21.82 KG/M2

## 2018-01-01 VITALS
HEART RATE: 67 BPM | OXYGEN SATURATION: 97 % | HEIGHT: 68 IN | TEMPERATURE: 97.3 F | DIASTOLIC BLOOD PRESSURE: 59 MMHG | WEIGHT: 143 LBS | RESPIRATION RATE: 15 BRPM | BODY MASS INDEX: 21.67 KG/M2 | SYSTOLIC BLOOD PRESSURE: 133 MMHG

## 2018-01-01 VITALS
OXYGEN SATURATION: 92 % | TEMPERATURE: 98.2 F | DIASTOLIC BLOOD PRESSURE: 60 MMHG | BODY MASS INDEX: 22.07 KG/M2 | SYSTOLIC BLOOD PRESSURE: 132 MMHG | HEART RATE: 88 BPM | WEIGHT: 145.6 LBS | HEIGHT: 68 IN | RESPIRATION RATE: 16 BRPM

## 2018-01-01 VITALS
WEIGHT: 141.8 LBS | HEIGHT: 68 IN | DIASTOLIC BLOOD PRESSURE: 70 MMHG | BODY MASS INDEX: 21.49 KG/M2 | OXYGEN SATURATION: 94 % | HEART RATE: 44 BPM | TEMPERATURE: 97.9 F | SYSTOLIC BLOOD PRESSURE: 164 MMHG | RESPIRATION RATE: 18 BRPM

## 2018-01-01 VITALS
RESPIRATION RATE: 15 BRPM | DIASTOLIC BLOOD PRESSURE: 78 MMHG | HEART RATE: 46 BPM | SYSTOLIC BLOOD PRESSURE: 143 MMHG | TEMPERATURE: 97.7 F | HEIGHT: 68 IN | OXYGEN SATURATION: 94 % | BODY MASS INDEX: 21.49 KG/M2 | WEIGHT: 141.8 LBS

## 2018-01-01 VITALS
BODY MASS INDEX: 20.58 KG/M2 | TEMPERATURE: 98.2 F | WEIGHT: 135.8 LBS | HEIGHT: 68 IN | SYSTOLIC BLOOD PRESSURE: 134 MMHG | RESPIRATION RATE: 16 BRPM | HEART RATE: 47 BPM | DIASTOLIC BLOOD PRESSURE: 52 MMHG | OXYGEN SATURATION: 98 %

## 2018-01-01 VITALS
SYSTOLIC BLOOD PRESSURE: 129 MMHG | DIASTOLIC BLOOD PRESSURE: 62 MMHG | HEART RATE: 52 BPM | BODY MASS INDEX: 22.07 KG/M2 | RESPIRATION RATE: 15 BRPM | WEIGHT: 145.6 LBS | TEMPERATURE: 97.5 F | HEIGHT: 68 IN | OXYGEN SATURATION: 97 %

## 2018-01-01 VITALS
RESPIRATION RATE: 16 BRPM | TEMPERATURE: 98.4 F | OXYGEN SATURATION: 94 % | DIASTOLIC BLOOD PRESSURE: 50 MMHG | WEIGHT: 136.6 LBS | HEART RATE: 51 BPM | SYSTOLIC BLOOD PRESSURE: 142 MMHG | BODY MASS INDEX: 20.7 KG/M2 | HEIGHT: 68 IN

## 2018-01-01 VITALS
RESPIRATION RATE: 12 BRPM | DIASTOLIC BLOOD PRESSURE: 67 MMHG | BODY MASS INDEX: 21.49 KG/M2 | TEMPERATURE: 97.6 F | OXYGEN SATURATION: 98 % | WEIGHT: 141.8 LBS | HEART RATE: 63 BPM | SYSTOLIC BLOOD PRESSURE: 149 MMHG | HEIGHT: 68 IN

## 2018-01-01 VITALS
DIASTOLIC BLOOD PRESSURE: 69 MMHG | RESPIRATION RATE: 24 BRPM | TEMPERATURE: 97.9 F | SYSTOLIC BLOOD PRESSURE: 142 MMHG | BODY MASS INDEX: 23.32 KG/M2 | WEIGHT: 153.4 LBS | HEART RATE: 61 BPM

## 2018-01-01 VITALS
DIASTOLIC BLOOD PRESSURE: 60 MMHG | SYSTOLIC BLOOD PRESSURE: 122 MMHG | WEIGHT: 119.7 LBS | BODY MASS INDEX: 18.2 KG/M2 | HEART RATE: 84 BPM

## 2018-01-01 VITALS
RESPIRATION RATE: 18 BRPM | TEMPERATURE: 97.6 F | SYSTOLIC BLOOD PRESSURE: 165 MMHG | OXYGEN SATURATION: 93 % | DIASTOLIC BLOOD PRESSURE: 84 MMHG

## 2018-01-01 VITALS
HEART RATE: 58 BPM | BODY MASS INDEX: 22.61 KG/M2 | RESPIRATION RATE: 24 BRPM | HEIGHT: 68 IN | WEIGHT: 149.2 LBS | TEMPERATURE: 97.3 F | OXYGEN SATURATION: 96 % | SYSTOLIC BLOOD PRESSURE: 185 MMHG | DIASTOLIC BLOOD PRESSURE: 87 MMHG

## 2018-01-01 VITALS
HEART RATE: 56 BPM | SYSTOLIC BLOOD PRESSURE: 111 MMHG | WEIGHT: 145.6 LBS | TEMPERATURE: 97.1 F | RESPIRATION RATE: 20 BRPM | OXYGEN SATURATION: 92 % | BODY MASS INDEX: 22.14 KG/M2 | DIASTOLIC BLOOD PRESSURE: 45 MMHG

## 2018-01-01 VITALS
SYSTOLIC BLOOD PRESSURE: 151 MMHG | TEMPERATURE: 97 F | WEIGHT: 146 LBS | OXYGEN SATURATION: 95 % | BODY MASS INDEX: 22.2 KG/M2 | DIASTOLIC BLOOD PRESSURE: 70 MMHG | RESPIRATION RATE: 20 BRPM | HEART RATE: 61 BPM

## 2018-01-01 VITALS
OXYGEN SATURATION: 98 % | WEIGHT: 142 LBS | DIASTOLIC BLOOD PRESSURE: 77 MMHG | RESPIRATION RATE: 15 BRPM | SYSTOLIC BLOOD PRESSURE: 141 MMHG | TEMPERATURE: 97.8 F | HEART RATE: 46 BPM | HEIGHT: 68 IN | BODY MASS INDEX: 21.52 KG/M2

## 2018-01-01 VITALS
SYSTOLIC BLOOD PRESSURE: 119 MMHG | HEIGHT: 68 IN | TEMPERATURE: 99.6 F | OXYGEN SATURATION: 94 % | DIASTOLIC BLOOD PRESSURE: 50 MMHG | HEART RATE: 68 BPM | BODY MASS INDEX: 20.82 KG/M2 | RESPIRATION RATE: 14 BRPM | WEIGHT: 137.4 LBS

## 2018-01-01 VITALS
TEMPERATURE: 96.9 F | WEIGHT: 135.8 LBS | BODY MASS INDEX: 20.65 KG/M2 | RESPIRATION RATE: 19 BRPM | HEART RATE: 93 BPM | DIASTOLIC BLOOD PRESSURE: 76 MMHG | SYSTOLIC BLOOD PRESSURE: 136 MMHG | OXYGEN SATURATION: 93 %

## 2018-01-01 VITALS
BODY MASS INDEX: 20.4 KG/M2 | RESPIRATION RATE: 12 BRPM | OXYGEN SATURATION: 94 % | HEART RATE: 56 BPM | SYSTOLIC BLOOD PRESSURE: 108 MMHG | DIASTOLIC BLOOD PRESSURE: 56 MMHG | WEIGHT: 134.6 LBS | HEIGHT: 68 IN | TEMPERATURE: 97.7 F

## 2018-01-01 VITALS
DIASTOLIC BLOOD PRESSURE: 54 MMHG | TEMPERATURE: 96.7 F | RESPIRATION RATE: 15 BRPM | BODY MASS INDEX: 20.52 KG/M2 | OXYGEN SATURATION: 95 % | WEIGHT: 135.4 LBS | HEART RATE: 50 BPM | SYSTOLIC BLOOD PRESSURE: 151 MMHG | HEIGHT: 68 IN

## 2018-01-01 VITALS
HEART RATE: 80 BPM | BODY MASS INDEX: 22.16 KG/M2 | OXYGEN SATURATION: 94 % | HEIGHT: 68 IN | SYSTOLIC BLOOD PRESSURE: 144 MMHG | RESPIRATION RATE: 16 BRPM | WEIGHT: 146.2 LBS | DIASTOLIC BLOOD PRESSURE: 61 MMHG | TEMPERATURE: 97.9 F

## 2018-01-01 DIAGNOSIS — D64.9 ANEMIA, UNSPECIFIED TYPE: ICD-10-CM

## 2018-01-01 DIAGNOSIS — Z51.81 ENCOUNTER FOR THERAPEUTIC DRUG MONITORING: ICD-10-CM

## 2018-01-01 DIAGNOSIS — E03.8 OTHER SPECIFIED HYPOTHYROIDISM: ICD-10-CM

## 2018-01-01 DIAGNOSIS — I48.20 CHRONIC ATRIAL FIBRILLATION (H): ICD-10-CM

## 2018-01-01 DIAGNOSIS — I48.20 CHRONIC ATRIAL FIBRILLATION (H): Primary | ICD-10-CM

## 2018-01-01 DIAGNOSIS — I50.9 CHRONIC CONGESTIVE HEART FAILURE, UNSPECIFIED CONGESTIVE HEART FAILURE TYPE: ICD-10-CM

## 2018-01-01 DIAGNOSIS — I71.40 ABDOMINAL AORTIC ANEURYSM (AAA) WITHOUT RUPTURE (H): ICD-10-CM

## 2018-01-01 DIAGNOSIS — Z79.01 LONG-TERM (CURRENT) USE OF ANTICOAGULANTS: ICD-10-CM

## 2018-01-01 DIAGNOSIS — J18.9 PNEUMONIA DUE TO INFECTIOUS ORGANISM, UNSPECIFIED LATERALITY, UNSPECIFIED PART OF LUNG: ICD-10-CM

## 2018-01-01 DIAGNOSIS — F01.50 VASCULAR DEMENTIA WITHOUT BEHAVIORAL DISTURBANCE (H): ICD-10-CM

## 2018-01-01 DIAGNOSIS — I10 ESSENTIAL HYPERTENSION: ICD-10-CM

## 2018-01-01 DIAGNOSIS — Z79.01 ANTICOAGULATED ON COUMADIN: ICD-10-CM

## 2018-01-01 DIAGNOSIS — F32.0 MAJOR DEPRESSIVE DISORDER, SINGLE EPISODE, MILD (H): ICD-10-CM

## 2018-01-01 DIAGNOSIS — A41.9 SEPSIS, DUE TO UNSPECIFIED ORGANISM: ICD-10-CM

## 2018-01-01 DIAGNOSIS — I10 ESSENTIAL HYPERTENSION WITH GOAL BLOOD PRESSURE LESS THAN 140/90: ICD-10-CM

## 2018-01-01 DIAGNOSIS — R53.81 PHYSICAL DECONDITIONING: ICD-10-CM

## 2018-01-01 DIAGNOSIS — E03.9 HYPOTHYROIDISM, UNSPECIFIED TYPE: ICD-10-CM

## 2018-01-01 DIAGNOSIS — R13.10 DYSPHAGIA, UNSPECIFIED TYPE: ICD-10-CM

## 2018-01-01 DIAGNOSIS — F33.9 RECURRENT MAJOR DEPRESSIVE DISORDER, REMISSION STATUS UNSPECIFIED (H): ICD-10-CM

## 2018-01-01 DIAGNOSIS — I50.32 CHRONIC DIASTOLIC CONGESTIVE HEART FAILURE (H): ICD-10-CM

## 2018-01-01 DIAGNOSIS — R31.9 HEMATURIA, UNSPECIFIED TYPE: ICD-10-CM

## 2018-01-01 DIAGNOSIS — R41.89 COGNITIVE IMPAIRMENT: ICD-10-CM

## 2018-01-01 DIAGNOSIS — N18.30 CKD (CHRONIC KIDNEY DISEASE) STAGE 3, GFR 30-59 ML/MIN (H): ICD-10-CM

## 2018-01-01 DIAGNOSIS — N18.9 ACUTE KIDNEY INJURY SUPERIMPOSED ON CHRONIC KIDNEY DISEASE (H): ICD-10-CM

## 2018-01-01 DIAGNOSIS — Z51.81 ANTICOAGULATION GOAL OF INR 1.5 TO 2.5: ICD-10-CM

## 2018-01-01 DIAGNOSIS — N17.9 ACUTE KIDNEY INJURY SUPERIMPOSED ON CHRONIC KIDNEY DISEASE (H): ICD-10-CM

## 2018-01-01 DIAGNOSIS — D64.9 ANEMIA, UNSPECIFIED TYPE: Primary | ICD-10-CM

## 2018-01-01 DIAGNOSIS — Z86.73 HISTORY OF CVA (CEREBROVASCULAR ACCIDENT): ICD-10-CM

## 2018-01-01 DIAGNOSIS — I50.33 ACUTE ON CHRONIC DIASTOLIC HEART FAILURE (H): ICD-10-CM

## 2018-01-01 DIAGNOSIS — K92.1 BLOOD IN STOOL: ICD-10-CM

## 2018-01-01 DIAGNOSIS — I25.10 CORONARY ARTERY DISEASE INVOLVING NATIVE HEART WITHOUT ANGINA PECTORIS, UNSPECIFIED VESSEL OR LESION TYPE: ICD-10-CM

## 2018-01-01 DIAGNOSIS — S32.010D CLOSED COMPRESSION FRACTURE OF L1 LUMBAR VERTEBRA WITH ROUTINE HEALING, SUBSEQUENT ENCOUNTER: ICD-10-CM

## 2018-01-01 DIAGNOSIS — M54.9 ACUTE MIDLINE BACK PAIN, UNSPECIFIED BACK LOCATION: Primary | ICD-10-CM

## 2018-01-01 DIAGNOSIS — R41.3 MEMORY LOSS: ICD-10-CM

## 2018-01-01 DIAGNOSIS — R11.2 NON-INTRACTABLE VOMITING WITH NAUSEA, UNSPECIFIED VOMITING TYPE: ICD-10-CM

## 2018-01-01 DIAGNOSIS — N40.0 BENIGN PROSTATIC HYPERPLASIA WITHOUT LOWER URINARY TRACT SYMPTOMS: ICD-10-CM

## 2018-01-01 DIAGNOSIS — F01.53 VASCULAR DEMENTIA WITH DEPRESSED MOOD (H): ICD-10-CM

## 2018-01-01 DIAGNOSIS — R41.0 CONFUSION: ICD-10-CM

## 2018-01-01 DIAGNOSIS — E46 PROTEIN-CALORIE MALNUTRITION, UNSPECIFIED SEVERITY (H): ICD-10-CM

## 2018-01-01 DIAGNOSIS — I50.9 CHRONIC CONGESTIVE HEART FAILURE, UNSPECIFIED HEART FAILURE TYPE (H): ICD-10-CM

## 2018-01-01 DIAGNOSIS — D62 ANEMIA DUE TO BLOOD LOSS, ACUTE: ICD-10-CM

## 2018-01-01 DIAGNOSIS — R19.5 LOOSE STOOLS: ICD-10-CM

## 2018-01-01 DIAGNOSIS — D69.6 THROMBOCYTOPENIA (H): ICD-10-CM

## 2018-01-01 DIAGNOSIS — K43.9 VENTRAL HERNIA WITHOUT OBSTRUCTION OR GANGRENE: ICD-10-CM

## 2018-01-01 DIAGNOSIS — H35.3290 WET SENILE MACULAR DEGENERATION (H): ICD-10-CM

## 2018-01-01 DIAGNOSIS — S09.90XA CLOSED HEAD INJURY, INITIAL ENCOUNTER: ICD-10-CM

## 2018-01-01 DIAGNOSIS — S32.010D CLOSED COMPRESSION FRACTURE OF L1 LUMBAR VERTEBRA WITH ROUTINE HEALING, SUBSEQUENT ENCOUNTER: Primary | ICD-10-CM

## 2018-01-01 DIAGNOSIS — H60.502 ACUTE OTITIS EXTERNA OF LEFT EAR, UNSPECIFIED TYPE: ICD-10-CM

## 2018-01-01 DIAGNOSIS — R79.89 ELEVATED TROPONIN: ICD-10-CM

## 2018-01-01 DIAGNOSIS — I10 ESSENTIAL HYPERTENSION: Primary | ICD-10-CM

## 2018-01-01 DIAGNOSIS — D53.9 MACROCYTIC ANEMIA: ICD-10-CM

## 2018-01-01 DIAGNOSIS — N39.0 SEPSIS SECONDARY TO UTI (H): ICD-10-CM

## 2018-01-01 DIAGNOSIS — I49.9 CARDIAC ARRHYTHMIA, UNSPECIFIED CARDIAC ARRHYTHMIA TYPE: ICD-10-CM

## 2018-01-01 DIAGNOSIS — R14.0 ABDOMINAL DISTENSION: Primary | ICD-10-CM

## 2018-01-01 DIAGNOSIS — D62 ANEMIA DUE TO BLOOD LOSS, ACUTE: Primary | ICD-10-CM

## 2018-01-01 DIAGNOSIS — J69.0 ASPIRATION PNEUMONITIS (H): Primary | ICD-10-CM

## 2018-01-01 DIAGNOSIS — I10 UNCONTROLLED HYPERTENSION: ICD-10-CM

## 2018-01-01 DIAGNOSIS — S00.03XA HEMATOMA OF SCALP, INITIAL ENCOUNTER: ICD-10-CM

## 2018-01-01 DIAGNOSIS — F32.9 REACTIVE DEPRESSION: ICD-10-CM
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
## 2018-01-01 DIAGNOSIS — R19.00 ABDOMINAL WALL BULGE: ICD-10-CM

## 2018-01-01 DIAGNOSIS — F33.1 MODERATE EPISODE OF RECURRENT MAJOR DEPRESSIVE DISORDER (H): ICD-10-CM

## 2018-01-01 DIAGNOSIS — Z51.81 ENCOUNTER FOR THERAPEUTIC DRUG MONITORING: Primary | ICD-10-CM

## 2018-01-01 DIAGNOSIS — S00.83XD HEMATOMA OF OCCIPITAL SURFACE OF HEAD, SUBSEQUENT ENCOUNTER: ICD-10-CM

## 2018-01-01 DIAGNOSIS — I12.9 BENIGN HYPERTENSION WITH CHRONIC KIDNEY DISEASE, STAGE III (H): ICD-10-CM

## 2018-01-01 DIAGNOSIS — E78.5 HYPERLIPIDEMIA LDL GOAL <130: ICD-10-CM

## 2018-01-01 DIAGNOSIS — R63.5 WEIGHT GAIN: ICD-10-CM

## 2018-01-01 DIAGNOSIS — R11.2 NAUSEA AND VOMITING, INTRACTABILITY OF VOMITING NOT SPECIFIED, UNSPECIFIED VOMITING TYPE: ICD-10-CM

## 2018-01-01 DIAGNOSIS — Z79.01 ANTICOAGULATION GOAL OF INR 1.5 TO 2.5: ICD-10-CM

## 2018-01-01 DIAGNOSIS — S01.01XD LACERATION OF SCALP, SUBSEQUENT ENCOUNTER: ICD-10-CM

## 2018-01-01 DIAGNOSIS — R19.00 ABDOMINAL WALL BULGE: Primary | ICD-10-CM

## 2018-01-01 DIAGNOSIS — J81.0 ACUTE PULMONARY EDEMA (H): ICD-10-CM

## 2018-01-01 DIAGNOSIS — D50.0 IRON DEFICIENCY ANEMIA DUE TO CHRONIC BLOOD LOSS: ICD-10-CM

## 2018-01-01 DIAGNOSIS — K56.0 ADYNAMIC ILEUS (H): ICD-10-CM

## 2018-01-01 DIAGNOSIS — Z79.01 LONG TERM CURRENT USE OF ANTICOAGULANT THERAPY: ICD-10-CM

## 2018-01-01 DIAGNOSIS — S01.01XA LACERATION OF SCALP, INITIAL ENCOUNTER: ICD-10-CM

## 2018-01-01 DIAGNOSIS — R33.9 URINARY RETENTION: ICD-10-CM

## 2018-01-01 DIAGNOSIS — A41.9 SEPSIS SECONDARY TO UTI (H): ICD-10-CM

## 2018-01-01 DIAGNOSIS — I48.91 ATRIAL FIBRILLATION, UNSPECIFIED TYPE (H): Primary | ICD-10-CM

## 2018-01-01 DIAGNOSIS — N18.30 BENIGN HYPERTENSION WITH CHRONIC KIDNEY DISEASE, STAGE III (H): ICD-10-CM

## 2018-01-01 DIAGNOSIS — J69.0 ASPIRATION PNEUMONIA, UNSPECIFIED ASPIRATION PNEUMONIA TYPE, UNSPECIFIED LATERALITY, UNSPECIFIED PART OF LUNG (H): ICD-10-CM

## 2018-01-01 LAB
ABO + RH BLD: NORMAL
ABO + RH BLD: NORMAL
ALBUMIN SERPL-MCNC: 3.6 G/DL (ref 3.4–5)
ALBUMIN UR-MCNC: 100 MG/DL
ALBUMIN UR-MCNC: ABNORMAL MG/DL
ALBUMIN UR-MCNC: ABNORMAL MG/DL
ALP SERPL-CCNC: 104 U/L (ref 40–150)
ALT SERPL W P-5'-P-CCNC: 16 U/L (ref 0–70)
ANION GAP SERPL CALCULATED.3IONS-SCNC: 10 MMOL/L (ref 5–18)
ANION GAP SERPL CALCULATED.3IONS-SCNC: 11 MMOL/L (ref 5–18)
ANION GAP SERPL CALCULATED.3IONS-SCNC: 4 MMOL/L (ref 3–14)
ANION GAP SERPL CALCULATED.3IONS-SCNC: 4 MMOL/L (ref 5–18)
ANION GAP SERPL CALCULATED.3IONS-SCNC: 4 MMOL/L (ref 5–18)
ANION GAP SERPL CALCULATED.3IONS-SCNC: 5 MMOL/L (ref 3–14)
ANION GAP SERPL CALCULATED.3IONS-SCNC: 5 MMOL/L (ref 5–18)
ANION GAP SERPL CALCULATED.3IONS-SCNC: 5 MMOL/L (ref 5–18)
ANION GAP SERPL CALCULATED.3IONS-SCNC: 6 MMOL/L (ref 5–18)
ANION GAP SERPL CALCULATED.3IONS-SCNC: 7 MMOL/L (ref 3–14)
ANION GAP SERPL CALCULATED.3IONS-SCNC: 7 MMOL/L (ref 3–14)
ANION GAP SERPL CALCULATED.3IONS-SCNC: 8 MMOL/L (ref 5–18)
ANION GAP SERPL CALCULATED.3IONS-SCNC: 9 MMOL/L (ref 3–14)
ANION GAP SERPL CALCULATED.3IONS-SCNC: 9 MMOL/L (ref 5–18)
ANION GAP SERPL CALCULATED.3IONS-SCNC: 9 MMOL/L (ref 5–18)
APPEARANCE UR: ABNORMAL
APPEARANCE UR: ABNORMAL
APPEARANCE UR: CLEAR
AST SERPL W P-5'-P-CCNC: 27 U/L (ref 0–45)
BACTERIA #/AREA URNS HPF: ABNORMAL /HPF
BACTERIA #/AREA URNS HPF: ABNORMAL HPF
BACTERIA #/AREA URNS HPF: ABNORMAL HPF
BACTERIA SPEC CULT: ABNORMAL
BACTERIA SPEC CULT: NO GROWTH
BACTERIA SPEC CULT: NORMAL
BACTERIA SPEC CULT: NORMAL
BASE DEFICIT BLDA-SCNC: 0.4 MMOL/L
BASOPHILS # BLD AUTO: 0 10E9/L (ref 0–0.2)
BASOPHILS # BLD AUTO: 0.1 10E9/L (ref 0–0.2)
BASOPHILS NFR BLD AUTO: 0.2 %
BASOPHILS NFR BLD AUTO: 1 %
BILIRUB SERPL-MCNC: 1.1 MG/DL (ref 0.2–1.3)
BILIRUB UR QL STRIP: NEGATIVE
BLD GP AB SCN SERPL QL: NORMAL
BLOOD BANK CMNT PATIENT-IMP: NORMAL
BUN SERPL-MCNC: 16 MG/DL (ref 8–28)
BUN SERPL-MCNC: 24 MG/DL (ref 7–30)
BUN SERPL-MCNC: 25 MG/DL (ref 7–30)
BUN SERPL-MCNC: 27 MG/DL (ref 7–30)
BUN SERPL-MCNC: 27 MG/DL (ref 7–30)
BUN SERPL-MCNC: 28 MG/DL (ref 8–28)
BUN SERPL-MCNC: 33 MG/DL (ref 8–28)
BUN SERPL-MCNC: 34 MG/DL (ref 8–28)
BUN SERPL-MCNC: 34 MG/DL (ref 8–28)
BUN SERPL-MCNC: 35 MG/DL (ref 8–28)
BUN SERPL-MCNC: 37 MG/DL (ref 8–28)
BUN SERPL-MCNC: 37 MG/DL (ref 8–28)
BUN SERPL-MCNC: 39 MG/DL (ref 7–30)
BUN SERPL-MCNC: 39 MG/DL (ref 8–28)
BUN SERPL-MCNC: 39 MG/DL (ref 8–28)
BUN SERPL-MCNC: 40 MG/DL (ref 8–28)
BUN SERPL-MCNC: 40 MG/DL (ref 8–28)
BUN SERPL-MCNC: 42 MG/DL (ref 8–28)
BUN SERPL-MCNC: 42 MG/DL (ref 8–28)
C DIFF TOX B STL QL: NEGATIVE
CALCIUM SERPL-MCNC: 7.8 MG/DL (ref 8.5–10.1)
CALCIUM SERPL-MCNC: 8.2 MG/DL (ref 8.5–10.1)
CALCIUM SERPL-MCNC: 8.2 MG/DL (ref 8.5–10.1)
CALCIUM SERPL-MCNC: 8.5 MG/DL (ref 8.5–10.1)
CALCIUM SERPL-MCNC: 8.9 MG/DL (ref 8.5–10.5)
CALCIUM SERPL-MCNC: 9 MG/DL (ref 8.5–10.5)
CALCIUM SERPL-MCNC: 9.1 MG/DL (ref 8.5–10.5)
CALCIUM SERPL-MCNC: 9.2 MG/DL (ref 8.5–10.5)
CALCIUM SERPL-MCNC: 9.3 MG/DL (ref 8.5–10.1)
CALCIUM SERPL-MCNC: 9.4 MG/DL (ref 8.5–10.5)
CHLORIDE BLD-SCNC: 102 MMOL/L (ref 98–107)
CHLORIDE BLD-SCNC: 104 MMOL/L (ref 98–107)
CHLORIDE BLD-SCNC: 105 MMOL/L (ref 98–107)
CHLORIDE BLD-SCNC: 106 MMOL/L (ref 98–107)
CHLORIDE BLD-SCNC: 107 MMOL/L (ref 98–107)
CHLORIDE BLD-SCNC: 107 MMOL/L (ref 98–107)
CHLORIDE BLD-SCNC: 108 MMOL/L (ref 98–107)
CHLORIDE BLD-SCNC: 109 MMOL/L (ref 98–107)
CHLORIDE BLD-SCNC: 109 MMOL/L (ref 98–107)
CHLORIDE BLD-SCNC: 112 MMOL/L (ref 98–107)
CHLORIDE SERPL-SCNC: 102 MMOL/L (ref 94–109)
CHLORIDE SERPL-SCNC: 103 MMOL/L (ref 94–109)
CHLORIDE SERPL-SCNC: 105 MMOL/L (ref 94–109)
CHLORIDE SERPL-SCNC: 107 MMOL/L (ref 94–109)
CHLORIDE SERPL-SCNC: 108 MMOL/L (ref 94–109)
CHLORIDE SERPLBLD-SCNC: 104 MMOL/L (ref 98–107)
CHLORIDE SERPLBLD-SCNC: 105 MMOL/L (ref 98–107)
CHLORIDE SERPLBLD-SCNC: 106 MMOL/L (ref 98–107)
CHLORIDE SERPLBLD-SCNC: 107 MMOL/L (ref 98–107)
CHLORIDE SERPLBLD-SCNC: 107 MMOL/L (ref 98–107)
CHLORIDE SERPLBLD-SCNC: 108 MMOL/L (ref 98–107)
CHLORIDE SERPLBLD-SCNC: 109 MMOL/L (ref 98–107)
CHLORIDE SERPLBLD-SCNC: 109 MMOL/L (ref 98–107)
CO2 SERPL-SCNC: 20 MMOL/L (ref 22–31)
CO2 SERPL-SCNC: 20 MMOL/L (ref 22–31)
CO2 SERPL-SCNC: 23 MMOL/L (ref 20–32)
CO2 SERPL-SCNC: 23 MMOL/L (ref 22–31)
CO2 SERPL-SCNC: 24 MMOL/L (ref 22–31)
CO2 SERPL-SCNC: 25 MMOL/L (ref 22–31)
CO2 SERPL-SCNC: 26 MMOL/L (ref 20–32)
CO2 SERPL-SCNC: 26 MMOL/L (ref 22–31)
CO2 SERPL-SCNC: 26 MMOL/L (ref 22–31)
CO2 SERPL-SCNC: 27 MMOL/L (ref 20–32)
CO2 SERPL-SCNC: 28 MMOL/L (ref 20–32)
CO2 SERPL-SCNC: 28 MMOL/L (ref 20–32)
COLOR UR AUTO: YELLOW
CREAT SERPL-MCNC: 1.1 MG/DL (ref 0.7–1.3)
CREAT SERPL-MCNC: 1.1 MG/DL (ref 0.7–1.3)
CREAT SERPL-MCNC: 1.15 MG/DL (ref 0.66–1.25)
CREAT SERPL-MCNC: 1.18 MG/DL (ref 0.7–1.3)
CREAT SERPL-MCNC: 1.19 MG/DL (ref 0.7–1.3)
CREAT SERPL-MCNC: 1.19 MG/DL (ref 0.7–1.3)
CREAT SERPL-MCNC: 1.23 MG/DL (ref 0.7–1.3)
CREAT SERPL-MCNC: 1.23 MG/DL (ref 0.7–1.3)
CREAT SERPL-MCNC: 1.27 MG/DL (ref 0.7–1.3)
CREAT SERPL-MCNC: 1.27 MG/DL (ref 0.7–1.3)
CREAT SERPL-MCNC: 1.28 MG/DL (ref 0.7–1.3)
CREAT SERPL-MCNC: 1.28 MG/DL (ref 0.7–1.3)
CREAT SERPL-MCNC: 1.38 MG/DL (ref 0.7–1.3)
CREAT SERPL-MCNC: 1.4 MG/DL (ref 0.7–1.3)
CREAT SERPL-MCNC: 1.45 MG/DL (ref 0.66–1.25)
CREAT SERPL-MCNC: 1.46 MG/DL (ref 0.7–1.3)
CREAT SERPL-MCNC: 1.46 MG/DL (ref 0.7–1.3)
CREAT SERPL-MCNC: 1.49 MG/DL (ref 0.66–1.25)
CREAT SERPL-MCNC: 1.5 MG/DL (ref 0.7–1.3)
CREAT SERPL-MCNC: 1.5 MG/DL (ref 0.7–1.3)
CREAT SERPL-MCNC: 1.51 MG/DL (ref 0.66–1.25)
CREAT SERPL-MCNC: 1.57 MG/DL (ref 0.66–1.25)
DIFFERENTIAL METHOD BLD: ABNORMAL
DIFFERENTIAL METHOD BLD: ABNORMAL
EOSINOPHIL # BLD AUTO: 0 10E9/L (ref 0–0.7)
EOSINOPHIL # BLD AUTO: 0.4 10E9/L (ref 0–0.7)
EOSINOPHIL NFR BLD AUTO: 0.1 %
EOSINOPHIL NFR BLD AUTO: 6.3 %
ERYTHROCYTE [DISTWIDTH] IN BLOOD BY AUTOMATED COUNT: 13.2 % (ref 10–15)
ERYTHROCYTE [DISTWIDTH] IN BLOOD BY AUTOMATED COUNT: 13.2 % (ref 11–14.5)
ERYTHROCYTE [DISTWIDTH] IN BLOOD BY AUTOMATED COUNT: 13.2 % (ref 11–14.5)
ERYTHROCYTE [DISTWIDTH] IN BLOOD BY AUTOMATED COUNT: 13.4 % (ref 10–15)
ERYTHROCYTE [DISTWIDTH] IN BLOOD BY AUTOMATED COUNT: 13.4 % (ref 10–15)
ERYTHROCYTE [DISTWIDTH] IN BLOOD BY AUTOMATED COUNT: 13.5 % (ref 10–15)
ERYTHROCYTE [DISTWIDTH] IN BLOOD BY AUTOMATED COUNT: 13.6 % (ref 11–14.5)
ERYTHROCYTE [DISTWIDTH] IN BLOOD BY AUTOMATED COUNT: 13.7 % (ref 11–14.5)
ERYTHROCYTE [DISTWIDTH] IN BLOOD BY AUTOMATED COUNT: 13.9 % (ref 10–15)
ERYTHROCYTE [DISTWIDTH] IN BLOOD BY AUTOMATED COUNT: 14.3 % (ref 11–14.5)
ERYTHROCYTE [DISTWIDTH] IN BLOOD BY AUTOMATED COUNT: 14.3 % (ref 11–14.5)
ERYTHROCYTE [DISTWIDTH] IN BLOOD BY AUTOMATED COUNT: 14.5 % (ref 11–14.5)
FERRITIN SERPL-MCNC: 129 NG/ML (ref 27–300)
FERRITIN SERPL-MCNC: 194 NG/ML (ref 27–300)
GFR SERPL CREATININE-BSD FRML MDRD: 41 ML/MIN/1.7M2
GFR SERPL CREATININE-BSD FRML MDRD: 43 ML/MIN/1.73M2
GFR SERPL CREATININE-BSD FRML MDRD: 43 ML/MIN/1.73M2
GFR SERPL CREATININE-BSD FRML MDRD: 43 ML/MIN/1.7M2
GFR SERPL CREATININE-BSD FRML MDRD: 44 ML/MIN/1.7M2
GFR SERPL CREATININE-BSD FRML MDRD: 45 ML/MIN/1.73M2
GFR SERPL CREATININE-BSD FRML MDRD: 45 ML/MIN/1.73M2
GFR SERPL CREATININE-BSD FRML MDRD: 45 ML/MIN/1.7M2
GFR SERPL CREATININE-BSD FRML MDRD: 47 ML/MIN/1.73M2
GFR SERPL CREATININE-BSD FRML MDRD: 48 ML/MIN/1.73M2
GFR SERPL CREATININE-BSD FRML MDRD: 52 ML/MIN/1.73M2
GFR SERPL CREATININE-BSD FRML MDRD: 54 ML/MIN/1.73M2
GFR SERPL CREATININE-BSD FRML MDRD: 54 ML/MIN/1.73M2
GFR SERPL CREATININE-BSD FRML MDRD: 57 ML/MIN/1.73M2
GFR SERPL CREATININE-BSD FRML MDRD: 59 ML/MIN/1.7M2
GFR SERPL CREATININE-BSD FRML MDRD: >60 ML/MIN/1.73M2
GFR SERPL CREATININE-BSD FRML MDRD: >60 ML/MIN/1.73M2
GLUCOSE BLD-MCNC: 155 MG/DL (ref 70–125)
GLUCOSE BLD-MCNC: 62 MG/DL (ref 70–125)
GLUCOSE BLD-MCNC: 63 MG/DL (ref 70–125)
GLUCOSE BLD-MCNC: 70 MG/DL (ref 70–125)
GLUCOSE BLD-MCNC: 72 MG/DL (ref 70–125)
GLUCOSE BLD-MCNC: 74 MG/DL (ref 70–125)
GLUCOSE BLD-MCNC: 75 MG/DL (ref 70–125)
GLUCOSE BLD-MCNC: 77 MG/DL (ref 70–125)
GLUCOSE BLD-MCNC: 80 MG/DL (ref 70–125)
GLUCOSE BLD-MCNC: 80 MG/DL (ref 70–125)
GLUCOSE SERPL-MCNC: 100 MG/DL (ref 70–99)
GLUCOSE SERPL-MCNC: 113 MG/DL (ref 70–99)
GLUCOSE SERPL-MCNC: 155 MG/DL (ref 70–125)
GLUCOSE SERPL-MCNC: 62 MG/DL (ref 70–125)
GLUCOSE SERPL-MCNC: 63 MG/DL (ref 70–125)
GLUCOSE SERPL-MCNC: 70 MG/DL (ref 70–125)
GLUCOSE SERPL-MCNC: 72 MG/DL (ref 70–125)
GLUCOSE SERPL-MCNC: 74 MG/DL (ref 70–125)
GLUCOSE SERPL-MCNC: 75 MG/DL (ref 70–125)
GLUCOSE SERPL-MCNC: 75 MG/DL (ref 70–125)
GLUCOSE SERPL-MCNC: 77 MG/DL (ref 70–125)
GLUCOSE SERPL-MCNC: 80 MG/DL (ref 70–125)
GLUCOSE SERPL-MCNC: 84 MG/DL (ref 70–99)
GLUCOSE SERPL-MCNC: 88 MG/DL (ref 70–99)
GLUCOSE SERPL-MCNC: 94 MG/DL (ref 70–99)
GLUCOSE UR STRIP-MCNC: NEGATIVE MG/DL
HCO3 BLD-SCNC: 24 MMOL/L (ref 21–28)
HCT VFR BLD AUTO: 29 % (ref 40–54)
HCT VFR BLD AUTO: 29 % (ref 40–54)
HCT VFR BLD AUTO: 30.4 % (ref 40–54)
HCT VFR BLD AUTO: 30.4 % (ref 40–54)
HCT VFR BLD AUTO: 30.9 % (ref 40–54)
HCT VFR BLD AUTO: 30.9 % (ref 40–54)
HCT VFR BLD AUTO: 32.3 % (ref 40–54)
HCT VFR BLD AUTO: 32.8 % (ref 40–53)
HCT VFR BLD AUTO: 33.6 % (ref 40–53)
HCT VFR BLD AUTO: 33.6 % (ref 40–54)
HCT VFR BLD AUTO: 33.6 % (ref 40–54)
HCT VFR BLD AUTO: 34.3 % (ref 40–53)
HCT VFR BLD AUTO: 35 % (ref 40–54)
HCT VFR BLD AUTO: 35.4 % (ref 40–53)
HCT VFR BLD AUTO: 36.5 % (ref 40–53)
HEMOCCULT STL QL: NEGATIVE
HEMOCCULT STL QL: NEGATIVE
HEMOGLOBIN: 10 G/DL (ref 14–18)
HEMOGLOBIN: 10.1 G/DL (ref 14–18)
HEMOGLOBIN: 10.4 G/DL (ref 14–18)
HEMOGLOBIN: 10.5 G/DL (ref 14–18)
HEMOGLOBIN: 10.7 G/DL (ref 14–18)
HEMOGLOBIN: 10.8 G/DL (ref 14–18)
HEMOGLOBIN: 11 G/DL (ref 14–18)
HEMOGLOBIN: 11 G/DL (ref 14–18)
HEMOGLOBIN: 11.1 G/DL (ref 14–18)
HEMOGLOBIN: 11.2 G/DL (ref 14–18)
HEMOGLOBIN: 11.5 G/DL (ref 14–18)
HEMOGLOBIN: 9.7 G/DL (ref 14–18)
HEMOGLOBIN: 9.8 G/DL (ref 14–18)
HEMOGLOBIN: 9.9 G/DL (ref 14–18)
HEMOGLOBIN: 9.9 G/DL (ref 14–18)
HGB BLD-MCNC: 10 G/DL (ref 14–18)
HGB BLD-MCNC: 10.1 G/DL (ref 14–18)
HGB BLD-MCNC: 10.4 G/DL (ref 14–18)
HGB BLD-MCNC: 10.4 G/DL (ref 14–18)
HGB BLD-MCNC: 10.5 G/DL (ref 14–18)
HGB BLD-MCNC: 10.7 G/DL (ref 13.3–17.7)
HGB BLD-MCNC: 10.7 G/DL (ref 13.3–17.7)
HGB BLD-MCNC: 10.7 G/DL (ref 14–18)
HGB BLD-MCNC: 10.8 G/DL (ref 14–18)
HGB BLD-MCNC: 11 G/DL (ref 13.3–17.7)
HGB BLD-MCNC: 11 G/DL (ref 14–18)
HGB BLD-MCNC: 11 G/DL (ref 14–18)
HGB BLD-MCNC: 11.1 G/DL (ref 14–18)
HGB BLD-MCNC: 11.2 G/DL (ref 14–18)
HGB BLD-MCNC: 11.4 G/DL (ref 14–18)
HGB BLD-MCNC: 11.5 G/DL (ref 13.3–17.7)
HGB BLD-MCNC: 11.5 G/DL (ref 14–18)
HGB BLD-MCNC: 11.8 G/DL (ref 13.3–17.7)
HGB BLD-MCNC: 9.7 G/DL (ref 14–18)
HGB BLD-MCNC: 9.8 G/DL (ref 14–18)
HGB BLD-MCNC: 9.9 G/DL (ref 14–18)
HGB BLD-MCNC: 9.9 G/DL (ref 14–18)
HGB UR QL STRIP: ABNORMAL
HGB UR QL STRIP: ABNORMAL
HGB UR QL STRIP: NEGATIVE
IMM GRANULOCYTES # BLD: 0 10E9/L (ref 0–0.4)
IMM GRANULOCYTES # BLD: 0.1 10E9/L (ref 0–0.4)
IMM GRANULOCYTES NFR BLD: 0.4 %
IMM GRANULOCYTES NFR BLD: 0.5 %
INR PPP: 1.49 (ref 0.9–1.1)
INR PPP: 1.66 (ref 0.9–1.1)
INR PPP: 1.7 (ref 0.86–1.14)
INR PPP: 1.95 (ref 0.86–1.14)
INR PPP: 2.31 (ref 0.86–1.14)
INR PPP: 2.32 (ref 0.86–1.14)
INR PPP: 2.5 (ref 0.86–1.14)
INR PPP: 2.51 (ref 0.86–1.14)
INR PPP: 3.59 (ref 0.9–1.1)
INTERPRETATION ECG - MUSE: NORMAL
INTERPRETATION ECG - MUSE: NORMAL
IRON SATN MFR SERPL: 18 % (ref 20–50)
IRON SERPL-MCNC: 45 UG/DL (ref 42–175)
KETONES UR STRIP-MCNC: NEGATIVE MG/DL
LACTATE BLD-SCNC: 1.4 MMOL/L (ref 0.7–2)
LEUKOCYTE ESTERASE UR QL STRIP: ABNORMAL
LEUKOCYTE ESTERASE UR QL STRIP: ABNORMAL
LEUKOCYTE ESTERASE UR QL STRIP: NEGATIVE
LYMPHOCYTES # BLD AUTO: 0.5 10E9/L (ref 0.8–5.3)
LYMPHOCYTES # BLD AUTO: 1.5 10E9/L (ref 0.8–5.3)
LYMPHOCYTES NFR BLD AUTO: 25 %
LYMPHOCYTES NFR BLD AUTO: 4.4 %
Lab: ABNORMAL
Lab: ABNORMAL
Lab: NORMAL
MAGNESIUM SERPL-MCNC: 1.8 MG/DL (ref 1.6–2.3)
MAGNESIUM SERPL-MCNC: 2.2 MG/DL (ref 1.6–2.3)
MCH RBC QN AUTO: 30.6 PG (ref 27–34)
MCH RBC QN AUTO: 31.6 PG (ref 27–34)
MCH RBC QN AUTO: 31.6 PG (ref 27–34)
MCH RBC QN AUTO: 31.7 PG (ref 26.5–33)
MCH RBC QN AUTO: 31.8 PG (ref 27–34)
MCH RBC QN AUTO: 32.1 PG (ref 26.5–33)
MCH RBC QN AUTO: 32.2 PG (ref 26.5–33)
MCH RBC QN AUTO: 32.4 PG (ref 26.5–33)
MCH RBC QN AUTO: 32.4 PG (ref 27–34)
MCH RBC QN AUTO: 32.4 PG (ref 27–34)
MCH RBC QN AUTO: 32.5 PG (ref 26.5–33)
MCH RBC QN AUTO: 32.7 PG (ref 27–34)
MCH RBC QN AUTO: 32.7 PG (ref 27–34)
MCH RBC QN AUTO: 33.3 PG (ref 27–34)
MCH RBC QN AUTO: 33.3 PG (ref 27–34)
MCHC RBC AUTO-ENTMCNC: 31.2 G/DL (ref 31.5–36.5)
MCHC RBC AUTO-ENTMCNC: 31.8 G/DL (ref 32–36)
MCHC RBC AUTO-ENTMCNC: 31.8 G/DL (ref 32–36)
MCHC RBC AUTO-ENTMCNC: 32.2 G/DL (ref 32–36)
MCHC RBC AUTO-ENTMCNC: 32.3 G/DL (ref 31.5–36.5)
MCHC RBC AUTO-ENTMCNC: 32.4 G/DL (ref 32–36)
MCHC RBC AUTO-ENTMCNC: 32.4 G/DL (ref 32–36)
MCHC RBC AUTO-ENTMCNC: 32.5 G/DL (ref 31.5–36.5)
MCHC RBC AUTO-ENTMCNC: 32.6 G/DL (ref 31.5–36.5)
MCHC RBC AUTO-ENTMCNC: 32.6 G/DL (ref 32–36)
MCHC RBC AUTO-ENTMCNC: 32.7 G/DL (ref 31.5–36.5)
MCHC RBC AUTO-ENTMCNC: 34.8 G/DL (ref 32–36)
MCHC RBC AUTO-ENTMCNC: 34.8 G/DL (ref 32–36)
MCV RBC AUTO: 100 FL (ref 78–100)
MCV RBC AUTO: 100 FL (ref 78–100)
MCV RBC AUTO: 100 FL (ref 80–100)
MCV RBC AUTO: 100 FL (ref 80–100)
MCV RBC AUTO: 101 FL (ref 80–100)
MCV RBC AUTO: 101 FL (ref 80–100)
MCV RBC AUTO: 102 FL (ref 78–100)
MCV RBC AUTO: 94 FL (ref 80–100)
MCV RBC AUTO: 96 FL (ref 80–100)
MCV RBC AUTO: 96 FL (ref 80–100)
MCV RBC AUTO: 99 FL (ref 78–100)
MCV RBC AUTO: 99 FL (ref 78–100)
MCV RBC AUTO: 99 FL (ref 80–100)
MONOCYTES # BLD AUTO: 0.7 10E9/L (ref 0–1.3)
MONOCYTES # BLD AUTO: 0.7 10E9/L (ref 0–1.3)
MONOCYTES NFR BLD AUTO: 12.1 %
MONOCYTES NFR BLD AUTO: 5.9 %
MUCOUS THREADS #/AREA URNS LPF: ABNORMAL LPF
MUCOUS THREADS #/AREA URNS LPF: ABNORMAL LPF
MUCOUS THREADS #/AREA URNS LPF: PRESENT /LPF
NEUTROPHILS # BLD AUTO: 10.5 10E9/L (ref 1.6–8.3)
NEUTROPHILS # BLD AUTO: 3.3 10E9/L (ref 1.6–8.3)
NEUTROPHILS NFR BLD AUTO: 55.1 %
NEUTROPHILS NFR BLD AUTO: 89 %
NITRATE UR QL: NEGATIVE
NRBC # BLD AUTO: 0 10*3/UL
NRBC # BLD AUTO: 0 10*3/UL
NRBC BLD AUTO-RTO: 0 /100
NRBC BLD AUTO-RTO: 0 /100
NT-PROBNP SERPL-MCNC: ABNORMAL PG/ML (ref 0–1800)
NT-PROBNP SERPL-MCNC: ABNORMAL PG/ML (ref 0–1800)
O2/TOTAL GAS SETTING VFR VENT: 2 %
OXYHGB MFR BLD: 95 % (ref 92–100)
PCO2 BLD: 39 MM HG (ref 35–45)
PH BLD: 7.4 PH (ref 7.35–7.45)
PH UR STRIP: 5.5 [PH] (ref 4.5–8)
PH UR STRIP: 6 PH (ref 5–7)
PH UR STRIP: 6 [PH] (ref 4.5–8)
PHOSPHATE SERPL-MCNC: 2.6 MG/DL (ref 2.5–4.5)
PLATELET # BLD AUTO: 102 10E9/L (ref 150–450)
PLATELET # BLD AUTO: 104 10E9/L (ref 150–450)
PLATELET # BLD AUTO: 108 10E9/L (ref 150–450)
PLATELET # BLD AUTO: 117 THOU/UL (ref 140–440)
PLATELET # BLD AUTO: 117 THOU/UL (ref 140–440)
PLATELET # BLD AUTO: 126 THOU/UL (ref 140–440)
PLATELET # BLD AUTO: 126 THOU/UL (ref 140–440)
PLATELET # BLD AUTO: 129 THOU/UL (ref 140–440)
PLATELET # BLD AUTO: 134 10^9/L (ref 140–440)
PLATELET # BLD AUTO: 134 THOU/UL (ref 140–440)
PLATELET # BLD AUTO: 136 10E9/L (ref 150–450)
PLATELET # BLD AUTO: 140 THOU/UL (ref 140–440)
PLATELET # BLD AUTO: 140 THOU/UL (ref 140–440)
PLATELET # BLD AUTO: 145 10E9/L (ref 150–450)
PLATELET # BLD AUTO: 153 THOU/UL (ref 140–440)
PMV BLD AUTO: 10.3 FL (ref 8.5–12.5)
PMV BLD AUTO: 10.5 FL (ref 8.5–12.5)
PMV BLD AUTO: 10.5 FL (ref 8.5–12.5)
PMV BLD AUTO: 10.7 FL (ref 8.5–12.5)
PMV BLD AUTO: 10.7 FL (ref 8.5–12.5)
PMV BLD AUTO: 10.9 FL (ref 8.5–12.5)
PO2 BLD: 86 MM HG (ref 80–105)
POTASSIUM BLD-SCNC: 3.5 MMOL/L (ref 3.5–5)
POTASSIUM BLD-SCNC: 3.7 MMOL/L (ref 3.5–5)
POTASSIUM BLD-SCNC: 3.8 MMOL/L (ref 3.5–5)
POTASSIUM BLD-SCNC: 3.8 MMOL/L (ref 3.5–5)
POTASSIUM BLD-SCNC: 3.9 MMOL/L (ref 3.5–5)
POTASSIUM BLD-SCNC: 4.2 MMOL/L (ref 3.5–5)
POTASSIUM BLD-SCNC: 4.3 MMOL/L (ref 3.5–5)
POTASSIUM BLD-SCNC: 4.3 MMOL/L (ref 3.5–5)
POTASSIUM BLD-SCNC: 4.4 MMOL/L (ref 3.5–5)
POTASSIUM BLD-SCNC: 4.4 MMOL/L (ref 3.5–5)
POTASSIUM BLD-SCNC: 4.5 MMOL/L (ref 3.5–5)
POTASSIUM BLD-SCNC: 4.9 MMOL/L (ref 3.5–5)
POTASSIUM SERPL-SCNC: 3.5 MMOL/L (ref 3.4–5.3)
POTASSIUM SERPL-SCNC: 3.5 MMOL/L (ref 3.4–5.3)
POTASSIUM SERPL-SCNC: 3.5 MMOL/L (ref 3.5–5)
POTASSIUM SERPL-SCNC: 3.7 MMOL/L (ref 3.4–5.3)
POTASSIUM SERPL-SCNC: 3.7 MMOL/L (ref 3.5–5)
POTASSIUM SERPL-SCNC: 3.8 MMOL/L (ref 3.5–5)
POTASSIUM SERPL-SCNC: 3.9 MMOL/L (ref 3.4–5.3)
POTASSIUM SERPL-SCNC: 3.9 MMOL/L (ref 3.5–5)
POTASSIUM SERPL-SCNC: 4.2 MMOL/L (ref 3.5–5)
POTASSIUM SERPL-SCNC: 4.3 MMOL/L (ref 3.5–5)
POTASSIUM SERPL-SCNC: 4.3 MMOL/L (ref 3.5–5)
POTASSIUM SERPL-SCNC: 4.4 MMOL/L (ref 3.5–5)
POTASSIUM SERPL-SCNC: 4.4 MMOL/L (ref 3.5–5)
POTASSIUM SERPL-SCNC: 4.8 MMOL/L (ref 3.4–5.3)
POTASSIUM SERPL-SCNC: 4.9 MMOL/L (ref 3.5–5)
PROCALCITONIN SERPL-MCNC: 2.4 NG/ML
PROT SERPL-MCNC: 7.3 G/DL (ref 6.8–8.8)
RBC # BLD AUTO: 3 10^12/L (ref 4.4–6.2)
RBC # BLD AUTO: 3 MILL/UL (ref 4.4–6.2)
RBC # BLD AUTO: 3.03 MILL/UL (ref 4.4–6.2)
RBC # BLD AUTO: 3.03 MILL/UL (ref 4.4–6.2)
RBC # BLD AUTO: 3.09 MILL/UL (ref 4.4–6.2)
RBC # BLD AUTO: 3.09 MILL/UL (ref 4.4–6.2)
RBC # BLD AUTO: 3.27 MILL/UL (ref 4.4–6.2)
RBC # BLD AUTO: 3.29 10E12/L (ref 4.4–5.9)
RBC # BLD AUTO: 3.38 10E12/L (ref 4.4–5.9)
RBC # BLD AUTO: 3.39 MILL/UL (ref 4.4–6.2)
RBC # BLD AUTO: 3.39 MILL/UL (ref 4.4–6.2)
RBC # BLD AUTO: 3.4 10E12/L (ref 4.4–5.9)
RBC # BLD AUTO: 3.58 10E12/L (ref 4.4–5.9)
RBC # BLD AUTO: 3.66 10E12/L (ref 4.4–5.9)
RBC # BLD AUTO: 3.73 MILL/UL (ref 4.4–6.2)
RBC #/AREA URNS AUTO: 57 /HPF (ref 0–2)
RBC #/AREA URNS AUTO: ABNORMAL HPF
RBC #/AREA URNS AUTO: ABNORMAL HPF
RIBOTYPE 027/NAP1/BI: NORMAL
SHIGA TOXIN 1: NEGATIVE
SHIGA TOXIN 1: NEGATIVE
SHIGA TOXIN 2: NEGATIVE
SHIGA TOXIN 2: NEGATIVE
SODIUM SERPL-SCNC: 134 MMOL/L (ref 133–144)
SODIUM SERPL-SCNC: 135 MMOL/L (ref 133–144)
SODIUM SERPL-SCNC: 136 MMOL/L (ref 136–145)
SODIUM SERPL-SCNC: 137 MMOL/L (ref 136–145)
SODIUM SERPL-SCNC: 138 MMOL/L (ref 133–144)
SODIUM SERPL-SCNC: 138 MMOL/L (ref 136–145)
SODIUM SERPL-SCNC: 139 MMOL/L (ref 133–144)
SODIUM SERPL-SCNC: 140 MMOL/L (ref 136–145)
SODIUM SERPL-SCNC: 140 MMOL/L (ref 136–145)
SODIUM SERPL-SCNC: 141 MMOL/L (ref 136–145)
SODIUM SERPL-SCNC: 141 MMOL/L (ref 136–145)
SODIUM SERPL-SCNC: 143 MMOL/L (ref 133–144)
SODIUM SERPL-SCNC: 143 MMOL/L (ref 136–145)
SOURCE: ABNORMAL
SP GR UR STRIP: 1.01 (ref 1–1.03)
SPECIMEN EXP DATE BLD: NORMAL
SPECIMEN SOURCE: ABNORMAL
SPECIMEN SOURCE: ABNORMAL
SPECIMEN SOURCE: NORMAL
SQUAMOUS #/AREA URNS AUTO: ABNORMAL LPF
SQUAMOUS #/AREA URNS AUTO: ABNORMAL LPF
TIBC SERPL-MCNC: 247 UG/DL (ref 313–563)
TRANSFERRIN SERPL-MCNC: 197 MG/DL (ref 212–360)
TROPONIN I SERPL-MCNC: 0.09 UG/L (ref 0–0.04)
TROPONIN I SERPL-MCNC: 0.23 UG/L (ref 0–0.04)
TROPONIN I SERPL-MCNC: 0.71 UG/L (ref 0–0.04)
TROPONIN I SERPL-MCNC: 0.75 UG/L (ref 0–0.04)
TSH SERPL DL<=0.005 MIU/L-ACNC: 3.91 UIU/ML (ref 0.3–5)
TSH SERPL-ACNC: 3.91 UIU/ML (ref 0.3–5)
UROBILINOGEN UR STRIP-ACNC: ABNORMAL
UROBILINOGEN UR STRIP-ACNC: ABNORMAL
UROBILINOGEN UR STRIP-MCNC: 0 MG/DL (ref 0–2)
WBC # BLD AUTO: 11.7 10E9/L (ref 4–11)
WBC # BLD AUTO: 4.3 THOU/UL (ref 4–11)
WBC # BLD AUTO: 4.7 10^9/L (ref 4–11)
WBC # BLD AUTO: 4.9 THOU/UL (ref 4–11)
WBC # BLD AUTO: 5.1 10E9/L (ref 4–11)
WBC # BLD AUTO: 5.1 THOU/UL (ref 4–11)
WBC # BLD AUTO: 5.3 10E9/L (ref 4–11)
WBC # BLD AUTO: 5.9 10E9/L (ref 4–11)
WBC # BLD AUTO: 8.6 10E9/L (ref 4–11)
WBC #/AREA URNS AUTO: 26 /HPF (ref 0–5)
WBC #/AREA URNS AUTO: ABNORMAL HPF
WBC #/AREA URNS AUTO: ABNORMAL HPF
WBC: 4.3 THOU/UL (ref 4–11)
WBC: 4.5 THOU/UL (ref 4–11)
WBC: 4.7 THOU/UL (ref 4–11)
WBC: 4.9 THOU/UL (ref 4–11)
WBC: 5.1 THOU/UL (ref 4–11)
WBC: 5.1 THOU/UL (ref 4–11)

## 2018-01-01 PROCEDURE — 99308 SBSQ NF CARE LOW MDM 20: CPT | Performed by: NURSE PRACTITIONER

## 2018-01-01 PROCEDURE — 99310 SBSQ NF CARE HIGH MDM 45: CPT | Performed by: NURSE PRACTITIONER

## 2018-01-01 PROCEDURE — 99207 ZZC APP CREDIT; MD BILLING SHARED VISIT: CPT | Performed by: PHYSICIAN ASSISTANT

## 2018-01-01 PROCEDURE — A9270 NON-COVERED ITEM OR SERVICE: HCPCS | Mod: GY | Performed by: INTERNAL MEDICINE

## 2018-01-01 PROCEDURE — 25000132 ZZH RX MED GY IP 250 OP 250 PS 637: Mod: GY | Performed by: INTERNAL MEDICINE

## 2018-01-01 PROCEDURE — 99233 SBSQ HOSP IP/OBS HIGH 50: CPT | Performed by: INTERNAL MEDICINE

## 2018-01-01 PROCEDURE — 87077 CULTURE AEROBIC IDENTIFY: CPT | Performed by: EMERGENCY MEDICINE

## 2018-01-01 PROCEDURE — 99309 SBSQ NF CARE MODERATE MDM 30: CPT | Performed by: NURSE PRACTITIONER

## 2018-01-01 PROCEDURE — 85610 PROTHROMBIN TIME: CPT | Performed by: PHYSICIAN ASSISTANT

## 2018-01-01 PROCEDURE — 99306 1ST NF CARE HIGH MDM 50: CPT | Performed by: INTERNAL MEDICINE

## 2018-01-01 PROCEDURE — 99207 ZZC CDG-CHARGE REQUIRED MANUAL ENTRY: CPT | Performed by: INTERNAL MEDICINE

## 2018-01-01 PROCEDURE — 92526 ORAL FUNCTION THERAPY: CPT | Mod: GN | Performed by: SPEECH-LANGUAGE PATHOLOGIST

## 2018-01-01 PROCEDURE — 99309 SBSQ NF CARE MODERATE MDM 30: CPT | Performed by: INTERNAL MEDICINE

## 2018-01-01 PROCEDURE — 25000128 H RX IP 250 OP 636: Performed by: PHYSICIAN ASSISTANT

## 2018-01-01 PROCEDURE — 85610 PROTHROMBIN TIME: CPT | Performed by: EMERGENCY MEDICINE

## 2018-01-01 PROCEDURE — 99318 ZZC ANNUAL NURSING FAC ASSESSMNT, STABLE: CPT | Performed by: NURSE PRACTITIONER

## 2018-01-01 PROCEDURE — 36415 COLL VENOUS BLD VENIPUNCTURE: CPT | Performed by: INTERNAL MEDICINE

## 2018-01-01 PROCEDURE — 84484 ASSAY OF TROPONIN QUANT: CPT | Performed by: PHYSICIAN ASSISTANT

## 2018-01-01 PROCEDURE — 12002 RPR S/N/AX/GEN/TRNK2.6-7.5CM: CPT

## 2018-01-01 PROCEDURE — 83880 ASSAY OF NATRIURETIC PEPTIDE: CPT | Performed by: EMERGENCY MEDICINE

## 2018-01-01 PROCEDURE — 83735 ASSAY OF MAGNESIUM: CPT | Performed by: INTERNAL MEDICINE

## 2018-01-01 PROCEDURE — 87800 DETECT AGNT MULT DNA DIREC: CPT | Performed by: EMERGENCY MEDICINE

## 2018-01-01 PROCEDURE — 12000007 ZZH R&B INTERMEDIATE

## 2018-01-01 PROCEDURE — 86900 BLOOD TYPING SEROLOGIC ABO: CPT | Performed by: EMERGENCY MEDICINE

## 2018-01-01 PROCEDURE — 70450 CT HEAD/BRAIN W/O DYE: CPT

## 2018-01-01 PROCEDURE — 85610 PROTHROMBIN TIME: CPT | Performed by: INTERNAL MEDICINE

## 2018-01-01 PROCEDURE — 71046 X-RAY EXAM CHEST 2 VIEWS: CPT

## 2018-01-01 PROCEDURE — 92610 EVALUATE SWALLOWING FUNCTION: CPT | Mod: GN | Performed by: SPEECH-LANGUAGE PATHOLOGIST

## 2018-01-01 PROCEDURE — 90714 TD VACC NO PRESV 7 YRS+ IM: CPT | Performed by: EMERGENCY MEDICINE

## 2018-01-01 PROCEDURE — 90471 IMMUNIZATION ADMIN: CPT

## 2018-01-01 PROCEDURE — 25000128 H RX IP 250 OP 636: Performed by: INTERNAL MEDICINE

## 2018-01-01 PROCEDURE — 99285 EMERGENCY DEPT VISIT HI MDM: CPT | Mod: 25

## 2018-01-01 PROCEDURE — 99316 NF DSCHRG MGMT 30 MIN+: CPT | Performed by: NURSE PRACTITIONER

## 2018-01-01 PROCEDURE — 97530 THERAPEUTIC ACTIVITIES: CPT | Mod: GP | Performed by: PHYSICAL THERAPIST

## 2018-01-01 PROCEDURE — 83735 ASSAY OF MAGNESIUM: CPT | Performed by: EMERGENCY MEDICINE

## 2018-01-01 PROCEDURE — 84100 ASSAY OF PHOSPHORUS: CPT | Performed by: EMERGENCY MEDICINE

## 2018-01-01 PROCEDURE — 80048 BASIC METABOLIC PNL TOTAL CA: CPT | Performed by: PHYSICIAN ASSISTANT

## 2018-01-01 PROCEDURE — 87186 SC STD MICRODIL/AGAR DIL: CPT | Performed by: EMERGENCY MEDICINE

## 2018-01-01 PROCEDURE — 36415 COLL VENOUS BLD VENIPUNCTURE: CPT | Performed by: PHYSICIAN ASSISTANT

## 2018-01-01 PROCEDURE — 25000132 ZZH RX MED GY IP 250 OP 250 PS 637: Mod: GY | Performed by: EMERGENCY MEDICINE

## 2018-01-01 PROCEDURE — 40000193 ZZH STATISTIC PT WARD VISIT: Performed by: PHYSICAL THERAPIST

## 2018-01-01 PROCEDURE — 72125 CT NECK SPINE W/O DYE: CPT

## 2018-01-01 PROCEDURE — 36600 WITHDRAWAL OF ARTERIAL BLOOD: CPT

## 2018-01-01 PROCEDURE — 84484 ASSAY OF TROPONIN QUANT: CPT | Performed by: EMERGENCY MEDICINE

## 2018-01-01 PROCEDURE — 74018 RADEX ABDOMEN 1 VIEW: CPT

## 2018-01-01 PROCEDURE — 84145 PROCALCITONIN (PCT): CPT | Performed by: EMERGENCY MEDICINE

## 2018-01-01 PROCEDURE — 92526 ORAL FUNCTION THERAPY: CPT | Mod: GN

## 2018-01-01 PROCEDURE — 99239 HOSP IP/OBS DSCHRG MGMT >30: CPT | Performed by: INTERNAL MEDICINE

## 2018-01-01 PROCEDURE — 87086 URINE CULTURE/COLONY COUNT: CPT | Performed by: EMERGENCY MEDICINE

## 2018-01-01 PROCEDURE — 93306 TTE W/DOPPLER COMPLETE: CPT | Mod: 26 | Performed by: INTERNAL MEDICINE

## 2018-01-01 PROCEDURE — 86850 RBC ANTIBODY SCREEN: CPT | Performed by: EMERGENCY MEDICINE

## 2018-01-01 PROCEDURE — 85027 COMPLETE CBC AUTOMATED: CPT | Performed by: INTERNAL MEDICINE

## 2018-01-01 PROCEDURE — 86901 BLOOD TYPING SEROLOGIC RH(D): CPT | Performed by: EMERGENCY MEDICINE

## 2018-01-01 PROCEDURE — 97162 PT EVAL MOD COMPLEX 30 MIN: CPT | Mod: GP | Performed by: PHYSICAL THERAPIST

## 2018-01-01 PROCEDURE — 80048 BASIC METABOLIC PNL TOTAL CA: CPT | Performed by: INTERNAL MEDICINE

## 2018-01-01 PROCEDURE — A9270 NON-COVERED ITEM OR SERVICE: HCPCS | Mod: GY | Performed by: EMERGENCY MEDICINE

## 2018-01-01 PROCEDURE — 87040 BLOOD CULTURE FOR BACTERIA: CPT | Performed by: EMERGENCY MEDICINE

## 2018-01-01 PROCEDURE — C9113 INJ PANTOPRAZOLE SODIUM, VIA: HCPCS | Performed by: PHYSICIAN ASSISTANT

## 2018-01-01 PROCEDURE — 85025 COMPLETE CBC W/AUTO DIFF WBC: CPT | Performed by: EMERGENCY MEDICINE

## 2018-01-01 PROCEDURE — 87040 BLOOD CULTURE FOR BACTERIA: CPT | Performed by: INTERNAL MEDICINE

## 2018-01-01 PROCEDURE — 40000275 ZZH STATISTIC RCP TIME EA 10 MIN

## 2018-01-01 PROCEDURE — 80053 COMPREHEN METABOLIC PANEL: CPT | Performed by: EMERGENCY MEDICINE

## 2018-01-01 PROCEDURE — 81001 URINALYSIS AUTO W/SCOPE: CPT | Performed by: EMERGENCY MEDICINE

## 2018-01-01 PROCEDURE — 80048 BASIC METABOLIC PNL TOTAL CA: CPT | Performed by: EMERGENCY MEDICINE

## 2018-01-01 PROCEDURE — 25000128 H RX IP 250 OP 636: Performed by: EMERGENCY MEDICINE

## 2018-01-01 PROCEDURE — 96375 TX/PRO/DX INJ NEW DRUG ADDON: CPT

## 2018-01-01 PROCEDURE — 99232 SBSQ HOSP IP/OBS MODERATE 35: CPT | Performed by: INTERNAL MEDICINE

## 2018-01-01 PROCEDURE — 96361 HYDRATE IV INFUSION ADD-ON: CPT

## 2018-01-01 PROCEDURE — 83605 ASSAY OF LACTIC ACID: CPT | Performed by: EMERGENCY MEDICINE

## 2018-01-01 PROCEDURE — 87088 URINE BACTERIA CULTURE: CPT | Performed by: EMERGENCY MEDICINE

## 2018-01-01 PROCEDURE — 93005 ELECTROCARDIOGRAM TRACING: CPT

## 2018-01-01 PROCEDURE — 51702 INSERT TEMP BLADDER CATH: CPT

## 2018-01-01 PROCEDURE — 40000225 ZZH STATISTIC SLP WARD VISIT

## 2018-01-01 PROCEDURE — 96374 THER/PROPH/DIAG INJ IV PUSH: CPT

## 2018-01-01 PROCEDURE — 36415 COLL VENOUS BLD VENIPUNCTURE: CPT | Performed by: EMERGENCY MEDICINE

## 2018-01-01 PROCEDURE — 99223 1ST HOSP IP/OBS HIGH 75: CPT | Mod: AI | Performed by: INTERNAL MEDICINE

## 2018-01-01 PROCEDURE — 93306 TTE W/DOPPLER COMPLETE: CPT

## 2018-01-01 PROCEDURE — 40000225 ZZH STATISTIC SLP WARD VISIT: Performed by: SPEECH-LANGUAGE PATHOLOGIST

## 2018-01-01 PROCEDURE — 82805 BLOOD GASES W/O2 SATURATION: CPT | Performed by: EMERGENCY MEDICINE

## 2018-01-01 PROCEDURE — 85027 COMPLETE CBC AUTOMATED: CPT | Performed by: PHYSICIAN ASSISTANT

## 2018-01-01 RX ORDER — ACETAMINOPHEN 650 MG/1
650 SUPPOSITORY RECTAL EVERY 4 HOURS PRN
Status: DISCONTINUED | OUTPATIENT
Start: 2018-01-01 | End: 2018-01-01 | Stop reason: HOSPADM

## 2018-01-01 RX ORDER — LOPERAMIDE HCL 2 MG
2 CAPSULE ORAL EVERY 6 HOURS PRN
COMMUNITY

## 2018-01-01 RX ORDER — FERROUS SULFATE 325(65) MG
325 TABLET ORAL
COMMUNITY
End: 2018-01-01

## 2018-01-01 RX ORDER — WARFARIN SODIUM 1 MG/1
1 TABLET ORAL
Status: COMPLETED | OUTPATIENT
Start: 2018-01-01 | End: 2018-01-01

## 2018-01-01 RX ORDER — TAMSULOSIN HYDROCHLORIDE 0.4 MG/1
0.4 CAPSULE ORAL AT BEDTIME
Status: DISCONTINUED | OUTPATIENT
Start: 2018-01-01 | End: 2018-01-01 | Stop reason: HOSPADM

## 2018-01-01 RX ORDER — CEPHALEXIN 500 MG/1
500 CAPSULE ORAL 2 TIMES DAILY
Qty: 6 CAPSULE | Refills: 0 | Status: SHIPPED | OUTPATIENT
Start: 2018-01-01 | End: 2019-01-01

## 2018-01-01 RX ORDER — METOPROLOL TARTRATE 25 MG/1
6.25 TABLET, FILM COATED ORAL 2 TIMES DAILY
Qty: 60 TABLET | DISCHARGE
Start: 2018-01-01

## 2018-01-01 RX ORDER — HYDRALAZINE HYDROCHLORIDE 20 MG/ML
10 INJECTION INTRAMUSCULAR; INTRAVENOUS EVERY 4 HOURS PRN
Status: DISCONTINUED | OUTPATIENT
Start: 2018-01-01 | End: 2018-01-01 | Stop reason: HOSPADM

## 2018-01-01 RX ORDER — ONDANSETRON 2 MG/ML
4 INJECTION INTRAMUSCULAR; INTRAVENOUS EVERY 6 HOURS PRN
Status: DISCONTINUED | OUTPATIENT
Start: 2018-01-01 | End: 2018-01-01 | Stop reason: HOSPADM

## 2018-01-01 RX ORDER — WARFARIN SODIUM 1 MG/1
1 TABLET ORAL
Status: DISCONTINUED | OUTPATIENT
Start: 2018-01-01 | End: 2018-01-01

## 2018-01-01 RX ORDER — LIDOCAINE HYDROCHLORIDE AND EPINEPHRINE 10; 10 MG/ML; UG/ML
INJECTION, SOLUTION INFILTRATION; PERINEURAL
Status: DISCONTINUED
Start: 2018-01-01 | End: 2018-01-01 | Stop reason: HOSPADM

## 2018-01-01 RX ORDER — ACETAMINOPHEN 500 MG
1000 TABLET ORAL ONCE
Status: DISCONTINUED | OUTPATIENT
Start: 2018-01-01 | End: 2018-01-01 | Stop reason: HOSPADM

## 2018-01-01 RX ORDER — GINSENG 100 MG
CAPSULE ORAL
Status: DISCONTINUED
Start: 2018-01-01 | End: 2018-01-01 | Stop reason: HOSPADM

## 2018-01-01 RX ORDER — PANTOPRAZOLE SODIUM 40 MG/1
40 TABLET, DELAYED RELEASE ORAL
Status: DISCONTINUED | OUTPATIENT
Start: 2018-01-01 | End: 2018-01-01 | Stop reason: HOSPADM

## 2018-01-01 RX ORDER — ONDANSETRON 2 MG/ML
4 INJECTION INTRAMUSCULAR; INTRAVENOUS EVERY 30 MIN PRN
Status: DISCONTINUED | OUTPATIENT
Start: 2018-01-01 | End: 2018-01-01

## 2018-01-01 RX ORDER — LEVOTHYROXINE SODIUM ANHYDROUS 100 UG/5ML
50 INJECTION, POWDER, LYOPHILIZED, FOR SOLUTION INTRAVENOUS DAILY
Status: DISCONTINUED | OUTPATIENT
Start: 2018-01-01 | End: 2018-01-01

## 2018-01-01 RX ORDER — LIDOCAINE 40 MG/G
CREAM TOPICAL
Status: DISCONTINUED | OUTPATIENT
Start: 2018-01-01 | End: 2018-01-01 | Stop reason: HOSPADM

## 2018-01-01 RX ORDER — NYSTATIN 100000 [USP'U]/G
POWDER TOPICAL 2 TIMES DAILY
COMMUNITY
Start: 2018-01-01 | End: 2019-01-01

## 2018-01-01 RX ORDER — TRANEXAMIC ACID 100 MG/ML
500 INJECTION, SOLUTION INTRAVENOUS ONCE
Status: DISCONTINUED | OUTPATIENT
Start: 2018-01-01 | End: 2018-01-01 | Stop reason: HOSPADM

## 2018-01-01 RX ORDER — NALOXONE HYDROCHLORIDE 0.4 MG/ML
.1-.4 INJECTION, SOLUTION INTRAMUSCULAR; INTRAVENOUS; SUBCUTANEOUS
Status: DISCONTINUED | OUTPATIENT
Start: 2018-01-01 | End: 2018-01-01 | Stop reason: HOSPADM

## 2018-01-01 RX ORDER — HYDRALAZINE HYDROCHLORIDE 25 MG/1
25 TABLET, FILM COATED ORAL 3 TIMES DAILY
Status: DISCONTINUED | OUTPATIENT
Start: 2018-01-01 | End: 2018-01-01 | Stop reason: HOSPADM

## 2018-01-01 RX ORDER — HYDRALAZINE HYDROCHLORIDE 10 MG/1
10 TABLET, FILM COATED ORAL 3 TIMES DAILY
Status: DISCONTINUED | OUTPATIENT
Start: 2018-01-01 | End: 2018-01-01

## 2018-01-01 RX ORDER — CEPHALEXIN 500 MG/1
500 CAPSULE ORAL ONCE
Status: COMPLETED | OUTPATIENT
Start: 2018-01-01 | End: 2018-01-01

## 2018-01-01 RX ORDER — LEVOTHYROXINE SODIUM 100 UG/1
100 TABLET ORAL
Status: DISCONTINUED | OUTPATIENT
Start: 2018-01-01 | End: 2018-01-01 | Stop reason: HOSPADM

## 2018-01-01 RX ORDER — TAMSULOSIN HYDROCHLORIDE 0.4 MG/1
0.4 CAPSULE ORAL AT BEDTIME
Status: DISCONTINUED | OUTPATIENT
Start: 2018-01-01 | End: 2018-01-01

## 2018-01-01 RX ORDER — FUROSEMIDE 10 MG/ML
40 INJECTION INTRAMUSCULAR; INTRAVENOUS ONCE
Status: DISCONTINUED | OUTPATIENT
Start: 2018-01-01 | End: 2018-01-01

## 2018-01-01 RX ORDER — WARFARIN SODIUM 1 MG/1
TABLET ORAL DAILY
Qty: 30 TABLET | COMMUNITY
Start: 2018-01-01 | End: 2019-01-01

## 2018-01-01 RX ORDER — LACTOSE-REDUCED FOOD
1 LIQUID (ML) ORAL
COMMUNITY
End: 2018-01-01

## 2018-01-01 RX ORDER — FUROSEMIDE 10 MG/ML
20 INJECTION INTRAMUSCULAR; INTRAVENOUS ONCE
Status: COMPLETED | OUTPATIENT
Start: 2018-01-01 | End: 2018-01-01

## 2018-01-01 RX ADMIN — PANTOPRAZOLE SODIUM 40 MG: 40 TABLET, DELAYED RELEASE ORAL at 16:24

## 2018-01-01 RX ADMIN — TAZOBACTAM SODIUM AND PIPERACILLIN SODIUM 2.25 G: 250; 2 INJECTION, SOLUTION INTRAVENOUS at 06:34

## 2018-01-01 RX ADMIN — TAZOBACTAM SODIUM AND PIPERACILLIN SODIUM 2.25 G: 250; 2 INJECTION, SOLUTION INTRAVENOUS at 13:33

## 2018-01-01 RX ADMIN — Medication 12.5 MG: at 09:07

## 2018-01-01 RX ADMIN — PANTOPRAZOLE SODIUM 40 MG: 40 INJECTION, POWDER, FOR SOLUTION INTRAVENOUS at 13:49

## 2018-01-01 RX ADMIN — TAMSULOSIN HYDROCHLORIDE 0.4 MG: 0.4 CAPSULE ORAL at 21:05

## 2018-01-01 RX ADMIN — Medication 12.5 MG: at 09:20

## 2018-01-01 RX ADMIN — TAZOBACTAM SODIUM AND PIPERACILLIN SODIUM 2.25 G: 250; 2 INJECTION, SOLUTION INTRAVENOUS at 13:05

## 2018-01-01 RX ADMIN — HYDRALAZINE HYDROCHLORIDE 10 MG: 10 TABLET, FILM COATED ORAL at 09:20

## 2018-01-01 RX ADMIN — TAZOBACTAM SODIUM AND PIPERACILLIN SODIUM 2.25 G: 250; 2 INJECTION, SOLUTION INTRAVENOUS at 19:29

## 2018-01-01 RX ADMIN — TAZOBACTAM SODIUM AND PIPERACILLIN SODIUM 2.25 G: 250; 2 INJECTION, SOLUTION INTRAVENOUS at 06:37

## 2018-01-01 RX ADMIN — PANTOPRAZOLE SODIUM 40 MG: 40 INJECTION, POWDER, FOR SOLUTION INTRAVENOUS at 02:30

## 2018-01-01 RX ADMIN — HYDRALAZINE HYDROCHLORIDE 25 MG: 25 TABLET ORAL at 16:24

## 2018-01-01 RX ADMIN — PANTOPRAZOLE SODIUM 40 MG: 40 TABLET, DELAYED RELEASE ORAL at 06:43

## 2018-01-01 RX ADMIN — TAZOBACTAM SODIUM AND PIPERACILLIN SODIUM 2.25 G: 250; 2 INJECTION, SOLUTION INTRAVENOUS at 00:29

## 2018-01-01 RX ADMIN — TAZOBACTAM SODIUM AND PIPERACILLIN SODIUM 2.25 G: 250; 2 INJECTION, SOLUTION INTRAVENOUS at 12:52

## 2018-01-01 RX ADMIN — TAZOBACTAM SODIUM AND PIPERACILLIN SODIUM 2.25 G: 250; 2 INJECTION, SOLUTION INTRAVENOUS at 01:42

## 2018-01-01 RX ADMIN — TAZOBACTAM SODIUM AND PIPERACILLIN SODIUM 2.25 G: 250; 2 INJECTION, SOLUTION INTRAVENOUS at 01:26

## 2018-01-01 RX ADMIN — TAZOBACTAM SODIUM AND PIPERACILLIN SODIUM 2.25 G: 250; 2 INJECTION, SOLUTION INTRAVENOUS at 18:20

## 2018-01-01 RX ADMIN — CEPHALEXIN 500 MG: 500 CAPSULE ORAL at 21:55

## 2018-01-01 RX ADMIN — WARFARIN SODIUM 1 MG: 1 TABLET ORAL at 17:27

## 2018-01-01 RX ADMIN — Medication 12.5 MG: at 16:31

## 2018-01-01 RX ADMIN — LEVOTHYROXINE SODIUM 100 MCG: 100 TABLET ORAL at 09:20

## 2018-01-01 RX ADMIN — TAZOBACTAM SODIUM AND PIPERACILLIN SODIUM 3.38 G: 375; 3 INJECTION, SOLUTION INTRAVENOUS at 13:20

## 2018-01-01 RX ADMIN — PANTOPRAZOLE SODIUM 40 MG: 40 TABLET, DELAYED RELEASE ORAL at 16:31

## 2018-01-01 RX ADMIN — LEVOTHYROXINE SODIUM 100 MCG: 100 TABLET ORAL at 06:43

## 2018-01-01 RX ADMIN — Medication 12.5 MG: at 21:05

## 2018-01-01 RX ADMIN — HYDRALAZINE HYDROCHLORIDE 10 MG: 10 TABLET, FILM COATED ORAL at 16:08

## 2018-01-01 RX ADMIN — TAZOBACTAM SODIUM AND PIPERACILLIN SODIUM 2.25 G: 250; 2 INJECTION, SOLUTION INTRAVENOUS at 18:27

## 2018-01-01 RX ADMIN — TAZOBACTAM SODIUM AND PIPERACILLIN SODIUM 2.25 G: 250; 2 INJECTION, SOLUTION INTRAVENOUS at 12:22

## 2018-01-01 RX ADMIN — HYDRALAZINE HYDROCHLORIDE 25 MG: 25 TABLET ORAL at 22:03

## 2018-01-01 RX ADMIN — CLOSTRIDIUM TETANI TOXOID ANTIGEN (FORMALDEHYDE INACTIVATED) AND CORYNEBACTERIUM DIPHTHERIAE TOXOID ANTIGEN (FORMALDEHYDE INACTIVATED) 0.5 ML: 5; 2 INJECTION, SUSPENSION INTRAMUSCULAR at 19:55

## 2018-01-01 RX ADMIN — LEVOTHYROXINE SODIUM 100 MCG: 100 TABLET ORAL at 06:37

## 2018-01-01 RX ADMIN — Medication 12.5 MG: at 20:12

## 2018-01-01 RX ADMIN — ONDANSETRON 4 MG: 2 INJECTION INTRAMUSCULAR; INTRAVENOUS at 10:39

## 2018-01-01 RX ADMIN — Medication 12.5 MG: at 22:47

## 2018-01-01 RX ADMIN — FUROSEMIDE 20 MG: 10 INJECTION, SOLUTION INTRAMUSCULAR; INTRAVENOUS at 13:19

## 2018-01-01 RX ADMIN — TAZOBACTAM SODIUM AND PIPERACILLIN SODIUM 2.25 G: 250; 2 INJECTION, SOLUTION INTRAVENOUS at 06:58

## 2018-01-01 RX ADMIN — WARFARIN SODIUM 1 MG: 1 TABLET ORAL at 18:16

## 2018-01-01 RX ADMIN — PANTOPRAZOLE SODIUM 40 MG: 40 INJECTION, POWDER, FOR SOLUTION INTRAVENOUS at 15:58

## 2018-01-01 RX ADMIN — PANTOPRAZOLE SODIUM 40 MG: 40 TABLET, DELAYED RELEASE ORAL at 06:58

## 2018-01-01 RX ADMIN — PANTOPRAZOLE SODIUM 40 MG: 40 TABLET, DELAYED RELEASE ORAL at 06:37

## 2018-01-01 RX ADMIN — LEVOTHYROXINE SODIUM 100 MCG: 100 TABLET ORAL at 06:58

## 2018-01-01 RX ADMIN — Medication 6.25 MG: at 09:24

## 2018-01-01 RX ADMIN — ACETAMINOPHEN 975 MG: 650 SUPPOSITORY RECTAL at 10:40

## 2018-01-01 RX ADMIN — TAMSULOSIN HYDROCHLORIDE 0.4 MG: 0.4 CAPSULE ORAL at 20:13

## 2018-01-01 RX ADMIN — HYDRALAZINE HYDROCHLORIDE 25 MG: 25 TABLET ORAL at 09:24

## 2018-01-01 RX ADMIN — HYDRALAZINE HYDROCHLORIDE 10 MG: 10 TABLET, FILM COATED ORAL at 22:27

## 2018-01-01 RX ADMIN — SODIUM CHLORIDE 1000 ML: 9 INJECTION, SOLUTION INTRAVENOUS at 10:39

## 2018-01-01 RX ADMIN — TAZOBACTAM SODIUM AND PIPERACILLIN SODIUM 2.25 G: 250; 2 INJECTION, SOLUTION INTRAVENOUS at 01:24

## 2018-01-01 RX ADMIN — HYDRALAZINE HYDROCHLORIDE 10 MG: 10 TABLET, FILM COATED ORAL at 07:34

## 2018-01-01 RX ADMIN — TAMSULOSIN HYDROCHLORIDE 0.4 MG: 0.4 CAPSULE ORAL at 22:03

## 2018-01-01 RX ADMIN — TAZOBACTAM SODIUM AND PIPERACILLIN SODIUM 2.25 G: 250; 2 INJECTION, SOLUTION INTRAVENOUS at 18:45

## 2018-01-01 RX ADMIN — TAZOBACTAM SODIUM AND PIPERACILLIN SODIUM 2.25 G: 250; 2 INJECTION, SOLUTION INTRAVENOUS at 06:43

## 2018-01-01 RX ADMIN — HYDRALAZINE HYDROCHLORIDE 10 MG: 20 INJECTION INTRAMUSCULAR; INTRAVENOUS at 06:58

## 2018-01-01 ASSESSMENT — ENCOUNTER SYMPTOMS
ARTHRALGIAS: 0
VOMITING: 1
FATIGUE: 1
WEAKNESS: 0
NAUSEA: 1
HEADACHES: 0
DIZZINESS: 0
DIZZINESS: 0
FEVER: 1
NECK PAIN: 0
SHORTNESS OF BREATH: 0
ABDOMINAL PAIN: 0

## 2018-01-01 ASSESSMENT — ACTIVITIES OF DAILY LIVING (ADL)
ADLS_ACUITY_SCORE: 28
ADLS_ACUITY_SCORE: 28
ADLS_ACUITY_SCORE: 14
ADLS_ACUITY_SCORE: 26
RETIRED_COMMUNICATION: 2-->DIFFICULTY UNDERSTANDING (NOT RELATED TO LANGUAGE BARRIER)
ADLS_ACUITY_SCORE: 14
COGNITION: 1 - ATTENTION OR MEMORY DEFICITS
ADLS_ACUITY_SCORE: 14
ADLS_ACUITY_SCORE: 28
ADLS_ACUITY_SCORE: 26
ADLS_ACUITY_SCORE: 30
ADLS_ACUITY_SCORE: 30
ADLS_ACUITY_SCORE: 14
RETIRED_EATING: 2-->ASSISTIVE PERSON
ADLS_ACUITY_SCORE: 14
ADLS_ACUITY_SCORE: 13
ADLS_ACUITY_SCORE: 25
ADLS_ACUITY_SCORE: 30
ADLS_ACUITY_SCORE: 25
AMBULATION: 2-->ASSISTIVE PERSON
SWALLOWING: 0-->SWALLOWS FOODS/LIQUIDS WITHOUT DIFFICULTY
WHICH_OF_THE_ABOVE_FUNCTIONAL_RISKS_HAD_A_RECENT_ONSET_OR_CHANGE?: SWALLOWING
ADLS_ACUITY_SCORE: 28
TOILETING: 2-->ASSISTIVE PERSON
ADLS_ACUITY_SCORE: 14
ADLS_ACUITY_SCORE: 16
BATHING: 2-->ASSISTIVE PERSON
ADLS_ACUITY_SCORE: 14
ADLS_ACUITY_SCORE: 28
TRANSFERRING: 2-->ASSISTIVE PERSON
ADLS_ACUITY_SCORE: 14
ADLS_ACUITY_SCORE: 30
DRESS: 2-->ASSISTIVE PERSON
ADLS_ACUITY_SCORE: 13

## 2018-01-09 DIAGNOSIS — I48.20 CHRONIC ATRIAL FIBRILLATION (H): ICD-10-CM

## 2018-01-09 RX ORDER — WARFARIN SODIUM 2 MG/1
TABLET ORAL
Qty: 180 TABLET | Refills: 0 | Status: ON HOLD | OUTPATIENT
Start: 2018-01-09 | End: 2018-02-20

## 2018-01-24 ENCOUNTER — RECORDS - HEALTHEAST (OUTPATIENT)
Dept: LAB | Facility: CLINIC | Age: 83
End: 2018-01-24

## 2018-01-25 LAB — INR PPP: 1.86 (ref 0.9–1.1)

## 2018-02-13 ENCOUNTER — RECORDS - HEALTHEAST (OUTPATIENT)
Dept: LAB | Facility: CLINIC | Age: 83
End: 2018-02-13

## 2018-02-14 LAB — INR PPP: 2.08 (ref 0.9–1.1)

## 2018-02-15 ENCOUNTER — ANTICOAGULATION THERAPY VISIT (OUTPATIENT)
Dept: ANTICOAGULATION | Facility: CLINIC | Age: 83
End: 2018-02-15
Payer: COMMERCIAL

## 2018-02-15 DIAGNOSIS — I48.20 CHRONIC ATRIAL FIBRILLATION (H): ICD-10-CM

## 2018-02-15 DIAGNOSIS — Z79.01 LONG-TERM (CURRENT) USE OF ANTICOAGULANTS: ICD-10-CM

## 2018-02-15 LAB — INR PPP: 2.08

## 2018-02-15 PROCEDURE — 99207 ZZC NO CHARGE NURSE ONLY: CPT

## 2018-02-15 NOTE — MR AVS SNAPSHOT
Francesco Killian   2/15/2018 4:15 PM   Anticoagulation Therapy Visit    Description:  89 year old male   Provider:   ANTICOAGULATION CLINIC   Department:   Anti Coagulation           INR as of 2/15/2018     Today's INR 2.08      Anticoagulation Summary as of 2/15/2018     INR goal 2.0-3.0   Today's INR 2.08   Full instructions 4 mg on Mon, Wed, Fri; 2 mg all other days   Next INR check 3/15/2018    Indications   Chronic atrial fibrillation (H) [I48.2]  Long-term (current) use of anticoagulants [Z79.01] [Z79.01]         Your next Anticoagulation Clinic appointment(s)     Feb 15, 2018  4:15 PM CST   Anticoagulation Visit with  ANTICOAGULATION CLINIC   [facility][1] ([facility][1[facility]    [address] [place] [id number] [phone]            Mar 15, 2018 12:00 PM CDT   Anticoagulation Visit with  ANTICOAGULATION CLINIC   [facility][1] ([facility][1])    [address] [place] [id number] [phone]              Contact Numbers     [facility]  Please call  [phone] to cancel and/or reschedule your appointment   Please call  [phone] with any problems or questions regarding your therapy.        February 2018 Details    Sun Mon Tue Wed Thu Fri Sat         1               2               3                 4               5               6               7               8               9               10                 11               12               13               14               15      2 mg   See details      16      4 mg         17      2 mg           18      2 mg         19      4 mg         20      2 mg         21      4 mg         22      2 mg         23      4 mg         24      2 mg           25      2 mg         26      4 mg         27      2 mg         28      4 mg             Date Details   02/15 This INR check               How to take your warfarin dose     To  take:  2 mg Take 1 of the 2 mg tablets.    To take:  4 mg Take 2 of the 2 mg tablets.           March 2018 Details    Sun Mon Tue Wed Thu Fri Sat         1      2 mg         2      4 mg         3      2 mg           4      2 mg         5      4 mg         6      2 mg         7      4 mg         8      2 mg         9      4 mg         10      2 mg           11      2 mg         12      4 mg         13      2 mg         14      4 mg         15            16               17                 18               19               20               21               22               23               24                 25               26               27               28               29               30               31                Date Details   No additional details    Date of next INR:  3/15/2018         How to take your warfarin dose     To take:  2 mg Take 1 of the 2 mg tablets.    To take:  4 mg Take 2 of the 2 mg tablets.

## 2018-02-15 NOTE — PROGRESS NOTES
ANTICOAGULATION FOLLOW-UP CLINIC VISIT    Patient Name:  Francesco Killian  Date:  2/15/2018  Contact Type:  Telephone/ faxed to Deniz Martinez [phone] and called to Daughter in Law Whit [phone]    SUBJECTIVE:     Patient Findings     Positives No Problem Findings           OBJECTIVE    INR   Date Value Ref Range Status   02/15/2018 2.08  Final       ASSESSMENT / PLAN  No question data found.  Anticoagulation Summary as of 2/15/2018     INR goal 2.0-3.0   Today's INR 2.08   Maintenance plan 4 mg (2 mg x 2) on Mon, Wed, Fri; 2 mg (2 mg x 1) all other days   Full instructions 4 mg on Mon, Wed, Fri; 2 mg all other days   Weekly total 20 mg   No change documented Haley Bteh RN   Plan last modified Haley Beth RN (6/1/2017)   Next INR check 3/15/2018   Target end date Indefinite    Indications   Chronic atrial fibrillation (H) [I48.2]  Long-term (current) use of anticoagulants [Z79.01] [Z79.01]         Anticoagulation Episode Summary     INR check location     Preferred lab     Send INR reminders to  [facility]    Comments             See the Encounter Report to view Anticoagulation Flowsheet and Dosing Calendar (Go to Encounters tab in chart review, and find the Anticoagulation Therapy Visit)        Haley Beth RN

## 2018-02-19 ENCOUNTER — APPOINTMENT (OUTPATIENT)
Dept: GENERAL RADIOLOGY | Facility: CLINIC | Age: 83
DRG: 552 | End: 2018-02-19
Attending: EMERGENCY MEDICINE
Payer: MEDICARE

## 2018-02-19 ENCOUNTER — APPOINTMENT (OUTPATIENT)
Dept: CT IMAGING | Facility: CLINIC | Age: 83
DRG: 552 | End: 2018-02-19
Attending: EMERGENCY MEDICINE
Payer: MEDICARE

## 2018-02-19 ENCOUNTER — HOSPITAL ENCOUNTER (EMERGENCY)
Facility: CLINIC | Age: 83
Discharge: HOME | DRG: 552 | End: 2018-02-19
Attending: EMERGENCY MEDICINE | Admitting: EMERGENCY MEDICINE
Payer: MEDICARE

## 2018-02-19 VITALS
TEMPERATURE: 99.3 F | RESPIRATION RATE: 18 BRPM | HEART RATE: 79 BPM | OXYGEN SATURATION: 92 % | WEIGHT: 155 LBS | BODY MASS INDEX: 23.57 KG/M2 | SYSTOLIC BLOOD PRESSURE: 140 MMHG | DIASTOLIC BLOOD PRESSURE: 87 MMHG

## 2018-02-19 DIAGNOSIS — S09.90XA CLOSED HEAD INJURY, INITIAL ENCOUNTER: ICD-10-CM
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
## 2018-02-19 LAB
ANION GAP SERPL CALCULATED.3IONS-SCNC: 9 MMOL/L (ref 3–14)
BASOPHILS # BLD AUTO: 0.1 10E9/L (ref 0–0.2)
BASOPHILS NFR BLD AUTO: 0.5 %
BUN SERPL-MCNC: 53 MG/DL (ref 7–30)
CALCIUM SERPL-MCNC: 8.7 MG/DL (ref 8.5–10.1)
CHLORIDE SERPL-SCNC: 103 MMOL/L (ref 94–109)
CO2 SERPL-SCNC: 27 MMOL/L (ref 20–32)
CREAT SERPL-MCNC: 1.68 MG/DL (ref 0.66–1.25)
DIFFERENTIAL METHOD BLD: ABNORMAL
EOSINOPHIL # BLD AUTO: 0.3 10E9/L (ref 0–0.7)
EOSINOPHIL NFR BLD AUTO: 3.3 %
ERYTHROCYTE [DISTWIDTH] IN BLOOD BY AUTOMATED COUNT: 12.8 % (ref 10–15)
GFR SERPL CREATININE-BSD FRML MDRD: 38 ML/MIN/1.7M2
GLUCOSE SERPL-MCNC: 109 MG/DL (ref 70–99)
HCT VFR BLD AUTO: 39.9 % (ref 40–53)
HGB BLD-MCNC: 13.2 G/DL (ref 13.3–17.7)
IMM GRANULOCYTES # BLD: 0.1 10E9/L (ref 0–0.4)
IMM GRANULOCYTES NFR BLD: 1.1 %
INR PPP: 2.58 (ref 0.86–1.14)
LYMPHOCYTES # BLD AUTO: 1.4 10E9/L (ref 0.8–5.3)
LYMPHOCYTES NFR BLD AUTO: 13.7 %
MCH RBC QN AUTO: 32.5 PG (ref 26.5–33)
MCHC RBC AUTO-ENTMCNC: 33.1 G/DL (ref 31.5–36.5)
MCV RBC AUTO: 98 FL (ref 78–100)
MONOCYTES # BLD AUTO: 1 10E9/L (ref 0–1.3)
MONOCYTES NFR BLD AUTO: 9.7 %
NEUTROPHILS # BLD AUTO: 7.1 10E9/L (ref 1.6–8.3)
NEUTROPHILS NFR BLD AUTO: 71.7 %
NRBC # BLD AUTO: 0 10*3/UL
NRBC BLD AUTO-RTO: 0 /100
PLATELET # BLD AUTO: 151 10E9/L (ref 150–450)
POTASSIUM SERPL-SCNC: 3.8 MMOL/L (ref 3.4–5.3)
RBC # BLD AUTO: 4.06 10E12/L (ref 4.4–5.9)
SODIUM SERPL-SCNC: 139 MMOL/L (ref 133–144)
WBC # BLD AUTO: 9.8 10E9/L (ref 4–11)

## 2018-02-19 PROCEDURE — 71046 X-RAY EXAM CHEST 2 VIEWS: CPT

## 2018-02-19 PROCEDURE — 80048 BASIC METABOLIC PNL TOTAL CA: CPT | Performed by: EMERGENCY MEDICINE

## 2018-02-19 PROCEDURE — 72100 X-RAY EXAM L-S SPINE 2/3 VWS: CPT

## 2018-02-19 PROCEDURE — 99285 EMERGENCY DEPT VISIT HI MDM: CPT | Mod: 25

## 2018-02-19 PROCEDURE — 85025 COMPLETE CBC W/AUTO DIFF WBC: CPT | Performed by: EMERGENCY MEDICINE

## 2018-02-19 PROCEDURE — 85610 PROTHROMBIN TIME: CPT | Performed by: EMERGENCY MEDICINE

## 2018-02-19 PROCEDURE — 72125 CT NECK SPINE W/O DYE: CPT

## 2018-02-19 PROCEDURE — 70450 CT HEAD/BRAIN W/O DYE: CPT

## 2018-02-19 ASSESSMENT — ENCOUNTER SYMPTOMS
BACK PAIN: 1
DIZZINESS: 0
HEADACHES: 0
ARTHRALGIAS: 0

## 2018-02-19 NOTE — ED AVS SNAPSHOT
[facility] Emergency Department    [address]    [facility] [address]    Phone:  [phone]    Fax:  [phone]                                       Francesco Killian   MRN: [id number]    Department:  [facility] Emergency Department   Date of Visit:  2/19/2018           Patient Information     Date Of Birth          12/26/1920        Your diagnoses for this visit were:     Fall, initial encounter     Closed head injury, initial encounter        You were seen by Cristina Gomez MD.      Follow-up Information     Follow up with nAgel Corea MD In 2 days.    Specialty:  Internal Medicine    Contact information:    [address] [facility] [address] [phone]          Discharge Instructions       Discharge Instructions  Head Injury    You have been seen today for a head injury. Your evaluation included a history and physical examination. You may have had a CT (CAT) scan performed, though most head injuries do not require a scan. Based on this evaluation, your provider today does not feel that your head injury is serious.    Generally, every Emergency Department visit should have a follow-up clinic visit with either a primary or a specialty clinic/provider. Please follow-up as instructed by your emergency provider today.  Return to the Emergency Department if:    You are confused or you are not acting right.    Your headache gets worse or you start to have a really bad headache even with your recommended treatment plan.    You vomit (throw up) more than once.    You have a seizure.    You have trouble walking.    You have weakness or paralysis (cannot move) in an arm or a leg.    You have blood or fluid coming from your ears or nose.    You have new symptoms or anything that worries you.    Sleeping:  It is okay for you to sleep, but someone should wake you up if instructed by your provider, and someone should check on you at your usual time to wake up.      Activity:    Do not drive for at least 24 hours.    Do not drive if you have dizzy spells or trouble concentrating, or remembering things.    Do not return to any contact sports until cleared by your regular provider.     MORE INFORMATION:    Concussion:  A concussion is a minor head injury that may cause temporary problems with the way the brain works. Although concussions are important, they are generally not an emergency or a reason that a person needs to be hospitalized. Some concussion symptoms include confusion, amnesia (forgetful), nausea (sick to your stomach) and vomiting (throwing up), dizziness, fatigue, memory or concentration problems, irritability and sleep problems. For most people, concussions are mild and temporary but some will have more severe and persistent symptoms that require on-going care and treatment.  CT Scans: Your evaluation today may have included a CT scan (CAT scan) to look for things like bleeding or a skull fracture (broken bone).  CT scans involve radiation and too many CT scans can cause serious health problems like cancer, especially in children.  Because of this, your provider may not have ordered a CT scan today if they think you are at low risk for a serious or life threatening problem.    If you were given a prescription for medicine here today, be sure to read all of the information (including the package insert) that comes with your prescription.  This will include important information about the medicine, its side effects, and any warnings that you need to know about.  The pharmacist who fills the prescription can provide more information and answer questions you may have about the medicine.  If you have questions or concerns that the pharmacist cannot address, please call or return to the Emergency Department.     Remember that you can always come back to the Emergency Department if you are not able to see your regular provider in the amount of time listed above, if  you get any new symptoms, or if there is anything that worries you.      Future Appointments        Provider Department Dept Phone Center    3/15/2018 12:00 PM [facility] [phone]       24 Hour Appointment Hotline       To make an appointment at any [facility], call [phone] ([phone]). If you don't have a family doctor or clinic, we will help you find one. [facility] are conveniently located to serve the needs of you and your family.             Review of your medicines      Our records show that you are taking the medicines listed below. If these are incorrect, please call your family doctor or clinic.        Dose / Directions Last dose taken    aspirin 81 MG tablet   Dose:  81 mg        Take 81 mg by mouth daily   Refills:  0        calcium carbonate 500 MG chewable tablet   Commonly known as:  TUMS   Dose:  1-2 chew tab        Take 1-2 chew tab by mouth 2 times daily as needed for heartburn   Refills:  0        citalopram 10 MG tablet   Commonly known as:  celeXA   Quantity:  90 tablet        TAKE 1 TABLET BY MOUTH DAILY   Refills:  1        diphenhydrAMINE-acetaminophen  MG tablet   Commonly known as:  TYLENOL PM   Dose:  1 tablet        Take 1 tablet by mouth At Bedtime   Refills:  0        furosemide 20 MG tablet   Commonly known as:  LASIX   Dose:  20 mg   Quantity:  180 tablet        Take 1 tablet (20 mg) by mouth daily   Refills:  3        ICAPS PO   Dose:  1 capsule        Take 1 capsule by mouth 2 times daily   Refills:  0        iron 325 (65 FE) MG tablet   Dose:  1 tablet        Take 1 tablet by mouth 2 times daily   Refills:  0        ketoconazole 2 % cream   Commonly known as:  NIZORAL   Quantity:  30 g        Apply to face BID PRN   Refills:  1        levothyroxine 100 MCG tablet   Commonly known as:  SYNTHROID/LEVOTHROID   Quantity:  90 tablet        TAKE 1 TABLET BY MOUTH EVERY DAY.   Refills:  0         lisinopril 10 MG tablet   Commonly known as:  PRINIVIL/ZESTRIL   Dose:  10 mg   Quantity:  90 tablet        Take 1 tablet (10 mg) by mouth daily   Refills:  3        order for DME   Quantity:  1 Month        Testing being ordered: INR orders as directed to be done at [facility] To be done monthly as directed.   Refills:  orn        TYLENOL 8 HOUR 650 MG CR tablet   Dose:  650 mg   Generic drug:  acetaminophen        Take 650 mg by mouth every evening as needed for mild pain or fever   Refills:  0        * warfarin 2 MG tablet   Commonly known as:  COUMADIN   Dose:  2 mg        Take 2 mg by mouth once a week on Tuesday   Refills:  0        * warfarin 2 MG tablet   Commonly known as:  COUMADIN   Quantity:  180 tablet        TAKE 2 TABLETS BY MOUTH DAILY ON MONDAY, WEDNESDAY, FRIDAY AND TAKE 1 TABLET DAILY ALL OTHER DAYS OF THE WEEK OR AS DIRECTED   Refills:  0        * Notice:  This list has 2 medication(s) that are the same as other medications prescribed for you. Read the directions carefully, and ask your doctor or other care provider to review them with you.            Procedures and tests performed during your visit     Basic metabolic panel    CBC with platelets differential    CT Cervical Spine w/o Contrast    CT Head w/o Contrast    INR    Lumbar spine XR, 2-3 views    XR Chest 2 Views      Orders Needing Specimen Collection     None      Pending Results     Date and Time Order Name Status Description    2/19/2018 1844 Lumbar spine XR, 2-3 views Preliminary             Pending Culture Results     No orders found from 2/17/2018 to 2/20/2018.            Pending Results Instructions     If you had any lab results that were not finalized at the time of your Discharge, you can call the ED Lab Result RN at [phone]. You will be contacted by this team for any positive Lab results or changes in treatment. The nurses are available 7 days a week from 10A to 6:30P.  You can leave a message 24  hours per day and they will return your call.        Test Results From Your Hospital Stay        2/19/2018  7:16 PM      Component Results     Component Value Ref Range & Units Status    WBC 9.8 4.0 - 11.0 10e9/L Final    RBC Count 4.06 (L) 4.4 - 5.9 10e12/L Final    Hemoglobin 13.2 (L) 13.3 - 17.7 g/dL Final    Hematocrit 39.9 (L) 40.0 - 53.0 % Final    MCV 98 78 - 100 fl Final    MCH 32.5 26.5 - 33.0 pg Final    MCHC 33.1 31.5 - 36.5 g/dL Final    RDW 12.8 10.0 - 15.0 % Final    Platelet Count 151 150 - 450 10e9/L Final    Diff Method Automated Method  Final    % Neutrophils 71.7 % Final    % Lymphocytes 13.7 % Final    % Monocytes 9.7 % Final    % Eosinophils 3.3 % Final    % Basophils 0.5 % Final    % Immature Granulocytes 1.1 % Final    Nucleated RBCs 0 0 /100 Final    Absolute Neutrophil 7.1 1.6 - 8.3 10e9/L Final    Absolute Lymphocytes 1.4 0.8 - 5.3 10e9/L Final    Absolute Monocytes 1.0 0.0 - 1.3 10e9/L Final    Absolute Eosinophils 0.3 0.0 - 0.7 10e9/L Final    Absolute Basophils 0.1 0.0 - 0.2 10e9/L Final    Abs Immature Granulocytes 0.1 0 - 0.4 10e9/L Final    Absolute Nucleated RBC 0.0  Final         2/19/2018  7:27 PM      Component Results     Component Value Ref Range & Units Status    Sodium 139 133 - 144 mmol/L Final    Potassium 3.8 3.4 - 5.3 mmol/L Final    Chloride 103 94 - 109 mmol/L Final    Carbon Dioxide 27 20 - 32 mmol/L Final    Anion Gap 9 3 - 14 mmol/L Final    Glucose 109 (H) 70 - 99 mg/dL Final    Urea Nitrogen 53 (H) 7 - 30 mg/dL Final    Creatinine 1.68 (H) 0.66 - 1.25 mg/dL Final    GFR Estimate 38 (L) >60 mL/min/1.7m2 Final    Non  GFR Calc    GFR Estimate If Black 46 (L) >60 mL/min/1.7m2 Final    African American GFR Calc    Calcium 8.7 8.5 - 10.1 mg/dL Final         2/19/2018  7:19 PM      Component Results     Component Value Ref Range & Units Status    INR 2.58 (H) 0.86 - 1.14 Final         2/19/2018  8:15 PM      Narrative     CT HEAD WITHOUT CONTRAST   2/19/2018 7:29 PM    HISTORY: Fell.    TECHNIQUE: Scans were obtained through the head without IV contrast.   Radiation dose for this scan was reduced using automated exposure  control, adjustment of the mA and/or kV according to patient size, or  iterative reconstruction technique.    COMPARISON: 12/9/2017.    FINDINGS: Advanced atrophy. Moderate low-density changes in the white  matter of both hemispheres consistent with chronic small vessel  ischemic disease. 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  No hemorrhage, mass lesion, or focal area of  acute infarction identified. Mild membrane thickening right maxillary  antrum. No bony abnormality.        Impression     IMPRESSION:   1. Atrophy, chronic white matter disease, and old right occipital  infarct.  2. Nothing acute.  3. No interval change.    RED HAWK MD         2/19/2018  8:15 PM      Narrative     CT CERVICAL SPINE WITHOUT CONTRAST   2/19/2018 7:30 PM     HISTORY: Fall.     TECHNIQUE: Axial images of the cervical spine were obtained without  intravenous contrast. Multiplanar reformations were performed.   Radiation dose for this scan was reduced using automated exposure  control, adjustment of the mA and/or kV according to patient size, or  iterative reconstruction technique.    COMPARISON: 8/29/2015.    FINDINGS: There is no evidence of fracture.    Alignment: Normal.      Craniocervical junction: Normal.     C1-C2:  Normal.     C2-C3:  Mild bilateral facet joint disease.     C3-C4:  Mild disc space narrowing. Uncinate spur on the right with  mild foraminal stenosis. Moderate facet joint disease on the right.     C4-C5:  Moderate narrowing of the interspace. Mild facet joint disease  on the right. No central or lateral stenosis.     C5-C6:  Moderate disc space narrowing. Mild ventral disc osteophyte  complex. Mild bilateral foraminal stenosis.      C6-C7:  Vacuum sign of degenerative disc disease. Minimal  ventral disc  osteophyte complex. No central or lateral stenosis.      C7-T1:   Normal disc, facet joints, spinal canal and neural foramina.  No interval change.        Impression     IMPRESSION:    1. Nothing acute. No fracture.  2. Multilevel degenerative change as previously described.    RED HAWK MD         2/19/2018  8:16 PM      Narrative     CHEST TWO VIEWS   2/19/2018  8:07 PM     HISTORY: Shortness of breath.      COMPARISON: 2/3/2017.    FINDINGS: Sternotomy. Cardiomegaly. Slight scattered fibrosis. No  acute-appearing infiltrates and no interval change.        Impression     IMPRESSION: Cardiomegaly but nothing acute. No interval change.    RED HAWK MD         2/19/2018  8:13 PM      Narrative     LUMBAR SPINE TWO TO THREE VIEWS   2/19/2018 8:07 PM     HISTORY: Pain.     COMPARISON: None.    FINDINGS: Biconcave appearance to the endplates of T12 and L1 likely  osteoporotic in origin. No acute appearing compression fractures.  Normal alignment in the lumbar spine. Degenerative narrowing of L3-4  and mild associated hypertrophic change. Aortic calcification.        Impression     IMPRESSION: Osteoporosis and degenerative changes lumbar spine.  Nothing acute. No acute fracture identified.                 Clinical Quality Measure: Blood Pressure Screening     Your blood pressure was checked while you were in the emergency department today. The last reading we obtained was  BP: 140/87 . Please read the guidelines below about what these numbers mean and what you should do about them.  If your systolic blood pressure (the top number) is less than 120 and your diastolic blood pressure (the bottom number) is less than 80, then your blood pressure is normal. There is nothing more that you need to do about it.  If your systolic blood pressure (the top number) is 120-139 or your diastolic blood pressure (the bottom number) is 80-89, your blood pressure may be higher than it should be. You should have  your blood pressure rechecked within a year by a primary care provider.  If your systolic blood pressure (the top number) is 140 or greater or your diastolic blood pressure (the bottom number) is 90 or greater, you may have high blood pressure. High blood pressure is treatable, but if left untreated over time it can put you at risk for heart attack, stroke, or kidney failure. You should have your blood pressure rechecked by a primary care provider within the next 4 weeks.  If your provider in the emergency department today gave you specific instructions to follow-up with your doctor or provider even sooner than that, you should follow that instruction and not wait for up to 4 weeks for your follow-up visit.        Thank you for choosing [facility]       Thank you for choosing [facility] for your care. Our goal is always to provide you with excellent care. Hearing back from our patients is one way we can continue to improve our services. Please take a few minutes to complete the written survey that you may receive in the mail after you visit with us. Thank you!        [employer] Information     [employer] gives you secure access to your electronic health record. If you see a primary care provider, you can also send messages to your care team and make appointments. If you have questions, please call your primary care clinic.  If you do not have a primary care provider, please call [phone] and they will assist you.        Care EveryWhere ID     This is your Care EveryWhere ID. This could be used by other organizations to access your [facility] medical records  [id number]        Equal Access to Services     IMAN MULTANI : Hadii aditi Salmon, marcus daugherty, qaybta singh cooper . So [facility] [phone].    ATENCIÓN: Si habla español, tiene a costa disposición servicios gratuitos de asistencia lingüística. Llame al [phone].    We comply with applicable federal civil  rights laws and [place] laws. We do not discriminate on the basis of race, color, national origin, age, disability, sex, sexual orientation, or gender identity.            After Visit Summary       This is your record. Keep this with you and show to your community pharmacist(s) and doctor(s) at your next visit.

## 2018-02-19 NOTE — ED AVS SNAPSHOT
[facility] Emergency Department    [address]    [facility] [address]    Phone:  [phone]    Fax:  [phone]                                       Francesco Killian   MRN: [id number]    Department:  [facility] Emergency Department   Date of Visit:  2/19/2018           After Visit Summary Signature Page     I have received my discharge instructions, and my questions have been answered. I have discussed any challenges I see with this plan with the nurse or doctor.    ..........................................................................................................................................  Patient/Patient Representative Signature      ..........................................................................................................................................  Patient Representative Print Name and Relationship to Patient    ..................................................               ................................................  Date                                            Time    ..........................................................................................................................................  Reviewed by Signature/Title    ...................................................              ..............................................  Date                                                            Time

## 2018-02-20 ENCOUNTER — HOSPITAL ENCOUNTER (INPATIENT)
Facility: CLINIC | Age: 83
LOS: 3 days | Discharge: NURSING, REHAB OR LONG-TERM CARE | DRG: 552 | End: 2018-02-23
Attending: EMERGENCY MEDICINE | Admitting: INTERNAL MEDICINE
Payer: MEDICARE

## 2018-02-20 ENCOUNTER — APPOINTMENT (OUTPATIENT)
Dept: GENERAL RADIOLOGY | Facility: CLINIC | Age: 83
DRG: 552 | End: 2018-02-20
Attending: EMERGENCY MEDICINE
Payer: MEDICARE

## 2018-02-20 ENCOUNTER — APPOINTMENT (OUTPATIENT)
Dept: CT IMAGING | Facility: CLINIC | Age: 83
DRG: 552 | End: 2018-02-20
Attending: EMERGENCY MEDICINE
Payer: MEDICARE

## 2018-02-20 DIAGNOSIS — S32.010A CLOSED COMPRESSION FRACTURE OF FIRST LUMBAR VERTEBRA, INITIAL ENCOUNTER: ICD-10-CM

## 2018-02-20 DIAGNOSIS — I48.20 CHRONIC ATRIAL FIBRILLATION (H): ICD-10-CM

## 2018-02-20 DIAGNOSIS — R09.02 HYPOXIA: ICD-10-CM

## 2018-02-20 DIAGNOSIS — R33.9 URINARY RETENTION: Primary | ICD-10-CM

## 2018-02-20 LAB
INR PPP: 3.46 (ref 0.86–1.14)
MAGNESIUM SERPL-MCNC: 2.5 MG/DL (ref 1.6–2.3)

## 2018-02-20 PROCEDURE — 72170 X-RAY EXAM OF PELVIS: CPT

## 2018-02-20 PROCEDURE — 12000000 ZZH R&B MED SURG/OB

## 2018-02-20 PROCEDURE — 99223 1ST HOSP IP/OBS HIGH 75: CPT | Mod: AI | Performed by: INTERNAL MEDICINE

## 2018-02-20 PROCEDURE — 36415 COLL VENOUS BLD VENIPUNCTURE: CPT | Performed by: INTERNAL MEDICINE

## 2018-02-20 PROCEDURE — 83735 ASSAY OF MAGNESIUM: CPT | Performed by: INTERNAL MEDICINE

## 2018-02-20 PROCEDURE — 71250 CT THORAX DX C-: CPT

## 2018-02-20 PROCEDURE — 25000132 ZZH RX MED GY IP 250 OP 250 PS 637: Mod: GY | Performed by: PHYSICIAN ASSISTANT

## 2018-02-20 PROCEDURE — 85610 PROTHROMBIN TIME: CPT | Performed by: INTERNAL MEDICINE

## 2018-02-20 PROCEDURE — 99285 EMERGENCY DEPT VISIT HI MDM: CPT | Mod: 25

## 2018-02-20 PROCEDURE — 72131 CT LUMBAR SPINE W/O DYE: CPT

## 2018-02-20 PROCEDURE — 25000128 H RX IP 250 OP 636: Performed by: PHYSICIAN ASSISTANT

## 2018-02-20 RX ORDER — CYCLOBENZAPRINE HCL 5 MG
5 TABLET ORAL 3 TIMES DAILY PRN
Status: DISCONTINUED | OUTPATIENT
Start: 2018-02-20 | End: 2018-02-21

## 2018-02-20 RX ORDER — LEVOTHYROXINE SODIUM 100 UG/1
100 TABLET ORAL DAILY
Status: DISCONTINUED | OUTPATIENT
Start: 2018-02-21 | End: 2018-02-23 | Stop reason: HOSPADM

## 2018-02-20 RX ORDER — HYDROXYZINE HYDROCHLORIDE 25 MG/1
25 TABLET, FILM COATED ORAL EVERY 6 HOURS PRN
Status: DISCONTINUED | OUTPATIENT
Start: 2018-02-20 | End: 2018-02-23 | Stop reason: HOSPADM

## 2018-02-20 RX ORDER — MAGNESIUM SULFATE HEPTAHYDRATE 40 MG/ML
4 INJECTION, SOLUTION INTRAVENOUS EVERY 4 HOURS PRN
Status: DISCONTINUED | OUTPATIENT
Start: 2018-02-20 | End: 2018-02-23 | Stop reason: HOSPADM

## 2018-02-20 RX ORDER — ONDANSETRON 4 MG/1
4 TABLET, ORALLY DISINTEGRATING ORAL EVERY 6 HOURS PRN
Status: DISCONTINUED | OUTPATIENT
Start: 2018-02-20 | End: 2018-02-23 | Stop reason: HOSPADM

## 2018-02-20 RX ORDER — POTASSIUM CHLORIDE 1.5 G/1.58G
20-40 POWDER, FOR SOLUTION ORAL
Status: DISCONTINUED | OUTPATIENT
Start: 2018-02-20 | End: 2018-02-23 | Stop reason: HOSPADM

## 2018-02-20 RX ORDER — POLYETHYLENE GLYCOL 3350 17 G/17G
17 POWDER, FOR SOLUTION ORAL DAILY PRN
Status: DISCONTINUED | OUTPATIENT
Start: 2018-02-20 | End: 2018-02-23 | Stop reason: HOSPADM

## 2018-02-20 RX ORDER — CITALOPRAM HYDROBROMIDE 10 MG/1
10 TABLET ORAL DAILY
Status: DISCONTINUED | OUTPATIENT
Start: 2018-02-21 | End: 2018-02-23 | Stop reason: HOSPADM

## 2018-02-20 RX ORDER — AMOXICILLIN 250 MG
2 CAPSULE ORAL 2 TIMES DAILY PRN
Status: DISCONTINUED | OUTPATIENT
Start: 2018-02-20 | End: 2018-02-23 | Stop reason: HOSPADM

## 2018-02-20 RX ORDER — CALCIUM CARBONATE 500 MG/1
500-1000 TABLET, CHEWABLE ORAL 2 TIMES DAILY PRN
Status: DISCONTINUED | OUTPATIENT
Start: 2018-02-20 | End: 2018-02-23 | Stop reason: HOSPADM

## 2018-02-20 RX ORDER — ASPIRIN 81 MG/1
81 TABLET ORAL DAILY
Status: DISCONTINUED | OUTPATIENT
Start: 2018-02-21 | End: 2018-02-23 | Stop reason: HOSPADM

## 2018-02-20 RX ORDER — FERROUS SULFATE 325(65) MG
325 TABLET ORAL 2 TIMES DAILY
Status: DISCONTINUED | OUTPATIENT
Start: 2018-02-20 | End: 2018-02-23 | Stop reason: HOSPADM

## 2018-02-20 RX ORDER — LISINOPRIL 10 MG/1
10 TABLET ORAL DAILY
Status: DISCONTINUED | OUTPATIENT
Start: 2018-02-20 | End: 2018-02-21

## 2018-02-20 RX ORDER — ONDANSETRON 2 MG/ML
4 INJECTION INTRAMUSCULAR; INTRAVENOUS EVERY 6 HOURS PRN
Status: DISCONTINUED | OUTPATIENT
Start: 2018-02-20 | End: 2018-02-23 | Stop reason: HOSPADM

## 2018-02-20 RX ORDER — AMOXICILLIN 250 MG
1 CAPSULE ORAL 2 TIMES DAILY PRN
Status: DISCONTINUED | OUTPATIENT
Start: 2018-02-20 | End: 2018-02-23 | Stop reason: HOSPADM

## 2018-02-20 RX ORDER — POTASSIUM CHLORIDE 7.45 MG/ML
10 INJECTION INTRAVENOUS
Status: DISCONTINUED | OUTPATIENT
Start: 2018-02-20 | End: 2018-02-23 | Stop reason: HOSPADM

## 2018-02-20 RX ORDER — ACETAMINOPHEN 325 MG/1
650 TABLET ORAL EVERY 4 HOURS PRN
Status: DISCONTINUED | OUTPATIENT
Start: 2018-02-20 | End: 2018-02-23 | Stop reason: HOSPADM

## 2018-02-20 RX ORDER — POTASSIUM CHLORIDE 1500 MG/1
20-40 TABLET, EXTENDED RELEASE ORAL
Status: DISCONTINUED | OUTPATIENT
Start: 2018-02-20 | End: 2018-02-23 | Stop reason: HOSPADM

## 2018-02-20 RX ORDER — POTASSIUM CL/LIDO/0.9 % NACL 10MEQ/0.1L
10 INTRAVENOUS SOLUTION, PIGGYBACK (ML) INTRAVENOUS
Status: DISCONTINUED | OUTPATIENT
Start: 2018-02-20 | End: 2018-02-23 | Stop reason: HOSPADM

## 2018-02-20 RX ORDER — HYDROMORPHONE HYDROCHLORIDE 1 MG/ML
0.2 INJECTION, SOLUTION INTRAMUSCULAR; INTRAVENOUS; SUBCUTANEOUS
Status: DISCONTINUED | OUTPATIENT
Start: 2018-02-20 | End: 2018-02-23 | Stop reason: HOSPADM

## 2018-02-20 RX ORDER — NALOXONE HYDROCHLORIDE 0.4 MG/ML
.1-.4 INJECTION, SOLUTION INTRAMUSCULAR; INTRAVENOUS; SUBCUTANEOUS
Status: DISCONTINUED | OUTPATIENT
Start: 2018-02-20 | End: 2018-02-23 | Stop reason: HOSPADM

## 2018-02-20 RX ORDER — POTASSIUM CHLORIDE 29.8 MG/ML
20 INJECTION INTRAVENOUS
Status: DISCONTINUED | OUTPATIENT
Start: 2018-02-20 | End: 2018-02-23 | Stop reason: HOSPADM

## 2018-02-20 RX ORDER — LIDOCAINE 4 G/G
1-2 PATCH TOPICAL
Status: DISCONTINUED | OUTPATIENT
Start: 2018-02-20 | End: 2018-02-23 | Stop reason: HOSPADM

## 2018-02-20 RX ADMIN — SODIUM CHLORIDE 500 ML: 9 INJECTION, SOLUTION INTRAVENOUS at 18:33

## 2018-02-20 RX ADMIN — LIDOCAINE 2 PATCH: 560 PATCH PERCUTANEOUS; TOPICAL; TRANSDERMAL at 20:20

## 2018-02-20 ASSESSMENT — ENCOUNTER SYMPTOMS
NAUSEA: 0
HEADACHES: 0
SHORTNESS OF BREATH: 0
NECK PAIN: 0
ABDOMINAL PAIN: 0
BACK PAIN: 1
VOMITING: 0

## 2018-02-20 NOTE — IP AVS SNAPSHOT
` `           [facility] [facility]: [phone]            Medication Administration Report for Francesco Killian as of 02/23/18 1655   Legend:    Given Hold Not Given Due Canceled Entry Other Actions    Time Time (Time) Time  Time-Action       Inactive    Active    Linked        Medications 02/17/18 02/18/18 02/19/18 02/20/18 02/21/18 02/22/18 02/23/18    acetaminophen (TYLENOL) tablet 650 mg  Dose: 650 mg  Freq: EVERY 4 HOURS PRN Route: PO  PRN Reason: mild pain  Start: 02/20/18 1617   Admin Instructions: Alternate ibuprofen (if ordered) with acetaminophen.  Maximum acetaminophen dose from all sources = 75 mg/kg/day not to exceed 4 grams/day.               aspirin EC EC tablet 81 mg  Dose: 81 mg  Freq: DAILY Route: PO  Start: 02/21/18 0800        1143 (81 mg)-Given        1103 (81 mg)-Given        0752 (81 mg)-Given           calcium carbonate (TUMS) chewable tablet 500-1,000 mg  Dose: 500-1,000 mg  Freq: 2 TIMES DAILY PRN Route: PO  PRN Reason: heartburn  Start: 02/20/18 1624         1524 (500 mg)-Given       1748 (1,000 mg)-Given            citalopram (celeXA) tablet 10 mg  Dose: 10 mg  Freq: DAILY Route: PO  Start: 02/21/18 0800        1144 (10 mg)-Given        1057 (10 mg)-Given        0752 (10 mg)-Given           diclofenac (VOLTAREN) 1 % topical gel 4 g  Dose: 4 g  Freq: 4 TIMES DAILY PRN Route: TD  PRN Reason: moderate pain  Start: 02/22/18 1745   Admin Instructions: Apply to low back  Send dosing card with product.               diphenhydrAMINE (BENADRYL) 25 mg, acetaminophen (TYLENOL) 325 mg alternative for Tylenol PM  Freq: AT BEDTIME PRN Route: PO  PRN Reason: sleep  Start: 02/20/18 1624   Admin Instructions: Give both components as replacement for Tylenol PM          2044 ( )-Given            ferrous sulfate (IRON) tablet 325 mg  Dose: 325 mg  Freq: 2 TIMES DAILY Route: PO  Start: 02/20/18 2000       (2025)-Not Given        (1147)-Not Given [C]       (2038)-Not Given [C]        4317 (325  mg)-Given       2043 (325 mg)-Given        0753 (325 mg)-Given       [ ] 2000           furosemide (LASIX) tablet 20 mg  Dose: 20 mg  Freq: DAILY Route: PO  Start: 02/22/18 1445         1524 (20 mg)-Given        0752 (20 mg)-Given           HYDROmorphone (PF) (DILAUDID) injection 0.2 mg  Dose: 0.2 mg  Freq: EVERY 2 HOURS PRN Route: IV  PRN Reason: moderate to severe pain  Start: 02/20/18 2040   Admin Instructions: For ordered doses up to 4 mg give IV Push undiluted. Administer each 2mg over 2-5 minutes.               hydrOXYzine (ATARAX) tablet 25 mg  Dose: 25 mg  Freq: EVERY 6 HOURS PRN Route: PO  PRN Reason: other  PRN Comment: adjuvant pain  Start: 02/20/18 1634              levothyroxine (SYNTHROID/LEVOTHROID) tablet 100 mcg  Dose: 100 mcg  Freq: DAILY Route: PO  Start: 02/21/18 0800   Admin Instructions: Separate oral administration of iron- or calcium-containing products and levothyroxine by at least 4 hours.         1143 (100 mcg)-Given        1057 (100 mcg)-Given        0753 (100 mcg)-Given           Lidocaine (LIDOCARE) 4 % Patch 1-2 patch  Dose: 1-2 patch  Freq: EVERY 24 HOURS 0800 Route: TD  Start: 02/20/18 1645   Admin Instructions: Apply patch(s) to apply to low back. To prevent lidocaine toxicity, patient should be patch free for 12 hrs daily. Patches may be cut to smaller size prior to removing release liner.  NEVER APPLY HEAT OVER PATCH which increases absorption and risk of local anesthetic toxicity. Do not apply over area where liposomal bupivacaine was injected for 96 hours post injection.        2020 (2 patch)-Given [C]        0758 (2 patch)-Given [C]        (1101)-Not Given        0753 (1 patch)-Given [C]           lidocaine patch in PLACE  Freq: EVERY 8 HOURS Route: TD  Start: 02/20/18 1630   Admin Instructions: Chart every shift, confirming that patch is still in place on patient (no barcode scan needed). See patch order for dose information.  NEVER APPLY HEAT OVER PATCH which will increase  absorption and may lead to risk of local anesthetic toxicity. Do not apply over area where liposomal bupivacaine injected for 96 hours.        2032 ( )-Positive        0000 ( )-Patch in Place       0806 ( )-Patch in Place       1549 ( )-Patch in Place        0400 ( )-Patch in Place       1101 ( )-Patch in Place       1612 ( )-Patch in Place               0801 ( )-Positive       1604 ( )-Negative [C]           lidocaine patch REMOVAL  Freq: EVERY 24 HOURS 2000 Route: TD  Start: 02/20/18 2000   Admin Instructions: Patient should have a 12 hour patch free interval                0806 ( )-Patch Removed        1101 ( )-Given        0802 ( )-Negative [C]           magnesium sulfate 4 g in 100 mL sterile water (premade)  Dose: 4 g  Freq: EVERY 4 HOURS PRN Route: IV  PRN Reason: magnesium supplementation  Start: 02/20/18 1617   Admin Instructions: For serum Mg++ less than 1.6 mg/dL  Give 4 g and recheck magnesium level 2 hours after dose, and next AM.               melatonin tablet 1 mg  Dose: 1 mg  Freq: AT BEDTIME PRN Route: PO  PRN Reason: sleep  Start: 02/20/18 1615   Admin Instructions: Do not give unless at least 6 hours of uninterrupted sleep is expected.               naloxone (NARCAN) injection 0.1-0.4 mg  Dose: 0.1-0.4 mg  Freq: EVERY 2 MIN PRN Route: IV  PRN Reason: opioid reversal  Start: 02/20/18 1615   Admin Instructions: For respiratory rate LESS than or EQUAL to 8.  Partial reversal dose:  0.1 mg titrated q 2 minutes for Analgesia Side Effects Monitoring Sedation Level of 3 (frequently drowsy, arousable, drifts to sleep during conversation).Full reversal dose:  0.4 mg bolus for Analgesia Side Effects Monitoring Sedation Level of 4 (somnolent, minimal or no response to stimulation).  For ordered doses up to 2mg give IVP. Give each 0.4mg over 15 seconds in emergency situations. For non-emergent situations further dilute in 9mL of NS to facilitate titration of response.               ondansetron (ZOFRAN-ODT)  ODT tab 4 mg  Dose: 4 mg  Freq: EVERY 6 HOURS PRN Route: PO  PRN Reasons: nausea,vomiting  Start: 02/20/18 1619   Admin Instructions: This is Step 1 of nausea and vomiting management.  If nausea not resolved in 15 minutes, go to Step 2 prochlorperazine (COMPAZINE). Do not push through foil backing. Peel back foil and gently remove. Place on tongue immediately. Administration with liquid unnecessary  With dry hands, peel back foil backing and gently remove tablet; do not push oral disintegrating tablet through foil backing; administer immediately on tongue and oral disintegrating tablet dissolves in seconds; then swallow with saliva; liquid not required.              Or  ondansetron (ZOFRAN) injection 4 mg  Dose: 4 mg  Freq: EVERY 6 HOURS PRN Route: IV  PRN Reasons: nausea,vomiting  Start: 02/20/18 1619   Admin Instructions: This is Step 1 of nausea and vomiting management.  If nausea not resolved in 15 minutes, go to Step 2 prochlorperazine (COMPAZINE).  Irritant. For ordered doses up to 4 mg, give IV Push undiluted over 2-5 minutes.               Patient is already receiving anticoagulation with heparin, enoxaparin (LOVENOX), warfarin (COUMADIN)  or other anticoagulant medication  Freq: CONTINUOUS PRN Route: XX  Start: 02/20/18 1616              polyethylene glycol (MIRALAX/GLYCOLAX) Packet 17 g  Dose: 17 g  Freq: DAILY PRN Route: PO  PRN Reason: constipation  Start: 02/20/18 1618   Admin Instructions: Give in 8oz of  water, juice, or soda. Hold for loose stools.  This is the second step of a three step constipation treatment.  1 Packet = 17 grams. Mixed prescribed dose in 8 ounces of water. Follow with 8 oz. of water.               potassium chloride (KLOR-CON) Packet 20-40 mEq  Dose: 20-40 mEq  Freq: EVERY 2 HOURS PRN Route: ORAL OR FEED  PRN Reason: potassium supplementation  Start: 02/20/18 1617   Admin Instructions: Use if unable to tolerate tablets.  If Serum K+ 3.0-3.3, dose = 60 mEq po total dose (40 mEq x1  followed in 2 hours by 20 mEq x1). Recheck K+ level 4 hours after dose and the next AM.  If Serum K+ 2.5-2.9, dose = 80 mEq po total dose (40 mEq Q2H x2). Recheck K+ level 4 hours after dose and the next AM.  If Serum K+ less than 2.5, See IV order.  Dissolve packet contents in 4-8 ounces of cold water or juice.               potassium chloride 10 mEq in 100 mL intermittent infusion with 10 mg lidocaine  Dose: 10 mEq  Freq: EVERY 1 HOUR PRN Route: IV  PRN Reason: potassium supplementation  Start: 02/20/18 1617   Admin Instructions: Infuse via PERIPHERAL LINE. Use potassium with lidocaine for pain with peripheral administration.  If Serum K+ 3.0-3.3, dose = 10 mEq/hr x4 doses (40 mEq IV total dose). Recheck K+ level 2 hours after dose and the next AM.  If Serum K+ less than 3.0, dose = 10 mEq/hr x6 doses (60 mEq IV total dose). Recheck K+ level 2 hours after dose and the next AM.               potassium chloride 10 mEq in 100 mL sterile water intermittent infusion (premix)  Dose: 10 mEq  Freq: EVERY 1 HOUR PRN Route: IV  PRN Reason: potassium supplementation  Start: 02/20/18 1617   Admin Instructions: Infuse via PERIPHERAL LINE or CENTRAL LINE. Use for central line replacement if patient weight less than 65 kg, if patient is on TPN with high potassium content or if unit does not stock 20 mEq bags.   If Serum K+ 3.0-3.3, dose = 10 mEq/hr x4 doses (40 mEq IV total dose). Recheck K+ level 2 hours after dose and the next AM.   If Serum K+ less than 3.0, dose = 10 mEq/hr x6 doses (60 mEq IV total dose). Recheck K+ level 2 hours after dose and the next AM.               potassium chloride 20 mEq in 50 mL intermittent infusion  Dose: 20 mEq  Freq: EVERY 1 HOUR PRN Route: IV  PRN Reason: potassium supplementation  Start: 02/20/18 1617   Admin Instructions: Infuse via CENTRAL LINE Only. May need EKG if less than 65 kg or on TPN - Max rate is 0.3 mEq/kg/hr for patients not on EKG monitoring.   If Serum K+ 3.0-3.3, dose = 20  mEq/hr x2 doses (40 mEq IV total dose). Recheck K+ level 2 hours after dose and the next AM.  If Serum K+ less than 3.0, dose = 20 mEq/hr x3 doses (60 mEq IV total dose). Recheck K+ level 2 hours after dose and the next AM.               potassium chloride SA (K-DUR/KLOR-CON M) CR tablet 20-40 mEq  Dose: 20-40 mEq  Freq: EVERY 2 HOURS PRN Route: PO  PRN Reason: potassium supplementation  Start: 02/20/18 1617   Admin Instructions: Use if able to take PO.   If Serum K+ 3.0-3.3, dose = 60 mEq po total dose (40 mEq x1 followed in 2 hours by 20 mEq x1). Recheck K+ level 4 hours after dose and the next AM.  If Serum K+ 2.5-2.9, dose = 80 mEq po total dose (40 mEq Q2H x2). Recheck K+ level 4 hours after dose and the next AM.  If Serum K+ less than 2.5, See IV order.  DO NOT CRUSH               senna-docusate (SENOKOT-S;PERICOLACE) 8.6-50 MG per tablet 1 tablet  Dose: 1 tablet  Freq: 2 TIMES DAILY PRN Route: PO  PRN Reason: constipation  Start: 02/20/18 1617   Admin Instructions: If no bowel movement in 24 hours, increase to 2 tablets PO.  Hold for loose stools.              Or  senna-docusate (SENOKOT-S;PERICOLACE) 8.6-50 MG per tablet 2 tablet  Dose: 2 tablet  Freq: 2 TIMES DAILY PRN Route: PO  PRN Reason: constipation  Start: 02/20/18 1617   Admin Instructions: Hold for loose stools.               tamsulosin (FLOMAX) capsule 0.4 mg  Dose: 0.4 mg  Freq: DAILY Route: PO  Start: 02/23/18 0800   Admin Instructions: Administer 30 minutes after the same meal each day.  Capsules should be swallowed whole; do not crush chew or open.           0823 (0.4 mg)-Given           traMADol (ULTRAM) half-tab 25 mg  Dose: 25 mg  Freq: EVERY 6 HOURS PRN Route: PO  PRN Reason: moderate pain  Start: 02/20/18 1623   Admin Instructions: Attempt use of Tylenol and topical Voltaren before offering Tramadol               Warfarin Therapy Reminder (Check START DATE - warfarin may be starting in the FUTURE)  Dose: 1 each  Freq: CONTINUOUS PRN Route:  XX  Start: 02/20/18 1625   Admin Instructions: *Note to reorder warfarin daily*  Pharmacy Warfarin Dosing Service  Patient is on Warfarin Therapy - check for daily order                 Dose: 1 each  Freq: NO DOSE TODAY (WARFARIN) Route: XX  Start: 02/22/18 1303   End: 02/22/18 2302   Admin Instructions: No dose of Warfarin due today per order          2302-Med Discontinued       Completed Medications  Medications 02/17/18 02/18/18 02/19/18 02/20/18 02/21/18 02/22/18 02/23/18         Dose: 500 mL  Freq: ONCE Route: IV  Last Dose: 500 mL (02/20/18 1833)  Start: 02/20/18 1630   End: 02/21/18 0633       1833 (500 mL)-New Bag                Dose: 10-20 mL  Freq: ONCE Route: UR  Start: 02/23/18 0145   End: 02/23/18 0150   Admin Instructions: Into penis for catheter insertion           0150 (10 mL)-Given             Dose: 1 mg  Freq: ONCE AT 6PM Route: PO  Start: 02/23/18 1800   End: 02/23/18 1647          1647 (1 mg)-Given [C]                    Dose: 1 mg  Freq: ONCE AT 6PM Route: PO  Start: 02/21/18 1800   End: 02/21/18 1730        1730 (1 mg)-Given            Discontinued Medications  Medications 02/17/18 02/18/18 02/19/18 02/20/18 02/21/18 02/22/18 02/23/18         Dose: 650 mg  Freq: EVERY 8 HOURS Route: PO  Start: 02/21/18 1600   End: 02/22/18 1736   Admin Instructions: Maximum acetaminophen dose from all sources = 75 mg/kg/day not to exceed 4 grams/day.         1730 (650 mg)-Given        0410 (650 mg)-Given       (1137)-Not Given       1736-Med Discontinued          Dose: 5 mg  Freq: 3 TIMES DAILY PRN Route: PO  PRN Reason: muscle spasms  Start: 02/20/18 1635   End: 02/21/18 1542        1542-Med Discontinued           Dose: 4 g  Freq: 3 TIMES DAILY Route: TD  Start: 02/21/18 1600   End: 02/22/18 1736   Admin Instructions: Apply to low back  Send dosing card with product.         (1930)-Not Given       2116 (4 g)-Given        (1057)-Not Given       (1400)-Not Given       1736-Med Discontinued          Dose: 10  mg  Freq: DAILY Route: PO  Start: 02/20/18 1630   End: 02/21/18 0935       (2025)-Not Given        0935-Med Discontinued                 Dose: 1 each  Freq: NO DOSE TODAY (WARFARIN) Route: XX  Start: 02/20/18 1852   End: 02/20/18 2351   Admin Instructions: No dose of Warfarin due today per order        2351-Med Discontinued       Medications 02/17/18 02/18/18 02/19/18 02/20/18 02/21/18 02/22/18 02/23/18

## 2018-02-20 NOTE — IP AVS SNAPSHOT
` ` Patient Information     Patient Name Sex     Francesco Killian ([id number]) Male 1920       Room Bed    [facility] 0644-      Patient Demographics     Address Phone E-mail Address     [address]   [facility] [id number] [phone] (Home)  none (Work)  none (Mobile) *Preferred* [email or web address]      Patient Ethnicity & Race     Ethnic Group Patient Race    American White      Emergency Contact(s)     Name Relation Home Work Mobile    Wilder Killian Son [phone] none [phone]    Lea Killian Daughter [phone]    Subha Ruelas Daughter [phone]    Whit Killian Relative [phone] none [phone]      Documents on File        Status Date Received Description       Documents for the Patient    Privacy Notice - [place] Received 13     External Medication Information Consent Accepted 13     Consent for Services - Hospital/Clinic Received () 13     Consent for EHR Access Received 13     [facility] Specified Other       Insurance Card Received () 13 Medicare, BCBS RX    Consent to Communicate Received 13     HIM LINH Authorization - File Only   release for records from [facility]  6/3/13    HIM LINH Authorization - File Only   release for records from [facility] 6/3/13    Insurance Card Received 13 rx card    Immunization Record   [facility]  Immunizations    HIM LINH Authorization - File Only   [facility] Consent    Patient ID Received () 13     Immunization Record   Outside Records: Immunizations    HIM LINH Authorization - File Only   [employer] Access 9.9.13    Insurance Card Received 14 bcbs    Insurance Card Received 07/15/16 medicare    Consent for Services - Hospital/Clinic Received () 14     Insurance Card Received 16 BCBS    Immunization Record   Immunizations from [facility] 6.7.13    Advance  Directives and Living Will Received 05/21/15 POLST 5/19/15    HIM LINH Authorization - File Only   Consent signed for [facility] 5/13/15    Consent for Services - Hospital/Clinic Received () 07/06/15     Patient ID Received 17 [place] ID  [facility]    Consent to Communicate Received 10/13/15     Consent for Services/Privacy Notice - Hospital/Clinic Received () 16     [facility] Specified Other Received () 16     Insurance Card Received 17 [employer]    Consent for Services/Privacy Notice - Hospital/Clinic Received 17     Care Everywhere Prospective Auth Received 17     Insurance Card Received (Deleted) 13     Patient ID  (Deleted)         Documents for the Encounter    CMS IM for Patient Signature Received 18     CMS IM for Patient Signature Received 18 2nd IMM      Admission Information     Attending Provider Admitting Provider Admission Type Admission Date/Time    Goldy Larsen MD Chamoun, Fady Mounir, MD Emergency 18  1132    Discharge Date Hospital Service Auth/Cert Status Service Area     General Medicine [facility] SERVICES    Unit Room/Bed Admission Status        ORTHO SPINE 0644/0644-01 Admission (Confirmed)       Admission     Complaint    Compression fracture of L1 lumbar vertebra (H)      Hospital Account     Name Acct ID Class Status Primary Coverage    Francesco Killian [id number] Inpatient Open MEDICARE - MEDICARE FOR HB SUPPLEMENT            Guarantor Account (for Hospital Account #[id number])     Name Relation to Pt Service Area Active? Acct Type    Francesco Killian Self FCS Yes Personal/Family    Address Phone          [address] [place] [address] [phone](H)  none(O)              Coverage Information (for Hospital Account #[id number])     1. MEDICARE/MEDICARE FOR HB SUPPLEMENT     F/O Payor/Plan Precert #    MEDICARE/MEDICARE FOR HB SUPPLEMENT     Subscriber  Subscriber #    Francesco Killian [id number]    Address Phone    ATTN CLAIMS  [address]  [facility] [place] [address] [phone]          2. MEDICA/MEDICA PRIME SOLUTION     F/O Payor/Plan Precert #    MEDICA/MEDICA PRIME SOLUTION     Subscriber Subscriber #    Francesco Killian [id number]    Address Phone    [address] [address]  [place] [address] [phone]
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
## 2018-02-20 NOTE — ED NOTES
Discharge instructions gone over with pt and son, verbalized understanding. Discharged home with plan for follow up and no new prescriptions. Denies questions at time of discharge.

## 2018-02-20 NOTE — ED NOTES
Pt had a fall yesterday and was seen here for eval.  Sent home without pain medications.  Family stayed with him overnight and he has not been able to move as well, was incontinent of urine this am due to inability to move.  Pt states he only has pain when he moves.  Pt is hypoxic and tachypnec also.
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
## 2018-02-20 NOTE — PHARMACY-ADMISSION MEDICATION HISTORY
Admission medication history interview status for this patient is complete. See [facility] admission navigator for allergy information, prior to admission medications and immunization status.     Medication history interview source(s):Patient  Medication history resources (including written lists, pill bottles, clinic record):None  Primary pharmacy:-    Changes made to PTA medication list:  Added: -  Deleted: -  Changed: -    Actions taken by pharmacist (provider contacted, etc):Spoke to Son to confirm meds     Additional medication history information:Per son , pt has not been taking lisinopril for about 2 months as directed by cardiologist    Medication reconciliation/reorder completed by provider prior to medication history? No    Do you take OTC medications (eg tylenol, ibuprofen, fish oil, eye/ear drops, etc)? yes(Y/N)    For patients on insulin therapy: no (Y/N)  Lantus/levemir/NPH/Mix 70/30 dose:   (Y/N) (see Med list for doses)   Sliding scale Novolog Y/N  If Yes, do you have a baseline novolog pre-meal dose:  units with meals  Patients eat three meals a day:   Y/N    How many episodes of hypoglycemia do you have per week: _______  How many missed doses do you have per week: ______  How many times do you check your blood glucose per day: _______   Any Barriers to therapy - Be specific :  cost of medications, comfortable with giving injections (if applicable), comfortable and confident with current diabetes regimen: Y/N ______________      Prior to Admission medications    Medication Sig Last Dose Taking? Auth Provider   WARFARIN SODIUM PO 2 mg on MWF and 4 mg ROW 2/19/2018 at 2 mg  Yes Unknown, Entered By History   levothyroxine (SYNTHROID/LEVOTHROID) 100 MCG tablet TAKE 1 TABLET BY MOUTH EVERY DAY. 2/20/2018 at Unknown time Yes Angel Corea MD   citalopram (CELEXA) 10 MG tablet TAKE 1 TABLET BY MOUTH DAILY 2/20/2018 at Unknown time Yes Angel Corea MD   ketoconazole (NIZORAL) 2 % cream Apply to face BID  PRN  Yes Miranda Avendaño PA-C   furosemide (LASIX) 20 MG tablet Take 1 tablet (20 mg) by mouth daily  Patient taking differently: Take 20 mg by mouth  2/20/2018 at x1 Yes Angel Corea MD   diphenhydrAMINE-acetaminophen (TYLENOL PM)  MG tablet Take 1 tablet by mouth At Bedtime 2/19/2018 at Unknown time Yes Unknown, Entered By History   acetaminophen (TYLENOL 8 HOUR) 650 MG CR tablet Take 650 mg by mouth every evening as needed for mild pain or fever  Yes Unknown, Entered By History   Multiple Vitamins-Minerals (ICAPS PO) Take 1 capsule by mouth 2 times daily 2/20/2018 at x1 Yes Unknown, Entered By History   calcium carbonate (TUMS) 500 MG chewable tablet Take 1-2 chew tab by mouth 2 times daily as needed for heartburn  Yes Unknown, Entered By History   Ferrous Sulfate (IRON) 325 (65 FE) MG tablet Take 1 tablet by mouth 2 times daily 2/20/2018 at x1 Yes Reported, Patient   aspirin 81 MG tablet Take 81 mg by mouth daily  2/20/2018 at Unknown time Yes Reported, Patient   lisinopril (PRINIVIL/ZESTRIL) 10 MG tablet Take 1 tablet (10 mg) by mouth daily More than a month at Unknown time  Angel Corea MD   warfarin (COUMADIN) 2 MG tablet Take 2 mg by mouth once a week on Tuesday   Unknown, Entered By History   order for DME Testing being ordered: INR orders as directed to be done at [facility]  To be done monthly as directed.   Angel Corea MD

## 2018-02-20 NOTE — ED NOTES
[facility]  ED Nurse Handoff Report    Francesco Killian is a 89 year old male   ED Chief complaint: Fall  . ED Diagnosis:   Final diagnoses:   Closed compression fracture of first lumbar vertebra, initial encounter (H)   Hypoxia     Allergies: No Known Allergies    Code Status: DNR / DNI  Activity level - Baseline/Home:  Stand with Assist. Activity Level - Current:   Stand with Assist. Lift room needed: No. Bariatric: No   Needed: No   Isolation: No. Infection: Not Applicable.     Vital Signs:   Vitals:    02/20/18 1230 02/20/18 1245 02/20/18 1255 02/20/18 1332   BP: 120/76      Resp:  16     Temp:       SpO2:  94% 96% 98%       Cardiac Rhythm:  ,      Pain level: 0-10 Pain Scale: 0  Patient confused: No. Patient Falls Risk: Yes.   Elimination Status: Has voided   Patient Report - Initial Complaint: Fall, back pain. Focused Assessment:  Musculoskeletal - Orientation: lower Pain Body Location: back CMS Intact: Yes Musculoskeletal Comment: pt c/o pain when moving.  Tests Performed: XR, CT. Abnormal Results: See CT   Treatments provided: Reposition/comfort. Pain meds declined.  Family Comments: Son at bedside.  OBS brochure/video discussed/provided to patient:  N/A  ED Medications: Medications - No data to display  Drips infusing:  No  For the majority of the shift, the patient's behavior Green. Interventions performed were Call light within reach     Severe Sepsis OR Septic Shock Diagnosis Present: No      ED Nurse Name/Phone Number: Daniela Sweeney,   2:29 PM    RECEIVING UNIT ED HANDOFF REVIEW    Above ED Nurse Handoff Report was reviewed: Yes  Reviewed by: Bettie Corbin on February 20, 2018 at 3:25 PM

## 2018-02-20 NOTE — ED NOTES
Bed: ED17  Expected date: 2/20/18  Expected time: 11:23 AM  Means of arrival: Ambulance  Comments:  BV1
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
## 2018-02-20 NOTE — LETTER
"Transition Communication Hand-off for Care Transitions to Next Level of Care Provider    Name: Francesco Killian  MRN #: [id number]  Primary Care Provider: Angel Corea  Primary Care MD Name: Anmol  Primary Clinic: [address] [facility] [address]     Reason for Hospitalization:  Hypoxia [R09.02]  Closed compression fracture of first lumbar vertebra, initial encounter (H) [S32.010A]  Admit Date/Time: 2/20/2018 11:32 AM  Discharge Date: 2/23/2018   Payor Source: Payor: MEDICA / Plan: MEDICA PRIME SOLUTION / Product Type: Indemnity /     Readmission Assessment Measure (DIAMOND) Risk Score/category: Average          Reason for Communication Hand-off Referral: Fragility    Patient will receive the following: TCU  [facility]   Ware Recommendations:     Physical Therapy:  0/10 pain at rest, \"intense pain\" with transitional movements such as log roll in bed, and sitting up.  Pain \"mild\" when walking. Mod A x2 log roll in bed>side lying to sit EOB w/ mod>max x2 due to pain. Stood with min A x2. Walked about 15' in room with fww, flexed posture, min A x1-2 for safety.     SPEECH THERAPY: Pt with overt s/sx of aspiration on large sip of nectar thick liquids, however no other s/sx of aspiration with cues to take small sips and double swallow. Recommend advance to dysphagia diet 2 and nectar thick liquids with intermittent supervision. Caregivers to encourage small sips/bites and double swallows.   CLINIC TO follow for Goals of Care planning.             Heena Snyder RN   Care Transitions Team  [phone]                  "

## 2018-02-20 NOTE — IP AVS SNAPSHOT
"          [facility]: [phone]                                              INTERAGENCY TRANSFER FORM - PHYSICIAN ORDERS   2018                    Hospital Admission Date: 2018  RUBIO MCCARTNEY   : 1920  Sex: Male        Attending Provider: Goldy Larsen MD     Allergies:  No Known Allergies    Infection:  None   Service:  GENERAL MEDI    Ht:  1.727 m (5' 8\")   Wt:  64.8 kg (142 lb 12.8 oz)   Admission Wt:  63.9 kg (140 lb 12.8 oz)    BMI:  21.71 kg/m 2   BSA:  1.76 m 2            Patient PCP Information     Provider PCP Type    Angel Corea MD General      ED Clinical Impression     Diagnosis Description Comment Added By Time Added    Closed compression fracture of first lumbar vertebra, initial encounter (H) [S32.010A] Closed compression fracture of first lumbar vertebra, initial encounter (H) [S32.010A]  Bassam Berg MD 2018  2:21 PM    Hypoxia [R09.02] Hypoxia [R09.02]  Bassam Berg MD 2018  2:21 PM      Hospital Problems as of 2018              Priority Class Noted POA    Compression fracture of L1 lumbar vertebra (H) Medium  2018 Yes      Non-Hospital Problems as of 2018              Priority Class Noted    Hyperlipidemia LDL goal <130 Medium  6/3/2013    AAA (abdominal aortic aneurysm) (H) Medium  6/3/2013    Dysphagia Medium  6/3/2013    Cerebral infarction (H) Medium  6/3/2013    Chronic atrial fibrillation (H) Medium  2013    Health Care Home Medium  2013    ACP (advance care planning) Medium  2013    Esophageal stricture Medium  2013    Macrocytic anemia Medium  2013    Other specified hypothyroidism Medium  2015    Right bundle branch block (RBBB) Medium  2015    Recurrent falls~occulovestibular syndrom Medium  2015    Wet senile macular degeneration (H) Medium  10/26/2015    Long-term (current) use of anticoagulants [Z79.01] Medium  12/3/2015    Major depressive " disorder, single episode, mild (H) Medium  4/21/2016    Essential hypertension with goal blood pressure less than 140/90 Medium  10/24/2016    Acquired hypothyroidism Medium  10/24/2016    CHF (congestive heart failure) (H) Medium  2/3/2017    Acute kidney failure (H) Medium  2/6/2017      Code Status History     Date Active Date Inactive Code Status Order ID Comments User Context    2/23/2018 10:26 AM  DNR/DNI [id number]  Favian Pereira MD Outpatient    2/20/2018  4:28 PM 2/23/2018 10:26 AM DNR/DNI [id number]  Sydnie Johnson PA-C Inpatient    2/6/2017 11:11 AM 2/20/2018  4:28 PM DNR/DNI [id number]  Jordon Esparza MD Outpatient    2/4/2017  1:37 AM 2/6/2017 11:11 AM DNR/DNI [id number]  Jordon Esparza MD Inpatient    8/31/2015  2:10 PM 2/4/2017  1:37 AM DNR/DNI [id number]  Javier Schulte DO Outpatient    8/29/2015  9:45 PM 8/31/2015  2:10 PM DNR/DNI [id number]  Georgette Mcrae RN Inpatient    8/29/2015  9:01 PM 8/29/2015  9:44 PM Full Code [id number]  Katia Abreu MD Inpatient         Medication Review      START taking        Dose / Directions Comments    diclofenac 1 % Gel topical gel   Commonly known as:  VOLTAREN        Dose:  4 g   Place 4 g onto the skin 4 times daily as needed for moderate pain Please have container at bedside sent to pharmacy to re-label for dismissal   Quantity:  100 g   Refills:  3        Lidocaine-Menthol 4-1 % Ptch        Dose:  1 patch   Externally apply 1 patch topically daily   Quantity:  15 patch   Refills:  3        polyethylene glycol Packet   Commonly known as:  MIRALAX/GLYCOLAX        Dose:  17 g   Take 17 g by mouth daily as needed for constipation   Quantity:  7 packet   Refills:  0        tamsulosin 0.4 MG capsule   Commonly known as:  FLOMAX   Used for:  Urinary retention        Dose:  0.4 mg   Start taking on:  2/24/2018   Take 1 capsule (0.4 mg) by mouth daily   Quantity:  60 capsule   Refills:  0          CONTINUE these medications which  may have CHANGED, or have new prescriptions. If we are uncertain of the size of tablets/capsules you have at home, strength may be listed as something that might have changed.        Dose / Directions Comments    furosemide 20 MG tablet   Commonly known as:  LASIX   This may have changed:  when to take this   Used for:  Essential hypertension, malignant        Dose:  20 mg   Take 1 tablet (20 mg) by mouth daily   Quantity:  180 tablet   Refills:  3          CONTINUE these medications which have NOT CHANGED        Dose / Directions Comments    aspirin 81 MG tablet        Dose:  81 mg   Take 81 mg by mouth daily   Refills:  0        calcium carbonate 500 MG chewable tablet   Commonly known as:  TUMS        Dose:  1-2 chew tab   Take 1-2 chew tab by mouth 2 times daily as needed for heartburn   Refills:  0        citalopram 10 MG tablet   Commonly known as:  celeXA   Used for:  Episodic mood disorder (H)        TAKE 1 TABLET BY MOUTH DAILY   Quantity:  90 tablet   Refills:  1        diphenhydrAMINE-acetaminophen  MG tablet   Commonly known as:  TYLENOL PM        Dose:  1 tablet   Take 1 tablet by mouth At Bedtime   Refills:  0        ICAPS PO        Dose:  1 capsule   Take 1 capsule by mouth 2 times daily   Refills:  0        iron 325 (65 FE) MG tablet        Dose:  1 tablet   Take 1 tablet by mouth 2 times daily   Refills:  0        ketoconazole 2 % cream   Commonly known as:  NIZORAL   Used for:  Dermatitis, seborrheic        Apply to face BID PRN   Quantity:  30 g   Refills:  1        levothyroxine 100 MCG tablet   Commonly known as:  SYNTHROID/LEVOTHROID   Used for:  Other specified hypothyroidism        TAKE 1 TABLET BY MOUTH EVERY DAY.   Quantity:  90 tablet   Refills:  0        order for DME   Used for:  Chronic atrial fibrillation (H)        Testing being ordered: INR orders as directed to be done at [facility] To be done monthly as directed.   Quantity:  1 Month   Refills:  orn         TYLENOL 8 HOUR 650 MG CR tablet   Generic drug:  acetaminophen        Dose:  650 mg   Take 650 mg by mouth every evening as needed for mild pain or fever   Refills:  0        * warfarin 2 MG tablet   Commonly known as:  COUMADIN        Dose:  2 mg   Take 2 mg by mouth once a week on Tuesday   Refills:  0        * WARFARIN SODIUM PO        2 mg on MWF and 4 mg ROW   Refills:  0        * Notice:  This list has 2 medication(s) that are the same as other medications prescribed for you. Read the directions carefully, and ask your doctor or other care provider to review them with you.            Summary of Visit     Reason for your hospital stay       You were admitted for a fall leading to a lumbar vertebral compression fracture.  You were seen by our Neurosurgery team who recommended you only consider bracing if needed for comfort.      You were seen by physical therapy and due to your mobility being worse than at baseline you will discharge to a transitional care unit.             After Care     Activity - Up with nursing assistance           Advance Diet as Tolerated       Follow this diet upon discharge: Orders Placed This Encounter      Combination Diet Dysphagia Diet Level 2: Mechan Altered; Nectar Thickened Liquids (pre-thickened or use instant food thickener)       Bladder scan       X 2 for post void residual, consider alcaraz placement vs straight cath for PVR > 500       Daily weights       Call Provider for weight gain of more than 2 pounds per day or 5 pounds per week.       Fall precautions           General info for SNF       Length of Stay Estimate: Short Term Care: Estimated # of Days <30  Condition at Discharge: Stable  Level of care:skilled   Rehabilitation Potential: Good  Admission H&P remains valid and up-to-date: Yes  Recent Chemotherapy: N/A  Use Nursing Home Standing Orders: N/A       Mantoux instructions       Give two-step Mantoux (PPD) Per Facility Policy Yes             Referrals      Occupational Therapy Adult Consult       Evaluate and treat as clinically indicated.    Reason:  deconditioning       Physical Therapy Adult Consult       Evaluate and treat as clinically indicated.    Reason:  deconditioning       Speech Language Path Adult Consult       Evaluate and treat as clinically indicated.    Reason:  deconditioning             Your next 10 appointments already scheduled     Mar 15, 2018 12:00 PM CDT   [facility]tico[facility] with [facility]   [facility]    [address] [id number] [phone]              Follow-Up Appointment Instructions     Future Labs/Procedures    Follow Up and recommended labs and tests     Comments:    Follow up with [facility] physician.  The following labs/tests are recommended: review blood pressure readings, review bladder scan readings, recheck basic metabolic panel in 3 days.    Please recheck INR in 2 days to see if dose adjustment is needed.      Follow-Up Appointment Instructions     Follow Up and recommended labs and tests       Follow up with [facility] physician.  The following labs/tests are recommended: review blood pressure readings, review bladder scan readings, recheck basic metabolic panel in 3 days.    Please recheck INR in 2 days to see if dose adjustment is needed.             Statement of Approval     Ordered          02/23/18 1054  I have reviewed and agree with all the recommendations and orders detailed in this document.  EFFECTIVE NOW     Approved and electronically signed by:  Favian Pereira MD

## 2018-02-20 NOTE — ED NOTES
Bed: ED08  Expected date: 2/19/18  Expected time: 6:09 PM  Means of arrival: Ambulance  Comments:  BV2  89yo
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
## 2018-02-20 NOTE — IP AVS SNAPSHOT
[facility]    [address] [facility] [address]    Phone:  [phone]    Fax:  [phone]                                       After Visit Summary   2/20/2018    Francesco Killian    MRN: [id number]           After Visit Summary Signature Page     I have received my discharge instructions, and my questions have been answered. I have discussed any challenges I see with this plan with the nurse or doctor.    ..........................................................................................................................................  Patient/Patient Representative Signature      ..........................................................................................................................................  Patient Representative Print Name and Relationship to Patient    ..................................................               ................................................  Date                                            Time    ..........................................................................................................................................  Reviewed by Signature/Title    ...................................................              ..............................................  Date                                                            Time

## 2018-02-21 ENCOUNTER — APPOINTMENT (OUTPATIENT)
Dept: PHYSICAL THERAPY | Facility: CLINIC | Age: 83
DRG: 552 | End: 2018-02-21
Attending: PHYSICIAN ASSISTANT
Payer: MEDICARE

## 2018-02-21 ENCOUNTER — APPOINTMENT (OUTPATIENT)
Dept: SPEECH THERAPY | Facility: CLINIC | Age: 83
DRG: 552 | End: 2018-02-21
Attending: INTERNAL MEDICINE
Payer: MEDICARE

## 2018-02-21 LAB
ANION GAP SERPL CALCULATED.3IONS-SCNC: 6 MMOL/L (ref 3–14)
BUN SERPL-MCNC: 34 MG/DL (ref 7–30)
CALCIUM SERPL-MCNC: 8.8 MG/DL (ref 8.5–10.1)
CHLORIDE SERPL-SCNC: 103 MMOL/L (ref 94–109)
CO2 SERPL-SCNC: 29 MMOL/L (ref 20–32)
CREAT SERPL-MCNC: 1.35 MG/DL (ref 0.66–1.25)
ERYTHROCYTE [DISTWIDTH] IN BLOOD BY AUTOMATED COUNT: 13.2 % (ref 10–15)
GFR SERPL CREATININE-BSD FRML MDRD: 49 ML/MIN/1.7M2
GLUCOSE SERPL-MCNC: 159 MG/DL (ref 70–99)
HCT VFR BLD AUTO: 39.8 % (ref 40–53)
HGB BLD-MCNC: 13.1 G/DL (ref 13.3–17.7)
INR PPP: 3.25 (ref 0.86–1.14)
MAGNESIUM SERPL-MCNC: 2.3 MG/DL (ref 1.6–2.3)
MCH RBC QN AUTO: 32.3 PG (ref 26.5–33)
MCHC RBC AUTO-ENTMCNC: 32.9 G/DL (ref 31.5–36.5)
MCV RBC AUTO: 98 FL (ref 78–100)
PLATELET # BLD AUTO: 112 10E9/L (ref 150–450)
POTASSIUM SERPL-SCNC: 3.6 MMOL/L (ref 3.4–5.3)
RBC # BLD AUTO: 4.06 10E12/L (ref 4.4–5.9)
SODIUM SERPL-SCNC: 138 MMOL/L (ref 133–144)
WBC # BLD AUTO: 10.5 10E9/L (ref 4–11)

## 2018-02-21 PROCEDURE — 97161 PT EVAL LOW COMPLEX 20 MIN: CPT | Mod: GP | Performed by: PHYSICAL THERAPIST

## 2018-02-21 PROCEDURE — 99223 1ST HOSP IP/OBS HIGH 75: CPT | Performed by: NURSE PRACTITIONER

## 2018-02-21 PROCEDURE — 25000132 ZZH RX MED GY IP 250 OP 250 PS 637: Mod: GY | Performed by: CLINICAL NURSE SPECIALIST

## 2018-02-21 PROCEDURE — 12000000 ZZH R&B MED SURG/OB

## 2018-02-21 PROCEDURE — 36415 COLL VENOUS BLD VENIPUNCTURE: CPT | Performed by: PHYSICIAN ASSISTANT

## 2018-02-21 PROCEDURE — 99223 1ST HOSP IP/OBS HIGH 75: CPT | Performed by: CLINICAL NURSE SPECIALIST

## 2018-02-21 PROCEDURE — 92610 EVALUATE SWALLOWING FUNCTION: CPT | Mod: GN

## 2018-02-21 PROCEDURE — 92526 ORAL FUNCTION THERAPY: CPT | Mod: GN

## 2018-02-21 PROCEDURE — 25000132 ZZH RX MED GY IP 250 OP 250 PS 637: Mod: GY | Performed by: PHYSICIAN ASSISTANT

## 2018-02-21 PROCEDURE — 40000193 ZZH STATISTIC PT WARD VISIT: Performed by: PHYSICAL THERAPIST

## 2018-02-21 PROCEDURE — A9270 NON-COVERED ITEM OR SERVICE: HCPCS | Mod: GY | Performed by: PHYSICIAN ASSISTANT

## 2018-02-21 PROCEDURE — 85027 COMPLETE CBC AUTOMATED: CPT | Performed by: PHYSICIAN ASSISTANT

## 2018-02-21 PROCEDURE — 25000132 ZZH RX MED GY IP 250 OP 250 PS 637: Mod: GY

## 2018-02-21 PROCEDURE — 83735 ASSAY OF MAGNESIUM: CPT | Performed by: PHYSICIAN ASSISTANT

## 2018-02-21 PROCEDURE — 40000225 ZZH STATISTIC SLP WARD VISIT

## 2018-02-21 PROCEDURE — 85610 PROTHROMBIN TIME: CPT | Performed by: PHYSICIAN ASSISTANT

## 2018-02-21 PROCEDURE — A9270 NON-COVERED ITEM OR SERVICE: HCPCS | Mod: GY | Performed by: CLINICAL NURSE SPECIALIST

## 2018-02-21 PROCEDURE — 99233 SBSQ HOSP IP/OBS HIGH 50: CPT | Performed by: INTERNAL MEDICINE

## 2018-02-21 PROCEDURE — 97116 GAIT TRAINING THERAPY: CPT | Mod: GP | Performed by: PHYSICAL THERAPIST

## 2018-02-21 PROCEDURE — A9270 NON-COVERED ITEM OR SERVICE: HCPCS | Mod: GY

## 2018-02-21 PROCEDURE — 97530 THERAPEUTIC ACTIVITIES: CPT | Mod: GP | Performed by: PHYSICAL THERAPIST

## 2018-02-21 PROCEDURE — 80048 BASIC METABOLIC PNL TOTAL CA: CPT | Performed by: PHYSICIAN ASSISTANT

## 2018-02-21 RX ORDER — ACETAMINOPHEN 325 MG/1
650 TABLET ORAL EVERY 8 HOURS
Status: DISCONTINUED | OUTPATIENT
Start: 2018-02-21 | End: 2018-02-22

## 2018-02-21 RX ORDER — WARFARIN SODIUM 1 MG/1
1 TABLET ORAL
Status: COMPLETED | OUTPATIENT
Start: 2018-02-21 | End: 2018-02-21

## 2018-02-21 RX ADMIN — DICLOFENAC SODIUM 4 G: 10 GEL TOPICAL at 21:16

## 2018-02-21 RX ADMIN — CITALOPRAM HYDROBROMIDE 10 MG: 10 TABLET ORAL at 11:44

## 2018-02-21 RX ADMIN — LEVOTHYROXINE SODIUM 100 MCG: 100 TABLET ORAL at 11:43

## 2018-02-21 RX ADMIN — LIDOCAINE 2 PATCH: 560 PATCH PERCUTANEOUS; TOPICAL; TRANSDERMAL at 07:58

## 2018-02-21 RX ADMIN — WARFARIN SODIUM 1 MG: 1 TABLET ORAL at 17:30

## 2018-02-21 RX ADMIN — ACETAMINOPHEN 650 MG: 325 TABLET, FILM COATED ORAL at 17:30

## 2018-02-21 RX ADMIN — ASPIRIN 81 MG: 81 TABLET, COATED ORAL at 11:43
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
## 2018-02-21 NOTE — CONSULTS
.Care Transition Initial Assessment - DEREJE  Reason For Consult: discharge planning. Noted MD documentation indicating possible need for placement on discharge, discharge possible two days depending on hypoxia, pain control and mobility. Noted PT assessment with recommendation for pt's transfer to rehab facility on discharge.    Active Problems:    Compression fracture of L1 lumbar vertebra (H)       DATA  Lives With: spouse  Living Arrangements: apartment, assisted living  Description of Support System: Supportive, Involved  Who is your support system?: Children    Resources List: Skilled Nursing Facility     Quality Of Family Relationships: supportive, involved  Transportation Available:  (to be determined)    INTERVENTION   Spoke by phone to pt's son, Deepak who is listed as POA for pt. Noted of PTs recommendation for pt's transfer to rehab facility, noted also the documentation from MD (as noted above) regarding possibility of pt's discharge on Friday. Deepak affirms planning for pt's transfer to rehab facility noting that family would like to see pt return to his residence ([facility]) upon recovery. He notes that pt has been using a cane with walking short distances, walker for longer distances. Based on facility location, and family familiarity with the facility, Deepak has asked that referral be made to [facility], private room requested. Discussed with him that pt would have a private pay, private room fee at [facility] of $36/day which he acknowledged and accepted. DEREJE has spoken to admissions coordinator at [facility], they do currently have openings, referral made.     Discussed also with Deepak the availability of medical transport for pt's transfer to rehab facility, discussed also that w/c transport is not covered by pt's Medicare, cost of $70/base and $4.50/mi. Medical transport has been requested with consideration of family transport is pt would be able to tolerate.       PLAN  Financial costs for the patient includes: see above  Patient given options and choices for discharge: yes  Patient/family is agreeable to the plan?  Yes:   Patient Goals and Preferences: independence with ambulation   Patient anticipates discharging to:  rehab facility     SW will await further MD determination of discharge date, assessment from [facility] and continue planning accordingly.
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
## 2018-02-21 NOTE — PLAN OF CARE
Confused. Up with assist x 2, walker, and gait belt- bed/chair alarm on. Hearing aid in R ear. C/O back pain when HOB elevated or initially getting out of bed, denies need for intervention, Lidoderm patch in place x 2 on lower back, Nectar-full liquid diet, supervision with feedings. IV SL. O2 2L- unable to wean off oxygen this shift.   SLP/PT/OT/Pain and palliative/spine surgery following.
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
## 2018-02-21 NOTE — PLAN OF CARE
Problem: Patient Care Overview  Goal: Plan of Care/Patient Progress Review  OT: Consult received, chart reviewed, pt admitted with back pain and L1 compression fracture after falling at home; per conversation with PT and chart review will hold OT assessment this date and reschedule for tomorrow to allow more time and plan of care development, updated nursing staff

## 2018-02-21 NOTE — PLAN OF CARE
Problem: Patient Care Overview  Goal: Plan of Care/Patient Progress Review  Outcome: No Change  Slept well. Denies pain. Initially refused to allow cares but after sleeping a bit, was cooperative, pleasantly confused. Incontinent large amounts urine. NPO. Repositioned.

## 2018-02-21 NOTE — PHARMACY-ANTICOAGULATION SERVICE
Clinical Pharmacy - Warfarin Dosing Consult     Pharmacy has been consulted to manage this patient s warfarin therapy.  Indication: Atrial Fibrillation  Therapy Goal: INR 2-3  Warfarin Prior to Admission: Yes  Warfarin PTA Regimen:  (2mg M W F   4mg ROW)  Significant drug interactions:  (Aspirin)  Recent documented change in oral intake/nutrition: Unknown    INR   Date Value Ref Range Status   02/20/2018 3.46 (H) 0.86 - 1.14 Final   02/19/2018 2.58 (H) 0.86 - 1.14 Final       Recommend warfarin NO DOSE today.  Pharmacy will monitor Francesco Killian daily and order warfarin doses to achieve specified goal.      Please contact pharmacy as soon as possible if the warfarin needs to be held for a procedure or if the warfarin goals change.

## 2018-02-21 NOTE — PLAN OF CARE
Confused to time, place, and situation. Denies pain. Pt coughing up food when family feeding pt. Son in agreement to make pt NPO and put in a speech consult. O2 2L, continuous pulse ox on. Condom cath to be placed.     Hospitalist paged: CHAVEZ making pt NPO and putting in a speech consult

## 2018-02-21 NOTE — CONSULTS
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 never smoked. He has never used smokeless tobacco. He reports   that he does not drink alcohol or use illicit drugs.    Family History   I have reviewed this patient's family history and updated it with   pertinent information if needed.   Family History   Problem Relation Age of Onset     C.A.D. Mother      Coronary Artery Disease Mother      C.A.D. Father      C.A.D. Maternal Grandmother      C.A.D. Maternal Grandfather      C.A.D. Paternal Grandmother      C.A.D. Paternal Grandfather      C.A.D. Brother      C.A.D. Sister        Review of Systems   CONSTITUTIONAL: NEGATIVE for fever, chills, change in weight  INTEGUMENTARY/SKIN: NEGATIVE for worrisome rashes, moles or   lesions  EYES: NEGATIVE for vision changes or irritation  ENT/MOUTH:Habematolel, macular degeneration   RESP: NEGATIVE for significant cough or SOB  CV: A-fib  GI: NEGATIVE for nausea, abdominal pain, heartburn, or change in   bowel habits  : NEGATIVE for frequency, dysuria, or hematuria  MUSCULOSKELETAL:  back pain  NEURO: recent fall  ENDOCRINE: NEGATIVE for temperature intolerance, skin/hair   changes  HEME:on Coumadin for A-fib  PSYCHIATRIC: dementia, memory issues     Physical Exam   Temp: 95.7  F (35.4  C) Temp src: Oral BP: 175/85   Heart Rate:   75 Resp: 20 SpO2: 95 % O2 Device: Nasal cannula Oxygen Delivery:   2 LPM  Vital Signs with Ranges  Temp:  [95.7  F (35.4  C)-97.8  F (36.6  C)] 95.7  F (35.4  C)  Heart Rate:  [63-97] 75  Resp:  [16-32] 20  BP: (120-186)/() 175/85  SpO2:  [85 %-98 %] 95 %  140 lbs 12.8 oz    Heart Rate: 75, Blood pressure 175/85, temperature 95.7  F (35.4    C), temperature source Oral, resp. rate 20, weight 140 lb 12.8   oz (63.9 kg), SpO2 95 %.  140 lbs 12.8 oz  HEENT:  Normocephalic, atraumatic.  PERRLA.  EOM s intact.   Neck:  Supple, non-tender, without lymphadenopathy.  Heart:  No peripheral edema  Lungs:  No SOB  Abdomen:  Soft, non-tender, non-distended.  Normal bowel sounds.  Skin:  Warm and dry, good capillary refill.  Extremities:  Good radial and dorsalis pedis pulses bilaterally,   no edema, cyanosis or clubbing.    NEUROLOGICAL EXAMINATION:     Mental status:  Alert  And [place]  Cranial nerves:  II-XII intact.   Motor:  Strength is 5/5 throughout the upper and lower   extremities  Shoulder Abduction:  Right:  5/5   Left:  5/5  Biceps:                      Right:  5/5   Left:  5/5  Triceps:                     Right:  5/5   Left:  5/5  Wrist Extensors:       Right:  5/5   Left:  5/5  Wrist Flexors:           Right:  5/5   Left:  5/5  interosseus :            Right:  5/5   Left:  5/5   Able to move LE in bed  Sensation:  intact  Reflexes:    Negative Clonus.        No pain with lumbar palpation     Data   All new lab and imaging data was personally reviewed by me.  CT:IMPRESSION:  1. Abdominal aortic aneurysm measuring at least 4 cm in diameter.   This  is only partly included on the scan.  2. L1 vertebral body fracture with mild loss of anterior and    posterior  vertebral body height and mild posterior cortical bowing or  retropulsion. This appears to be acute or subacute.  3. Multilevel central stenosis, greatest at L3-L4 and L4-L5.  4. Multilevel foraminal stenosis, greatest at L3-L4 and L4-L5.    CBC RESULTS:   Recent Labs   Lab Test  02/21/18   0616   WBC  10.5   RBC  4.06*   HGB  13.1*   HCT  39.8*   MCV  98   MCH  32.3   MCHC  32.9   RDW  13.2   PLT  112*     Basic Metabolic Panel:  Lab Results   Component Value Date     02/21/2018      Lab Results   Component Value Date    POTASSIUM 3.6 02/21/2018     Lab Results   Component Value Date    CHLORIDE 103 02/21/2018     Lab Results   Component Value Date    KEZIA 8.8 02/21/2018     Lab Results   Component Value Date    CO2 29 02/21/2018     Lab Results   Component Value Date    BUN 34 02/21/2018     Lab Results   Component Value Date    CR 1.35 02/21/2018     Lab Results   Component Value Date     02/21/2018     INR:  Lab Results   Component Value Date    INR 3.25 02/21/2018    INR 3.46 02/20/2018    INR 2.58 02/19/2018    INR 2.08 02/15/2018    INR 1.86 01/26/2018    INR 2.16 12/29/2017    INR 2.28 12/09/2017    INR 2.18 11/30/2017    INR 2.34 11/09/2017    INR 1.93 10/26/2017    INR 1.78 10/04/2017    INR 2.17 09/28/2017

## 2018-02-21 NOTE — PLAN OF CARE
Problem: Patient Care Overview  Goal: Plan of Care/Patient Progress Review  Outcome: No Change  1228-2505: Pt resting comfortably. Disoriented to time, place, situation. Denies pain, lidocaine patches in place on back. VSS- ex BP elevated. Hospitalist (Alexi) paged about pt being NPO- unable to take oral medication. /81. Wanted IV BP meds and pain medication if needed. Low dose dilaudid ordered. Transfers with Ax2, incontinent. Will continue to monitor.
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
## 2018-02-21 NOTE — PLAN OF CARE
Problem: Patient Care Overview  Goal: Plan of Care/Patient Progress Review  Discharge Planner SLP   Patient plan for discharge: none stated  Current status: Bedside swallow eval completed today. Pt presents with moderate oropharyngeal and suspected esophageal dysphagia. Per RN, pt noted to regurgitate PO yesterday and pt with hx of esophageal strictures.Recommend pt cautiously initiate nectar thick full liquids. Pt should be fully upright and alert for all PO, take small single sips/bites, double swallow, alternate between consistenices, and remain upright for 30-60 minutes following PO. Hold PO should pt demonstrate overt s/sx of aspiration or if s/sx of regurigitation observed. Pending goals of care, pt may benefit from GI consult to assess esophageal integrity given hx of strictures and report of regurgitation of PO.  Barriers to return to prior living situation: suspected oropharyngeal and esophageal dysphagia   Recommendations for discharge: ongoing ST upon d/c pending goals of care  Rationale for recommendations: suspect pt is not yet at baseline diet level; ongoing dysphagia        Entered by: Lashell Simon 02/21/2018 11:22 AM
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
## 2018-02-22 ENCOUNTER — APPOINTMENT (OUTPATIENT)
Dept: PHYSICAL THERAPY | Facility: CLINIC | Age: 83
DRG: 552 | End: 2018-02-22
Payer: MEDICARE

## 2018-02-22 LAB
ANION GAP SERPL CALCULATED.3IONS-SCNC: 5 MMOL/L (ref 3–14)
BASOPHILS # BLD AUTO: 0 10E9/L (ref 0–0.2)
BASOPHILS NFR BLD AUTO: 0.4 %
BUN SERPL-MCNC: 37 MG/DL (ref 7–30)
CALCIUM SERPL-MCNC: 8.8 MG/DL (ref 8.5–10.1)
CHLORIDE SERPL-SCNC: 108 MMOL/L (ref 94–109)
CO2 SERPL-SCNC: 29 MMOL/L (ref 20–32)
CREAT SERPL-MCNC: 1.34 MG/DL (ref 0.66–1.25)
DIFFERENTIAL METHOD BLD: ABNORMAL
EOSINOPHIL # BLD AUTO: 0.8 10E9/L (ref 0–0.7)
EOSINOPHIL NFR BLD AUTO: 9.8 %
ERYTHROCYTE [DISTWIDTH] IN BLOOD BY AUTOMATED COUNT: 13.2 % (ref 10–15)
GFR SERPL CREATININE-BSD FRML MDRD: 49 ML/MIN/1.7M2
GLUCOSE SERPL-MCNC: 102 MG/DL (ref 70–99)
HCT VFR BLD AUTO: 38.2 % (ref 40–53)
HGB BLD-MCNC: 12.2 G/DL (ref 13.3–17.7)
IMM GRANULOCYTES # BLD: 0 10E9/L (ref 0–0.4)
IMM GRANULOCYTES NFR BLD: 0.5 %
INR PPP: 3.47 (ref 0.86–1.14)
LYMPHOCYTES # BLD AUTO: 0.8 10E9/L (ref 0.8–5.3)
LYMPHOCYTES NFR BLD AUTO: 10.1 %
MAGNESIUM SERPL-MCNC: 2.5 MG/DL (ref 1.6–2.3)
MCH RBC QN AUTO: 31.8 PG (ref 26.5–33)
MCHC RBC AUTO-ENTMCNC: 31.9 G/DL (ref 31.5–36.5)
MCV RBC AUTO: 100 FL (ref 78–100)
MONOCYTES # BLD AUTO: 0.8 10E9/L (ref 0–1.3)
MONOCYTES NFR BLD AUTO: 9.1 %
NEUTROPHILS # BLD AUTO: 5.8 10E9/L (ref 1.6–8.3)
NEUTROPHILS NFR BLD AUTO: 70.1 %
NRBC # BLD AUTO: 0 10*3/UL
NRBC BLD AUTO-RTO: 0 /100
PLATELET # BLD AUTO: 102 10E9/L (ref 150–450)
POTASSIUM SERPL-SCNC: 3.7 MMOL/L (ref 3.4–5.3)
RBC # BLD AUTO: 3.84 10E12/L (ref 4.4–5.9)
SODIUM SERPL-SCNC: 142 MMOL/L (ref 133–144)
WBC # BLD AUTO: 8.3 10E9/L (ref 4–11)

## 2018-02-22 PROCEDURE — 83735 ASSAY OF MAGNESIUM: CPT | Performed by: PHYSICIAN ASSISTANT

## 2018-02-22 PROCEDURE — A9270 NON-COVERED ITEM OR SERVICE: HCPCS | Mod: GY | Performed by: PHYSICIAN ASSISTANT

## 2018-02-22 PROCEDURE — A9270 NON-COVERED ITEM OR SERVICE: HCPCS | Mod: GY | Performed by: INTERNAL MEDICINE

## 2018-02-22 PROCEDURE — A9270 NON-COVERED ITEM OR SERVICE: HCPCS | Mod: GY | Performed by: CLINICAL NURSE SPECIALIST

## 2018-02-22 PROCEDURE — 40000894 ZZH STATISTIC OT IP EVAL DEFER: Performed by: STUDENT IN AN ORGANIZED HEALTH CARE EDUCATION/TRAINING PROGRAM

## 2018-02-22 PROCEDURE — 85025 COMPLETE CBC W/AUTO DIFF WBC: CPT | Performed by: PHYSICIAN ASSISTANT

## 2018-02-22 PROCEDURE — 80048 BASIC METABOLIC PNL TOTAL CA: CPT | Performed by: PHYSICIAN ASSISTANT

## 2018-02-22 PROCEDURE — 25000132 ZZH RX MED GY IP 250 OP 250 PS 637: Mod: GY | Performed by: INTERNAL MEDICINE

## 2018-02-22 PROCEDURE — 99232 SBSQ HOSP IP/OBS MODERATE 35: CPT | Performed by: CLINICAL NURSE SPECIALIST

## 2018-02-22 PROCEDURE — 40000193 ZZH STATISTIC PT WARD VISIT: Performed by: PHYSICAL THERAPIST

## 2018-02-22 PROCEDURE — 99232 SBSQ HOSP IP/OBS MODERATE 35: CPT | Performed by: INTERNAL MEDICINE

## 2018-02-22 PROCEDURE — 97116 GAIT TRAINING THERAPY: CPT | Mod: GP | Performed by: PHYSICAL THERAPIST

## 2018-02-22 PROCEDURE — 25000132 ZZH RX MED GY IP 250 OP 250 PS 637: Mod: GY | Performed by: CLINICAL NURSE SPECIALIST

## 2018-02-22 PROCEDURE — 85610 PROTHROMBIN TIME: CPT | Performed by: PHYSICIAN ASSISTANT

## 2018-02-22 PROCEDURE — 97530 THERAPEUTIC ACTIVITIES: CPT | Mod: GP | Performed by: PHYSICAL THERAPIST

## 2018-02-22 PROCEDURE — 12000000 ZZH R&B MED SURG/OB

## 2018-02-22 PROCEDURE — 25000132 ZZH RX MED GY IP 250 OP 250 PS 637: Mod: GY | Performed by: PHYSICIAN ASSISTANT

## 2018-02-22 PROCEDURE — 36415 COLL VENOUS BLD VENIPUNCTURE: CPT | Performed by: PHYSICIAN ASSISTANT

## 2018-02-22 RX ORDER — FUROSEMIDE 20 MG
20 TABLET ORAL DAILY
Status: DISCONTINUED | OUTPATIENT
Start: 2018-02-22 | End: 2018-02-23 | Stop reason: HOSPADM

## 2018-02-22 RX ADMIN — LEVOTHYROXINE SODIUM 100 MCG: 100 TABLET ORAL at 10:57

## 2018-02-22 RX ADMIN — FERROUS SULFATE TAB 325 MG (65 MG ELEMENTAL FE) 325 MG: 325 (65 FE) TAB at 20:43

## 2018-02-22 RX ADMIN — CALCIUM CARBONATE (ANTACID) CHEW TAB 500 MG 500 MG: 500 CHEW TAB at 15:24

## 2018-02-22 RX ADMIN — CITALOPRAM HYDROBROMIDE 10 MG: 10 TABLET ORAL at 10:57

## 2018-02-22 RX ADMIN — FERROUS SULFATE TAB 325 MG (65 MG ELEMENTAL FE) 325 MG: 325 (65 FE) TAB at 10:57

## 2018-02-22 RX ADMIN — FUROSEMIDE 20 MG: 20 TABLET ORAL at 15:24

## 2018-02-22 RX ADMIN — ASPIRIN 81 MG: 81 TABLET, COATED ORAL at 11:03

## 2018-02-22 RX ADMIN — ACETAMINOPHEN 650 MG: 325 TABLET, FILM COATED ORAL at 04:10

## 2018-02-22 RX ADMIN — ACETAMINOPHEN: 325 TABLET, FILM COATED ORAL at 20:44

## 2018-02-22 RX ADMIN — CALCIUM CARBONATE (ANTACID) CHEW TAB 500 MG 1000 MG: 500 CHEW TAB at 17:48

## 2018-02-22 NOTE — PROGRESS NOTES
AARON     D: Discharge planning continuing... [facility] has assessed and would be able to accept pt tomorrow if he was medical ready for discharge. DEREJE has updated pt's son, Deepak.. SW will await MD determination of discharge date and continue planning accordingly.

## 2018-02-22 NOTE — PLAN OF CARE
"Problem: Patient Care Overview  Goal: Plan of Care/Patient Progress Review    Discharge Planner PT   Patient plan for discharge: Not stated  Current status: Pt reporting \"very minimal\" incisional back pain, denies radicular pain.  Log roll supine > sit EOB min-mod A x 1.  Sit <> stand transfers with min A, amb x 40ft and 20ft with FWW and CGA, cues for posture.  BP stable, SaO2 desaturating to 86% on RA, placed back on 2L nc, stable on 2L.  Up to chair end of session, alarms on.  Recommendations for discharge: TCU  Rationale for recommendations: Skilled need to continue with PT for bed mobility, transfers and progress gait as well as get pain managed to improve functional mobility to get back to baseline Mod I .       Entered by: Dieudonne Rodriguez 02/22/2018 12:26 PM           "

## 2018-02-22 NOTE — PLAN OF CARE
Problem: Patient Care Overview  Goal: Plan of Care/Patient Progress Review  OT: Consult received, chart reviewed, at this time plan for discharge is to TCU as at baseline pt is quite independent and actually cares for his wife who is limited by dementia. At this time note concern for pt's cognition with history of memory deficits, with safe discharge location in place would be most appropriate to defer full OT assessment including cognition to next level of care when pt's status not as limited with acute issues (back pain, hypoxia, urinary retention). At this time will defer OT to TCU, no evaluation completed.    Discharge Planner OT   Patient plan for discharge: not stated  Current status: Per PT: Log roll supine > sit EOB min-mod A x 1.  Sit <> stand transfers with min A, amb x 40ft and 20ft with FWW and CGA, cues for posture.  BP stable, SaO2 desaturating to 86% on RA, placed back on 2L nc, stable on 2L.   Barriers to return to prior living situation: independent at baseline, cares for his wife; currently needing assist with mobility and has O2 needs  Recommendations for discharge: Per PT: TCU  Rationale for recommendations: Per PT: pt with continued needs for bed mobility, transfers and gait as well as get pain managed to improve functional mobility to get back to baseline Mod I .       Entered by: Anuradha Pathak 02/22/2018 3:24 PM

## 2018-02-22 NOTE — PLAN OF CARE
Problem: Patient Care Overview  Goal: Plan of Care/Patient Progress Review  SLP: Attempted to see pt for dysphagia tx, however pt declined participation d/t wanting to rest despite encouragement. Pt reports good tolerance of current diet level. Will follow up as appropriate.

## 2018-02-22 NOTE — PLAN OF CARE
Problem: Patient Care Overview  Goal: Plan of Care/Patient Progress Review  Pt confused. Up with assist x 2, walker, and gait belt- bed alarm on. Lidoderm patches in place on lower back. Nectar thickened liquids-full liquid diet, supervision with feedings. Pt bladder scanned for 552, pt able to void 75 - straight cath for 475.

## 2018-02-22 NOTE — PROGRESS NOTES
"[facility]  Palliative Care Progress Note  Text Page    Met with Cristopher as he was resting in bed and asked \"Can I go home now? My back is fine. I don't have any pain.\" Discussed pain plan and explained that he would likely dismiss tomorrow.     Assessment & Plan   1.  Pain - Patient denies. Reasonable to change pain plan to Tylenol and Voltaren as needed. Opiate naive patient - do not recommend opiates on dismissal.        2.  Weakness with recurrent falls - Appreciate input of Physical Therapist Dieudonne Rodriguez, PT. Plan for dismissal to TCU.     3.  Dysphagia - Appreciate input of Speech Language Pathologist Lashell Simon, SLP     4.  Unintentional weight loss - documented weight of 157 pounds on 2/9/2017 clinic visit and admission weight of 140 pounds.     Goal of Care: DNR/DNI - Restorative care with plan to dismiss to TCU. Appreciate input of  KAYLEE Duenas regarding dismissal plan.    Janice Waters MS, RN, CNS, APRN, ACHPN, FAACVPR  Pain and Palliative Care  Pager [phone]  Office [phone]       Time Spent on this Encounter   I spent from 5:25 PM until 5:35 PM in assessment and discussion with the patient. Another 20 minutes in review of chart, documentation and discussion with the health care team.    Interval History   Chart reviewed    Palliative Symptom Review (0=no symptom/no concern, 1=mild, 2=moderate, 3=severe):      Pain: 0-none      Fatigue: 1-mild      Nausea: 0-none      Constipation: 0-none      Diarrhea: 0-none      Depressive Symptoms: 0-none      Anxiety: 1-mild      Drowsiness: 2-moderate      Poor Appetite: 0-none      Shortness of Breath: 0-none      Insomnia: 0-none        Physical Exam   Temp:  [95.6  F (35.3  C)-98.9  F (37.2  C)] 97.7  F (36.5  C)  Heart Rate:  [70-84] 73  Resp:  [16] 16  BP: (125-183)/(65-84) 172/84  SpO2:  [92 %-97 %] 92 %  144 lbs 1.6 oz  GEN:  Alert, oriented to self and situation, appears comfortable, NAD.  HEENT:  " Normocephalic/atraumatic, no scleral icterus, no nasal discharge, mouth moist.  RESP:  Symmetric chest rise on inhalation noted.  Normal respiratory effort.  PAIN BEHAVIOR: Cooperative  Psych:  Normal affect.  Calm, cooperative, conversant.    Medications     - MEDICATION INSTRUCTIONS -       Warfarin Therapy Reminder         warfarin-No DOSE today  1 each Does not apply no dose today (warfarin)     furosemide  20 mg Oral Daily     acetaminophen  650 mg Oral Q8H     diclofenac  4 g Transdermal TID     lidocaine  1-2 patch Transdermal Q24H     lidocaine   Transdermal Q24h     lidocaine   Transdermal Q8H     aspirin EC  81 mg Oral Daily     citalopram  10 mg Oral Daily     ferrous sulfate  325 mg Oral BID     levothyroxine  100 mcg Oral Daily       Data   Results for orders placed or performed during the hospital encounter of 02/20/18 (from the past 24 hour(s))   INR   Result Value Ref Range    INR 3.47 (H) 0.86 - 1.14   Basic metabolic panel   Result Value Ref Range    Sodium 142 133 - 144 mmol/L    Potassium 3.7 3.4 - 5.3 mmol/L    Chloride 108 94 - 109 mmol/L    Carbon Dioxide 29 20 - 32 mmol/L    Anion Gap 5 3 - 14 mmol/L    Glucose 102 (H) 70 - 99 mg/dL    Urea Nitrogen 37 (H) 7 - 30 mg/dL    Creatinine 1.34 (H) 0.66 - 1.25 mg/dL    GFR Estimate 49 (L) >60 mL/min/1.7m2    GFR Estimate If Black 60 (L) >60 mL/min/1.7m2    Calcium 8.8 8.5 - 10.1 mg/dL   CBC with platelets differential   Result Value Ref Range    WBC 8.3 4.0 - 11.0 10e9/L    RBC Count 3.84 (L) 4.4 - 5.9 10e12/L    Hemoglobin 12.2 (L) 13.3 - 17.7 g/dL    Hematocrit 38.2 (L) 40.0 - 53.0 %     78 - 100 fl    MCH 31.8 26.5 - 33.0 pg    MCHC 31.9 31.5 - 36.5 g/dL    RDW 13.2 10.0 - 15.0 %    Platelet Count 102 (L) 150 - 450 10e9/L    Diff Method Automated Method     % Neutrophils 70.1 %    % Lymphocytes 10.1 %    % Monocytes 9.1 %    % Eosinophils 9.8 %    % Basophils 0.4 %    % Immature Granulocytes 0.5 %    Nucleated RBCs 0 0 /100    Absolute  Neutrophil 5.8 1.6 - 8.3 10e9/L    Absolute Lymphocytes 0.8 0.8 - 5.3 10e9/L    Absolute Monocytes 0.8 0.0 - 1.3 10e9/L    Absolute Eosinophils 0.8 (H) 0.0 - 0.7 10e9/L    Absolute Basophils 0.0 0.0 - 0.2 10e9/L    Abs Immature Granulocytes 0.0 0 - 0.4 10e9/L    Absolute Nucleated RBC 0.0    Magnesium   Result Value Ref Range    Magnesium 2.5 (H) 1.6 - 2.3 mg/dL

## 2018-02-22 NOTE — PLAN OF CARE
Problem: Patient Care Overview  Goal: Plan of Care/Patient Progress Review  Outcome: Improving  Slept well. Denies pain. CMS intact. Forgetful but appropriate. Pleasant and cooperative. No void overnight. Bladder scanned x 2 for 187cc & 218cc.

## 2018-02-22 NOTE — PLAN OF CARE
Problem: Patient Care Overview  Goal: Plan of Care/Patient Progress Review  Outcome: Improving  RN - BP remains hypertensive, otherwise vitally stable. Pt maintaining adequate O2 saturation on room air. Denying back pain. Elvia nectar thickened liquids while sitting upright without difficulty. Requires strong encouragement with PO intake. Pt conitnues with difficulty voiding - able to void x2 (100ml each time) following checking a PVR of 374ml. Pt strongly wishing to not be straight cath again. Pt possibly discharging to TCU tomorrow.
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
## 2018-02-23 ENCOUNTER — CARE COORDINATION (OUTPATIENT)
Dept: CARE COORDINATION | Facility: CLINIC | Age: 83
End: 2018-02-23

## 2018-02-23 ENCOUNTER — APPOINTMENT (OUTPATIENT)
Dept: SPEECH THERAPY | Facility: CLINIC | Age: 83
DRG: 552 | End: 2018-02-23
Payer: MEDICARE

## 2018-02-23 VITALS
TEMPERATURE: 97.8 F | BODY MASS INDEX: 21.71 KG/M2 | RESPIRATION RATE: 14 BRPM | DIASTOLIC BLOOD PRESSURE: 72 MMHG | WEIGHT: 142.8 LBS | OXYGEN SATURATION: 90 % | SYSTOLIC BLOOD PRESSURE: 170 MMHG

## 2018-02-23 LAB
ANION GAP SERPL CALCULATED.3IONS-SCNC: 7 MMOL/L (ref 3–14)
BUN SERPL-MCNC: 36 MG/DL (ref 7–30)
CALCIUM SERPL-MCNC: 8.8 MG/DL (ref 8.5–10.1)
CHLORIDE SERPL-SCNC: 106 MMOL/L (ref 94–109)
CO2 SERPL-SCNC: 27 MMOL/L (ref 20–32)
CREAT SERPL-MCNC: 1.35 MG/DL (ref 0.66–1.25)
GFR SERPL CREATININE-BSD FRML MDRD: 49 ML/MIN/1.7M2
GLUCOSE SERPL-MCNC: 99 MG/DL (ref 70–99)
INR PPP: 3.07 (ref 0.86–1.14)
POTASSIUM SERPL-SCNC: 3.8 MMOL/L (ref 3.4–5.3)
SODIUM SERPL-SCNC: 140 MMOL/L (ref 133–144)

## 2018-02-23 PROCEDURE — 25000125 ZZHC RX 250

## 2018-02-23 PROCEDURE — 99239 HOSP IP/OBS DSCHRG MGMT >30: CPT | Performed by: INTERNAL MEDICINE

## 2018-02-23 PROCEDURE — A9270 NON-COVERED ITEM OR SERVICE: HCPCS | Mod: GY | Performed by: PHYSICIAN ASSISTANT

## 2018-02-23 PROCEDURE — 25000132 ZZH RX MED GY IP 250 OP 250 PS 637: Mod: GY | Performed by: INTERNAL MEDICINE

## 2018-02-23 PROCEDURE — 25000132 ZZH RX MED GY IP 250 OP 250 PS 637: Mod: GY | Performed by: PHYSICIAN ASSISTANT

## 2018-02-23 PROCEDURE — 40000225 ZZH STATISTIC SLP WARD VISIT

## 2018-02-23 PROCEDURE — 92526 ORAL FUNCTION THERAPY: CPT | Mod: GN

## 2018-02-23 PROCEDURE — 85610 PROTHROMBIN TIME: CPT | Performed by: PHYSICIAN ASSISTANT

## 2018-02-23 PROCEDURE — A9270 NON-COVERED ITEM OR SERVICE: HCPCS | Mod: GY | Performed by: INTERNAL MEDICINE

## 2018-02-23 PROCEDURE — 36415 COLL VENOUS BLD VENIPUNCTURE: CPT | Performed by: PHYSICIAN ASSISTANT

## 2018-02-23 PROCEDURE — 80048 BASIC METABOLIC PNL TOTAL CA: CPT | Performed by: PHYSICIAN ASSISTANT

## 2018-02-23 RX ORDER — POLYETHYLENE GLYCOL 3350 17 G/17G
17 POWDER, FOR SOLUTION ORAL DAILY PRN
Qty: 7 PACKET | DISCHARGE
Start: 2018-02-23

## 2018-02-23 RX ORDER — WARFARIN SODIUM 1 MG/1
1 TABLET ORAL
Status: COMPLETED | OUTPATIENT
Start: 2018-02-23 | End: 2018-02-23

## 2018-02-23 RX ORDER — TAMSULOSIN HYDROCHLORIDE 0.4 MG/1
0.4 CAPSULE ORAL DAILY
Status: DISCONTINUED | OUTPATIENT
Start: 2018-02-23 | End: 2018-02-23 | Stop reason: HOSPADM

## 2018-02-23 RX ORDER — TAMSULOSIN HYDROCHLORIDE 0.4 MG/1
0.4 CAPSULE ORAL DAILY
Qty: 60 CAPSULE | DISCHARGE
Start: 2018-02-24 | End: 2018-03-05

## 2018-02-23 RX ORDER — WARFARIN SODIUM 2 MG/1
2 TABLET ORAL DAILY
Qty: 30 TABLET | DISCHARGE
Start: 2018-02-23 | End: 2018-02-25

## 2018-02-23 RX ADMIN — LIDOCAINE HYDROCHLORIDE 10 ML: 20 JELLY TOPICAL at 01:50

## 2018-02-23 RX ADMIN — FUROSEMIDE 20 MG: 20 TABLET ORAL at 07:52

## 2018-02-23 RX ADMIN — FERROUS SULFATE TAB 325 MG (65 MG ELEMENTAL FE) 325 MG: 325 (65 FE) TAB at 07:53

## 2018-02-23 RX ADMIN — TAMSULOSIN HYDROCHLORIDE 0.4 MG: 0.4 CAPSULE ORAL at 08:23

## 2018-02-23 RX ADMIN — ASPIRIN 81 MG: 81 TABLET, COATED ORAL at 07:52

## 2018-02-23 RX ADMIN — CITALOPRAM HYDROBROMIDE 10 MG: 10 TABLET ORAL at 07:52

## 2018-02-23 RX ADMIN — LIDOCAINE 1 PATCH: 560 PATCH PERCUTANEOUS; TOPICAL; TRANSDERMAL at 07:53

## 2018-02-23 RX ADMIN — LEVOTHYROXINE SODIUM 100 MCG: 100 TABLET ORAL at 07:53

## 2018-02-23 RX ADMIN — WARFARIN SODIUM 1 MG: 1 TABLET ORAL at 16:47

## 2018-02-23 ASSESSMENT — ACTIVITIES OF DAILY LIVING (ADL)
COGNITION: 1 - ATTENTION OR MEMORY DEFICITS
TRANSFERRING: 1-->ASSISTIVE EQUIPMENT
RETIRED_EATING: 0-->INDEPENDENT
TOILETING: 0-->INDEPENDENT
RETIRED_COMMUNICATION: 0-->UNDERSTANDS/COMMUNICATES WITHOUT DIFFICULTY
SWALLOWING: 2-->DIFFICULTY SWALLOWING LIQUIDS/FOODS
DRESS: 0-->INDEPENDENT
FALL_HISTORY_WITHIN_LAST_SIX_MONTHS: YES
AMBULATION: 1-->ASSISTIVE EQUIPMENT
BATHING: 0-->INDEPENDENT

## 2018-02-23 NOTE — PHARMACY-ANTICOAGULATION SERVICE
Clinical Pharmacy- Warfarin Discharge Note  This patient is currently on warfarin for the treatment of Atrial fibrillation.  INR Goal= 2-3    Anticoagulation Dose History     Recent Dosing and Labs Latest Ref Rng & Units 1/26/2018 2/15/2018 2/19/2018 2/20/2018 2/21/2018 2/22/2018 2/23/2018    Warfarin 1 mg - - - - - 1 mg - -    INR 0.86 - 1.14 1.86 2.08 2.58(H) 3.46(H) 3.25(H) 3.47(H) 3.07(H)    INR 0.86 - 1.14 - - - - - - -          Vitamin K doses administered during the last 7 days: -  FFP administered during the last 7 days: -  Recommend discharging the patient on a warfarin regimen of 2 mg daily with INR check on Monday, 2/26/2018. - Recommendation accepted. MD ordered warfarin 2 mg daily with INR within 2 days.

## 2018-02-23 NOTE — PROGRESS NOTES
[facility]  Palliative Care Progress Note  Text Page    Chart reviewed. Patient doing well from pain perspective. Ordered Lidoderm patches and Voltaren for TCU dismissal.        Janice Waters MS, RN, CNS, APRN, ACHPN, FAACVPR  Pain and Palliative Care  Pager [phone]  Office [phone]       Medications     - MEDICATION INSTRUCTIONS -       Warfarin Therapy Reminder         tamsulosin  0.4 mg Oral Daily     furosemide  20 mg Oral Daily     lidocaine  1-2 patch Transdermal Q24H     lidocaine   Transdermal Q24h     lidocaine   Transdermal Q8H     aspirin EC  81 mg Oral Daily     citalopram  10 mg Oral Daily     ferrous sulfate  325 mg Oral BID     levothyroxine  100 mcg Oral Daily       Data   Results for orders placed or performed during the hospital encounter of 02/20/18 (from the past 24 hour(s))   INR   Result Value Ref Range    INR 3.07 (H) 0.86 - 1.14   Basic metabolic panel   Result Value Ref Range    Sodium 140 133 - 144 mmol/L    Potassium 3.8 3.4 - 5.3 mmol/L    Chloride 106 94 - 109 mmol/L    Carbon Dioxide 27 20 - 32 mmol/L    Anion Gap 7 3 - 14 mmol/L    Glucose 99 70 - 99 mg/dL    Urea Nitrogen 36 (H) 7 - 30 mg/dL    Creatinine 1.35 (H) 0.66 - 1.25 mg/dL    GFR Estimate 49 (L) >60 mL/min/1.7m2    GFR Estimate If Black 59 (L) >60 mL/min/1.7m2    Calcium 8.8 8.5 - 10.1 mg/dL

## 2018-02-23 NOTE — PROGRESS NOTES
SWS     D: Discharge planning continuing.. per discussion with MD pt will be ready for transfer today, [facility] has been contacted and will be able to accept pt today, private room as requested. Per discussion with RN w/c transport need anticipated.      I: Spoke by phone this morning to pt's son, Deepak, discussed above. He has asked planning continue for pt's transfer to [facility] and that medical transport be arranged. [facility], w/c arranged for 1730.. Reviewed with Deepak the private pay cost ($70/base and $4.50/mi) of the transport as Medicare does not cover, cost accepted. Additional questions were addressed. Deepak notes that today they will be moving his mother( pt's ) into the memory care area at residence where pt resides, [facility].     A/P: Anticipate no problem with arrangements made for transfer today, with discharge no further SWS.
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
## 2018-02-23 NOTE — PLAN OF CARE
Problem: Patient Care Overview  Goal: Plan of Care/Patient Progress Review  PT-attempted to see he declined session as planning to leave later today to TCU. Per chart review  Goals are not met is 1 assist with RW to less than 20 feet.

## 2018-02-23 NOTE — PROGRESS NOTES
Your information has been submitted on February 23rd, 2018 at 12:06:25 PM [facility]. The confirmation number is [id number]

## 2018-02-23 NOTE — PLAN OF CARE
Problem: Patient Care Overview  Goal: Plan of Care/Patient Progress Review  Outcome: Improving  Pt confused at times. Due to void, only voided 80cc this shift. Bladder scan 181, no episodes of incontinence. Thickened liquids. Denies pain except on moving. sched tylenol given. C/o bloating, tums given. Total feed. Plan to dc to TCU tomorrow.

## 2018-02-23 NOTE — PLAN OF CARE
Problem: Fracture Orthopaedic (Adult)  Goal: Signs and Symptoms of Listed Potential Problems Will be Absent, Minimized or Managed (Fracture Orthopaedic)  Signs and symptoms of listed potential problems will be absent, minimized or managed by discharge/transition of care (reference Fracture Orthopaedic (Adult) CPG).   Outcome: Adequate for Discharge Date Met: 02/23/18  Pleasantly disoriented to time, calm & cooperative.  [place].  Incont. urine, LBM earlier today.  Up with A1-2 belt + walker, sat in chair for early supper.  Poor appetite.  Reviewed discharge instructions and medications with daughter-in-law (Whit), denies any questions. Patient discharged to [facility]. Homes with discharge instructions and belongings.  Transported via HE wheelchair, left floor at 1715.

## 2018-02-23 NOTE — PLAN OF CARE
Problem: Patient Care Overview  Goal: Plan of Care/Patient Progress Review  Patient A&Ox4. Hypertensive this shift. LS C/E. +BS/Gas, incontinent of stool 1x. Voiding in adequate amounts. Incontinent of urine 1x.  Tolerating Dysphagia II diet. Ambulates Ax1-2 w/ walker and belt.  Plan is to [facility] via [facility] at 1730. Will continue to monitor.

## 2018-02-23 NOTE — PLAN OF CARE
Problem: Patient Care Overview  Goal: Plan of Care/Patient Progress Review  Outcome: Improving  Slept well. Denies pain. Voided 25cc. Bladder scanned for 541cc. Unable to void more- straight cathed for 475cc.

## 2018-02-23 NOTE — PLAN OF CARE
Problem: Patient Care Overview  Goal: Plan of Care/Patient Progress Review  Discharge Planner SLP   Patient plan for discharge: none stated  Current status: Pt with overt s/sx of aspiration on large sip of nectar thick liquids, however no other s/sx of aspiration with cues to take small sips and double swallow. Recommend advance to dysphagia diet 2 and nectar thick liquids with intermittent supervision. Caregivers to encourage small sips/bites and double swallows.   Barriers to return to prior living situation: dysphagia   Recommendations for discharge: ongoing ST upon d/c to TCU  Rationale for recommendations: pt would benefit from ongoing ST targeting diet tolerance and advanced trials as appropriate for safe return to baseline diet level       Entered by: Lashell Simon 02/23/2018 9:31 AM       Speech Language Therapy Discharge Summary    Reason for therapy discharge:    Discharged to transitional care facility.    Progress towards therapy goal(s). See goals on Care Plan in [facility] electronic health record for goal details.  Goals partially met.  Barriers to achieving goals:   discharge from facility.    Therapy recommendation(s):    Continued therapy is recommended.  Rationale/Recommendations:  Continued ST for dysphagia.

## 2018-02-23 NOTE — PROGRESS NOTES
"Transition Communication Hand-off for Care Transitions to Next Level of Care Provider    Name: Francesco Killian  MRN #: [id number]  Primary Care Provider: Angel Corea  Primary Care MD Name: Anmol  Primary Clinic: [address] [facility] [address]     Reason for Hospitalization:  Hypoxia [R09.02]  Closed compression fracture of first lumbar vertebra, initial encounter (H) [S32.010A]  Admit Date/Time: 2/20/2018 11:32 AM  Discharge Date: 2/23/2018   Payor Source: Payor: MEDICA / Plan: MEDICA PRIME SOLUTION / Product Type: Indemnity /     Readmission Assessment Measure (DIAMOND) Risk Score/category: Average          Reason for Communication Hand-off Referral: Fragility    Patient will receive the following: TCU  [facility]   Ware Recommendations:     Physical Therapy:  0/10 pain at rest, \"intense pain\" with transitional movements such as log roll in bed, and sitting up.  Pain \"mild\" when walking. Mod A x2 log roll in bed>side lying to sit EOB w/ mod>max x2 due to pain. Stood with min A x2. Walked about 15' in room with fww, flexed posture, min A x1-2 for safety.     SPEECH THERAPY: Pt with overt s/sx of aspiration on large sip of nectar thick liquids, however no other s/sx of aspiration with cues to take small sips and double swallow. Recommend advance to dysphagia diet 2 and nectar thick liquids with intermittent supervision. Caregivers to encourage small sips/bites and double swallows.   CLINIC TO follow for Goals of Care planning.             Heena Snyder RN   Care Transitions Team  [phone]                    "

## 2018-02-24 ENCOUNTER — RECORDS - HEALTHEAST (OUTPATIENT)
Dept: LAB | Facility: CLINIC | Age: 83
End: 2018-02-24

## 2018-02-24 ENCOUNTER — TRANSFERRED RECORDS (OUTPATIENT)
Dept: HEALTH INFORMATION MANAGEMENT | Facility: CLINIC | Age: 83
End: 2018-02-24

## 2018-02-24 LAB
ANION GAP SERPL CALCULATED.3IONS-SCNC: 5 MMOL/L (ref 5–18)
BUN SERPL-MCNC: 29 MG/DL (ref 8–28)
CALCIUM SERPL-MCNC: 8.9 MG/DL (ref 8.5–10.5)
CHLORIDE BLD-SCNC: 106 MMOL/L (ref 98–107)
CHLORIDE SERPLBLD-SCNC: 106 MMOL/L (ref 98–107)
CHLORIDE SERPLBLD-SCNC: 106 MMOL/L (ref 98–107)
CO2 SERPL-SCNC: 29 MMOL/L (ref 22–31)
CREAT SERPL-MCNC: 1.34 MG/DL (ref 0.7–1.3)
GFR SERPL CREATININE-BSD FRML MDRD: 49 ML/MIN/1.73M2
GLUCOSE BLD-MCNC: 94 MG/DL (ref 70–125)
GLUCOSE SERPL-MCNC: 94 MG/DL (ref 70–125)
GLUCOSE SERPL-MCNC: 94 MG/DL (ref 70–125)
POTASSIUM BLD-SCNC: 3.7 MMOL/L (ref 3.5–5)
POTASSIUM SERPL-SCNC: 3.7 MMOL/L (ref 3.5–5)
POTASSIUM SERPL-SCNC: 3.7 MMOL/L (ref 3.5–5)
SODIUM SERPL-SCNC: 140 MMOL/L (ref 136–145)

## 2018-02-25 VITALS
OXYGEN SATURATION: 96 % | RESPIRATION RATE: 26 BRPM | BODY MASS INDEX: 21.29 KG/M2 | DIASTOLIC BLOOD PRESSURE: 85 MMHG | WEIGHT: 140.5 LBS | HEIGHT: 68 IN | SYSTOLIC BLOOD PRESSURE: 182 MMHG | TEMPERATURE: 97.7 F | HEART RATE: 83 BPM

## 2018-02-26 ENCOUNTER — NURSING HOME VISIT (OUTPATIENT)
Dept: GERIATRICS | Facility: CLINIC | Age: 83
End: 2018-02-26
Payer: COMMERCIAL

## 2018-02-26 DIAGNOSIS — R09.02 HYPOXIA: ICD-10-CM

## 2018-02-26 DIAGNOSIS — R53.81 PHYSICAL DECONDITIONING: ICD-10-CM

## 2018-02-26 DIAGNOSIS — R41.0 CONFUSION: ICD-10-CM

## 2018-02-26 DIAGNOSIS — I71.40 ABDOMINAL AORTIC ANEURYSM (AAA) WITHOUT RUPTURE (H): ICD-10-CM

## 2018-02-26 DIAGNOSIS — R33.9 URINARY RETENTION: ICD-10-CM

## 2018-02-26 DIAGNOSIS — I10 ESSENTIAL HYPERTENSION: ICD-10-CM

## 2018-02-26 DIAGNOSIS — M54.9 ACUTE MIDLINE BACK PAIN, UNSPECIFIED BACK LOCATION: Primary | ICD-10-CM

## 2018-02-26 DIAGNOSIS — H35.30 MACULAR DEGENERATION (SENILE) OF RETINA: ICD-10-CM

## 2018-02-26 DIAGNOSIS — R41.3 MEMORY LOSS: ICD-10-CM

## 2018-02-26 DIAGNOSIS — I48.20 CHRONIC ATRIAL FIBRILLATION (H): ICD-10-CM

## 2018-02-26 DIAGNOSIS — I50.9 CHRONIC CONGESTIVE HEART FAILURE, UNSPECIFIED CONGESTIVE HEART FAILURE TYPE: ICD-10-CM

## 2018-02-26 DIAGNOSIS — Z86.73 HISTORY OF CVA (CEREBROVASCULAR ACCIDENT): ICD-10-CM

## 2018-02-26 PROCEDURE — 99309 SBSQ NF CARE MODERATE MDM 30: CPT | Performed by: NURSE PRACTITIONER

## 2018-02-26 NOTE — PROGRESS NOTES
Clinic Care Coordination Contact  Care Coordination Transition Communication    Referral Source: CTS    Clinical Data: Patient was hospitalized at [facility] 12/20-12/23/2018 with diagnosis of Hypoxia Closed compression fracture of fist lumbar vertebrae .     Transition to Facility:              Facility Name: [facility]               Contact name and phone number/fax: Radha REYEZ [phone]    Plan: RN/SW Care Coordinator will await notification from facility staff informing RN/SW Care Coordinator of patient's discharge plans/needs. RN/SW Care Coordinator will review chart and outreach to facility staff every 4 weeks and as needed.     Radha Persaud RN / Clinical Care Coordinator     [address] [address]  [email or web address]  Office :  [phone] / Fax :  [phone]
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
## 2018-02-26 NOTE — MR AVS SNAPSHOT
After Visit Summary   2/26/2018    Francesco Killian    MRN: [id number]           Patient Information     Date Of Birth          12/26/1920        Visit Information        Provider Department      2/26/2018 7:30 AM Joann Quintero APRN CNP [facility]        Today's Diagnoses     Acute midline back pain, unspecified back location    -  1    Hypoxia        Chronic congestive heart failure, unspecified congestive heart failure type (H)        Chronic atrial fibrillation (H)        Essential hypertension        Memory loss        Macular degeneration (senile) of retina        Abdominal aortic aneurysm (AAA) without rupture (H)        History of CVA (cerebrovascular accident)        Urinary retention        Physical deconditioning        Confusion           Follow-ups after your visit        Your next 10 appointments already scheduled     Mar 15, 2018 12:00 PM CDT   Anticoagulation Visit with  ANTICOAGULATION CLINIC   [facility] ([facility]    [address] [place] [id number] [phone]              Who to contact     If you have questions or need follow up information about today's clinic visit or your schedule please contact G[facility] directly at [phone].  Normal or non-critical lab and imaging results will be communicated to you by [employer], letter or phone within 4 business days after the clinic has received the results. If you do not hear from us within 7 days, please contact the clinic through [employer] or phone. If you have a critical or abnormal lab result, we will notify you by phone as soon as possible.  Submit refill requests through [employer] or call your pharmacy and they will forward the refill request to us. Please allow 3 business days for your refill to be completed.          Additional Information About Your Visit        [employer] Information     [employer] gives you secure access to  "your electronic health record. If you see a primary care provider, you can also send messages to your care team and make appointments. If you have questions, please call your primary care clinic.  If you do not have a primary care provider, please call [phone] and they will assist you.        Care EveryWhere ID     This is your Care EveryWhere ID. This could be used by other organizations to access your [place] medical records  [id number]        Your Vitals Were     Pulse Temperature Respirations Height Pulse Oximetry BMI (Body Mass Index)    83 97.7  F (36.5  C) 26 5' 8\" (1.727 m) 96% 21.36 kg/m2       Blood Pressure from Last 3 Encounters:   02/25/18 182/85   02/23/18 170/72   02/19/18 140/87    Weight from Last 3 Encounters:   02/25/18 140 lb 8 oz (63.7 kg)   02/23/18 142 lb 12.8 oz (64.8 kg)   02/19/18 155 lb (70.3 kg)              Today, you had the following     No orders found for display         Today's Medication Changes          These changes are accurate as of 2/26/18 12:34 PM.  If you have any questions, ask your nurse or doctor.               These medicines have changed or have updated prescriptions.        Dose/Directions    furosemide 20 MG tablet   Commonly known as:  LASIX   This may have changed:  when to take this   Used for:  Essential hypertension, malignant        Dose:  20 mg   Take 1 tablet (20 mg) by mouth daily   Quantity:  180 tablet   Refills:  3                Primary Care Provider Office Phone # Fax #    Angel Corea [phone]       [address] [place] [id number]        Equal Access to Services     [facility] RANGEL : Hadii aditi clinton Sokathleen, waaxda luqadaha, qaybta kaalmasingh zabala. So [facility] [phone].    ATENCIÓN: Si habla español, tiene a costa disposición servicios gratuitos de asistencia lingüística. Llame al [phone].    We comply with applicable federal civil rights laws and [place] laws. " We do not discriminate on the basis of race, color, national origin, age, disability, sex, sexual orientation, or gender identity.            Thank you!     Thank you for choosing G[facility]AT[facility]  for your care. Our goal is always to provide you with excellent care. Hearing back from our patients is one way we can continue to improve our services. Please take a few minutes to complete the written survey that you may receive in the mail after your visit with us. Thank you!             Your Updated Medication List - Protect others around you: Learn how to safely use, store and throw away your medicines at www.disposemymeds.org.          This list is accurate as of 2/26/18 12:34 PM.  Always use your most recent med list.                   Brand Name Dispense Instructions for use Diagnosis    aspirin 81 MG tablet      Take 81 mg by mouth daily        calcium carbonate 500 MG chewable tablet    TUMS     Take 2 chew tab by mouth 2 times daily as needed for heartburn        citalopram 10 MG tablet    celeXA    90 tablet    TAKE 1 TABLET BY MOUTH DAILY    Episodic mood disorder (H)       COUMADIN PO      INR 2.7 on 2/24/18. Coumadin Tablet (Warfarin Sodium) Give 4 mg by mouth ROW for A-fib  2mg on M-T-W-F. Next INR on 02/26/2018        diclofenac 1 % Gel topical gel    VOLTAREN    100 g    Place 4 g onto the skin 4 times daily as needed for moderate pain Please have container at bedside sent to pharmacy to re-label for dismissal    Closed compression fracture of first lumbar vertebra, initial encounter (H)       diphenhydrAMINE-acetaminophen  MG tablet    TYLENOL PM     Take 1 tablet by mouth At Bedtime        furosemide 20 MG tablet    LASIX    180 tablet    Take 1 tablet (20 mg) by mouth daily    Essential hypertension, malignant       ICAPS PO      Take 1 capsule by mouth 2 times daily        iron 325 (65 FE) MG tablet      Take 1 tablet by mouth 2 times daily        ketoconazole 2 % cream    NIZORAL     30 g    Apply to face BID PRN    Dermatitis, seborrheic       levothyroxine 100 MCG tablet    SYNTHROID/LEVOTHROID    90 tablet    TAKE 1 TABLET BY MOUTH EVERY DAY.    Other specified hypothyroidism       Lidocaine-Menthol 4-1 % Ptch     15 patch    Externally apply 1 patch topically daily    Closed compression fracture of first lumbar vertebra, initial encounter (H)       polyethylene glycol Packet    MIRALAX/GLYCOLAX    7 packet    Take 17 g by mouth daily as needed for constipation    Closed compression fracture of first lumbar vertebra, initial encounter (H)       tamsulosin 0.4 MG capsule    FLOMAX    60 capsule    Take 1 capsule (0.4 mg) by mouth daily    Urinary retention       TYLENOL 8 HOUR 650 MG CR tablet   Generic drug:  acetaminophen      Take 650 mg by mouth every evening as needed for mild pain or fever

## 2018-02-27 ENCOUNTER — NURSING HOME VISIT (OUTPATIENT)
Dept: GERIATRICS | Facility: CLINIC | Age: 83
End: 2018-02-27
Payer: COMMERCIAL

## 2018-02-27 VITALS
HEART RATE: 80 BPM | BODY MASS INDEX: 21.98 KG/M2 | OXYGEN SATURATION: 95 % | RESPIRATION RATE: 22 BRPM | SYSTOLIC BLOOD PRESSURE: 146 MMHG | WEIGHT: 145 LBS | DIASTOLIC BLOOD PRESSURE: 75 MMHG | HEIGHT: 68 IN | TEMPERATURE: 97.4 F

## 2018-02-27 DIAGNOSIS — R53.81 PHYSICAL DECONDITIONING: ICD-10-CM

## 2018-02-27 DIAGNOSIS — R33.9 URINARY RETENTION: ICD-10-CM

## 2018-02-27 DIAGNOSIS — R41.3 MEMORY LOSS: ICD-10-CM

## 2018-02-27 DIAGNOSIS — I48.20 CHRONIC ATRIAL FIBRILLATION (H): ICD-10-CM

## 2018-02-27 DIAGNOSIS — I10 ESSENTIAL HYPERTENSION: ICD-10-CM

## 2018-02-27 DIAGNOSIS — I50.9 CHRONIC CONGESTIVE HEART FAILURE, UNSPECIFIED CONGESTIVE HEART FAILURE TYPE: ICD-10-CM

## 2018-02-27 DIAGNOSIS — M54.9 ACUTE MIDLINE BACK PAIN, UNSPECIFIED BACK LOCATION: Primary | ICD-10-CM

## 2018-02-27 DIAGNOSIS — B37.0 THRUSH, ORAL: ICD-10-CM

## 2018-02-27 DIAGNOSIS — R79.1 SUPRATHERAPEUTIC INR: ICD-10-CM

## 2018-02-27 DIAGNOSIS — Z86.73 HISTORY OF CVA (CEREBROVASCULAR ACCIDENT): ICD-10-CM

## 2018-02-27 PROBLEM — Z79.01 ANTICOAGULATED ON COUMADIN: Status: ACTIVE | Noted: 2018-02-27

## 2018-02-27 PROCEDURE — 99309 SBSQ NF CARE MODERATE MDM 30: CPT | Performed by: NURSE PRACTITIONER

## 2018-02-27 NOTE — PROGRESS NOTES
[facility]    Chief Complaint   Patient presents with     Nursing Home Acute     INR f/u       HPI:    Francesco Killian is a 89 year old  (12/26/1920), who is being seen today for an episodic care visit at [facility].  HPI information obtained from: facility chart records, facility staff, patient report and [facility] chart review.Today's concern is:     Acute midline back pain, unspecified back location  Physical deconditioning  Chronic congestive heart failure, unspecified congestive heart failure type (H)  Chronic atrial fibrillation (H)  Supratherapeutic INR: INR 4.0 today  History of CVA (cerebrovascular accident)  Memory loss  Essential hypertension: varies mostly 140-160's, occas 180's  Urinary retention: 269 cc, 302 cc, 163 cc  Thrush    No CP, SOB, Cough, dizziness, fevers, chills, HA, N/V, dysuria or Bowel Abnormalities. Appetite is good.  No pain except left lower back with movements and activity but thinks it is improving    ALLERGIES: Review of patient's allergies indicates no known allergies.  Past Medical, Surgical, Family and Social History reviewed and updated in [facility].    Current Outpatient Prescriptions   Medication Sig Dispense Refill     Lidocaine-Menthol 4-1 % PTCH Externally apply 1 patch topically Apply to back topically every morning and at bedtime for pain 1 patch on at AM for 12 hrs off at HS       nystatin (MYCOSTATIN) 500626 unit/mL SUSP suspension Swish and swallow 50,000 Units in mouth 4 times daily 5cc swish & swallow QID X 7 days. Dx: Thrush.       Warfarin Sodium (COUMADIN PO) INR 4.0 today 2/27/18. No Coumadin today 2/27/18. Next INR 2/28/18. Call NP w/ results.       diclofenac (VOLTAREN) 1 % GEL topical gel Place 4 g onto the skin 4 times daily as needed for moderate pain Please have container at bedside sent to pharmacy to re-label for dismissal 100 g 3     tamsulosin (FLOMAX) 0.4 MG capsule Take 1 capsule (0.4 mg) by mouth daily 60  "capsule      polyethylene glycol (MIRALAX/GLYCOLAX) Packet Take 17 g by mouth daily as needed for constipation 7 packet      levothyroxine (SYNTHROID/LEVOTHROID) 100 MCG tablet TAKE 1 TABLET BY MOUTH EVERY DAY. 90 tablet 0     citalopram (CELEXA) 10 MG tablet TAKE 1 TABLET BY MOUTH DAILY 90 tablet 1     ketoconazole (NIZORAL) 2 % cream Apply to face BID PRN 30 g 1     furosemide (LASIX) 20 MG tablet Take 1 tablet (20 mg) by mouth daily (Patient taking differently: Take 20 mg by mouth ) 180 tablet 3     acetaminophen (TYLENOL 8 HOUR) 650 MG CR tablet Take 650 mg by mouth every evening as needed for mild pain or fever       Multiple Vitamins-Minerals (ICAPS PO) Take 1 capsule by mouth 2 times daily       calcium carbonate (TUMS) 500 MG chewable tablet Take 2 chew tab by mouth 2 times daily as needed for heartburn        Ferrous Sulfate (IRON) 325 (65 FE) MG tablet Take 1 tablet by mouth 2 times daily       aspirin 81 MG tablet Take 81 mg by mouth daily        Medications reviewed:  Medications reconciled to facility chart and changes were made to reflect current medications as identified as above med list. Below are the changes that were made:   Medications stopped since last EPIC medication reconciliation:   There are no discontinued medications.    Medications started since last [facility] medication reconciliation:  No orders of the defined types were placed in this encounter.        REVIEW OF SYSTEMS:  See under HPI above    Physical Exam:  /75  Pulse 80  Temp 97.4  F (36.3  C)  Resp 22  Ht 5' 8\" (1.727 m)  Wt 145 lb (65.8 kg)  SpO2 95%  BMI 22.05 kg/m2   GENERAL APPEARANCE:  Alert, in no distress, pleasant, cooperative, oriented x person, ?situation  EYES: Sclera clear and conjunctiva normal, vision significantly impaired.  ENT:  Mouth with moist mucous membranes except has thrush on tongue. Is  [place] significant both ears--wears hearing aide in right ear  RESP:  respiratory effort  of chest normal,  no " respiratory distress, Lung sounds clear to auscultation.  CV: Auscultation of heart done, IRRR with loud systolic murmur.  No rub or gallop or edema.  +dps bilat.  ABDOMEN:  normal bowel sounds, soft, nontender.  M/S:   Gait and station walks with assist and walker. Unsafe by self at this point. ADAME equally.  NEURO: cranial nerves 2-12 grossly intact, no facial asymmetry, no speech deficits.  PSYCH:  insight and judgement impaired, memory forgetful, affect and mood normal       BP Readings from Last 3 Encounters:   02/27/18 146/75   02/25/18 182/85   02/23/18 170/72     INR's: 2/27: 4.0, 2/24: 2.7, 2/23: 3.07, 2/22: 3.47, 2/21: 3.25    Recent Labs:   CBC RESULTS:   Recent Labs   Lab Test  02/22/18   0701  02/21/18   0616   WBC  8.3  10.5   RBC  3.84*  4.06*   HGB  12.2*  13.1*   HCT  38.2*  39.8*   MCV  100  98   MCH  31.8  32.3   MCHC  31.9  32.9   RDW  13.2  13.2   PLT  102*  112*       Last Basic Metabolic Panel:  Recent Labs   Lab Test 02/24/18 02/23/18   0618   NA  140  140   POTASSIUM  3.7  3.8   CHLORIDE  106  106   KEZIA  8.9  8.8   CO2  29  27   BUN  29*  36*   CR  1.34*  1.35*   GLC  94  99       Liver Function Studies -   Recent Labs   Lab Test  09/07/17   1512  02/03/17   1950   PROTTOTAL  8.1  8.2   ALBUMIN  3.6  3.6   BILITOTAL  0.4  0.3   ALKPHOS  105  82   AST  26  29   ALT  22  23       TSH   Date Value Ref Range Status   07/11/2017 0.26 (L) 0.40 - 4.00 mU/L Final   10/24/2016 2.28 0.40 - 4.00 mU/L Final     Lab Results   Component Value Date    INR 3.07 02/23/2018    INR 3.47 02/22/2018    INR 3.25 02/21/2018     ASSESSMENT / PLAN:  (M54.9) Acute midline back pain, unspecified back location  (primary encounter diagnosis)   (R53.81) Physical deconditioning  Comment/Plan: lessening pain, more so with activities, cont same POC, meds, monitor    (I50.9) Chronic congestive heart failure, unspecified congestive heart failure type (H)  Comment/Plan: compensated, cont current POC, monitor.    (I48.2)  Chronic atrial fibrillation (H)   (R79.1) Supratherapeutic INR  Comment/Plan: INR 4.0, no coumadin again today(held yest also). Check INR in am and monitor.    (Z86.73) History of CVA (cerebrovascular accident)   (R41.3) Memory loss  Comment/Plan: see above, will also dc the Tylenol PM as he has fallen,  and has memory issues, so should NOT have Benadryl    (I10) Essential hypertension  Comment/Plan: labile at baseline and apparently had ACE-I dc'd as outpt.  I note he was on Norvasc at one point also in past. Will monitor, no changes at this time.    (R33.9) Urinary retention  Comment/Plan:  Has been fine but a couple PVR's borderline.  Please check twice more and prn.       (B37.0) Thrush, oral  Comment/Plan: nystatin, good oral cares.        Electronically signed by  CODY Yarbrough CNP

## 2018-02-28 ENCOUNTER — MEDICAL CORRESPONDENCE (OUTPATIENT)
Dept: HEALTH INFORMATION MANAGEMENT | Facility: CLINIC | Age: 83
End: 2018-02-28

## 2018-02-28 VITALS
HEIGHT: 68 IN | RESPIRATION RATE: 20 BRPM | WEIGHT: 148 LBS | TEMPERATURE: 97.6 F | DIASTOLIC BLOOD PRESSURE: 75 MMHG | OXYGEN SATURATION: 97 % | SYSTOLIC BLOOD PRESSURE: 146 MMHG | BODY MASS INDEX: 22.43 KG/M2 | HEART RATE: 83 BPM

## 2018-03-01 ENCOUNTER — NURSING HOME VISIT (OUTPATIENT)
Dept: GERIATRICS | Facility: CLINIC | Age: 83
End: 2018-03-01
Payer: COMMERCIAL

## 2018-03-01 DIAGNOSIS — R13.10 DYSPHAGIA, UNSPECIFIED TYPE: ICD-10-CM

## 2018-03-01 DIAGNOSIS — S32.010D CLOSED COMPRESSION FRACTURE OF L1 LUMBAR VERTEBRA WITH ROUTINE HEALING, SUBSEQUENT ENCOUNTER: ICD-10-CM

## 2018-03-01 DIAGNOSIS — N18.30 CKD (CHRONIC KIDNEY DISEASE) STAGE 3, GFR 30-59 ML/MIN (H): ICD-10-CM

## 2018-03-01 DIAGNOSIS — I70.90 ATHEROSCLEROSIS: ICD-10-CM

## 2018-03-01 DIAGNOSIS — R53.81 PHYSICAL DECONDITIONING: Primary | ICD-10-CM

## 2018-03-01 DIAGNOSIS — Z86.73 HISTORY OF CVA (CEREBROVASCULAR ACCIDENT): ICD-10-CM

## 2018-03-01 DIAGNOSIS — I48.20 CHRONIC ATRIAL FIBRILLATION (H): ICD-10-CM

## 2018-03-01 DIAGNOSIS — I10 UNCONTROLLED HYPERTENSION: ICD-10-CM

## 2018-03-01 DIAGNOSIS — Z95.1 POSTSURGICAL AORTOCORONARY BYPASS STATUS: ICD-10-CM

## 2018-03-01 PROCEDURE — 99306 1ST NF CARE HIGH MDM 50: CPT | Performed by: INTERNAL MEDICINE

## 2018-03-01 NOTE — PROGRESS NOTES
PRIMARY CARE PROVIDER AND CLINIC RESPONSIBLE:  Angel Corea, [address] [place] [id number]        ADMISSION HISTORY AND PHYSICAL EXAMINATION     Chief Complaint   Patient presents with     Fatigue     Hypertension     Back Pain         HISTORY OF PRESENT ILLNESS:  89 year old male, (12/26/1920), admitted to the [facility] TCU for continuation of medical care and rehab.  HPI information obtained from: facility chart records, facility staff, patient report and [facility] chart review.    Francesco Killian is a 89 year old male with history of chronic atrial fibrillation on warfarin, CAD s/p CABG, CHF, , hx of multiple CVAs, HTN, chronic renal insufficiency, tricuspid and aortic regurgitation, hearing impairment, macular degeneration who was admitted at [facility] from 2/20/18 to 2/23/18 due to mechanical fall. ED admission testing revealed Creatinine 1.68, normal electrolytes, glucose 109, BUN 53, WBC 9.8, Hgb 13.2. INR 2.58. CT head negative. and CT cervical spine negative. Lumbar CT shows an L1 vertebral body fracture, X ray pelvis was negative. He was admitted at hospital and treated with pain management with tylenol, Flexeril, atarax, lidoderm patch, tramadol and T Pump. His lisinopril was discontinued and Lasix was decreased once daily at the hospital due increased creatinine and treated with IV fluids. He has dysphagia and was seen by speech therapist and his swallowing was advanced prior to discharge. He feels weak and tired. His BP is markedly elevated this morning at 190/119 mmHg. He denies cp, headache, new vision change and dizziness. Slept ok, appetite good and had BM. Patient is seen and examined by me with HUNTER Quintero NP. Please see HUNTER Quintero's admit noted dated 2/26/18 for details of admission, past medical history, family history, allergies, medication list, social history and other details pertinent with this admission. Hospital admission and dc summary  reviewed.    Past Medical History:   Diagnosis Date     AAA (abdominal aortic aneurysm) (H) 6/3/2013     Aortic stenosis      Bradycardia     Dr Stanford didn't think pacer needed at this time     Carotid occlusion, right     see above note     Chronic atrial fibrillation (H) 6/17/2013     CVA (cerebral infarction) 6/3/2013    DANILO and both vertebral art blocked-family son (neurologist) requests BP to run a bit higher since flow is via L ICA     Dysphagia 6/3/2013     HTN (hypertension) 6/3/2013    and RA stenosis, s/p stents at [facility]     Hyperlipidemia LDL goal <130 6/3/2013     Hypothyroidism 6/3/2013     Macular degeneration of both eyes      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     Right bundle branch block (RBBB) 9/2/2015     Squamous cell carcinoma      Unspecified cerebral artery occlusion with cerebral infarction     x 2, uses a cane       Past Surgical History:   Procedure Laterality Date     C NONSPECIFIC PROCEDURE      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 [facility] at time of RA stenting     CARDIAC SURGERY  1980s    CABg     COLONOSCOPY      normal exams     COLONOSCOPY  11/4/2013    Procedure: COLONOSCOPY;  COLONOSCOPY ;  Surgeon: Aguilar Arechiga MD;  Location:  GI     ESOPHAGOSCOPY, GASTROSCOPY, DUODENOSCOPY (EGD), COMBINED  10/14/2013    Procedure: COMBINED ESOPHAGOSCOPY, GASTROSCOPY, DUODENOSCOPY (EGD);  COMBINED ESOPHAGOSCOPY, GASTROSCOPY, DUODENOSCOPY (EGD) ;  Surgeon: Aguilar Arechiga MD;  Location:  GI     EYE SURGERY      bilat cataracts     GI SURGERY  1970s    duodenal ulcer reapair     HERNIA REPAIR      bilat inguinal     IR RENAL/VISCERAL STENT/ATHERECT/PTA       TURP         Current Outpatient Prescriptions   Medication Sig     diphenhydrAMINE-acetaminophen (TYLENOL PM)  MG tablet Take 1 tablet by mouth At Bedtime     Lidocaine-Menthol 4-1 % PTCH Externally apply 1 patch topically Apply to back topically every morning and at bedtime for pain 1 patch on at AM  for 12 hrs off at HS     nystatin (MYCOSTATIN) 991131 unit/mL SUSP suspension Swish and swallow 50,000 Units in mouth 4 times daily 5cc swish & swallow QID X 7 days. Dx: Thrush.     diclofenac (VOLTAREN) 1 % GEL topical gel Place 4 g onto the skin 4 times daily as needed for moderate pain Please have container at bedside sent to pharmacy to re-label for dismissal     tamsulosin (FLOMAX) 0.4 MG capsule Take 1 capsule (0.4 mg) by mouth daily     polyethylene glycol (MIRALAX/GLYCOLAX) Packet Take 17 g by mouth daily as needed for constipation     levothyroxine (SYNTHROID/LEVOTHROID) 100 MCG tablet TAKE 1 TABLET BY MOUTH EVERY DAY.     citalopram (CELEXA) 10 MG tablet TAKE 1 TABLET BY MOUTH DAILY     ketoconazole (NIZORAL) 2 % cream Apply to face BID PRN     furosemide (LASIX) 20 MG tablet Take 1 tablet (20 mg) by mouth daily (Patient taking differently: Take 20 mg by mouth )     Multiple Vitamins-Minerals (ICAPS PO) Take 1 capsule by mouth 2 times daily     calcium carbonate (TUMS) 500 MG chewable tablet Take 2 chew tab by mouth 2 times daily as needed for heartburn      Ferrous Sulfate (IRON) 325 (65 FE) MG tablet Take 1 tablet by mouth 2 times daily     aspirin 81 MG tablet Take 81 mg by mouth daily      Warfarin Sodium (COUMADIN PO) INR 4.0 today 2/27/18. No Coumadin today 2/27/18. Next INR 2/28/18. Call NP w/ results.     No current facility-administered medications for this visit.        No Known Allergies    Social History     Social History     Marital status:      Spouse name: N/A     Number of children: N/A     Years of education: N/A     Occupational History     Not on file.     Social History Main Topics     Smoking status: Never Smoker     Smokeless tobacco: Never Used     Alcohol use No     Drug use: No     Sexual activity: No     Other Topics Concern     Caffeine Concern No     decaff tea/green tea     Sleep Concern No     Special Diet No     low carbs     Exercise Yes     extremities - stretches  "every day,  some walking     Social History Narrative          Information reviewed:  Medications, vital signs, orders, nursing notes, problem list, hospital information.     ROS: All 10 point review of system completed, those pertinent positive, please see H&P, the remaining ROS is negative.    /75  Pulse 83  Temp 97.6  F (36.4  C)  Resp 20  Ht 5' 8\" (1.727 m)  Wt 148 lb (67.1 kg)  SpO2 97%  BMI 22.5 kg/m2    PHYSICAL EXAMINATION:   GENERAL:  No acute distress.  SKIN:  Dry and warm.  There is no rash at area of skin examined.  HEENT:  Head without trauma.  Pupils round, reactive. Exam of conjunctiva and lids are normal. Sclera without icterus. There is no oral thrush. Hard hearing. Thick secretions of mouth.  NECK:  Supple. No jugular venous distension.  CHEST: No reproducible chest tenderness.   LUNGS:  Normal respiratory effort. Lungs reveal decreased breath sounds at bases. No rales or wheezes.  HEART:  Irregular rate and rhythm.  2/6 murmur at aortic site but no gallops or rubs auscultated.  ABDOMEN:  Soft, bowel sounds positive.  There is no tenderness or guarding.   EXTREMITIES: No edema.   NEUROLOGIC:  Alert and oriented x3. Moving all ext. Gait not tested.  PSYCH: Recent and remote memory is mildly impaired. Mood and affect are normal.    Lab/Diagnostic data:  Reviewed  CBC Lab Results (Last 5 results in 365 days)       WBC RBC Hgb Hct MCV MCH MCHC RDW Plt Neut # Lymph # Eos # Baso # Immat. Gran #   02/22/18 0701 8.3 3.84 12.2 38.2 100 31.8 31.9 13.2 102 5.8 0.8 0.8 0.0 0.0   02/21/18 0616 10.5 4.06 13.1 39.8 98 32.3 32.9 13.2 112 -- -- -- -- --   02/19/18 1859 9.8 4.06 13.2 39.9 98 32.5 33.1 12.8 151 7.1 1.4 0.3 0.1 0.1   10/04/17 2241 5.9 4.18 13.5 41.3 99 32.3 32.7 12.5 138 3.4 1.4 0.3 0.0 0.0   09/07/17 1512 7.3 4.20 13.7 41.7 99 32.6 32.9 12.4 161 5.0 1.2 0.2 0.1 0.0   CMP Labs (Last 5 results in 365 days)       Na+ K+ Cl CO2 ANION GAP Glc BUN Creat GFR GFR-Black Calcium Mg ALT AST A1C TSH " LDL HIV   02/24/18 0000 140 3.7 106 29 5 94 29 1.34 49 60 8.9 -- -- -- -- -- -- --   02/24/18 0000 140 3.7 106 29 5 94 29 1.34 49 60 8.9 -- -- -- -- -- -- --   02/23/18 0618 140 3.8 106 27 7 99 36 1.35 49 59 8.8 -- -- -- -- -- -- --   02/22/18 0701 142 3.7 108 29 5 102 37 1.34 49 60 8.8 -- -- -- -- -- -- --   02/22/18 0701 -- -- -- -- -- --                 Results for orders placed or performed during the hospital encounter of 02/20/18   Chest CT w/o contrast    Narrative    CT CHEST WITHOUT CONTRAST February 20, 2018 1:18 PM     HISTORY: Fall, hypoxia, evaluate rib fractures, pneumothorax.     TECHNIQUE: Volumetric acquisition of the chest  without contrast.  Radiation dose for this scan was reduced using automated exposure  control, adjustment of the mA and/or kV according to patient size, or  iterative reconstruction technique.    COMPARISON: Chest x-ray 8/29/2015.    FINDINGS: No acute airspace disease or pneumothorax. Mild peripheral  fibrosis/scarring at the bases. Minimal pleural thickening or fluid at  the left base. Sternotomy. Cardiomegaly. Coronary artery  calcifications. Atheromatous calcification of the thoracic aorta.  Calcified granulomas in the right midlung and a few partially  calcified small mediastinal lymph nodes. Atrophic right kidney. Heavy  vascular calcification. Partially visualized abdominal aortic aneurysm  measuring at least 4.3 x 5.4 cm.    Bone windows demonstrate no displaced rib fractures. There is an acute  compression fracture with moderate loss of height involving L1. Slight  compression of the superior endplate of T12 is age indeterminate.  Moderate compression of T5 which was present on the previous chest  x-ray.      Impression    IMPRESSION:  1. Acute compression fracture at L1 with moderate loss of height. Mild  compression involving the superior endplate of T12, age indeterminate.  See separate lumbar spine CT report.   2. No other acute findings in the chest.  3.  Cardiomegaly. Trace left pleural fluid and/or thickening.  4. Partially visualized infrarenal abdominal aortic aneurysm measuring  at least 5.4 cm in diameter.    EDGARD BATES MD   Lumbar spine CT w/o contrast    Narrative    CT LUMBAR SPINE WITHOUT CONTRAST February 20, 2018 1:17 PM     HISTORY: Pain and tenderness status post fall, evaluate fracture.     TECHNIQUE: Radiation dose for this scan was reduced using automated  exposure control, adjustment of the mA and/or kV according to patient  size, or iterative reconstruction technique.    FINDINGS: There is an infrarenal abdominal aortic aneurysm measuring  at least 4 cm in diameter, but not entirely included on the scan.  There are five nonrib-bearing or lumbar-type vertebra. There is an L1  vertebral body fracture with mild loss of anterior and posterior  vertebral body height and mild posterior cortical bowing or  retropulsion resulting in mild osseous central stenosis at the  vertebral body level. Sagittal alignment is within normal limits. Disc  degeneration is noted, greatest at L3-L4 where there is degenerative  intradiscal gas and moderate loss of disc height. At L2-L3, there is a  diffuse annular bulge and ligamentum flavum thickening with at least  mild to moderate central stenosis and bilateral foraminal stenosis. At  L3-L4, there is diffuse annular bulge and ligamentum flavum thickening  with moderate to severe central and bilateral foraminal stenosis. At  L4-L5, there is diffuse annular bulge and ligamentum flavum thickening  resulting in moderate to severe central and bilateral foraminal  stenosis.      Impression    IMPRESSION:  1. Abdominal aortic aneurysm measuring at least 4 cm in diameter. This  is only partly included on the scan.  2. L1 vertebral body fracture with mild loss of anterior and posterior  vertebral body height and mild posterior cortical bowing or  retropulsion. This appears to be acute or subacute.  3. Multilevel central  stenosis, greatest at L3-L4 and L4-L5.  4. Multilevel foraminal stenosis, greatest at L3-L4 and L4-L5.    LJ WELLER MD   Pelvis XR, 1-2 views    Narrative    PELVIS ONE TO TWO VIEWS  2/20/2018 1:26 PM    HISTORY:  Fall. Evaluate fracture. Sacroiliac pain.     COMPARISON:  None.      Impression    IMPRESSION:  No acute process identified. No significant degenerative  changes. Prominent atherosclerotic vascular calcifications.     JOSE J LAWRENCE MD     ECHO 2/3/17  Interpretation Summary     Left ventricular systolic function is normal.The visual ejection fraction is  estimated at 65-70%.There is severe concentric left ventricular hypertrophy.  The right ventricle is mild to moderately dilated.Mildly decreased right  ventricular systolic function  B-atrial severe enlargement.  There is sclerosis, calcification, and restriction of the aortic valve opening  compatible with moderate aortic stenosis.There is mild (1+) aortic  Regurgitation.      ASSESSMENT / PLAN:     Physical deconditioning  Plan: PT/OT with increase independence, self-care, strength and endurance and other cares that help return to home/facility of origin, fall precautions. Care conference with patient and family for the progress of rehab and disposition issues will be discussed as planned. Rehab evaluation and other evaluations including CPT are at rehab logs, to be reviewed separately.  Fall risk assessment as well as cognitive evaluation so far performed:  SLUMS:  /330  Bed mobility: mod I-cga  Transfers: cga  Ambulating distance:  113 Feet cga  Fww    Uncontrolled hypertension  Plan: No BP medications when discharged at this TCU. Start metoprolol 25 mg bid and Hydralazine 25 mg tid as soon as possible.he is asymptomatic at this time, but if BP remains elevated, will recommend return to hospital. His lisinopril was discontinued at the hospital.    Closed compression fracture of L1 lumbar vertebra with routine healing, subsequent encounter  Plan:  Continue PT/PT, fall precautions, scheduled tylenol 1000 mg tid, Lidoderm patch and follow up spine team.     Chronic atrial fibrillation (H)  Plan: Continue to monitor INR, his INR was elevated, resume warfarin once INR therapeutic. Start metoprolol for rate control.    Atherosclerosis Postsurgical aortocoronary bypass status and CHF  Plan: Continue and increase lasix 20 mg bid and add metoprolol.     History of CVA (cerebrovascular accident)  Plan: Continue warfarin as above.    CKD (chronic kidney disease) stage 3, GFR 30-59 ml/min  Plan: Continue to monitor BMP.    Dysphagia  Plan: Consult speech therapist to guide treatment and evaluate progress.    Other problems with same care. Primary care doctor and other specialists to address those chronic problems in next clinic appointment to be scheduled upon discharge from the TCU.      Total time spent with patient visit was 37 min including patient visit, review of past records, patients counseling and coordinating care.        David Nguyễn MD

## 2018-03-01 NOTE — MR AVS SNAPSHOT
After Visit Summary   3/1/2018    Francesco Killian    MRN: [id number]           Patient Information     Date Of Birth          12/26/1920        Visit Information        Provider Department      3/1/2018 6:08 PM David Nguyễn MD [facility]        Today's Diagnoses     Physical deconditioning    -  1    Uncontrolled hypertension        Closed compression fracture of L1 lumbar vertebra with routine healing, subsequent encounter        Chronic atrial fibrillation (H)        Atherosclerosis        Postsurgical aortocoronary bypass status        History of CVA (cerebrovascular accident)        CKD (chronic kidney disease) stage 3, GFR 30-59 ml/min        Dysphagia, unspecified type           Follow-ups after your visit        Your next 10 appointments already scheduled     Mar 15, 2018 12:00 PM CDT   Anticoagulation Visit with  ANTICOAGULATION CLINIC   [facility]    [address] [place] [id number] [phone]              Who to contact     If you have questions or need follow up information about today's clinic visit or your schedule please contact [facility] directly at [phone].  Normal or non-critical lab and imaging results will be communicated to you by [employer], letter or phone within 4 business days after the clinic has received the results. If you do not hear from us within 7 days, please contact the clinic through [employer]t or phone. If you have a critical or abnormal lab result, we will notify you by phone as soon as possible.  Submit refill requests through [employer] or call your pharmacy and they will forward the refill request to us. Please allow 3 business days for your refill to be completed.          Additional Information About Your Visit        MyChart Information     [employer] gives you secure access to your electronic health record. If you see a primary care  "provider, you can also send messages to your care team and make appointments. If you have questions, please call your primary care clinic.  If you do not have a primary care provider, please call [phone] and they will assist you.        Care EveryWhere ID     This is your Care EveryWhere ID. This could be used by other organizations to access your [facility] medical records  [id number]        Your Vitals Were     Pulse Temperature Respirations Height Pulse Oximetry BMI (Body Mass Index)    83 97.6  F (36.4  C) 20 5' 8\" (1.727 m) 97% 22.5 kg/m2       Blood Pressure from Last 3 Encounters:   02/28/18 146/75   02/27/18 146/75   02/25/18 182/85    Weight from Last 3 Encounters:   02/28/18 148 lb (67.1 kg)   02/27/18 145 lb (65.8 kg)   02/25/18 140 lb 8 oz (63.7 kg)              Today, you had the following     No orders found for display         Today's Medication Changes          These changes are accurate as of 3/1/18 11:33 AM.  If you have any questions, ask your nurse or doctor.               These medicines have changed or have updated prescriptions.        Dose/Directions    furosemide 20 MG tablet   Commonly known as:  LASIX   This may have changed:  when to take this   Used for:  Essential hypertension, malignant        Dose:  20 mg   Take 1 tablet (20 mg) by mouth daily   Quantity:  180 tablet   Refills:  3                Primary Care Provider Office Phone # Fax #    Angel Corea [phone]       [address] [place] [phone]        Equal Access to Services     IMAN MULTANI : Hadii aditi clinton Sokathleen, waaxda luqadaha, qaybta kaalmasingh zabala. So [facility] [phone].    ATENCIÓN: Si habla español, tiene a costa disposición servicios gratuitos de asistencia lingüística. Llame al [phone].    We comply with applicable federal civil rights laws and [place] laws. We do not discriminate on the basis of race, color, national " origin, age, disability, sex, sexual orientation, or gender identity.            Thank you!     Thank you for choosing [facility]  for your care. Our goal is always to provide you with excellent care. Hearing back from our patients is one way we can continue to improve our services. Please take a few minutes to complete the written survey that you may receive in the mail after your visit with us. Thank you!             Your Updated Medication List - Protect others around you: Learn how to safely use, store and throw away your medicines at www.disposemymeds.org.          This list is accurate as of 3/1/18 11:33 AM.  Always use your most recent med list.                   Brand Name Dispense Instructions for use Diagnosis    aspirin 81 MG tablet      Take 81 mg by mouth daily        calcium carbonate 500 MG chewable tablet    TUMS     Take 2 chew tab by mouth 2 times daily as needed for heartburn        citalopram 10 MG tablet    celeXA    90 tablet    TAKE 1 TABLET BY MOUTH DAILY    Episodic mood disorder (H)       COUMADIN PO      INR 2.8 today 3/1/18. Coumadin 2 mg daily Next INR 3/5/18.        diclofenac 1 % Gel topical gel    VOLTAREN    100 g    Place 4 g onto the skin 4 times daily as needed for moderate pain Please have container at bedside sent to pharmacy to re-label for dismissal    Closed compression fracture of first lumbar vertebra, initial encounter (H)       diphenhydrAMINE-acetaminophen  MG tablet    TYLENOL PM     Take 1 tablet by mouth At Bedtime        furosemide 20 MG tablet    LASIX    180 tablet    Take 1 tablet (20 mg) by mouth daily    Essential hypertension, malignant       ICAPS PO      Take 1 capsule by mouth 2 times daily        iron 325 (65 FE) MG tablet      Take 1 tablet by mouth 2 times daily        ketoconazole 2 % cream    NIZORAL    30 g    Apply to face BID PRN    Dermatitis, seborrheic       levothyroxine 100 MCG tablet    SYNTHROID/LEVOTHROID    90 tablet     TAKE 1 TABLET BY MOUTH EVERY DAY.    Other specified hypothyroidism       Lidocaine-Menthol 4-1 % Ptch      Externally apply 1 patch topically Apply to back topically every morning and at bedtime for pain 1 patch on at AM for 12 hrs off at HS        nystatin [id number] unit/mL Susp suspension    MYCOSTATIN     Swish and swallow 50,000 Units in mouth 4 times daily 5cc swish & swallow QID X 7 days. Dx: Thrush.        polyethylene glycol Packet    MIRALAX/GLYCOLAX    7 packet    Take 17 g by mouth daily as needed for constipation    Closed compression fracture of first lumbar vertebra, initial encounter (H)       tamsulosin 0.4 MG capsule    FLOMAX    60 capsule    Take 1 capsule (0.4 mg) by mouth daily    Urinary retention

## 2018-03-02 ENCOUNTER — RECORDS - HEALTHEAST (OUTPATIENT)
Dept: LAB | Facility: CLINIC | Age: 83
End: 2018-03-02

## 2018-03-02 ENCOUNTER — NURSING HOME VISIT (OUTPATIENT)
Dept: GERIATRICS | Facility: CLINIC | Age: 83
End: 2018-03-02
Payer: COMMERCIAL

## 2018-03-02 VITALS
DIASTOLIC BLOOD PRESSURE: 71 MMHG | SYSTOLIC BLOOD PRESSURE: 160 MMHG | BODY MASS INDEX: 22.31 KG/M2 | HEART RATE: 65 BPM | WEIGHT: 147.2 LBS | HEIGHT: 68 IN | OXYGEN SATURATION: 97 % | TEMPERATURE: 97 F | RESPIRATION RATE: 20 BRPM

## 2018-03-02 DIAGNOSIS — Z86.73 HISTORY OF CVA (CEREBROVASCULAR ACCIDENT): ICD-10-CM

## 2018-03-02 DIAGNOSIS — R53.81 PHYSICAL DECONDITIONING: ICD-10-CM

## 2018-03-02 DIAGNOSIS — Z79.01 ANTICOAGULATED ON COUMADIN: ICD-10-CM

## 2018-03-02 DIAGNOSIS — I48.20 CHRONIC ATRIAL FIBRILLATION (H): ICD-10-CM

## 2018-03-02 DIAGNOSIS — M54.9 ACUTE MIDLINE BACK PAIN, UNSPECIFIED BACK LOCATION: Primary | ICD-10-CM

## 2018-03-02 DIAGNOSIS — I10 ESSENTIAL HYPERTENSION: ICD-10-CM

## 2018-03-02 DIAGNOSIS — I50.9 CHRONIC CONGESTIVE HEART FAILURE, UNSPECIFIED CONGESTIVE HEART FAILURE TYPE: ICD-10-CM

## 2018-03-02 DIAGNOSIS — B37.0 THRUSH, ORAL: ICD-10-CM

## 2018-03-02 PROCEDURE — 99309 SBSQ NF CARE MODERATE MDM 30: CPT | Performed by: NURSE PRACTITIONER

## 2018-03-02 NOTE — PROGRESS NOTES
[facility]    Chief Complaint   Patient presents with     Nursing Home Acute       HPI:    Francesco Killian is a 89 year old  (12/26/1920), who is being seen today for an episodic care visit at [facility].  HPI information obtained from: facility chart records, facility staff, patient report and [facility] chart review.Today's concern is:      Acute midline back pain, unspecified back location;;feels pain is better relieved today  Physical deconditioning: feeling stronger  Chronic congestive heart failure, unspecified congestive heart failure type (H)  Chronic atrial fibrillation (H)  Anticoagulated on Coumadin  History of CVA (cerebrovascular accident)  Essential hypertension: running 160-170's SBP since yesterday  Thrush, oral    No CP, SOB, Cough, dizziness, fevers, chills, HA, N/V, dysuria or Bowel Abnormalities. Appetite is good.  No pain except as noted above.    ALLERGIES: Review of patient's allergies indicates no known allergies.  Past Medical, Surgical, Family and Social History reviewed and updated in [facility].    Current Outpatient Prescriptions   Medication Sig Dispense Refill     FUROSEMIDE PO Take 20 mg by mouth 2 times daily       ACETAMINOPHEN PO Take 1,000 mg by mouth every 8 hours       METOPROLOL TARTRATE PO Take 25 mg by mouth 2 times daily for HTN Hold for SBP is less than or equal to 120 and HR is less than or equal to 55       HYDRALAZINE HCL PO Take 25 mg by mouth 3 times daily for HTN Hold for SBP of less than or equal to 120       ferrous sulfate (IRON) 325 (65 FE) MG tablet        Lidocaine-Menthol 4-1 % PTCH Externally apply 1 patch topically Apply to back topically every morning and at bedtime for pain 1 patch on at AM for 12 hrs off at HS       nystatin (MYCOSTATIN) 318928 unit/mL SUSP suspension Swish and swallow 50,000 Units in mouth 4 times daily 5cc swish & swallow QID X 7 days. Dx: Thrush.       Warfarin Sodium (COUMADIN PO) INR 2.8 today  "3/1/18. Coumadin 2 mg daily Next INR 3/5/18.       diclofenac (VOLTAREN) 1 % GEL topical gel Place 4 g onto the skin 4 times daily as needed for moderate pain Please have container at bedside sent to pharmacy to re-label for dismissal 100 g 3     tamsulosin (FLOMAX) 0.4 MG capsule Take 1 capsule (0.4 mg) by mouth daily 60 capsule      polyethylene glycol (MIRALAX/GLYCOLAX) Packet Take 17 g by mouth daily as needed for constipation 7 packet      levothyroxine (SYNTHROID/LEVOTHROID) 100 MCG tablet TAKE 1 TABLET BY MOUTH EVERY DAY. 90 tablet 0     citalopram (CELEXA) 10 MG tablet TAKE 1 TABLET BY MOUTH DAILY 90 tablet 1     ketoconazole (NIZORAL) 2 % cream Apply to face BID PRN 30 g 1     Multiple Vitamins-Minerals (ICAPS PO) Take 1 capsule by mouth 2 times daily       calcium carbonate (TUMS) 500 MG chewable tablet Take 2 chew tab by mouth 2 times daily as needed for heartburn        aspirin 81 MG tablet Take 81 mg by mouth daily        Medications reviewed:  Medications reconciled to facility chart and changes were made to reflect current medications as identified as above med list. Below are the changes that were made:   Medications stopped since last EPIC medication reconciliation:   There are no discontinued medications.    Medications started since last [facility] medication reconciliation:  No orders of the defined types were placed in this encounter.        REVIEW OF SYSTEMS:  See under HPI above    Physical Exam:  /71  Pulse 65  Temp 97  F (36.1  C)  Resp 20  Ht 5' 8\" (1.727 m)  Wt 147 lb 3.2 oz (66.8 kg)  SpO2 97%  BMI 22.38 kg/m2   GENERAL APPEARANCE:  Alert, in no distress, pleasant, cooperative, oriented x person,more alert this visit  EYES: Sclera clear and conjunctiva normal, vision significantly impaired.  ENT:  Mouth with moist mucous membranes except scant thrush on tongue. Hearing aide in right ear  RESP:  respiratory effort  of chest normal,  no respiratory distress, Lung sounds clear to " auscultation.  CV: Auscultation of heart done, IRRR with loud systolic murmur.  No rub or gallop or edema.  +dps bilat.  ABDOMEN:  normal bowel sounds, soft, nontender.  M/S:   Gait and station walks with assist and walker. AE equally.  NEURO: cranial nerves 2-12 grossly intact, no facial asymmetry, no speech deficits.  PSYCH:  insight and judgement impaired, memory forgetful, affect and mood normal     BP Readings from Last 3 Encounters:   03/02/18 160/71   02/28/18 146/75   02/27/18 146/75     INR's: 2/27: 4.0, 2/24: 2.7, 2/23: 3.07, 2/22: 3.47, 2/21: 3.25    Recent Labs:   CBC RESULTS:   Recent Labs   Lab Test  02/22/18   0701  02/21/18   0616   WBC  8.3  10.5   RBC  3.84*  4.06*   HGB  12.2*  13.1*   HCT  38.2*  39.8*   MCV  100  98   MCH  31.8  32.3   MCHC  31.9  32.9   RDW  13.2  13.2   PLT  102*  112*       Last Basic Metabolic Panel:  Recent Labs   Lab Test 02/24/18 02/23/18   0618   NA  140  140  140   POTASSIUM  3.7  3.7  3.8   CHLORIDE  106  106  106   KEZIA  8.9  8.9  8.8   CO2  29  29  27   BUN  29*  29*  36*   CR  1.34*  1.34*  1.35*   GLC  94  94  99       Liver Function Studies -   Recent Labs   Lab Test  09/07/17   1512  02/03/17   1950   PROTTOTAL  8.1  8.2   ALBUMIN  3.6  3.6   BILITOTAL  0.4  0.3   ALKPHOS  105  82   AST  26  29   ALT  22  23       TSH   Date Value Ref Range Status   07/11/2017 0.26 (L) 0.40 - 4.00 mU/L Final   10/24/2016 2.28 0.40 - 4.00 mU/L Final       Lab Results   Component Value Date    INR 3.07 02/23/2018    INR 3.47 02/22/2018    INR 3.25 02/21/2018       ASSESSMENT / PLAN:  (M54.9) Acute midline back pain, unspecified back location  (primary encounter diagnosis)   (R53.81) Physical deconditioning  Comment/Plan: lessening pain, on scheduled tylenol and  Voltaren gel, cont same POC, meds, monitor    (I50.9) Chronic congestive heart failure, unspecified congestive heart failure type (H)  Comment/Plan: compensated, cont current POC, monitor. Check BMP on Monday      (I48.2) Chronic atrial fibrillation (H)   (Z51.81,  Z79.01) Anticoagulated on Coumadin  Comment/Plan: INR 2.8 yest, cont coumadin at 2mg and then check INR on 3/5     (Z86.73) History of CVA (cerebrovascular accident)  Comment/Plan: wrote orders to dc the Tylenol PM on 2/27, not done. Dc'd yest. Has memory issues, so should NOT have Benadryl    (I10) Essential hypertension  Comment/Plan: labile at baseline and Hydralazine and Metoprolol added yest, a bit better BP today. Will cont to monitor and adjust as needed and hold BP meds if SBP <120.   (*He was on Norvasc at one point also in past*)     (B37.0) Thrush, oral  Comment/Plan: nystatin, good oral cares.        Electronically signed by  CODY Yarbrough CNP

## 2018-03-05 ENCOUNTER — NURSING HOME VISIT (OUTPATIENT)
Dept: GERIATRICS | Facility: CLINIC | Age: 83
End: 2018-03-05
Payer: COMMERCIAL

## 2018-03-05 ENCOUNTER — TRANSFERRED RECORDS (OUTPATIENT)
Dept: HEALTH INFORMATION MANAGEMENT | Facility: CLINIC | Age: 83
End: 2018-03-05

## 2018-03-05 VITALS
TEMPERATURE: 97.1 F | WEIGHT: 152 LBS | BODY MASS INDEX: 23.11 KG/M2 | DIASTOLIC BLOOD PRESSURE: 80 MMHG | SYSTOLIC BLOOD PRESSURE: 158 MMHG | HEART RATE: 73 BPM | RESPIRATION RATE: 16 BRPM

## 2018-03-05 DIAGNOSIS — I50.9 CHRONIC CONGESTIVE HEART FAILURE, UNSPECIFIED CONGESTIVE HEART FAILURE TYPE: ICD-10-CM

## 2018-03-05 DIAGNOSIS — I48.20 CHRONIC ATRIAL FIBRILLATION (H): ICD-10-CM

## 2018-03-05 DIAGNOSIS — Z79.01 ANTICOAGULATED ON COUMADIN: ICD-10-CM

## 2018-03-05 DIAGNOSIS — B37.0 THRUSH, ORAL: ICD-10-CM

## 2018-03-05 DIAGNOSIS — M54.9 ACUTE MIDLINE BACK PAIN, UNSPECIFIED BACK LOCATION: Primary | ICD-10-CM

## 2018-03-05 DIAGNOSIS — I10 ESSENTIAL HYPERTENSION: ICD-10-CM

## 2018-03-05 DIAGNOSIS — R53.81 PHYSICAL DECONDITIONING: ICD-10-CM

## 2018-03-05 DIAGNOSIS — Z86.73 HISTORY OF CVA (CEREBROVASCULAR ACCIDENT): ICD-10-CM

## 2018-03-05 LAB
ANION GAP SERPL CALCULATED.3IONS-SCNC: 9 MMOL/L (ref 5–18)
ANION GAP SERPL CALCULATED.3IONS-SCNC: 9 MMOL/L (ref 5–18)
BUN SERPL-MCNC: 31 MG/DL (ref 8–28)
BUN SERPL-MCNC: 31 MG/DL (ref 8–28)
CALCIUM SERPL-MCNC: 9 MG/DL (ref 8.5–10.5)
CALCIUM SERPL-MCNC: 9 MG/DL (ref 8.5–10.5)
CHLORIDE BLD-SCNC: 105 MMOL/L (ref 98–107)
CHLORIDE SERPLBLD-SCNC: 105 MMOL/L (ref 98–107)
CO2 SERPL-SCNC: 30 MMOL/L (ref 22–31)
CO2 SERPL-SCNC: 30 MMOL/L (ref 22–31)
CREAT SERPL-MCNC: 1.5 MG/DL (ref 0.7–1.3)
CREAT SERPL-MCNC: 1.5 MG/DL (ref 0.7–1.3)
GFR SERPL CREATININE-BSD FRML MDRD: 43 ML/MIN/1.73M2
GFR SERPL CREATININE-BSD FRML MDRD: 43 ML/MIN/1.73M2
GLUCOSE BLD-MCNC: 82 MG/DL (ref 70–125)
GLUCOSE SERPL-MCNC: 82 MG/DL (ref 70–125)
POTASSIUM BLD-SCNC: 4.1 MMOL/L (ref 3.5–5)
POTASSIUM SERPL-SCNC: 4.1 MMOL/L (ref 3.5–5)
SODIUM SERPL-SCNC: 144 MMOL/L (ref 136–145)
SODIUM SERPL-SCNC: 144 MMOL/L (ref 136–145)

## 2018-03-05 PROCEDURE — 99309 SBSQ NF CARE MODERATE MDM 30: CPT | Performed by: NURSE PRACTITIONER

## 2018-03-05 RX ORDER — TAMSULOSIN HYDROCHLORIDE 0.4 MG/1
0.4 CAPSULE ORAL AT BEDTIME
COMMUNITY

## 2018-03-05 NOTE — PROGRESS NOTES
[facility]    Chief Complaint   Patient presents with     Nursing Home Acute     labs/INR       HPI:    Francesco Killian is a 89 year old  (12/26/1920), who is being seen today for an episodic care visit at [facility].  HPI information obtained from: facility chart records, facility staff, patient report and [facility] chart review.Today's concern is:      Acute midline back pain, unspecified back location-cont to feel pain is improving  Physical deconditioning: feeling stronger  Chronic congestive heart failure, unspecified congestive heart failure type (H):   Chronic atrial fibrillation (H)  Anticoagulated on Coumadin  History of CVA (cerebrovascular accident)  Essential hypertension: running 150-170's SBP though am is highest, usually 180-90's  Thrush, oral    No CP, SOB, Cough, dizziness, fevers, chills, HA, N/V, dysuria or Bowel Abnormalities. Appetite is good.  No pain except as noted above.    ALLERGIES: Review of patient's allergies indicates no known allergies.  Past Medical, Surgical, Family and Social History reviewed and [facility] in [facility].    Current Outpatient Prescriptions   Medication Sig Dispense Refill     carbamide peroxide (DEBROX) 6.5 % otic solution Place 3 drops Into the left ear At Bedtime X 3 days. Then irrigate.       NUTRITIONAL SUPPLEMENTS PO 4oz Frozen  Nutritional Cup  two times a day for  Undernutrition.       tamsulosin (FLOMAX) 0.4 MG capsule Take by mouth At Bedtime       MELATONIN PO Take 3 mg by mouth nightly as needed       FUROSEMIDE PO Take 20 mg by mouth 2 times daily       ACETAMINOPHEN PO Take 1,000 mg by mouth every 8 hours       METOPROLOL TARTRATE PO Take 25 mg by mouth 2 times daily for HTN Hold for SBP is less than or equal to 120 and HR is less than or equal to 55       HYDRALAZINE HCL PO Take 25 mg by mouth 3 times daily for HTN Hold for SBP of less than or equal to 120       ferrous sulfate (IRON) 325 (65 FE) MG tablet Take 325  mg by mouth 2 times daily        Lidocaine-Menthol 4-1 % PTCH Externally apply 1 patch topically Apply to back topically every morning and at bedtime for pain 1 patch on at AM for 12 hrs off at HS       nystatin (MYCOSTATIN) 453778 unit/mL SUSP suspension Swish and swallow 50,000 Units in mouth 4 times daily 5cc swish & swallow QID X 7 days. Dx: Thrush.       Warfarin Sodium (COUMADIN PO) INR 2.1 today 3/5/18. Coumadin 2 mg M-W-F, 4mg all other days.  Next INR 3/8/18.       diclofenac (VOLTAREN) 1 % GEL topical gel Place 4 g onto the skin 4 times daily as needed for moderate pain Please have container at bedside sent to pharmacy to re-label for dismissal 100 g 3     polyethylene glycol (MIRALAX/GLYCOLAX) Packet Take 17 g by mouth daily as needed for constipation 7 packet      levothyroxine (SYNTHROID/LEVOTHROID) 100 MCG tablet TAKE 1 TABLET BY MOUTH EVERY DAY. 90 tablet 0     citalopram (CELEXA) 10 MG tablet TAKE 1 TABLET BY MOUTH DAILY 90 tablet 1     ketoconazole (NIZORAL) 2 % cream Apply to face BID PRN 30 g 1     Multiple Vitamins-Minerals (ICAPS PO) Take 1 capsule by mouth 2 times daily       calcium carbonate (TUMS) 500 MG chewable tablet Take 2 chew tab by mouth 2 times daily as needed for heartburn        aspirin 81 MG tablet Take 81 mg by mouth daily        Medications reviewed:  Medications reconciled to facility chart and changes were made to reflect current medications as identified as above med list. Below are the changes that were made:   Medications stopped since last EPIC medication reconciliation:   There are no discontinued medications.    Medications started since last [facility] medication reconciliation:  No orders of the defined types were placed in this encounter.        REVIEW OF SYSTEMS:  See under HPI above    Physical Exam:  /80  Pulse 73  Temp 97.1  F (36.2  C)  Resp 16  Wt 152 lb (68.9 kg)  BMI 23.11 kg/m2   GENERAL APPEARANCE:  Alert, in no distress, pleasant, cooperative, oriented  x person,place and globally situation  EYES: Sclera clear and conjunctiva normal, vision significantly impaired.  ENT:  Mouth with moist mucous membranes except scant thrush on tongue. Hearing aide in right ear  RESP:  respiratory effort  of chest normal,  no respiratory distress, Lung sounds clear to auscultation.  CV: Auscultation of heart done, IRRR with loud systolic murmur.  No rub or gallop.  No edema.  +dps bilat.  ABDOMEN:  normal bowel sounds, soft, nontender.  M/S:   Gait and station walks with assist and walker. ADAME equally.  NEURO: cranial nerves 2-12 grossly intact, no facial asymmetry, no speech deficits.  PSYCH:  insight and judgement impaired, memory forgetful, affect and mood normal     BP Readings from Last 3 Encounters:   03/05/18 158/80   03/02/18 160/71   02/28/18 146/75     INR's: 3/5: 2.1 2/27: 4.0, 2/24: 2.7, 2/23: 3.07, 2/22: 3.47, 2/21: 3.25    Recent Labs:     CBC RESULTS:   Recent Labs   Lab Test  02/22/18   0701  02/21/18   0616   WBC  8.3  10.5   RBC  3.84*  4.06*   HGB  12.2*  13.1*   HCT  38.2*  39.8*   MCV  100  98   MCH  31.8  32.3   MCHC  31.9  32.9   RDW  13.2  13.2   PLT  102*  112*       Last Basic Metabolic Panel:  Recent Labs   Lab Test 02/24/18 02/23/18   0618   NA  140  140  140   POTASSIUM  3.7  3.7  3.8   CHLORIDE  106  106  106   KEZIA  8.9  8.9  8.8   CO2  29  29  27   BUN  29*  29*  36*   CR  1.34*  1.34*  1.35*   GLC  94  94  99       Liver Function Studies -   Recent Labs   Lab Test  09/07/17   1512  02/03/17   1950   PROTTOTAL  8.1  8.2   ALBUMIN  3.6  3.6   BILITOTAL  0.4  0.3   ALKPHOS  105  82   AST  26  29   ALT  22  23       TSH   Date Value Ref Range Status   07/11/2017 0.26 (L) 0.40 - 4.00 mU/L Final   10/24/2016 2.28 0.40 - 4.00 mU/L Final       Lab Results   Component Value Date    INR 3.07 02/23/2018    INR 3.47 02/22/2018    INR 3.25 02/21/2018       ASSESSMENT / PLAN:  (M54.9) Acute midline back pain, unspecified back location  (primary encounter  diagnosis)   (R53.81) Physical deconditioning  Comment/Plan: less pain, Cont scheduled tylenol and  Voltaren gel and other POC, meds, monitor    (I50.9) Chronic congestive heart failure, unspecified congestive heart failure type (H)  Comment/Plan: compensated, cont current POC, monitor. BMP today:  Na 144, K 4.1, , CO2 30, GFR 43, BUN 31, Cr 1.50. Weight has been steady 147-148#. Today 152# but weighed on different scale per nurses and after meal. He does not look overloaded. Monitor. F/U cards as outpt--consider 2-4 weeks.    (I48.2) Chronic atrial fibrillation (H)   (Z51.81,  Z79.01) Anticoagulated on Coumadin  Comment/Plan: INR 2.1 today, cont coumadin regimen and then check INR on 3/8     (Z86.73) History of CVA (cerebrovascular accident)  Comment/Plan:   Has memory issues, no Benadryl-type meds, see above.    (I10) Essential hypertension  Comment/Plan: labile at baseline but overall a bit better control. BB held on wkd for HR <55. Will cont to monitor and adjust as needed and hold BP meds if SBP <120.   (*He was on Norvasc at one point also in past*)     (B37.0) Thrush, oral  Comment/Plan: nystatin, good oral cares. improving    Electronically signed by  CODY Yarbrough CNP

## 2018-03-08 ENCOUNTER — NURSING HOME VISIT (OUTPATIENT)
Dept: GERIATRICS | Facility: CLINIC | Age: 83
End: 2018-03-08
Payer: COMMERCIAL

## 2018-03-08 ENCOUNTER — MEDICAL CORRESPONDENCE (OUTPATIENT)
Dept: HEALTH INFORMATION MANAGEMENT | Facility: CLINIC | Age: 83
End: 2018-03-08

## 2018-03-08 VITALS
OXYGEN SATURATION: 93 % | RESPIRATION RATE: 20 BRPM | HEART RATE: 52 BPM | BODY MASS INDEX: 22.7 KG/M2 | DIASTOLIC BLOOD PRESSURE: 80 MMHG | SYSTOLIC BLOOD PRESSURE: 165 MMHG | WEIGHT: 149.8 LBS | HEIGHT: 68 IN | TEMPERATURE: 97 F

## 2018-03-08 DIAGNOSIS — R53.81 PHYSICAL DECONDITIONING: ICD-10-CM

## 2018-03-08 DIAGNOSIS — I50.9 CHRONIC CONGESTIVE HEART FAILURE, UNSPECIFIED CONGESTIVE HEART FAILURE TYPE: ICD-10-CM

## 2018-03-08 DIAGNOSIS — I10 ESSENTIAL HYPERTENSION: ICD-10-CM

## 2018-03-08 DIAGNOSIS — B37.0 THRUSH, ORAL: ICD-10-CM

## 2018-03-08 DIAGNOSIS — M54.9 ACUTE MIDLINE BACK PAIN, UNSPECIFIED BACK LOCATION: Primary | ICD-10-CM

## 2018-03-08 DIAGNOSIS — I48.20 CHRONIC ATRIAL FIBRILLATION (H): ICD-10-CM

## 2018-03-08 DIAGNOSIS — Z86.73 HISTORY OF CVA (CEREBROVASCULAR ACCIDENT): ICD-10-CM

## 2018-03-08 PROCEDURE — 99309 SBSQ NF CARE MODERATE MDM 30: CPT | Performed by: NURSE PRACTITIONER

## 2018-03-08 NOTE — PROGRESS NOTES
[facility]    Chief Complaint   Patient presents with     Nursing Home Acute       HPI:    Francesco Killian is a 89 year old  (12/26/1920), who is being seen today for an episodic care visit at [facility].  HPI information obtained from: facility chart records, facility staff, patient report and [facility] chart review.Today's concern is:      Acute midline back pain, unspecified back location-cont to feel pain is improving  Physical deconditioning: feeling stronger  Chronic congestive heart failure, unspecified congestive heart failure type (H):  Wt steady: 149.8 # yest  Chronic atrial fibrillation (H)  Anticoagulated on Coumadin  History of CVA (cerebrovascular accident)  Essential hypertension: running mostly 130-170's, occas 180's  Thrush, oral-tongue feels better    No CP, SOB, Cough, dizziness, fevers, chills, HA, N/V, dysuria or Bowel Abnormalities. Appetite is good.  Says back pain much better overall..    ALLERGIES: Review of patient's allergies indicates no known allergies.  Past Medical, Surgical, Family and Social History reviewed and updated in [facility].    Current Outpatient Prescriptions   Medication Sig Dispense Refill     NUTRITIONAL SUPPLEMENTS PO 4oz Frozen  Nutritional Cup  two times a day for  Undernutrition.       tamsulosin (FLOMAX) 0.4 MG capsule Take by mouth At Bedtime       MELATONIN PO Take 3 mg by mouth nightly as needed       FUROSEMIDE PO Take 20 mg by mouth 2 times daily       ACETAMINOPHEN PO Take 1,000 mg by mouth every 8 hours       METOPROLOL TARTRATE PO Take 25 mg by mouth 2 times daily for HTN Hold for SBP is less than or equal to 120 and HR is less than or equal to 55       HYDRALAZINE HCL PO Take 25 mg by mouth 3 times daily for HTN Hold for SBP of less than or equal to 120       ferrous sulfate (IRON) 325 (65 FE) MG tablet Take 325 mg by mouth 2 times daily        Lidocaine-Menthol 4-1 % PTCH Externally apply 1 patch topically Apply to  "back topically every morning and at bedtime for pain 1 patch on at AM for 12 hrs off at HS       Warfarin Sodium (COUMADIN PO) INR 2.2 today 3/8/18. Coumadin 2 mg daily.  Next INR 3/12/18.       diclofenac (VOLTAREN) 1 % GEL topical gel Place 4 g onto the skin 4 times daily as needed for moderate pain Please have container at bedside sent to pharmacy to re-label for dismissal 100 g 3     polyethylene glycol (MIRALAX/GLYCOLAX) Packet Take 17 g by mouth daily as needed for constipation 7 packet      levothyroxine (SYNTHROID/LEVOTHROID) 100 MCG tablet TAKE 1 TABLET BY MOUTH EVERY DAY. 90 tablet 0     citalopram (CELEXA) 10 MG tablet TAKE 1 TABLET BY MOUTH DAILY 90 tablet 1     ketoconazole (NIZORAL) 2 % cream Apply to face BID PRN 30 g 1     Multiple Vitamins-Minerals (ICAPS PO) Take 1 capsule by mouth 2 times daily       calcium carbonate (TUMS) 500 MG chewable tablet Take 2 chew tab by mouth 2 times daily as needed for heartburn        aspirin 81 MG tablet Take 81 mg by mouth daily        Medications reviewed:  Medications reconciled to facility chart and changes were made to reflect current medications as identified as above med list. Below are the changes that were made:   Medications stopped since last EPIC medication reconciliation:   There are no discontinued medications.    Medications started since last [facility] medication reconciliation:  No orders of the defined types were placed in this encounter.        REVIEW OF SYSTEMS:  See under HPI above    Physical Exam:  /80  Pulse 52  Temp 97  F (36.1  C)  Resp 20  Ht 5' 8\" (1.727 m)  Wt 149 lb 12.8 oz (67.9 kg)  SpO2 93%  BMI 22.78 kg/m2   GENERAL APPEARANCE:  Alert, in no distress, pleasant, cooperative, oriented x person,place and globally situation  EYES: Sclera clear and conjunctiva normal, vision significantly impaired.  ENT:  Mouth with moist mucous membranes and clean/clear tongue. Hearing aide in right ear  RESP:  respiratory effort  of chest " normal,  no respiratory distress, Lung sounds clear to auscultation.  CV: Auscultation of heart done, IRRR with loud systolic murmur.  No rub or gallop.  No edema.  +dps bilat.  ABDOMEN:  normal bowel sounds, soft, nontender.  M/S:   Gait and station walks with assist and walker. ADAME equally.  NEURO: cranial nerves 2-12 grossly intact, no facial asymmetry, no speech deficits.  PSYCH:  insight and judgement impaired, memory forgetful, affect and mood normal     BP Readings from Last 3 Encounters:   03/08/18 165/80   03/05/18 158/80   03/02/18 160/71     INR's: 3/8: 2.2, 3/5: 2.1 2/27: 4.0, 2/24: 2.7, 2/23: 3.07, 2/22: 3.47, 2/21: 3.25    Recent Labs:   CBC RESULTS:   Recent Labs   Lab Test  02/22/18   0701  02/21/18   0616   WBC  8.3  10.5   RBC  3.84*  4.06*   HGB  12.2*  13.1*   HCT  38.2*  39.8*   MCV  100  98   MCH  31.8  32.3   MCHC  31.9  32.9   RDW  13.2  13.2   PLT  102*  112*       Last Basic Metabolic Panel:  Recent Labs   Lab Test 03/05/18 02/24/18   NA  144  140  140   POTASSIUM  4.1  3.7  3.7   CHLORIDE  105  106  106   KEZIA  9.0  8.9  8.9   CO2  30  29  29   BUN  31*  29*  29*   CR  1.50*  1.34*  1.34*   GLC  82  94  94       Liver Function Studies -   Recent Labs   Lab Test  09/07/17   1512  02/03/17   1950   PROTTOTAL  8.1  8.2   ALBUMIN  3.6  3.6   BILITOTAL  0.4  0.3   ALKPHOS  105  82   AST  26  29   ALT  22  23       TSH   Date Value Ref Range Status   07/11/2017 0.26 (L) 0.40 - 4.00 mU/L Final   10/24/2016 2.28 0.40 - 4.00 mU/L Final       ASSESSMENT / PLAN:  (M54.9) Acute midline back pain, unspecified back location  (primary encounter diagnosis)   (R53.81) Physical deconditioning  Comment/Plan: conts to have less pain, Cont scheduled tylenol and  Voltaren gel and other POC, meds, improving, cont PT and OT.  No LCD yet, monitor    (I50.9) Chronic congestive heart failure, unspecified congestive heart failure type (H)  Comment/Plan: compensated, cont current POC, monitor.  Weight has been  steady 147-149#. No changes today, Monitor. F/U cards as outpt--consider 2-4 weeks.    (I48.2) Chronic atrial fibrillation (H)   (Z51.81,  Z79.01) Anticoagulated on Coumadin  Comment/Plan: INR 2.12 today, cont coumadin regimen and then check INR on 3/12     (Z86.73) History of CVA (cerebrovascular accident)  Comment/Plan:   Has memory issues, no Benadryl-type meds, see above.    (I10) Essential hypertension  Comment/Plan: still labile but less so. Reasonable control, . Will cont to monitor and adjust as needed and hold BP meds if SBP <120.   (*He was on Norvasc at one point also in past*)     (B37.0) Thrush, oral  Comment/Plan: resolved, cont good oral cares.     Electronically signed by  CODY Yarbrough CNP

## 2018-03-12 NOTE — Clinical Note
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  He is in Rehab/TCU right now. SBP is labile--not new per previous notes and his son(Dr Killian also). Dr Nguyễn and I have had better success with the hydralazine as he was taken off the ACE-I in the hospital due to his kidney function.  I had to increase the Hydralazine. Today as ranging more in the 170's than 130-170's. Are you able to see him in clinic  In the next few weeks or one of your NP/PA's? Would love your expertise to assist. Thanks Jesica Bloom CNP,  Geriatrics TCU Rounding Team

## 2018-03-12 NOTE — PROGRESS NOTES
[place] [facility]    Chief Complaint   Patient presents with     Nursing Home Acute       HPI:    Francesco Killian is a 89 year old  (12/26/1920), who is being seen today for an episodic care visit at [facility].  HPI information obtained from: facility chart records, facility staff, patient report and [facility] chart review.Today's concern is:       Acute midline back pain, unspecified back location--no pain  Physical deconditioning--feels moving better, progressing with therapies  Essential hypertension: running mostly 150-170's--last week less so in the 170's  Chronic atrial fibrillation (H)  Anticoagulated on Coumadin  History of CVA (cerebrovascular accident)  Chronic congestive heart failure, unspecified congestive heart failure type (H)    No CP, SOB, Cough, dizziness, fevers, chills, HA, N/V, dysuria or Bowel Abnormalities. Appetite is good.  No pain    ALLERGIES: Review of patient's allergies indicates no known allergies.  Past Medical, Surgical, Family and Social History reviewed and updated in [facility].    Current Outpatient Prescriptions   Medication Sig Dispense Refill     NUTRITIONAL SUPPLEMENTS PO 4oz Frozen  Nutritional Cup  two times a day for  Undernutrition.       tamsulosin (FLOMAX) 0.4 MG capsule Take by mouth At Bedtime       MELATONIN PO Take 3 mg by mouth nightly as needed       FUROSEMIDE PO Take 20 mg by mouth 2 times daily       ACETAMINOPHEN PO Take 1,000 mg by mouth every 8 hours       METOPROLOL TARTRATE PO Take 25 mg by mouth 2 times daily for HTN Hold for SBP is less than or equal to 120 and HR is less than or equal to 55       HYDRALAZINE HCL PO Take 25 mg by mouth 4 times daily GIVE AT 6-7am, 12n, 5pm,  for HTN Hold for SBP of less than or equal to 120       ferrous sulfate (IRON) 325 (65 FE) MG tablet Take 325 mg by mouth 2 times daily        Lidocaine-Menthol 4-1 % PTCH Externally apply 1 patch topically Apply to back topically every morning  "and at bedtime for pain 1 patch on at AM for 12 hrs off at HS       Warfarin Sodium (COUMADIN PO) INR 2.1 today 3/8/18. Coumadin 2 mg ON M-W-F, Coumadin 4mg ROW.  Next INR 3/15/18.       diclofenac (VOLTAREN) 1 % GEL topical gel Place 4 g onto the skin 4 times daily as needed for moderate pain Please have container at bedside sent to pharmacy to re-label for dismissal 100 g 3     polyethylene glycol (MIRALAX/GLYCOLAX) Packet Take 17 g by mouth daily as needed for constipation 7 packet      levothyroxine (SYNTHROID/LEVOTHROID) 100 MCG tablet TAKE 1 TABLET BY MOUTH EVERY DAY. 90 tablet 0     citalopram (CELEXA) 10 MG tablet TAKE 1 TABLET BY MOUTH DAILY 90 tablet 1     ketoconazole (NIZORAL) 2 % cream Apply to face BID PRN 30 g 1     Multiple Vitamins-Minerals (ICAPS PO) Take 1 capsule by mouth 2 times daily       calcium carbonate (TUMS) 500 MG chewable tablet Take 2 chew tab by mouth 2 times daily as needed for heartburn        aspirin 81 MG tablet Take 81 mg by mouth daily        Medications reviewed:  Medications reconciled to facility chart and changes were made to reflect current medications as identified as above med list. Below are the changes that were made:   Medications stopped since last EPIC medication reconciliation:   There are no discontinued medications.    Medications started since last [facility] medication reconciliation:  No orders of the defined types were placed in this encounter.        REVIEW OF SYSTEMS:  See under HPI above    Physical Exam:  /87  Pulse 58  Temp 97.3  F (36.3  C)  Resp 24  Ht 5' 8\" (1.727 m)  Wt 149 lb 3.2 oz (67.7 kg)  SpO2 96%  BMI 22.69 kg/m2     GENERAL APPEARANCE:  Alert, in no distress, pleasant, cooperative, oriented x person,place and globally situation  ENT:  Mouth with moist mucous membranes and clean/clear tongue. Hearing aide in right ear  RESP:  respiratory effort  of chest normal,  no respiratory distress, Lung sounds clear to auscultation.  CV: " Auscultation of heart done, IRRR with loud systolic murmur.  No rub or gallop.  No edema.  +dps bilat.  ABDOMEN:  normal bowel sounds, soft, nontender.  M/S:   Gait and station walks with assist and walker. ADAME equally.  NEURO: cranial nerves 2-12 grossly intact, no facial asymmetry, no speech deficits.  PSYCH:  insight and judgement impaired, memory forgetful, affect and mood normal     BP Readings from Last 3 Encounters:   03/12/18 185/87   03/08/18 165/80   03/05/18 158/80     INR's: 3/12: 2.1, 3/8: 2.2, 3/5: 2.1 2/27: 4.0, 2/24: 2.7, 2/23: 3.07, 2/22: 3.47, 2/21: 3.25    Recent Labs:   CBC RESULTS:   Recent Labs   Lab Test  02/22/18   0701  02/21/18   0616   WBC  8.3  10.5   RBC  3.84*  4.06*   HGB  12.2*  13.1*   HCT  38.2*  39.8*   MCV  100  98   MCH  31.8  32.3   MCHC  31.9  32.9   RDW  13.2  13.2   PLT  102*  112*       Last Basic Metabolic Panel:  Recent Labs   Lab Test 03/05/18 02/24/18   NA  144  140  140   POTASSIUM  4.1  3.7  3.7   CHLORIDE  105  106  106   KEZIA  9.0  8.9  8.9   CO2  30  29  29   BUN  31*  29*  29*   CR  1.50*  1.34*  1.34*   GLC  82  94  94       Liver Function Studies -   Recent Labs   Lab Test  09/07/17   1512  02/03/17   1950   PROTTOTAL  8.1  8.2   ALBUMIN  3.6  3.6   BILITOTAL  0.4  0.3   ALKPHOS  105  82   AST  26  29   ALT  22  23       TSH   Date Value Ref Range Status   07/11/2017 0.26 (L) 0.40 - 4.00 mU/L Final   10/24/2016 2.28 0.40 - 4.00 mU/L Final         ASSESSMENT / PLAN:  (M54.9) Acute midline back pain, unspecified back location  (primary encounter diagnosis)   (R53.81) Physical deconditioning  Comment/Plan: minimal to no pain, Cont scheduled tylenol and  Voltaren gel and other POC for now.  Consider decrease or wean next visit he is  improving, cont PT and OT.  No LCD yet, monitor    (I48.2) Chronic atrial fibrillation (H)   (Z51.81,  Z79.01) Anticoagulated on Coumadin  Comment/Plan: INR 2.1 today, cont coumadin regimen and then check INR on 3/15    (Z86.73)  History of CVA (cerebrovascular accident)  Comment/Plan:   Has memory issues, no Benadryl-type meds, see above.    (I10) Essential hypertension  Comment/Plan: less labile but trending upward, so will adjust Hydralazine to QID rather than increase the TID doses.  Will watch closely and hold BP meds if SBP <120.   (*He was on Norvasc at one point also in past*)    (I50.9) Chronic congestive heart failure, unspecified congestive heart failure type (H)  Comment/Plan: compensated, cont current POC, monitor.  Weight has been steady 147-149#. No changes today, Monitor. F/U cards as outpt--consider 2-4 weeks.       Electronically signed by  CODY Yarbrough CNP

## 2018-03-13 NOTE — TELEPHONE ENCOUNTER
Call out to INDY Bloom, and manager at [facility]. Informed both odrers in epic per DR Berry and facility asked to contact scheduling to make arrangements.....     Oliver Berry MD Schwartz, Tammy J, RN       Cc: Jesica Bloom, APRN INDY Adler, see note below. This pt was to return to see me Spring 2017 so he's about a year late. Could you have appt made with me or any NP--although I think his internist (was [facility]) has also seen him   Also get ecg and echo at Office visit   Thanks ,Dimitris Fine RN

## 2018-03-15 NOTE — PROGRESS NOTES
"[place] [facility]    Chief Complaint   Patient presents with     Nursing Home Acute     INR       HPI:    Francesco Killian is a 89 year old  (12/26/1920), who is being seen today for an episodic care visit at [facility].  HPI information obtained from: facility chart records, facility staff, patient report and [facility] chart review.Today's concern is:       Acute midline back pain, unspecified back location--no pain today, \"It is so much better than 3 or 4 weeks ago\"  Physical deconditioning--feels moving better, progressing with therapies--LCD next week likely  Essential hypertension: running mostly 130-160's this week since hydralazine increased  Chronic atrial fibrillation (H)  Anticoagulated on Coumadin  History of CVA (cerebrovascular accident)  Chronic congestive heart failure, unspecified congestive heart failure type (H): mostly 148-150#    No CP, SOB, Cough, dizziness, fevers, chills, HA, N/V, dysuria or Bowel Abnormalities. Appetite is good.  No pain    ALLERGIES: Review of patient's allergies indicates no known allergies.  Past Medical, Surgical, Family and Social History reviewed and updated in [facility].    Current Outpatient Prescriptions   Medication Sig Dispense Refill     NUTRITIONAL SUPPLEMENTS PO 4oz Frozen  Nutritional Cup  two times a day for  Undernutrition.       tamsulosin (FLOMAX) 0.4 MG capsule Take by mouth At Bedtime       MELATONIN PO Take 3 mg by mouth nightly as needed       FUROSEMIDE PO Take 20 mg by mouth 2 times daily       ACETAMINOPHEN PO Take 1,000 mg by mouth every 8 hours       METOPROLOL TARTRATE PO Take 25 mg by mouth 2 times daily for HTN Hold for SBP is less than or equal to 120 and HR is less than or equal to 55       HYDRALAZINE HCL PO Take 25 mg by mouth 4 times daily GIVE AT 6-7am, 12n, 5pm,  for HTN Hold for SBP of less than or equal to 120       ferrous sulfate (IRON) 325 (65 FE) MG tablet Take 325 mg by mouth 2 times daily    " "    Lidocaine-Menthol 4-1 % PTCH Externally apply 1 patch topically Apply to back topically every morning and at bedtime for pain 1 patch on at AM for 12 hrs off at HS       Warfarin Sodium (COUMADIN PO) INR 2.6 toda/(3/15).  Coumadin 2 mg ON M-W-F, Coumadin 4mg ROW.  Next INR   3/20/18.       diclofenac (VOLTAREN) 1 % GEL topical gel Place 4 g onto the skin 4 times daily as needed for moderate pain Please have container at bedside sent to pharmacy to re-label for dismissal 100 g 3     polyethylene glycol (MIRALAX/GLYCOLAX) Packet Take 17 g by mouth daily as needed for constipation 7 packet      levothyroxine (SYNTHROID/LEVOTHROID) 100 MCG tablet TAKE 1 TABLET BY MOUTH EVERY DAY. 90 tablet 0     citalopram (CELEXA) 10 MG tablet TAKE 1 TABLET BY MOUTH DAILY 90 tablet 1     ketoconazole (NIZORAL) 2 % cream Apply to face BID PRN 30 g 1     Multiple Vitamins-Minerals (ICAPS PO) Take 1 capsule by mouth 2 times daily       calcium carbonate (TUMS) 500 MG chewable tablet Take 2 chew tab by mouth 2 times daily as needed for heartburn        aspirin 81 MG tablet Take 81 mg by mouth daily        Medications reviewed:  Medications reconciled to facility chart and changes were made to reflect current medications as identified as above med list. Below are the changes that were made:   Medications stopped since last EPIC medication reconciliation:   There are no discontinued medications.    Medications started since last [facility] medication reconciliation:  No orders of the defined types were placed in this encounter.        REVIEW OF SYSTEMS:  See under HPI above    Physical Exam:  BP (!) 133/98  Pulse 66  Temp 97  F (36.1  C)  Resp 18  Ht 5' 8\" (1.727 m)  Wt 148 lb 14.4 oz (67.5 kg)  SpO2 92%  BMI 22.64 kg/m2     GENERAL APPEARANCE:  Alert, in no distress, pleasant, cooperative, oriented x person,place and globally situation  ENT:  Mouth with moist mucous membranes and clean/clear tongue. Hearing aide in right ear  RESP:  " respiratory effort  of chest normal,  no respiratory distress, Lung sounds clear to auscultation.  CV: Auscultation of heart done, IRRR with loud systolic murmur.  No rub or gallop.  Trace pedal edema.  +dps bilat.  ABDOMEN:  normal bowel sounds, soft, nontender.  M/S:   Gait and station walks with assist and walker. ADAME equally.  NEURO: cranial nerves 2-12 grossly intact, no facial asymmetry, no speech deficits.  PSYCH:  insight and judgement impaired, memory forgetful, affect and mood normal     BP Readings from Last 3 Encounters:   03/15/18 (!) 133/98   03/12/18 185/87   03/08/18 165/80     INR's: 3/15: 2.6, 3/12: 2.1, 3/8: 2.2, 3/5: 2.1 2/27: 4.0, 2/24: 2.7, 2/23: 3.07, 2/22: 3.47, 2/21: 3.25    Recent Labs:   CBC RESULTS:   Recent Labs   Lab Test  02/22/18   0701  02/21/18   0616   WBC  8.3  10.5   RBC  3.84*  4.06*   HGB  12.2*  13.1*   HCT  38.2*  39.8*   MCV  100  98   MCH  31.8  32.3   MCHC  31.9  32.9   RDW  13.2  13.2   PLT  102*  112*       Last Basic Metabolic Panel:  Recent Labs   Lab Test 03/05/18 02/24/18   NA  144  140  140   POTASSIUM  4.1  3.7  3.7   CHLORIDE  105  106  106   KEZIA  9.0  8.9  8.9   CO2  30  29  29   BUN  31*  29*  29*   CR  1.50*  1.34*  1.34*   GLC  82  94  94       Liver Function Studies -   Recent Labs   Lab Test  09/07/17   1512  02/03/17   1950   PROTTOTAL  8.1  8.2   ALBUMIN  3.6  3.6   BILITOTAL  0.4  0.3   ALKPHOS  105  82   AST  26  29   ALT  22  23       TSH   Date Value Ref Range Status   07/11/2017 0.26 (L) 0.40 - 4.00 mU/L Final   10/24/2016 2.28 0.40 - 4.00 mU/L Final           ASSESSMENT / PLAN:  (M54.9) Acute midline back pain, unspecified back location  (primary encounter diagnosis)   (R53.81) Physical deconditioning  Comment/Plan:  no pain, Cont scheduled tylenol and Voltaren gel  for now.  Consider decrease or wean next visit, cont PT and OT.   Cont  monitor    (I48.2) Chronic atrial fibrillation (H)   (Z51.81,  Z79.01) Anticoagulated on  Coumadin  Comment/Plan: INR 2.6 today, cont coumadin regimen and then check INR on 3/20    (Z86.73) History of CVA (cerebrovascular accident)  Comment/Plan:   Has memory issues, no Benadryl-type meds, see above.    (I10) Essential hypertension  Comment/Plan: less labile and better control, will cont Hydralazine at QID, Will watch closely and hold BP meds if SBP <120.   (*He was on Norvasc at one point also in past*)    (I50.9) Chronic congestive heart failure, unspecified congestive heart failure type (H)  Comment/Plan: compensated, cont current POC, monitor.  Weight has been steady 147-149#. No changes today, Monitor. F/U cards as outpt(I d/w Cards RN on 3/13, he has appt with Echo, ECG, Labs on 5/29/18)     Electronically signed by  CODY Yarbrough CNP

## 2018-03-20 NOTE — PROGRESS NOTES
[place] [facility]    Chief Complaint   Patient presents with     Nursing Home Acute       HPI:    Francesco Killian is a 89 year old  (12/26/1920), who is being seen today for an episodic care visit at [facility].  HPI information obtained from: facility chart records, facility staff, patient report and [facility] chart review.Today's concern is:       Acute midline back pain, unspecified back location--no pain  Physical deconditioning--feels moving better, progressing with therapies dc is early next week, likely  Essential hypertension: running mostly 150-170's--occas < or >, rare > 180  Chronic atrial fibrillation (H): occas HR < 55, once yest < 50 per nurse.  Anticoagulated on Coumadin  History of CVA (cerebrovascular accident)  Chronic congestive heart failure, unspecified congestive heart failure type (H)    No CP, SOB, Cough, dizziness, fevers, chills, HA, N/V, dysuria or Bowel Abnormalities. Appetite is good.  No pain    ALLERGIES: Review of patient's allergies indicates no known allergies.  Past Medical, Surgical, Family and Social History reviewed and updated in [facility].    Current Outpatient Prescriptions   Medication Sig Dispense Refill     diclofenac (VOLTAREN) 1 % GEL topical gel Place 2 g onto the skin every 6 hours as needed for moderate pain Please have container at bedside sent to pharmacy to re-label for dismissal       NUTRITIONAL SUPPLEMENTS PO 4oz Frozen  Nutritional Cup  two times a day for  Undernutrition.       tamsulosin (FLOMAX) 0.4 MG capsule Take by mouth At Bedtime       MELATONIN PO Take 3 mg by mouth nightly as needed       FUROSEMIDE PO Take 20 mg by mouth 2 times daily       ACETAMINOPHEN PO Take 1,000 mg by mouth every 8 hours       METOPROLOL TARTRATE PO Take 25 mg by mouth 2 times daily for HTN Hold for SBP is<120 and HR is <60, plz call if hold x 2 in a row for any of these reasons.       HYDRALAZINE HCL PO Take 25 mg by mouth 4 times daily GIVE AT  6-7am, 12n, 5pm,  for HTN Hold for SBP of less than or equal to 120       Warfarin Sodium (COUMADIN PO) INR 2.9 toda/(3/15).  Coumadin 2 mg ON M-W-F, Coumadin 4mg ROW.  Next INR   3/22/18.       polyethylene glycol (MIRALAX/GLYCOLAX) Packet Take 17 g by mouth daily as needed for constipation 7 packet      levothyroxine (SYNTHROID/LEVOTHROID) 100 MCG tablet TAKE 1 TABLET BY MOUTH EVERY DAY. 90 tablet 0     citalopram (CELEXA) 10 MG tablet TAKE 1 TABLET BY MOUTH DAILY 90 tablet 1     ketoconazole (NIZORAL) 2 % cream Apply to face BID PRN 30 g 1     Multiple Vitamins-Minerals (ICAPS PO) Take 1 capsule by mouth 2 times daily       calcium carbonate (TUMS) 500 MG chewable tablet Take 2 chew tab by mouth 2 times daily as needed for heartburn        aspirin 81 MG tablet Take 81 mg by mouth daily        Medications reviewed:  Medications reconciled to facility chart and changes were made to reflect current medications as identified as above med list. Below are the changes that were made:   Medications stopped since last EPIC medication reconciliation:   There are no discontinued medications.    Medications started since last [facility] medication reconciliation:  No orders of the defined types were placed in this encounter.        REVIEW OF SYSTEMS:  See under HPI above    Physical Exam:  /87  Pulse 66  Temp 97  F (36.1  C)  Resp 22  Wt 147 lb (66.7 kg)  SpO2 94%  BMI 22.35 kg/m2     GENERAL APPEARANCE:  Alert, in no distress, pleasant, cooperative, oriented x person,place and globally situation  ENT:  Mouth with moist mucous membranes and clean/clear tongue. Hearing aide in right ear  RESP:  respiratory effort  of chest normal,  no respiratory distress, Lung sounds clear to auscultation.  CV: Auscultation of heart done, IRRR with loud systolic murmur.  No rub or gallop.  No edema.  +dps bilat.  ABDOMEN:  normal bowel sounds, soft, nontender.  M/S:   Gait and station walks with assist and walker. DEEP  equally.  NEURO: cranial nerves 2-12 grossly intact, no facial asymmetry, no speech deficits.  PSYCH:  insight and judgement impaired, memory forgetful, affect and mood normal     BP Readings from Last 3 Encounters:   03/20/18 155/87   03/15/18 (!) 133/98   03/12/18 185/87     INR's: 3/20: 2.9, 3/15: 2.6, 3/12: 2.1, 3/8: 2.2, 3/5: 2.1 2/27: 4.0, 2/24: 2.7, 2/23: 3.07, 2/22: 3.47, 2/21: 3.25    Recent Labs:   CBC RESULTS:   Recent Labs   Lab Test  02/22/18   0701  02/21/18   0616   WBC  8.3  10.5   RBC  3.84*  4.06*   HGB  12.2*  13.1*   HCT  38.2*  39.8*   MCV  100  98   MCH  31.8  32.3   MCHC  31.9  32.9   RDW  13.2  13.2   PLT  102*  112*       Last Basic Metabolic Panel:  Recent Labs   Lab Test 03/05/18 02/24/18   NA  144  140  140   POTASSIUM  4.1  3.7  3.7   CHLORIDE  105  106  106   KEZIA  9.0  8.9  8.9   CO2  30  29  29   BUN  31*  29*  29*   CR  1.50*  1.34*  1.34*   GLC  82  94  94       Liver Function Studies -   Recent Labs   Lab Test  09/07/17   1512  02/03/17   1950   PROTTOTAL  8.1  8.2   ALBUMIN  3.6  3.6   BILITOTAL  0.4  0.3   ALKPHOS  105  82   AST  26  29   ALT  22  23       TSH   Date Value Ref Range Status   07/11/2017 0.26 (L) 0.40 - 4.00 mU/L Final   10/24/2016 2.28 0.40 - 4.00 mU/L Final         ASSESSMENT / PLAN:  (M54.9) Acute midline back pain, unspecified back location  (primary encounter diagnosis)   (R53.81) Physical deconditioning  Comment/Plan: minimal to no pain, Cont scheduled tylenol but change Voltaren gel to prn. Dc the lidocaine/menthol patches. Is improving, cont PT and OT.   monitor    (I48.2) Chronic atrial fibrillation (H)   (Z51.81,  Z79.01) Anticoagulated on Coumadin  Comment/Plan: INR 2.9 today, cont coumadin regimen and then check INR on 3/22    (Z86.73) History of CVA (cerebrovascular accident)  Comment/Plan:   Has memory issues, no Benadryl-type meds, see above.    (I10) Essential hypertension  Comment/Plan: less labile, cont with  Hydralazine at QID and  metoprolol--has had a little bradycardia so raised parameter for holding BB to < 60.     (*He was on Norvasc at one point also in past*)    (I50.9) Chronic congestive heart failure, unspecified congestive heart failure type (H)  Comment/Plan: compensated, cont current POC, monitor.  Weight has been steady 147-149#. No changes today, Monitor. F/U cards as outpt--has appt in May       Electronically signed by  CODY Yarbrough CNP

## 2018-03-22 PROBLEM — B37.0 THRUSH, ORAL: Status: RESOLVED | Noted: 2018-02-27 | Resolved: 2018-01-01

## 2018-03-22 PROBLEM — Z86.73 HISTORY OF CVA (CEREBROVASCULAR ACCIDENT): Status: ACTIVE | Noted: 2018-01-01
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
## 2018-03-22 NOTE — PATIENT INSTRUCTIONS
[facility] Discharge Orders    Name: Francesco Killian  : 1920  Planned Discharge Date: 3/24/18  Discharged to: [facility] skilled nursing facility [facility]    MEDICAL FOLLOW UP  Follow up with PCP in 1-2 weeks family to make appt or facility team: not know at time of this document  Follow up with Specialists see below  Next 5 appointments (look out 90 days)     May 29, 2018  4:15 PM CDT   Return Visit with Oliver Berry MD   [facility] ([facility])    [facility] [facility] [address] [id number]  [facility] [id number] [phone] OPT [address]                  FUTURE LABS: INR due 3/26 with results to PCP    ORDER CHANGES:  Fe stopped--as Hgb fine. Voltaren and  Icy Hot stopped also. New meds are Metoprolol and Hydralazine    DISCHARGE MEDICATIONS:  The patient s pharmacy is authorized to dispense a 30-day supply of medications. Refill requests should be directed to the primary provider, Angel Corea .   No narcotics are prescribed at time of discharge.   Current Outpatient Prescriptions   Medication Sig Dispense Refill     NUTRITIONAL SUPPLEMENTS PO 4oz Frozen  Nutritional Cup  two times a day for  Undernutrition.       tamsulosin (FLOMAX) 0.4 MG capsule Take by mouth At Bedtime       MELATONIN PO Take 3 mg by mouth nightly as needed       FUROSEMIDE PO Take 20 mg by mouth 2 times daily       ACETAMINOPHEN PO Take 1,000 mg by mouth every 8 hours       METOPROLOL TARTRATE PO Take 25 mg by mouth 2 times daily for HTN Hold for SBP is<120 and HR is <60, plz call if hold x 2 in a row for any of these reasons.       HYDRALAZINE HCL PO Take 25 mg by mouth 4 times daily GIVE AT 6-7am, 12n, 5pm,  for HTN Hold for SBP of less than or equal to 120       Warfarin Sodium (COUMADIN PO) INR 2.9 toda/(3/22). Coumadin 3 mg daily Next INR   3/26/18.       polyethylene glycol (MIRALAX/GLYCOLAX) Packet Take 17 g by mouth daily as needed for constipation 7  packet      levothyroxine (SYNTHROID/LEVOTHROID) 100 MCG tablet TAKE 1 TABLET BY MOUTH EVERY DAY. 90 tablet 0     citalopram (CELEXA) 10 MG tablet TAKE 1 TABLET BY MOUTH DAILY 90 tablet 1     ketoconazole (NIZORAL) 2 % cream Apply to face BID PRN 30 g 1     Multiple Vitamins-Minerals (ICAPS PO) Take 1 capsule by mouth 2 times daily       calcium carbonate (TUMS) 500 MG chewable tablet Take 2 chew tab by mouth 2 times daily as needed for heartburn        aspirin 81 MG tablet Take 81 mg by mouth daily          SERVICES:  None needed    ADDITIONAL INSTRUCTIONS: watch BP and HR, hold parameters for cardioactive meds are listed above.    CODY Yarbrough CNP  This document was electronically signed on March 22, 2018

## 2018-03-26 PROBLEM — I25.10 CORONARY ARTERY DISEASE INVOLVING NATIVE HEART WITHOUT ANGINA PECTORIS, UNSPECIFIED VESSEL OR LESION TYPE: Status: ACTIVE | Noted: 2018-01-01

## 2018-03-26 NOTE — TELEPHONE ENCOUNTER
Clinic Care Coordination RN:      Incoming call from the [facility] TCU .  Left a message that the patient will be transferring to long term care.  Limited memory, very forgetful, previously was living independently, increased cares required, primary reason for LTC admission.  Complicated by his poor vision and hearing.  Most recent SLUMS 3/24    No future care coordination needed at this time     Radha Persaud RN / Clinical Care Coordinator     [address] [address]  [email or web address]  Office :  [phone] / Fax :  [phone]

## 2018-03-26 NOTE — PROGRESS NOTES
[facility]  PRIMARY CARE PROVIDER AND CLINIC:  Anmol Angel VARGAS [address] [place] [address] / [facility] [address]  Chief Complaint   Patient presents with     [facility]       HPI:    Francesco Killian is a 89 year old  (12/26/1920),admitted to the [facility] from [facility].  TCU stay 2/23/18 through 3/24/18.  Admitted to this facility for  rehab, medical management and nursing care.  HPI information obtained from: facility chart records, facility staff, patient report and [facility] chart review.  Current issues are:            Chronic atrial fibrillation (H)  Chronic, rate controlled, on metoprolol.  He is anticoagulated with coumadin and asa per patient's son's preference who is a neurologist.  No current s/s of bleeding.  He has been on 3mg coumadin QD, his INR today is 3.0.  Denies/no reports of palpitations, dizziness, lightheadedness or shortness of breath     Chronic congestive heart failure, unspecified congestive heart failure type (H)  Essential hypertension with goal blood pressure less than 140/90  Per history, ECHO 2/2017 with normal LV systolic function, EF 65%-70% but severe concentric left ventricular hypertrophy.  Mild/moderate RV dilation with mildly decrease RV systolic function.  Moderate aortic stenosis.  He is on lasix 20mg BID as well as metoprolol and hydralazine.  No recent weight gain.  Denies/no reports of PND, orthopnea, hypoxia, increasing edema.  His BP are somewhat labile and elevated at times.  Permissive htn to avoid hypoperfusion to his brain.  He is to follow-up with his cardiologist, and has pending apt for 5/29/18.  No reports of chest pain.  His weight is stable    Last 3 BPs: 161/78, 145/74, 156/77    HR: 55-78      Wt Readings from Last 3 Encounters:   03/26/18 147 lb 0.8 oz (66.7 kg)   03/22/18 147 lb (66.7 kg)   03/20/18 147 lb (66.7 kg)       Abdominal aortic aneurysm (AAA) without rupture  "(H)  History of PAD and known AAA noted on US from 9/25/17, 5.6cm in diameter.  Denies/no reports of abdmonial pain.  EVAR procedure has been discussed with patient and family in past and they have deferred     Wet senile macular degeneration (H)  Chronic, very poor vision baseline.  Follows with opthalmology     Recurrent major depressive disorder, remission status unspecified (H)  Per history, on celexa.  Patient denies sadness, tearfulness.  Staff without concerns in mood     History of CVA (cerebrovascular accident)  History of multiple CVA's with cerebral and carotid artery stenosis.  He is on coumadin and asa, patient's son, a neurologist prefers to continue with these       Other specified hypothyroidism  Per history, on synthroid  Lab Results   Component Value Date    TSH 0.26 07/11/2017         Closed compression fracture of L1 lumbar vertebra with routine healing, subsequent encounter  Fall with traumatic L1 compression fracture and hospitalization end of February.  Ortho consulted in hospital and recommended brace prn for pain management.  He completed therapy in the TCU and his pain significantly improved.  Today he denies pain, states \"that has all cleared up\".  He continues on scheduled APAP TID     CKD (chronic kidney disease) stage 3, GFR 30-59 ml/min  Chronic with OR[facility]O noted inpatient, improved with holding diuretics and IVF.  Creatinine was 1.68 in hospital, while in TCU he was discharged with creatinine as noted below     Lab Results   Component Value Date    CR 1.40 03/22/2018          Benign prostatic hyperplasia without lower urinary tract symptoms  History of PVR >500 when inpatient, monitored some while in TCU.  Symptoms improved with Flomax, PVR were monitored only if symptomatic while in TCU.  Patient denies/no reports from staff on voiding habits    Dysphagia, unspecified type  Per history, he is on mechanical soft diet.  No reports of coughing or choking after po intake, was followed by " speech inpatient and TCU.  Staff without concerns today     Coronary artery disease involving native heart without angina pectoris, unspecified vessel or lesion type  Per history, s/p CABG in the 1980's, per notes his primary presenting symptom at that time was fatigue.  He is anticoagulated with coumadin and asa.  No reports of chest pain, dyspnea or increasing fatigue     Physical deconditioning  Acute on chronic, history of falls.  Worked with therapies while in TCU.  Per notes able to ambulate with walker and assistance 170 feet    Confusion  Memory loss  Limited memory, very forgetful, previously was living independently, increased cares required, primary reason for LTC admission.  Complicated by his poor vision and hearing.  Most recent [facility] 3/24           CODE STATUS/ADVANCE DIRECTIVES DISCUSSION:   DNR / DNI  Patient's living condition: lives in a skilled nursing facility    ALLERGIES:Review of patient's allergies indicates no known allergies.  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 He also has no past medical history of Basal cell carcinoma or Malignant melanoma (H).  PAST SURGICAL HISTORY:  has a past surgical history that includes GI surgery (1970s); Cardiac surgery (1980s); Eye surgery; colonoscopy; hernia repair; IR Renal/Visceral Stent/Atherect/PTA; turp; Esophagoscopy, gastroscopy, duodenoscopy (EGD), combined (10/14/2013); Colonoscopy (11/4/2013); and  NONSPECIFIC PROCEDURE.  FAMILY HISTORY: family history includes C.A.D. in his brother, father, maternal grandfather, maternal grandmother, mother, paternal grandfather, paternal grandmother, and sister; Coronary Artery Disease in his mother.  SOCIAL HISTORY:  reports that he has never smoked. He has never used smokeless tobacco. He reports that he does not drink alcohol or use illicit drugs.    Post Discharge Medication Reconciliation Status: patient was not discharged from an inpatient facility.  Current Outpatient Prescriptions   Medication Sig Dispense Refill     NUTRITIONAL SUPPLEMENTS PO 4oz Frozen  Nutritional Cup  two times a day for  Undernutrition.       tamsulosin (FLOMAX) 0.4 MG capsule Take by mouth At Bedtime       MELATONIN PO Take 3 mg by mouth nightly as needed       FUROSEMIDE PO Take 20 mg by mouth 2 times daily       ACETAMINOPHEN PO Take 1,000 mg by mouth every 8 hours       METOPROLOL TARTRATE PO Take 25 mg by mouth 2 times daily for HTN Hold for SBP is<120 and HR is <60, plz call if hold x 2 in a row for any of these reasons.       HYDRALAZINE HCL PO Take 25 mg by mouth 4 times daily GIVE AT 6-7am, 12n, 5pm,  for HTN Hold for SBP of less than or equal to 120       Warfarin Sodium (COUMADIN PO) INR 2.9 toda/(3/22). Coumadin 3 mg daily Next INR   3/26/18.       polyethylene glycol (MIRALAX/GLYCOLAX) Packet Take 17 g by mouth daily as needed for constipation 7 packet      levothyroxine (SYNTHROID/LEVOTHROID) 100 MCG tablet TAKE 1 TABLET BY MOUTH EVERY DAY. 90 tablet 0     citalopram (CELEXA) 10 MG tablet TAKE 1 TABLET BY MOUTH DAILY 90 tablet 1     ketoconazole (NIZORAL) 2 % cream Apply to face BID PRN 30 g 1     Multiple Vitamins-Minerals (ICAPS PO) Take 1 capsule by mouth daily        calcium carbonate (TUMS) 500 MG chewable tablet Take 2 chew tab by mouth 2 times daily as needed for heartburn        aspirin 81 MG tablet Take 81 mg by mouth daily          ROS:  10 point ROS of systems  "including Constitutional, Eyes, Respiratory, Cardiovascular, Gastroenterology, Genitourinary, Integumentary, Muscularskeletal, Psychiatric were all negative except for pertinent positives noted in my HPI.    Exam:  /78  Pulse 55  Temp 98.4  F (36.9  C)  Resp 16  Ht 5' 8\" (1.727 m)  Wt 147 lb 0.8 oz (66.7 kg)  SpO2 94%  BMI 22.36 kg/m2  GENERAL APPEARANCE:  Alert, in no distress  ENT:  Mouth and posterior oropharynx normal, moist mucous membranes, [place]  EYES:  EOM, conjunctivae, lids, pupils and irises normal  NECK:  No adenopathy,masses or thyromegaly  RESP:  respiratory effort and palpation of chest normal, lungs clear to auscultation , no respiratory distress, decreased at bases  CV:  Palpation and auscultation of heart done , irregular rhythm, rate controlled, no murmur, rub, or gallop, no edema, grade 2/6 murmur  ABDOMEN:  normal bowel sounds, soft, nontender, no hepatosplenomegaly or other masses  M/S:   Gait and station abnormal generalized weakness, no edema/erythema joints, CMS intact  NEURO:   Cranial nerves 2-12 are normal tested and grossly at patient's baseline, no tremor, normal muscle tone   PSYCH:  oriented to person only, mood and affect normal     Lab/Diagnostic data:    CBC RESULTS:   Recent Labs   Lab Test  02/22/18   0701  02/21/18   0616   WBC  8.3  10.5   RBC  3.84*  4.06*   HGB  12.2*  13.1*   HCT  38.2*  39.8*   MCV  100  98   MCH  31.8  32.3   MCHC  31.9  32.9   RDW  13.2  13.2   PLT  102*  112*       Last Basic Metabolic Panel:  Recent Labs   Lab Test 03/22/18 03/05/18   NA  141  144   POTASSIUM  3.9  4.1   CHLORIDE  107  105   KEZIA  9.0  9.0   CO2  26  30   BUN  33*  31*   CR  1.40*  1.50*   GLC  80  82       Liver Function Studies -   Recent Labs   Lab Test  09/07/17   1512  02/03/17   1950   PROTTOTAL  8.1  8.2   ALBUMIN  3.6  3.6   BILITOTAL  0.4  0.3   ALKPHOS  105  82   AST  26  29   ALT  22  23       ASSESSMENT/PLAN:  (I48.2) Chronic atrial fibrillation (H)  (primary " encounter diagnosis)  Comment: Chronic, rate controlled  Plan: continue metoprolol   -coumadin 2mg po Monday, Friday, coumadin 3mg po all other days  -INR recheck in 1 week     (I50.9) Chronic congestive heart failure, unspecified congestive heart failure type (H)  Comment: Per history, compensated   Plan: continue metoprolol and lasix  -daily weights, update NP if >2lbs/day or 5lbs/ week or increased edema, shortness of breath, hypoxia, ect  -BMP Thursday   -[employer] re-establish with cards, apt 5/28    (I71.4) Abdominal aortic aneurysm (AAA) without rupture (H)  Comment: per history, no abdominal pain  Plan: monitor for symptoms     (H35.3620) Wet senile macular degeneration (H)  Comment: per history   Plan: requires assistance with cares  -follow with opthalmology per their recommendation     (F33.9) Recurrent major depressive disorder, remission status unspecified (H)  Comment: per history, without symptoms today  Plan: continue Celexa at this time, do not recommend GDR given recent change and move to LTC.  Consider in future if no symptoms and family agreeable     (Z86.73) History of CVA (cerebrovascular accident)  Comment: per history   Plan: anticoagulation with coumadin and asa per son (neurologist)     (I10) Essential hypertension with goal blood pressure less than 140/90  Comment: chronic, labile and difficult to manage per chart review.  Permissive htn given concern for hypoperfusion to brain   Plan: continue metoprolol and hydralazine at this time  -monitor BP's  -BMP Thursday  -cardiology apt 5/28 for assistance on management     (E03.8) Other specified hypothyroidism  Comment: per history, on synthroid, goal TSH 3-4 given age  Plan: check TSH and adjust synthroid accordingly     (S32.010D) Closed compression fracture of L1 lumbar vertebra with routine healing, subsequent encounter  Comment: per history from fall end of February, no reports of pain today  Plan: reduce APAP 500mg po TID, monitor pain      (N18.3) CKD (chronic kidney disease) stage 3, GFR 30-59 ml/min  Comment: per history with [facility] inpatient, was d'cd from TCU with creatinine at 1.40  Plan: renally adjust meds, avoid nephrotoxic meds  -BMP Thursday     (N40.0) Benign prostatic hyperplasia without lower urinary tract symptoms  Comment: per history with high PVR in past, now on Flomax and asymptomatic   Plan: continue Flomax, monitor, staff to update and obtain PVR if patient with symptoms.  If continues to be problem, consider urology referral     (R13.10) Dysphagia, unspecified type  Comment: per history, on modified diet  Plan: continue mechanical soft diet, staff to update with concerns    (I25.10) Coronary artery disease involving native heart without angina pectoris, unspecified vessel or lesion type  Comment: per history, s/p bypass  Plan: control BP as above  -asa and coumadin     (R53.81) Physical deconditioning  Comment: contributes to need for LTC placement   Plan: staff assist with ADL's, ambulation     (R41.0) Confusion  (R41.3) Memory loss  Comment: previously noted and progressive, requiring LTC now for safety.  Poor vision and hearing contributes   Plan: staff assist with cares        Total time spent with patient visit at the skilled nursing facility was > 35 minutes including patient visit, review of past records and phone call to patient contact. Greater than 50% of total time spent with counseling and coordinating care     Electronically signed by:  CODY Diaz CNP

## 2018-04-02 NOTE — PROGRESS NOTES
[facility]    HPI:    Francesco Killian is a 89 year old  (12/26/1920), who is being seen today for an episodic care visit at [facility].   HPI information obtained from: facility chart records and facility staff. Today's concern is INR/Coumadin management for A. Fib    Bleeding Signs/Symptoms:  None  Thromboembolic Signs/Symptoms:  None    Medication Changes:  No  Dietary Changes:  No  Activity Changes: No  Bacterial/Viral Infection:  No    Missed Coumadin Doses:  None    On ASA: Yes - 81 mg po q day    Other Concerns:  No    OBJECTIVE:    INR Today:  3.0  Current Dose:  2mg Monday, Friday, 3mg all other days     ASSESSMENT:  Encounter for therapeutic drug monitoring  Long-term (current) use of anticoagulants  Chronic atrial fibrillation (H)      Therapeutic INR for goal of 2-3    PLAN:    New Dose: 2mg M, W, F, 3mg all other days      Next INR: 1 week        Electronically signed by:  CODY Diaz CNP

## 2018-04-09 NOTE — PROGRESS NOTES
[facility]    HPI:    Francesco Killian is a 89 year old  (12/26/1920), who is being seen today for an episodic care visit at [facility].   HPI information obtained from: facility chart records and facility staff. Today's concern is INR/Coumadin management for A. Fib    Bleeding Signs/Symptoms:  None  Thromboembolic Signs/Symptoms:  None    Medication Changes:  No  Dietary Changes:  No  Activity Changes: No  Bacterial/Viral Infection:  No    Missed Coumadin Doses:  None    On ASA: Yes - 81 mg po q day    Other Concerns:  No    OBJECTIVE:    INR Today:  2.8  Current Dose:  2mg M, W, F, 3mg all other days    ASSESSMENT:  Chronic atrial fibrillation (H)  Encounter for therapeutic drug monitoring  Long-term (current) use of anticoagulants      Therapeutic INR for goal of 2-3    PLAN:    New Dose: No Change      Next INR: 1 week        Electronically signed by:  CODY Diaz CNP
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
## 2018-04-23 NOTE — PROGRESS NOTES
[facility]    HPI:    Francesco Killian is a 89 year old  (12/26/1920), who is being seen today for an episodic care visit at [facility].   HPI information obtained from: facility chart records and facility staff. Today's concern is INR/Coumadin management for A. Fib    Bleeding Signs/Symptoms:  None  Thromboembolic Signs/Symptoms:  None    Medication Changes:  No  Dietary Changes:  No  Activity Changes: No  Bacterial/Viral Infection:  No    Missed Coumadin Doses:  None    On ASA: Yes - 81 mg po q day    Other Concerns:  No    OBJECTIVE:    INR Today:  2.2  Current Dose:  2mg M, W, F, 3mg all other days    ASSESSMENT:  Chronic atrial fibrillation (H)  Encounter for therapeutic drug monitoring  Long-term (current) use of anticoagulants      Therapeutic INR for goal of 2-3    PLAN:    New Dose: No Change      Next INR: 1 week        Electronically signed by:  CODY Diaz CNP

## 2018-04-30 NOTE — PROGRESS NOTES
[place] [facility]    Chief Complaint   Patient presents with     retirement Acute       [facility] Record Number:  [id number]    HPI:    Francesco Killian is a 89 year old  (12/26/1920), who is being seen today for an episodic care visit at [facility].  HPI information obtained from: facility chart records, facility staff, patient report, [facility] chart review and family/first contact son, Dr. Killian report.Today's concern is:      Chronic atrial fibrillation (H)  Encounter for therapeutic drug monitoring  Long-term (current) use of anticoagulants  Chronic, rate controlled on metoprolol.  Anticoagulated with coumadin.  No recent med changes, no infection, no s/s of bleed or clot.  He is also on a baby asa.  His INR today is 2.0.  He is currently on 2mg M, W, F, 3mg all other days    Moderate episode of recurrent major depressive disorder (H)  Seen today at request of family.  Concern that patient's depression is worsening.  He is having a harder time adjusting to LTC, dislikes being apart from his wife.  Son reports they try and take him to see his wife 1-2 times per week, but difficult with their schedules.  Denies/no reports of thoughts to harm self or others.  He is currently on Celexa 10 mg.  Son reports noticeable improvement when was initially started over 1 yr again, wondering if dose could be increased.      Vascular dementia without behavioral disturbance  Limited memory, very forgetful, previously was living independently, increased cares required, primary reason for LTC admission.  Complicated by his poor vision and hearing.  Most recent SLUMS 3/24.  Son reports his dad is struggling with realization his function has decreased and memory decreased      Current Weight: 142.4 lbs    ALLERGIES: Review of patient's allergies indicates no known allergies.  Past Medical, Surgical, Family and Social History reviewed and updated in [facility].    Current Outpatient  "Prescriptions   Medication Sig Dispense Refill     ACETAMINOPHEN PO Take 500 mg by mouth 3 times daily        aspirin 81 MG tablet Take 81 mg by mouth daily        calcium carbonate (TUMS) 500 MG chewable tablet Take 2 chew tab by mouth 2 times daily as needed for heartburn        citalopram (CELEXA) 10 MG tablet TAKE 1 TABLET BY MOUTH DAILY 90 tablet 1     FUROSEMIDE PO Take 20 mg by mouth 2 times daily       HYDRALAZINE HCL PO Take 25 mg by mouth 4 times daily GIVE AT 6-7am, 12n, 5pm,  for HTN Hold for SBP of less than or equal to 120       ketoconazole (NIZORAL) 2 % cream Apply to face BID PRN 30 g 1     levothyroxine (SYNTHROID/LEVOTHROID) 100 MCG tablet TAKE 1 TABLET BY MOUTH EVERY DAY. 90 tablet 0     MELATONIN PO Take 3 mg by mouth nightly as needed       METOPROLOL TARTRATE PO Take 25 mg by mouth 2 times daily for HTN Hold for SBP is<120 and HR is <60, plz call if hold x 2 in a row for any of these reasons.       Multiple Vitamins-Minerals (ICAPS PO) Take 1 capsule by mouth daily        Nutritional Supplements (BOOST) Take 1 Bottle by mouth 3 times daily (with meals)       polyethylene glycol (MIRALAX/GLYCOLAX) Packet Take 17 g by mouth daily as needed for constipation 7 packet      tamsulosin (FLOMAX) 0.4 MG capsule Take by mouth At Bedtime       Warfarin Sodium (COUMADIN PO) Take as directed       Medications reviewed:  Medications reconciled to facility chart and changes were made to reflect current medications as identified as above med list. Below are the changes that were made:   Medications stopped since last EPIC medication reconciliation:   There are no discontinued medications.    Medications started since last [facility] medication reconciliation:  No orders of the defined types were placed in this encounter.        REVIEW OF SYSTEMS:  Limited secondary to cognitive impairment but today pt reports doing ok     Physical Exam:  /74  Pulse 55  Temp 97.5  F (36.4  C)  Resp 15  Ht 5' 8\" " (1.727 m)  Wt 142 lb 6.4 oz (64.6 kg)  SpO2 94%  BMI 21.65 kg/m2  GENERAL APPEARANCE:  Alert, in no distress  ENT:  Mouth and posterior oropharynx normal, moist mucous membranes, [place]  NECK:  No adenopathy,masses or thyromegaly  RESP:  respiratory effort and palpation of chest normal, lungs clear to auscultation , no respiratory distress, decreased at bases  CV:  Palpation and auscultation of heart done , irregular rhythm, rate controlled, no murmur, rub, or gallop, no edema, grade 2/6 murmur  NEURO:   Cranial nerves 2-12 are normal tested and grossly at patient's baseline, no tremor, normal muscle tone   PSYCH:  oriented to person only, mood and affect normal     Recent Labs:     CBC RESULTS:   Recent Labs   Lab Test  02/22/18   0701  02/21/18   0616   WBC  8.3  10.5   RBC  3.84*  4.06*   HGB  12.2*  13.1*   HCT  38.2*  39.8*   MCV  100  98   MCH  31.8  32.3   MCHC  31.9  32.9   RDW  13.2  13.2   PLT  102*  112*       Last Basic Metabolic Panel:  Recent Labs   Lab Test 03/29/18 03/22/18   NA  140  141   POTASSIUM  3.5  3.9   CHLORIDE  107  107   KEZIA  9.0  9.0   CO2  23  26   BUN  28  33*   CR  1.28  1.40*   GLC  75  80       TSH   Date Value Ref Range Status   03/29/2018 3.91 0.30 - 5.00 uIU/mL Final   07/11/2017 0.26 (L) 0.40 - 4.00 mU/L Final       Assessment/Plan:  (I48.2) Chronic atrial fibrillation (H)  (primary encounter diagnosis)  (Z51.81) Encounter for therapeutic drug monitoring  (Z79.01) Long-term (current) use of anticoagulants  Comment: Chronic, rate controlled  Plan: continue metoprolol, monitor HR  -anticoagulate with coumadin, 3mg all days except 2mg Friday  -INR recheck in 1 week       (F33.1) Moderate episode of recurrent major depressive disorder (H)  Comment: Chronic, worsening per family report, discussed with son, Dr. Killian today  Plan: increase Celexa 20mg po QD--monitor, family to update if not effective  -in-house psych consult  -BMP in 1 week     (F01.50) Vascular dementia without  behavioral disturbance  Comment: previously noted and progressive, requiring LTC now for safety.  Poor vision and hearing contributes.  Could contribute to worsening depression   Plan: staff assist with cares        Total time spent with patient visit at the skilled nursing facility was 30 minutes including patient visit, review of past records and phone call to patient contact, son Dr. Killian to discuss POC. Greater than 50% of total time spent with counseling and coordinating care    Electronically signed by  CODY Diaz CNP

## 2018-05-07 NOTE — PROGRESS NOTES
[place]    Chief Complaint   Patient presents with     custodial Regulatory       [place] Medical Record Number:  [id number]    HPI:    Francesco Killian is a 89 year old  (12/26/1920), who is being seen today for a federally mandated E/M visit at [facility].  HPI information obtained from: facility chart records, facility staff and [place] Epic chart review. Today's concerns are:    Chronic atrial fibrillation (H  Encounter for therapeutic drug monitoring  Long-term (current) use of anticoagulants  Chronic, rate controlled, on metoprolol.  He is anticoagulated with coumadin and asa per patient's son's preference who is a neurologist.  No current s/s of bleeding.  He has been on coumadin 3mg all days except for 2mg on Friday.  His INR went from 2.1 last Monday to 2.9 today.  Denies/no reports of palpitations, dizziness, lightheadedness or shortness of breath     CHF  Essential hypertension with goal blood pressure less than 140/90  Per history, ECHO 2/2017 with normal LV systolic function, EF 65%-70% but severe concentric left ventricular hypertrophy.  Mild/moderate RV dilation with mildly decrease RV systolic function.  Moderate aortic stenosis.  He is on lasix 20mg BID as well as metoprolol and hydralazine.  He has gained about 5lbs since end of April, but lungs are clear and no increase in edema noted to LE's.  He had a 20# weight loss prior to LTC admission and is followed by dietary.  He is on boost 3X's per day and hus nutrition status has improved.  Denies/no reports of PND, orthopnea, hypoxia, increasing edema.  His BP are somewhat labile and elevated at times.  Permissive htn to avoid hypoperfusion to his brain.  He is to follow-up with his cardiologist, and has pending apt for 5/29/18.  No reports of chest pain.      Wt Readings from Last 3 Encounters:   05/07/18 146 lb 3.2 oz (66.3 kg)   04/30/18 142 lb 6.4 oz (64.6 kg)   04/23/18 141 lb 12.8 oz  "(64.3 kg)     Last 3 BPs: 144/61, 148/70, 116/48    Lab Results   Component Value Date    CR 1.28 03/29/2018     Lab Results   Component Value Date    POTASSIUM 3.5 03/29/2018       Moderate episode of recurrent major depressive disorder (H)  Seen last week per family's request to discuss concerns of worsening depression.  He is having a harder time adjusting to LTC, dislikes being apart from his wife.  Son reports they try and take him to see his wife 1-2 times per week, but difficult with their schedules.  Denies/no reports of thoughts to harm self or others.  His Celexa was increased to 20mg last Monday per family request and an order for in-house psych was also placed.      Vascular dementia without behavioral disturbance  Limited memory, very forgetful, previously was living independently, increased cares required, primary reason for LTC admission.  Complicated by his poor vision and hearing.  Most recent SLUMS 3/24        Closed compression fracture of L1 lumbar vertebra with routine healing, subsequent encounter  Fall with traumatic L1 compression fracture and hospitalization end of February.  Ortho consulted in hospital and recommended brace prn for pain management.  He completed therapy in the TCU and his pain significantly improved.  Today he denies pain, states \"that has all cleared up\".  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  He is on coumadin and asa, patient's son, a neurologist prefers to continue with these     Coronary artery disease involving native heart without angina pectoris, unspecified vessel or lesion type  Per history, s/p CABG in the 1980's, per notes his primary presenting symptom at that time was fatigue.  He is anticoagulated with coumadin and asa.  No reports of chest pain, dyspnea or increasing fatigue                Current Weight: 146.2 lbs      ALLERGIES: Review of " patient's allergies indicates no known allergies.  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 He also has no past medical history of Basal cell carcinoma or Malignant melanoma (H).  PAST SURGICAL HISTORY:  has a past surgical history that includes GI surgery (1970s); Cardiac surgery (1980s); Eye surgery; colonoscopy; hernia repair; IR Renal/Visceral Stent/Atherect/PTA; turp; Esophagoscopy, gastroscopy, duodenoscopy (EGD), combined (10/14/2013); Colonoscopy (11/4/2013); and NONSPECIFIC PROCEDURE.  FAMILY HISTORY: family history includes C.A.D. in his brother, father, maternal grandfather, maternal grandmother, mother, paternal grandfather, paternal grandmother, and sister; Coronary Artery Disease in his mother.  SOCIAL HISTORY:  reports that he has never smoked. He has never used smokeless tobacco. He reports that he does not drink alcohol or use illicit drugs.    MEDICATIONS:  Current Outpatient Prescriptions   Medication Sig Dispense Refill     ACETAMINOPHEN PO Take 500 mg by mouth 3 times daily        aspirin 81 MG tablet Take 81 mg by mouth daily        calcium carbonate (TUMS) 500 MG chewable tablet Take 2 chew tab by mouth 2 times daily as needed for heartburn        Citalopram Hydrobromide (CELEXA PO) Take 20 mg by mouth daily       FUROSEMIDE PO Take 20 mg by mouth 2 times daily       HYDRALAZINE HCL PO Take 25 mg by mouth 4 times daily GIVE AT 6-7am, 12n, 5pm,  for  "HTN Hold for SBP of less than or equal to 120       ketoconazole (NIZORAL) 2 % cream Apply to face BID PRN 30 g 1     levothyroxine (SYNTHROID/LEVOTHROID) 100 MCG tablet TAKE 1 TABLET BY MOUTH EVERY DAY. 90 tablet 0     MELATONIN PO Take 3 mg by mouth nightly as needed       METOPROLOL TARTRATE PO Take 25 mg by mouth 2 times daily for HTN Hold for SBP is<120 and HR is <60, plz call if hold x 2 in a row for any of these reasons.       Multiple Vitamins-Minerals (ICAPS PO) Take 1 capsule by mouth daily        Nutritional Supplements (BOOST) Take 1 Bottle by mouth 3 times daily (with meals)       polyethylene glycol (MIRALAX/GLYCOLAX) Packet Take 17 g by mouth daily as needed for constipation 7 packet      tamsulosin (FLOMAX) 0.4 MG capsule Take by mouth At Bedtime       Warfarin Sodium (COUMADIN PO) Take as directed       Medications reviewed:  Medications reconciled to facility chart and changes were made to reflect current medications as identified as above med list. Below are the changes that were made:   Medications stopped since last EPIC medication reconciliation:   There are no discontinued medications.    Medications started since last [facility] medication reconciliation:  No orders of the defined types were placed in this encounter.        Case Management:  I have reviewed the care plan and MDS and do agree with the plan. Patient's desire to return to the community is present, but is not able due to care needs .  Information reviewed:  Medications, vital signs, orders, and nursing notes.    ROS:  Limited secondary to cognitive impairment but today pt reports no concerns, denies pain    Exam:  Vitals: /61  Pulse 80  Temp 97.9  F (36.6  C)  Resp 16  Ht 5' 8\" (1.727 m)  Wt 146 lb 3.2 oz (66.3 kg)  SpO2 94%  BMI 22.23 kg/m2  BMI= Body mass index is 22.23 kg/(m^2).  GENERAL APPEARANCE:  Alert, in no distress  ENT:  Mouth and posterior oropharynx normal, moist mucous membranes, [place]  EYES:  EOM, " conjunctivae, lids, pupils and irises normal  NECK:  No adenopathy,masses or thyromegaly  RESP:  respiratory effort and palpation of chest normal, lungs clear to auscultation , no respiratory distress, decreased at bases  CV:  Palpation and auscultation of heart done , irregular rhythm, rate controlled, no rub, or gallop, no edema, grade 2/6 murmur  ABDOMEN:  normal bowel sounds, soft, nontender, no hepatosplenomegaly or other masses  M/S:   Gait and station abnormal generalized weakness, no edema/erythema joints, CMS intact, ambulates with 4ww  NEURO:   Cranial nerves 2-12 are normal tested and grossly at patient's baseline, no tremor, normal muscle tone   PSYCH:  oriented to person only, mood and affect normal     Lab/Diagnostic data:     CBC RESULTS:   Recent Labs   Lab Test  02/22/18   0701  02/21/18   0616   WBC  8.3  10.5   RBC  3.84*  4.06*   HGB  12.2*  13.1*   HCT  38.2*  39.8*   MCV  100  98   MCH  31.8  32.3   MCHC  31.9  32.9   RDW  13.2  13.2   PLT  102*  112*       Last Basic Metabolic Panel:  Recent Labs   Lab Test 03/29/18 03/22/18   NA  140  141   POTASSIUM  3.5  3.9   CHLORIDE  107  107   KEZIA  9.0  9.0   CO2  23  26   BUN  28  33*   CR  1.28  1.40*   GLC  75  80       TSH   Date Value Ref Range Status   03/29/2018 3.91 0.30 - 5.00 uIU/mL Final   07/11/2017 0.26 (L) 0.40 - 4.00 mU/L Final       ASSESSMENT/PLAN  (I48.2) Chronic atrial fibrillation (H)  (primary encounter diagnosis)  (Z51.81) Encounter for therapeutic drug monitoring  (Z79.01) Long-term (current) use of anticoagulants  Comment: Chronic, rate controlled  Plan: continue metoprolol   -coumadin 2mg po Mondaycoumadin 3mg po all other days  -INR recheck Thursday (INR increased from 2.1 to 2.9 in span of 1 week, also on baby asa so high risk for bleed, will monitor more closely)      (F33.1) Moderate episode of recurrent major depressive disorder (H)  Comment: Chronic, worsening per family report, discussed with son, Dr. Constantin marcus  Monday  Plan: continue increased Celexa 20mg po QD--monitor, family to update if not effective  -in-house psych consult       (F01.50) Vascular dementia without behavioral disturbance  Comment: previously noted and progressive, requiring LTC now for safety.  Poor vision and hearing contributes   Plan: staff assist with cares'    (S32.010D) Closed compression fracture of L1 lumbar vertebra with routine healing, subsequent encounter  Comment: per history from fall end of February, no reports of pain today  Plan: reduce APAP 500mg po BID, monitor pain   -APAP 500mg po BID prn        (Z86.73) History of CVA (cerebrovascular accident)  Comment: per history   Plan: anticoagulation with coumadin and asa per son (neurologist)     (I25.10) Coronary artery disease involving native heart without angina pectoris, unspecified vessel or lesion type  Comment: per history, s/p bypass  Plan: control BP as below  -asa and coumadin   -cards f/u end of May    (I10) Essential hypertension with goal blood pressure less than 140/90  Comment: chronic, labile but has been fairly well-controlled recently.  Permissive htn given concern for hypoperfusion to brain   Plan: continue metoprolol and hydralazine at this time  -monitor BP's  -BMP Thursday  -cardiology apt 5/28 for assistance on management     (I50.9) Chronic congestive heart failure, unspecified congestive heart failure type (H)  Comment: Per history, compensated, suspect recent weight gain not fluid r/t based on exam and ROS.  Patient with history of 20# weight loss and poor nutritional status prior to LTC admission, now receiving boost and dietary following  Plan: continue metoprolol and lasix  -daily weights, update NP if >2lbs/day or 5lbs/ week or increased edema, shortness of breath, hypoxia, ect  -BMP Thursday   -[employer] re-establish with cards, apt 5/28        Electronically signed by:  CODY Diaz CNP

## 2018-05-09 NOTE — ED AVS SNAPSHOT
[facility]    [address]    [facility] [address]    Phone:  [phone]    Fax:  [phone]                                       Francesco Killian   MRN: [id number]    Department:  [facility]   Date of Visit:  5/9/2018           After Visit Summary Signature Page     I have received my discharge instructions, and my questions have been answered. I have discussed any challenges I see with this plan with the nurse or doctor.    ..........................................................................................................................................  Patient/Patient Representative Signature      ..........................................................................................................................................  Patient Representative Print Name and Relationship to Patient    ..................................................               ................................................  Date                                            Time    ..........................................................................................................................................  Reviewed by Signature/Title    ...................................................              ..............................................  Date                                                            Time

## 2018-05-09 NOTE — TELEPHONE ENCOUNTER
Called re: head lac secondary to a fall. It is bleeding profusely with associated hematoma. They are having trouble getting hemostasis (on warfarin). OK to send to ER as unable to control bleeding at facility.     Rema Ray MD

## 2018-05-09 NOTE — ED AVS SNAPSHOT
[facility] Emergency Department    [address] [place] [address]    Phone:  [phone]    Fax:  [phone]                                       Francesco Killian   MRN: [id number]    Department:  [facility] Emergency Department   Date of Visit:  5/9/2018           Patient Information     Date Of Birth          12/26/1920        Your diagnoses for this visit were:     Closed head injury, initial encounter     Hematoma of scalp, initial encounter     Laceration of scalp, initial encounter     Long term current use of anticoagulant therapy        You were seen by Wilder Berumen MD and Bassam Garnica MD.      Follow-up Information     Follow up with Julissa Virk APRN CNP In 2 days.    Specialty:  Nurse Practitioner - Gerontology    Contact information:    [address] [address]  [phone]          Discharge Instructions       Discharge Instructions  Head Injury    You have been seen today for a head injury. You were checked for serious problems, like bleeding on the brain, but these problems cannot always be found right away.  Due to this risk, you should not be alone for 24 hours after your injury.  Follow up with your regular physician in 2 days. If you are taking a blood thinner, such as aspirin, Pradaxa  (dabigatran), Coumadin  (warfarin), or Plavix  (clopidogrel), you are at especially high risk for immediate or delayed bleeding, and need to re-check with a physician in 24 hours, or sooner if any of the symptoms below happen.     Return to the Emergency Department if:    You are confused, have amnesia, or you are not acting right.    Your headache gets worse or you start to have a really bad headache even with your recommended treatment plan.    You vomit more than once.    You have a convulsion or seizure.    You have trouble walking.    You have weakness or paralysis in an arm or a leg.    You have blood or fluid coming from  your ears or nose.    You have new symptoms or anything that worries you.    Sleeping:  It is okay for you to sleep, but someone should wake you up as instructed by your doctor, and someone should check on you at your usual time to wake up.     Activity:    Do not drive for at least 24 hours.    Do not drive if you have dizzy spells or trouble concentrating, or remembering things.    Do not return to any contact sports until cleared by your regular doctor.     Follow-up:  It is very important that you make an appointment with your clinic and go to the appointment.  If you do not follow-up with your regular doctor, it may result in missing an important development which could result in permanent injury or disability and/or lasting pain.  If there is any problem keeping your appointment, call your doctor or return to the Emergency Department.    MORE INFORMATION:    Concussion:  A concussion is a minor head injury that may cause temporary problems with the way your brain works.  Some symptoms include:  confusion, amnesia, nausea and vomiting, dizziness, fatigue, memory or concentration problems, irritability and sleep problems.    CT Scans: Your evaluation today may have included a CT scan (CAT scan) to look for things like bleeding or a skull fracture (break).  CT scans involve radiation and too many CT scans can cause serious health problems like cancer, especially in children.  Because of this, your doctor may not have ordered a CT scan today if they think you are at low risk for a serious or life threatening problem.    If you were given a prescription for medicine here today, be sure to read all of the information (including the package insert) that comes with your prescription.  This will include important information about the medicine, its side effects, and any warnings that you need to know about.  The pharmacist who fills the prescription can provide more information and answer questions you may have about  the medicine.  If you have questions or concerns that the pharmacist cannot address, please call or return to the Emergency Department.     Opioid Medication Information    Pain medications are among the most commonly prescribed medicines, so we are including this information for all our patients. If you did not receive pain medication or get a prescription for pain medicine, you can ignore it.     You may have been given a prescription for an opioid (narcotic) pain medicine and/or have received a pain medicine while here in the Emergency Department. These medicines can make you drowsy or impaired. You must not drive, operate dangerous equipment, or engage in any other dangerous activities while taking these medications. If you drive while taking these medications, you could be arrested for DUI, or driving under the influence. Do not drink any alcohol while you are taking these medications.     Opioid pain medications can cause addiction. If you have a history of chemical dependency of any type, you are at a higher risk of becoming addicted to pain medications.  Only take these prescribed medications to treat your pain when all other options have been tried. Take it for as short a time and as few doses as possible. Store your pain pills in a secure place, as they are frequently stolen and provide a dangerous opportunity for children or visitors in your house to start abusing these powerful medications. We will not replace any lost or stolen medicine.  As soon as your pain is better, you should flush all your remaining medication.     Many prescription pain medications contain Tylenol  (acetaminophen), including Vicodin , Tylenol #3 , Norco , Lortab , and Percocet .  You should not take any extra pills of Tylenol  if you are using these prescription medications or you can get very sick.  Do not ever take more than 3000 mg of acetaminophen in any 24 hour period.    All opioids tend to cause constipation. Drink plenty  of water and eat foods that have a lot of fiber, such as fruits, vegetables, prune juice, apple juice and high fiber cereal.  Take a laxative if you don t move your bowels at least every other day. Miralax , Milk of Magnesia, Colace , or Senna  can be used to keep you regular.      Remember that you can always come back to the Emergency Department if you are not able to see your regular doctor in the amount of time listed above, if you get any new symptoms, or if there is anything that worries you.    Discharge Instructions  Laceration (Cut)    You were seen today for a laceration (cut).  Your doctor examined your laceration for any problems such a buried foreign body (like glass, a splinter, or gravel), or injury to blood vessels, tendons, and nerves.  Your doctor may have also rinsed and/or scrubbed your laceration to help prevent an infection.  Your laceration may have been closed with glue, staples or sutures (stitches).      It may not be possible to find all problems with your laceration on the first visit, and we can't always prevent infections.  Antibiotics are only given when the benefit is more than the risk, and don't prevent all infections. Some lacerations are too high risk to close, and are left open to heal.  All lacerations, no matter how expertly repaired, will cause scarring.    Return to the Emergency Department right away if:    You have more redness, swelling, pain, drainage (pus), a bad smell, or red streaking from your laceration.      You have a fever of 101oF or more.    You have bleeding that you can t stop at home. If your cut starts to bleed, hold pressure on the bleeding area with a clean cloth or put pressure over the bandage.  If the bleeding doesn t stop after using constant pressure for 30 minutes, you should return to the Emergency Department for further treatment.    An area past the laceration is cool, pale, or blue compared with the other side, or has a slower return of color  when squeezed.    Your dressing seems too tight or starts to get uncomfortable or painful.    You have loss of normal function or use of an area, such as being unable to straighten or bend a finger normally.    You have a numb area past the laceration.    Return to the Emergency Department or see your regular doctor if:    The laceration starts to come open.     You have something coming out of the cut or a feeling that there is something in the laceration.    Your wound will not heal, or keeps breaking open. There can always be glass, wood, dirt or other things in any wound.  They won t always show up, even on x-rays.  If a wound doesn t heal, this may be why, and it is important to follow-up with your regular doctor.    Home Care:    Take your dressing off in 12 hours, or as instructed by your doctor, to check your laceration. Remove the dressing sooner if it seems too tight or painful, or if it is getting numb, tingly, or pale past the dressing.    Gently wash your laceration 2 times a day with clean cloth and soap.     It is okay to shower, but do not let the laceration soak in water.      If your laceration was closed with wound adhesive or strips: pat it dry and leave it open to the air.     For all other repairs: after you wash your laceration, or at least 2 times a day, apply bacitracin or other antibiotic ointment to the laceration, then cover it with a Band-Aid  or gauze.    Keep the laceration clean. Wear gloves or other protective clothing if you are around dirt.    Follow-up:    You need to follow-up with your regular doctor in 2 days.    Your sutures or staples need to be removed in 7 days. Schedule an appointment with your regular doctor to have this done.    Scars:  To help minimize scarring:    Wear sunscreen over the healed laceration when out in the sun.    Massage the area regularly.    You may use Vitamin E oil.    Wait a year.  Most scars will start to fade within a year.    Probiotics: If you  "have been given an antibiotic, you may want to also take a probiotic pill or eat yogurt with live cultures. Probiotics have \"good bacteria\" to help your intestines stay healthy. Studies have shown that probiotics help prevent diarrhea and other intestine problems (including C. diff infection) when you take antibiotics. You can buy these without a prescription in the pharmacy section of the store.     If you were given a prescription for medicine here today, be sure to read all of the information (including the package insert) that comes with your prescription.  This will include important information about the medicine, its side effects, and any warnings that you need to know about.  The pharmacist who fills the prescription can provide more information and answer questions you may have about the medicine.  If you have questions or concerns that the pharmacist cannot address, please call or return to the Emergency Department.     Opioid Medication Information    Pain medications are among the most commonly prescribed medicines, so we are including this information for all our patients. If you did not receive pain medication or get a prescription for pain medicine, you can ignore it.     You may have been given a prescription for an opioid (narcotic) pain medicine and/or have received a pain medicine while here in the Emergency Department. These medicines can make you drowsy or impaired. You must not drive, operate dangerous equipment, or engage in any other dangerous activities while taking these medications. If you drive while taking these medications, you could be arrested for DUI, or driving under the influence. Do not drink any alcohol while you are taking these medications.     Opioid pain medications can cause addiction. If you have a history of chemical dependency of any type, you are at a higher risk of becoming addicted to pain medications.  Only take these prescribed medications to treat your pain when all " other options have been tried. Take it for as short a time and as few doses as possible. Store your pain pills in a secure place, as they are frequently stolen and provide a dangerous opportunity for children or visitors in your house to start abusing these powerful medications. We will not replace any lost or stolen medicine.  As soon as your pain is better, you should flush all your remaining medication.     Many prescription pain medications contain Tylenol  (acetaminophen), including Vicodin , Tylenol #3 , Norco , Lortab , and Percocet .  You should not take any extra pills of Tylenol  if you are using these prescription medications or you can get very sick.  Do not ever take more than 3000 mg of acetaminophen in any 24 hour period.    All opioids tend to cause constipation. Drink plenty of water and eat foods that have a lot of fiber, such as fruits, vegetables, prune juice, apple juice and high fiber cereal.  Take a laxative if you don t move your bowels at least every other day. Miralax , Milk of Magnesia, Colace , or Senna  can be used to keep you regular.      Remember that you can always come back to the Emergency Department if you are not able to see your regular doctor in the amount of time listed above, if you get any new symptoms, or if there is anything that worries you.              Your next 10 appointments already scheduled     May 29, 2018 12:45 PM CDT   Ech Complete with [id number]   [facility] CV Echocardiography ([facility])    [address] [place] [id number] [phone]           1. Please bring or wear a comfortable two-piece outfit. 2. You may eat, drink and take your normal medicines. 3. For any questions that cannot be answered, please contact the ordering physician            May 29, 2018  4:15 PM CDT   Return Visit with Oliver Berry MD   [facility] ([facility])     [address]  [facility] [id number] [phone] OPT 2              24 Hour Appointment Hotline       To make an appointment at any [facility] clinic, call [phone] ([phone]). If you don't have a family doctor or clinic, we will help you find one. [facility] clinics are conveniently located to serve the needs of you and your family.             Review of your medicines      START taking        Dose / Directions Last dose taken    cephALEXin 500 MG capsule   Commonly known as:  KEFLEX   Dose:  500 mg   Quantity:  6 capsule        Take 1 capsule (500 mg) by mouth 2 times daily for 3 days   Refills:  0          Our records show that you are taking the medicines listed below. If these are incorrect, please call your family doctor or clinic.        Dose / Directions Last dose taken    ACETAMINOPHEN PO   Dose:  500 mg   Indication:  Pain        Take 500 mg by mouth 2 times daily And 500mg po BID prn pain   Refills:  0        aspirin 81 MG tablet   Dose:  81 mg        Take 81 mg by mouth daily   Refills:  0        BOOST   Dose:  1 Bottle        Take 1 Bottle by mouth 3 times daily (with meals)   Refills:  0        calcium carbonate 500 MG chewable tablet   Commonly known as:  TUMS   Dose:  2 chew tab        Take 2 chew tab by mouth 2 times daily as needed for heartburn   Refills:  0        CELEXA PO   Dose:  20 mg        Take 20 mg by mouth daily   Refills:  0        COUMADIN PO        Take as directed   Refills:  0        FLOMAX 0.4 MG capsule   Indication:  Benign Enlargement of Prostate   Generic drug:  tamsulosin        Take by mouth At Bedtime   Refills:  0        FUROSEMIDE PO   Dose:  20 mg   Indication:  High Blood Pressure Disorder        Take 20 mg by mouth 2 times daily   Refills:  0        HYDRALAZINE HCL PO   Dose:  25 mg        Take 25 mg by mouth 4 times daily GIVE AT 6-7am, 12n, 5pm,  for HTN Hold for SBP of less than or equal to 120   Refills:  0        ICAPS PO    Dose:  1 capsule        Take 1 capsule by mouth daily   Refills:  0        ketoconazole 2 % cream   Commonly known as:  NIZORAL   Quantity:  30 g        Apply to face BID PRN   Refills:  1        levothyroxine 100 MCG tablet   Commonly known as:  SYNTHROID/LEVOTHROID   Quantity:  90 tablet        TAKE 1 TABLET BY MOUTH EVERY DAY.   Refills:  0        MELATONIN PO   Dose:  3 mg   Indication:  Trouble Sleeping        Take 3 mg by mouth nightly as needed   Refills:  0        METOPROLOL TARTRATE PO   Dose:  25 mg        Take 25 mg by mouth 2 times daily for HTN Hold for SBP is<120 and HR is <60, plz call if hold x 2 in a row for any of these reasons.   Refills:  0        polyethylene glycol Packet   Commonly known as:  MIRALAX/GLYCOLAX   Dose:  17 g   Quantity:  7 packet        Take 17 g by mouth daily as needed for constipation   Refills:  0                Prescriptions were sent or printed at these locations (1 Prescription)                   Other Prescriptions                Printed at Department/Unit printer (1 of 1)         cephALEXin (KEFLEX) 500 MG capsule                Procedures and tests performed during your visit     Basic metabolic panel    CBC with platelets differential    CT Cervical Spine w/o Contrast    CT Head w/o Contrast    EKG 12 lead    INR    Irrigate: laceration    Prep for procedure - 20% POLOXAMER 188 NF (Nelda)      Orders Needing Specimen Collection     None      Pending Results     Date and Time Order Name Status Description    5/9/2018 1841 EKG 12 lead Preliminary             Pending Culture Results     No orders found from 5/7/2018 to 5/10/2018.            Pending Results Instructions     If you had any lab results that were not finalized at the time of your Discharge, you can call the ED Lab Result RN at [phone]. You will be contacted by this team for any positive Lab results or changes in treatment. The nurses are available 7 days a week from 10A to 6:30P.  You can leave a  message 24 hours per day and they will return your call.        Test Results From Your Hospital Stay        5/9/2018  8:15 PM      Narrative     CT CERVICAL SPINE WITHOUT CONTRAST   5/9/2018 7:57 PM     HISTORY: Trauma.     TECHNIQUE: Axial images of the cervical spine were obtained without  intravenous contrast. Multiplanar reformations were performed.  Radiation dose for this scan was reduced using automated exposure  control, adjustment of the mA and/or kV according to patient size, or  iterative reconstruction technique.    COMPARISON: None.    FINDINGS: Bones appear osteopenic. No evidence of fracture. No  prevertebral soft tissue swelling. There is reversal of the normal  cervical lordosis centered at C4-C5. Mild grade 1 anterolisthesis of  C3 on C4. Minimal anterior wedging of the C5 vertebral body presumably  degenerative in etiology. Severe loss of intervertebral disc space  with degenerative endplate changes at C4-C5, C5-C6, and C6-C7.  Degenerative changes also seen at the other levels in the cervical  spine. Schmorl's node type deformity seen at the superior T2 endplate.  Mild to moderate spinal canal narrowing at C3-C4, C4-C5, C5-C6, and  C6-C7 due to posterior disc osteophyte complexes. Varying levels of  neural foraminal narrowing due to facet hypertrophy and uncinate  spurring.     Visualized paraspinous tissues: Unremarkable.        Impression     IMPRESSION:   1. No evidence of acute fracture or subluxation in the cervical spine.  2. Degenerative changes in the cervical spine as described above.  There does not appear to be significant change since prior.       CAMILA HAWTHORNE MD         5/9/2018  8:20 PM      Narrative     CT SCAN OF THE HEAD WITHOUT CONTRAST   5/9/2018 7:56 PM     HISTORY: Head injury.    TECHNIQUE: Axial images of the head and coronal reformations without  IV contrast material. Radiation dose for this scan was reduced using  automated exposure control, adjustment of the mA and/or  kV according  to patient size, or iterative reconstruction technique.    COMPARISON: Head CT 2/19/2018.    FINDINGS: Large posterior scalp soft tissue injury and hematoma just  to the right of midline. No underlying skull fracture.    No evidence of acute intracranial hemorrhage. No mass effect or  midline shift. Large area of encephalomalacia within the right  occipital lobe is unchanged. Area of cortical encephalomalacia in the  right postcentral gyrus region is also unchanged. Diffuse parenchymal  volume loss. Periventricular white matter hypodensities are  nonspecific, but likely related to chronic microvascular ischemic  disease and appear grossly similar to prior. Ventricular size is  unchanged without evidence of hydrocephalus. Persistent ex vacuo  dilatation of the occipital horn of the right lateral ventricle.        Impression     IMPRESSION:     1. Posterior scalp soft tissue injury and hematoma to the right of  midline without underlying skull fracture.  2. No evidence of acute intracranial hemorrhage, mass, or herniation.  3. Diffuse atrophy and chronic changes as above.      CAMILA HAWTHORNE MD         5/9/2018  7:27 PM      Component Results     Component Value Ref Range & Units Status    Sodium 134 133 - 144 mmol/L Final    Potassium 4.8 3.4 - 5.3 mmol/L Final    Chloride 102 94 - 109 mmol/L Final    Carbon Dioxide 27 20 - 32 mmol/L Final    Anion Gap 5 3 - 14 mmol/L Final    Glucose 113 (H) 70 - 99 mg/dL Final    Urea Nitrogen 39 (H) 7 - 30 mg/dL Final    Creatinine 1.49 (H) 0.66 - 1.25 mg/dL Final    GFR Estimate 44 (L) >60 mL/min/1.7m2 Final    Non  GFR Calc    GFR Estimate If Black 53 (L) >60 mL/min/1.7m2 Final    African American GFR Calc    Calcium 9.3 8.5 - 10.1 mg/dL Final         5/9/2018  7:21 PM      Component Results     Component Value Ref Range & Units Status    INR 2.50 (H) 0.86 - 1.14 Final         5/9/2018  7:12 PM      Component Results     Component Value Ref Range &  Units Status    WBC 5.9 4.0 - 11.0 10e9/L Final    RBC Count 3.66 (L) 4.4 - 5.9 10e12/L Final    Hemoglobin 11.8 (L) 13.3 - 17.7 g/dL Final    Hematocrit 36.5 (L) 40.0 - 53.0 % Final     78 - 100 fl Final    MCH 32.2 26.5 - 33.0 pg Final    MCHC 32.3 31.5 - 36.5 g/dL Final    RDW 13.9 10.0 - 15.0 % Final    Platelet Count 145 (L) 150 - 450 10e9/L Final    Diff Method Automated Method  Final    % Neutrophils 55.1 % Final    % Lymphocytes 25.0 % Final    % Monocytes 12.1 % Final    % Eosinophils 6.3 % Final    % Basophils 1.0 % Final    % Immature Granulocytes 0.5 % Final    Nucleated RBCs 0 0 /100 Final    Absolute Neutrophil 3.3 1.6 - 8.3 10e9/L Final    Absolute Lymphocytes 1.5 0.8 - 5.3 10e9/L Final    Absolute Monocytes 0.7 0.0 - 1.3 10e9/L Final    Absolute Eosinophils 0.4 0.0 - 0.7 10e9/L Final    Absolute Basophils 0.1 0.0 - 0.2 10e9/L Final    Abs Immature Granulocytes 0.0 0 - 0.4 10e9/L Final    Absolute Nucleated RBC 0.0  Final                Clinical Quality Measure: Blood Pressure Screening     Your blood pressure was checked while you were in the emergency department today. The last reading we obtained was  BP: 165/84 . Please read the guidelines below about what these numbers mean and what you should do about them.  If your systolic blood pressure (the top number) is less than 120 and your diastolic blood pressure (the bottom number) is less than 80, then your blood pressure is normal. There is nothing more that you need to do about it.  If your systolic blood pressure (the top number) is 120-139 or your diastolic blood pressure (the bottom number) is 80-89, your blood pressure may be higher than it should be. You should have your blood pressure rechecked within a year by a primary care provider.  If your systolic blood pressure (the top number) is 140 or greater or your diastolic blood pressure (the bottom number) is 90 or greater, you may have high blood pressure. High blood pressure is  treatable, but if left untreated over time it can put you at risk for heart attack, stroke, or kidney failure. You should have your blood pressure rechecked by a primary care provider within the next 4 weeks.  If your provider in the emergency department today gave you specific instructions to follow-up with your doctor or provider even sooner than that, you should follow that instruction and not wait for up to 4 weeks for your follow-up visit.        Thank you for choosing [facility]       Thank you for choosing [facility] for your care. Our goal is always to provide you with excellent care. Hearing back from our patients is one way we can continue to improve our services. Please take a few minutes to complete the written survey that you may receive in the mail after you visit with us. Thank you!        [employer] Information     [employer] gives you secure access to your electronic health record. If you see a primary care provider, you can also send messages to your care team and make appointments. If you have questions, please call your primary care clinic.  If you do not have a primary care provider, please call [phone] and they will assist you.        Care EveryWhere ID     This is your Care EveryWhere ID. This could be used by other organizations to access your [place] medical records  [id number]        Equal Access to Services     IMAN MULTANI : Anant Salmon, marcus daugherty, gely olmstead, singh lemon. So [facility] [phone].    ATENCIÓN: Si habla español, tiene a costa disposición servicios gratuitos de asistencia lingüística. Lesia al [phone].    We comply with applicable federal civil rights laws and [place] laws. We do not discriminate on the basis of race, color, national origin, age, disability, sex, sexual orientation, or gender identity.            After Visit Summary       This is your record. Keep this with you and show to your community  pharmacist(s) and doctor(s) at your next visit.

## 2018-05-09 NOTE — ED PROVIDER NOTES
History     Chief Complaint:  Fall and Head Laceration      HPI   Francesco Killian is a 89 year old male on Coumadin with a history of atrial fibrillation, abdominal aortic aneurysm, CVA, hypertension, hyperlipidemia, and hypothyroidism, status-post CABG, who presents to the emergency department today via EMS from [facility] for evaluation of a fall and a head laceration. The patient reports that he was in his bathroom this evening when he suddenly lost his balance and fell backwards, hitting his posterior head on the floor and sustaining a laceration. He notes that he has fallen in the past and usually ambulates with a walker. The patient denies headache, loss of consciousness, dizziness, neck pain, chest pain, shortness of breath, or any other injuries or pain. He notes that his children live nearby.    Allergies:  No Known Drug Allergies     Medications:    Acetaminophen  Aspirin  Tums  Celexa  Furosemide  Hydralazine HCl  Nizoral 2% cream  Synthroid  Melatonin  Metoprolol tartrate  Miralax  Flomax  Coumadin    Past Medical History:    Abdominal aortic aneurysm  Aortic stenosis  Bradycardia  Right carotid occlusion  Chronic atrial fibrillation  CVA  Dysphagia  Hypertension  Hyperlipidemia  Hypothyroidism  Macular degeneration, bilateral  Recurrent falls, oculovestibular syndrome  Right bundle branch block  Squamous cell carcinoma  Macrocytic anemia  Esophageal stricture  CHF  Acute kidney failure  Compression fracture of L1 vertebra    Past Surgical History:    Surgical repair of left arm pseudoaneurysm  CABG  EGD combined  Bilateral cataract surgery  Hernia repair, bilateral inguinal  Duodenal ulcer repair  Renal/visceral stent/atherect/PTA  TURP    Family History:    CAD: mother, father, maternal grandmother, maternal grandfather, paternal grandmother, paternal grandfather, brother, and sister    Social History:  Smoking Status: Never Smoker  Smokeless Tobacco: Never  Used  Alcohol Use: Negative  Marital Status:       Review of Systems   HENT:        Positive for laceration of the posterior scalp.   Respiratory: Negative for shortness of breath.    Cardiovascular: Negative for chest pain.   Musculoskeletal: Negative for arthralgias and neck pain.   Neurological: Negative for dizziness and headaches.        Negative for loss of consciousness.   All other systems reviewed and are negative.    Physical Exam     Patient Vitals for the past 24 hrs:   BP Temp Temp src Heart Rate Resp SpO2   05/09/18 2000 - - - - - 93 %   05/09/18 1959 165/84 - - 78 - -   05/09/18 1900 188/84 - - - - -   05/09/18 1841 (!) 194/93 97.6  F (36.4  C) Oral 73 18 92 %     Physical Exam  General: Alert.  HEENT:   Laceration on the occipital area.  It is not fully through the skin; tennis ball sized hematoma on occiput.    The pupils are equal, round, and reactive to light    Extraocular muscles are intact.    The nose is normal.    The oropharynx is normal.      Uvula is in the midline.    Neck:  Normal range of motion.    Lungs:  Clear.      No rales, no wheezing.      There is no tachypnea.  Non-labored.  Cardiac: Regular rate.      Normal S1 and S2.      Loud 3/6 holosystolic murmur.   Abdomen: Soft. No distension, no localized tenderness or rebound.  MS:  No bony tenderness over the entire vertebral column. Non-tender chest. Normal tone. Normal movement of all extremities.   Neurologic:     Normal mentation.  No cranial nerve deficits.  No focal motor or sensory                            changes.      Speech normal.  Psych:  Awake. Oriented to person and place.    Alert.      Normal affect.      Appropriate interactions.  Skin:  No rash.      No lesions.    Emergency Department Course     ECG:  ECG taken at 1845, ECG read at 1853  Atrial fibrillation  Left axis deviation  Right bundle branch block  Abnormal ECG  Rate 70 bpm. NM interval * ms. QRS duration 164 ms. QT/QTc 480/518 ms. P-R-T axes * -39  -7.    Imaging:  Radiology findings were communicated with the patient who voiced understanding of the findings.    CT Head w/o Contrast   1. Posterior scalp soft tissue injury and hematoma to the right of  midline without underlying skull fracture.  2. No evidence of acute intracranial hemorrhage, mass, or herniation.  3. Diffuse atrophy and chronic changes as above.  CAMILA HAWTHORNE MD  Reading per radiology    CT Cervical Spine w/o Contrast  1. No evidence of acute fracture or subluxation in the cervical spine.  2. Degenerative changes in the cervical spine as described above.  There does not appear to be significant change since prior.   CAMILA HAWTHORNE MD  Reading per radiology    Laboratory:  Laboratory findings were communicated with the patient who voiced understanding of the findings.    BMP: Glucose 113 (H), BUN 39 (H), Creatinine 1.49 (H), GFR Estimate 44 (L), o/w WNL  INR: 2.50 (H)  CBC: WBC 5.9, HGB 11.8 (L),  (L)    Procedures:  PROCEDURE: Laceration Repair    LACERATION: A clean 3.5 cm linear laceration.    LOCATION: Occipital region.    FUNCTION: Sensation, circulation intact.    ANESTHESIA: Lidocaine 1% with epinephrine, 10 cc's    PREPARATION: Cleansed by emergency department technician.    DEBRIDEMENT: No debridement. Wound explored and no foreign body found.     CLOSURE: Wound was closed with 3.0 Ethilon, 4 interrupted sutures.    Interventions:  1955 Tetanus & diptheria toxoids 0.5 mL IM  2155 Keflex 500 mg PO    Emergency Department Course:  Nursing notes and vitals reviewed.  I performed an exam of the patient as documented above.   The patient was sent for a CT Head w/o Contrast and CT Cervical Spine w/o Contrast while in the emergency department, results above.   IV was inserted and blood was drawn for laboratory testing, results above.  2048 I performed a laceration repair of the patient as per above.  2129 The patient was road tested and walked well per RN.  2209 I discussed the  treatment plan with the patient. They expressed understanding of this plan and consented to discharge. They will be discharged home with instructions for care and follow up. In addition, the patient will return to the emergency department if their symptoms persist, worsen, if new symptoms arise or if there is any concern.  All questions were answered.  I personally reviewed the ECG, imaging, and laboratory results with the patient and answered all related questions prior to discharge.    Impression & Plan      Medical Decision Making:  Francesco Killian is a 89 year old elderly male who was walking and accidentally leaned back a little bit and fell backwards hitting his head. He falls frequently and states that he has had problems in the past also with falling backwards. He denied any lightheadedness prior to this happening. He sustained no loss of consciousness but has a large hematoma and a superficial laceration to the occipital region of his head.    He is on coumadin and had a CT scan done which showed no intracerebral blood or fractures. His C-spine was negative. He denied pain anywhere. He is at his mental status baseline which is mild recent memory loss but conversive and cognitively intact at this point. He ambulated after our evaluation here and did well with that. There was no unsteadiness and he needed minimal assistance. He normally uses a walker, he was not using it tonight when this happened.    He has a large hematoma on the occipital region of his head about the size of a tennis ball. There was some oozing from the suture site. Bacitracin, adaptic 4x4, and gauze wrap was placed and then an ACE wrap and there was no more active bleeding. He was observed here for an hour after repair.     His tetanus was updated and he was given a dose of Keflex here. He will be going home on two more days of Keflex and he will follow up with his primary care provider either tomorrow or Friday before the  weekend.    Diagnosis:    ICD-10-CM    1. Closed head injury, initial encounter S09.90XA    2. Hematoma of scalp, initial encounter S00.03XA    3. Laceration of scalp, initial encounter S01.01XA    4. Long term current use of anticoagulant therapy Z79.01        Disposition:  The patient is discharged to home.    Discharge Medications:  New Prescriptions    CEPHALEXIN (KEFLEX) 500 MG CAPSULE    Take 1 capsule (500 mg) by mouth 2 times daily for 3 days     Scribe Disclosure:  Horacio GUERRERO, am serving as a scribe at 6:57 PM on 5/9/2018 to document services personally performed by Wilder Berumen MD based on my observations and the provider's statements to me.  [facility] EMERGENCY DEPARTMENT       Wilder Berumen MD  05/10/18 0036

## 2018-05-09 NOTE — ED TRIAGE NOTES
"Pt had a fall in bathroom, striking back of his head on floor.  Pt states \"sore\" at back of head.  He now denies any other pain.    "

## 2018-05-10 NOTE — DISCHARGE INSTRUCTIONS
Discharge Instructions  Head Injury    You have been seen today for a head injury. You were checked for serious problems, like bleeding on the brain, but these problems cannot always be found right away.  Due to this risk, you should not be alone for 24 hours after your injury.  Follow up with your regular physician in 2 days. If you are taking a blood thinner, such as aspirin, Pradaxa  (dabigatran), Coumadin  (warfarin), or Plavix  (clopidogrel), you are at especially high risk for immediate or delayed bleeding, and need to re-check with a physician in 24 hours, or sooner if any of the symptoms below happen.     Return to the Emergency Department if:    You are confused, have amnesia, or you are not acting right.    Your headache gets worse or you start to have a really bad headache even with your recommended treatment plan.    You vomit more than once.    You have a convulsion or seizure.    You have trouble walking.    You have weakness or paralysis in an arm or a leg.    You have blood or fluid coming from your ears or nose.    You have new symptoms or anything that worries you.    Sleeping:  It is okay for you to sleep, but someone should wake you up as instructed by your doctor, and someone should check on you at your usual time to wake up.     Activity:    Do not drive for at least 24 hours.    Do not drive if you have dizzy spells or trouble concentrating, or remembering things.    Do not return to any contact sports until cleared by your regular doctor.     Follow-up:  It is very important that you make an appointment with your clinic and go to the appointment.  If you do not follow-up with your regular doctor, it may result in missing an important development which could result in permanent injury or disability and/or lasting pain.  If there is any problem keeping your appointment, call your doctor or return to the Emergency Department.    MORE INFORMATION:    Concussion:  A concussion is a minor head  injury that may cause temporary problems with the way your brain works.  Some symptoms include:  confusion, amnesia, nausea and vomiting, dizziness, fatigue, memory or concentration problems, irritability and sleep problems.    CT Scans: Your evaluation today may have included a CT scan (CAT scan) to look for things like bleeding or a skull fracture (break).  CT scans involve radiation and too many CT scans can cause serious health problems like cancer, especially in children.  Because of this, your doctor may not have ordered a CT scan today if they think you are at low risk for a serious or life threatening problem.    If you were given a prescription for medicine here today, be sure to read all of the information (including the package insert) that comes with your prescription.  This will include important information about the medicine, its side effects, and any warnings that you need to know about.  The pharmacist who fills the prescription can provide more information and answer questions you may have about the medicine.  If you have questions or concerns that the pharmacist cannot address, please call or return to the Emergency Department.     Opioid Medication Information    Pain medications are among the most commonly prescribed medicines, so we are including this information for all our patients. If you did not receive pain medication or get a prescription for pain medicine, you can ignore it.     You may have been given a prescription for an opioid (narcotic) pain medicine and/or have received a pain medicine while here in the Emergency Department. These medicines can make you drowsy or impaired. You must not drive, operate dangerous equipment, or engage in any other dangerous activities while taking these medications. If you drive while taking these medications, you could be arrested for DUI, or driving under the influence. Do not drink any alcohol while you are taking these medications.     Opioid pain  medications can cause addiction. If you have a history of chemical dependency of any type, you are at a higher risk of becoming addicted to pain medications.  Only take these prescribed medications to treat your pain when all other options have been tried. Take it for as short a time and as few doses as possible. Store your pain pills in a secure place, as they are frequently stolen and provide a dangerous opportunity for children or visitors in your house to start abusing these powerful medications. We will not replace any lost or stolen medicine.  As soon as your pain is better, you should flush all your remaining medication.     Many prescription pain medications contain Tylenol  (acetaminophen), including Vicodin , Tylenol #3 , Norco , Lortab , and Percocet .  You should not take any extra pills of Tylenol  if you are using these prescription medications or you can get very sick.  Do not ever take more than 3000 mg of acetaminophen in any 24 hour period.    All opioids tend to cause constipation. Drink plenty of water and eat foods that have a lot of fiber, such as fruits, vegetables, prune juice, apple juice and high fiber cereal.  Take a laxative if you don t move your bowels at least every other day. Miralax , Milk of Magnesia, Colace , or Senna  can be used to keep you regular.      Remember that you can always come back to the Emergency Department if you are not able to see your regular doctor in the amount of time listed above, if you get any new symptoms, or if there is anything that worries you.    Discharge Instructions  Laceration (Cut)    You were seen today for a laceration (cut).  Your doctor examined your laceration for any problems such a buried foreign body (like glass, a splinter, or gravel), or injury to blood vessels, tendons, and nerves.  Your doctor may have also rinsed and/or scrubbed your laceration to help prevent an infection.  Your laceration may have been closed with glue, staples or  sutures (stitches).      It may not be possible to find all problems with your laceration on the first visit, and we can't always prevent infections.  Antibiotics are only given when the benefit is more than the risk, and don't prevent all infections. Some lacerations are too high risk to close, and are left open to heal.  All lacerations, no matter how expertly repaired, will cause scarring.    Return to the Emergency Department right away if:    You have more redness, swelling, pain, drainage (pus), a bad smell, or red streaking from your laceration.      You have a fever of 101oF or more.    You have bleeding that you can t stop at home. If your cut starts to bleed, hold pressure on the bleeding area with a clean cloth or put pressure over the bandage.  If the bleeding doesn t stop after using constant pressure for 30 minutes, you should return to the Emergency Department for further treatment.    An area past the laceration is cool, pale, or blue compared with the other side, or has a slower return of color when squeezed.    Your dressing seems too tight or starts to get uncomfortable or painful.    You have loss of normal function or use of an area, such as being unable to straighten or bend a finger normally.    You have a numb area past the laceration.    Return to the Emergency Department or see your regular doctor if:    The laceration starts to come open.     You have something coming out of the cut or a feeling that there is something in the laceration.    Your wound will not heal, or keeps breaking open. There can always be glass, wood, dirt or other things in any wound.  They won t always show up, even on x-rays.  If a wound doesn t heal, this may be why, and it is important to follow-up with your regular doctor.    Home Care:    Take your dressing off in 12 hours, or as instructed by your doctor, to check your laceration. Remove the dressing sooner if it seems too tight or painful, or if it is getting  "numb, tingly, or pale past the dressing.    Gently wash your laceration 2 times a day with clean cloth and soap.     It is okay to shower, but do not let the laceration soak in water.      If your laceration was closed with wound adhesive or strips: pat it dry and leave it open to the air.     For all other repairs: after you wash your laceration, or at least 2 times a day, apply bacitracin or other antibiotic ointment to the laceration, then cover it with a Band-Aid  or gauze.    Keep the laceration clean. Wear gloves or other protective clothing if you are around dirt.    Follow-up:    You need to follow-up with your regular doctor in 2 days.    Your sutures or staples need to be removed in 7 days. Schedule an appointment with your regular doctor to have this done.    Scars:  To help minimize scarring:    Wear sunscreen over the healed laceration when out in the sun.    Massage the area regularly.    You may use Vitamin E oil.    Wait a year.  Most scars will start to fade within a year.    Probiotics: If you have been given an antibiotic, you may want to also take a probiotic pill or eat yogurt with live cultures. Probiotics have \"good bacteria\" to help your intestines stay healthy. Studies have shown that probiotics help prevent diarrhea and other intestine problems (including C. diff infection) when you take antibiotics. You can buy these without a prescription in the pharmacy section of the store.     If you were given a prescription for medicine here today, be sure to read all of the information (including the package insert) that comes with your prescription.  This will include important information about the medicine, its side effects, and any warnings that you need to know about.  The pharmacist who fills the prescription can provide more information and answer questions you may have about the medicine.  If you have questions or concerns that the pharmacist cannot address, please call or return to the " Emergency Department.     Opioid Medication Information    Pain medications are among the most commonly prescribed medicines, so we are including this information for all our patients. If you did not receive pain medication or get a prescription for pain medicine, you can ignore it.     You may have been given a prescription for an opioid (narcotic) pain medicine and/or have received a pain medicine while here in the Emergency Department. These medicines can make you drowsy or impaired. You must not drive, operate dangerous equipment, or engage in any other dangerous activities while taking these medications. If you drive while taking these medications, you could be arrested for DUI, or driving under the influence. Do not drink any alcohol while you are taking these medications.     Opioid pain medications can cause addiction. If you have a history of chemical dependency of any type, you are at a higher risk of becoming addicted to pain medications.  Only take these prescribed medications to treat your pain when all other options have been tried. Take it for as short a time and as few doses as possible. Store your pain pills in a secure place, as they are frequently stolen and provide a dangerous opportunity for children or visitors in your house to start abusing these powerful medications. We will not replace any lost or stolen medicine.  As soon as your pain is better, you should flush all your remaining medication.     Many prescription pain medications contain Tylenol  (acetaminophen), including Vicodin , Tylenol #3 , Norco , Lortab , and Percocet .  You should not take any extra pills of Tylenol  if you are using these prescription medications or you can get very sick.  Do not ever take more than 3000 mg of acetaminophen in any 24 hour period.    All opioids tend to cause constipation. Drink plenty of water and eat foods that have a lot of fiber, such as fruits, vegetables, prune juice, apple juice and high  fiber cereal.  Take a laxative if you don t move your bowels at least every other day. Miralax , Milk of Magnesia, Colace , or Senna  can be used to keep you regular.      Remember that you can always come back to the Emergency Department if you are not able to see your regular doctor in the amount of time listed above, if you get any new symptoms, or if there is anything that worries you.

## 2018-05-10 NOTE — ED NOTES
Pressure dressing removed and laceration and hematoma re evaluated by RN.  There is no bloody ooze noted, just old ooze on the dressing.  Dressing re applied and head re wrapped.

## 2018-05-10 NOTE — PROGRESS NOTES
[facility]  PRIMARY CARE PROVIDER AND CLINIC:  Julissa Virk [address] / [facility] [address]  Chief Complaint   Patient presents with     ER F/U     [facility] Medical Record Number:  [id number]    HPI:    Francesco Killian is a 89 year old  (12/26/1920),admitted to the [facility]  [place] from Emergency Room  [facility].  Hospital stay 5/9/18 through 5/9/18.  Admitted to this facility for  rehab, medical management and nursing care.  HPI information obtained from: facility chart records, facility staff, patient report and [facility] chart review.  Current issues are:        Emergency Department Course:  Nursing notes and vitals reviewed.  I performed an exam of the patient as documented above.   The patient was sent for a CT Head w/o Contrast and CT Cervical Spine w/o Contrast while in the emergency department, results above.   IV was inserted and blood was drawn for laboratory testing, results above.  2048 I performed a laceration repair of the patient as per above.  2129 The patient was road tested and walked well per RN.  2209 I discussed the treatment plan with the patient. They expressed understanding of this plan and consented to discharge. They will be discharged home with instructions for care and follow up. In addition, the patient will return to the emergency department if their symptoms persist, worsen, if new symptoms arise or if there is any concern.  All questions were answered.  I personally reviewed the ECG, imaging, and laboratory results with the patient and answered all related questions prior to discharge.     Impression & Plan       Medical Decision Making:  Francesco Killian is a 89 year old elderly male who was walking and accidentally leaned back a little bit and fell backwards hitting his head. He falls frequently and states that he has had problems in the past also with falling backwards. He denied any lightheadedness  prior to this happening. He sustained no loss of consciousness but has a large hematoma and a superficial laceration to the occipital region of his head.     He is on coumadin and had a CT scan done which showed no intracerebral blood or fractures. His C-spine was negative. He denied pain anywhere. He is at his mental status baseline which is mild recent memory loss but conversive and cognitively intact at this point. He ambulated after our evaluation here and did well with that. There was no unsteadiness and he needed minimal assistance. He normally uses a walker, he was not using it tonight when this happened.     He has a large hematoma on the occipital region of his head about the size of a tennis ball. There was some oozing from the suture site. Bacitracin, adaptic 4x4, and gauze wrap was placed and then an ACE wrap and there was no more active bleeding. He was observed here for an hour after repair.      His tetanus was updated and he was given a dose of Keflex here. He will be going home on two more days of Keflex and he will follow up with his primary care provider either tomorrow or Friday before the weekend.      Chronic atrial fibrillation (H)  Encounter for therapeutic drug monitoring  Long-term (current) use of anticoagulants  Blood in stool  Chronic, rate controlled, on metoprolol.  He is anticoagulated with coumadin and asa per patient's son's preference who is a neurologist.  Staff report today that small amount of blood was noted in patient's stool, however patient denies this.  His INR was checked today and is 2.7, no other s/s of bleeding.  He has received coumadin 2mg Monday, 3mg Tuesday, Wednesday (previously dosed 2mg Friday, and 3mg all other days).  Denies/no reports of palpitations, dizziness, lightheadedness or shortness of breath.    Vascular dementia without behavioral disturbance  Limited memory, very forgetful, previously was living independently, increased cares required, primary  reason for LTC admission.  Complicated by his poor vision and hearing.  Most recent SLUMS 3/24    Hematoma of occipital surface of head, subsequent encounter  Fall, subsequent encounter  Laceration Scalp  Seen today after ER visit last evening from a fall in the NH resulting in large superficial ociptal hematoma.  Patient on coumadin and asa, bleeding difficult to stop in the NH so sent to ER for further w/u.  Imaging completed in the ER and unremarkable for acute findings.  Tetanus updated and sutures placed to laceration.  Patient given dose of Keflex in ER and to complete two more doses in the NH.     Patient with history of falls, has worked with therapy in the past, they d'c'd in April.  Ambulates with 4ww. He denies pain anywhere today, denies headache, changes in vision, dizziness, lightheadedness or nausea.  Staff report mentation appears at baseline.  No bleeding from laceration site, no s/s of infection         Last 3 BPs: 132/60, 130/58, 158/75  Current Weight: 145.6 lbs    CODE STATUS/ADVANCE DIRECTIVES DISCUSSION:   DNR / DNI  Patient's living condition: lives in a skilled nursing facility    ALLERGIES:Review of patient's allergies indicates no known allergies.  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 He also has no past medical history of Basal cell carcinoma or Malignant melanoma (H).  PAST SURGICAL HISTORY:  has a past surgical history that includes GI surgery  (1970s); Cardiac surgery (1980s); Eye surgery; colonoscopy; hernia repair; IR Renal/Visceral Stent/Atherect/PTA; turp; Esophagoscopy, gastroscopy, duodenoscopy (EGD), combined (10/14/2013); Colonoscopy (11/4/2013); and NONSPECIFIC PROCEDURE.  FAMILY HISTORY: family history includes C.A.D. in his brother, father, maternal grandfather, maternal grandmother, mother, paternal grandfather, paternal grandmother, and sister; Coronary Artery Disease in his mother.  SOCIAL HISTORY:  reports that he has never smoked. He has never used smokeless tobacco. He reports that he does not drink alcohol or use illicit drugs.    Post Discharge Medication Reconciliation Status: patient was not discharged from an inpatient facility.  Current Outpatient Prescriptions   Medication Sig Dispense Refill     ACETAMINOPHEN PO Take 500 mg by mouth 2 times daily And 500mg po BID prn pain       aspirin 81 MG tablet Take 81 mg by mouth daily        calcium carbonate (TUMS) 500 MG chewable tablet Take 2 chew tab by mouth 2 times daily as needed for heartburn        cephALEXin (KEFLEX) 500 MG capsule Take 1 capsule (500 mg) by mouth 2 times daily for 3 days 6 capsule 0     Citalopram Hydrobromide (CELEXA PO) Take 20 mg by mouth daily       FUROSEMIDE PO Take 20 mg by mouth 2 times daily       HYDRALAZINE HCL PO Take 25 mg by mouth 4 times daily GIVE AT 6-7am, 12n, 5pm,  for HTN Hold for SBP of less than or equal to 120       ketoconazole (NIZORAL) 2 % cream Apply to face BID PRN 30 g 1     levothyroxine (SYNTHROID/LEVOTHROID) 100 MCG tablet TAKE 1 TABLET BY MOUTH EVERY DAY. 90 tablet 0     MELATONIN PO Take 3 mg by mouth nightly as needed       METOPROLOL TARTRATE PO Take 25 mg by mouth 2 times daily for HTN Hold for SBP is<120 and HR is <60, plz call if hold x 2 in a row for any of these reasons.       Multiple Vitamins-Minerals (ICAPS PO) Take 1 capsule by mouth daily        Nutritional Supplements (BOOST) Take 1 Bottle by mouth 3 times  "daily (with meals)       polyethylene glycol (MIRALAX/GLYCOLAX) Packet Take 17 g by mouth daily as needed for constipation 7 packet      tamsulosin (FLOMAX) 0.4 MG capsule Take by mouth At Bedtime       Warfarin Sodium (COUMADIN PO) Take as directed         ROS:  Limited secondary to cognitive impairment but today pt reports \"doing just fine, no pain or headache\"    Exam:  /60  Pulse 88  Temp 98.2  F (36.8  C)  Resp 16  Ht 5' 8\" (1.727 m)  Wt 145 lb 9.6 oz (66 kg)  SpO2 92%  BMI 22.14 kg/m2  GENERAL APPEARANCE:  Alert, in no distress  Head: laceration with sutures in place noted to occipital area, tennis ball sized hematoma on occiput, no bleeding, site approximated with sutures in place and no s/s of infection noted  ENT:  Mouth and posterior oropharynx normal, moist mucous membranes, [place]  EYES:  EOM, conjunctivae, lids, pupils and irises normal  NECK:  No adenopathy,masses or thyromegaly, normal ROM  RESP:  respiratory effort and palpation of chest normal, lungs clear to auscultation , no respiratory distress, decreased at bases  CV:  Palpation and auscultation of heart done , irregular rhythm, rate controlled, no rub, or gallop, no edema, grade 2/6 HS murmur  ABDOMEN:  normal bowel sounds, soft, nontender, no hepatosplenomegaly or other masses  M/S:   Gait and station abnormal generalized weakness, no edema/erythema joints, CMS intact, ambulates with 4ww, no tenderness to palpation of cervical spine, moves all extremities without difficulty  NEURO:   Cranial nerves 2-12 are normal tested and grossly at patient's baseline, no tremor, normal muscle tone   PSYCH:  oriented to person only, mood and affect normal     Lab/Diagnostic data:     CBC RESULTS:   Recent Labs   Lab Test  05/09/18   1850  02/22/18   0701   WBC  5.9  8.3   RBC  3.66*  3.84*   HGB  11.8*  12.2*   HCT  36.5*  38.2*   MCV  100  100   MCH  32.2  31.8   MCHC  32.3  31.9   RDW  13.9  13.2   PLT  145*  102*       Last Basic Metabolic " Panel:  Recent Labs   Lab Test  05/09/18   1850 03/29/18   NA  134  140   POTASSIUM  4.8  3.5   CHLORIDE  102  107   KEZIA  9.3  9.0   CO2  27  23   BUN  39*  28   CR  1.49*  1.28   GLC  113*  75     TSH   Date Value Ref Range Status   03/29/2018 3.91 0.30 - 5.00 uIU/mL Final   07/11/2017 0.26 (L) 0.40 - 4.00 mU/L Final     ASSESSMENT/PLAN:  (I48.2) Chronic atrial fibrillation (H)  (primary encounter diagnosis)  (Z51.81)Encounter for therapeutic drug monitoring  (Z79.01) Long-term (current) use of anticoagulants  Comment: Chronic, rate controlled, on keflex after laceration and fall  Plan: continue metoprolol  -coumadin 2mg Thursday, Friday, Saturday, 3mg Sunday  -INR recheck Sunday       (F01.50) Vascular dementia without behavioral disturbance  Comment: previously noted and progressive, requiring LTC now for safety.  Poor vision and hearing contributes   Plan: staff assist with cares'    (S00.83XD) Hematoma of occipital surface of head, subsequent encounter  Laceration Scalp  Comment: acute from fall 5/9  Plan: staff to change dressing to site daily, apply bacitracin, cover with guaze, update NP with s/s of infection  -sutures to be removed 5/16  -continue 2 days of Keflex     (W19.XXXD) Fall, subsequent encounter  Comment: noted 5/9, ER visit  Plan: physical therapy eval     (K92.1) Blood in stool  Comment: small amount per staff, patient denies, on coumadin and asa  Lab Results   Component Value Date    HGB 11.8 05/09/2018     Plan: occult stool  -Hgb Monday  -INR monitoring, check again Monday as above       Total time spent with patient visit at the skilled nursing facility was 37 minutes including patient visit, review of past records and discussion with NH staff. Greater than 50% of total time spent with counseling and coordinating care     Electronically signed by:  CODY Diaz CNP

## 2018-05-10 NOTE — ED NOTES
Applied pressure dressing to hematoma.   Ambulated pt using wheeled walker - pt walked well.  Steady gait, no dizziness.  Upon return to room, checked bandage for blood and blood had soaked through all gauze to the ace bandage, but not through the ace yet.  Notified MD.  Gave pt juice to drink..  Pt sitting in a chair with daughter in law at side.

## 2018-05-10 NOTE — PROGRESS NOTES
Clinic Care Coordination Contact    Situation: Patient chart reviewed by care coordinator.    Background: ED visit 5/9/2018  Closed head injury     Assessment: Patient is in LTC facility    Plan/Recommendations: No outreach made for a hospital follow up     Radha Persaud RN / Clinical Care Coordinator     [address] [address]  [email or web address]  Office :  [phone] / Fax :  [phone]

## 2018-05-14 NOTE — PROGRESS NOTES
[place] [facility]    HPI:    Francesco Killian is a 89 year old  (12/26/1920), who is being seen today for an episodic care visit at [facility].   HPI information obtained from: facility chart records and facility staff. Today's concern is INR/Coumadin management for BORIS. Fib    Anemia: lab results back today, Hgb dropped from 11.8 on 5/9/18 to 9.9 5/14/18.  No acute signs of blood loss per patient and NH staff.  Occult stool ordered last Thursday, has not been collected.  Patient is on coumadin and asa per son's (neurologist) recommendation.  His INR as noted below is WNL.  He denies/no reports of dizziness, lightheadedness, shortness of breath, palpitations.  The laceration to back of his head is healing nicely, no bleeding noted, no pain to site.  Patient is not currently on PPI    Bleeding Signs/Symptoms:  None  Thromboembolic Signs/Symptoms:  None    Medication Changes:  Yes: Keflex  Dietary Changes:  No  Activity Changes: No  Bacterial/Viral Infection:  had a fall with large laceration, placed on Keflex prophylactically     Missed Coumadin Doses:  None    On ASA: Yes - 81 mg po q day    Other Concerns:  Yes: monitoring closely, as recent fall with head laceration, antibiotic use, also on asa and high bleed risk     Hgb drop as noted above     OBJECTIVE:    INR Today:  2.0  Current Dose:  coumadin 2mg Thursday, Friday, Saturday, 3mg Sunday    General: alert, in no distress, pleasant  Head: laceration with sutures in place, no bleeding, no pain to palpation, hematoma to occipital region appears same from last week, no increase in swelling noted  Respiratory: non-labored, on room air  Neuro: no acute findings     ASSESSMENT:  Chronic atrial fibrillation (H)  Encounter for therapeutic drug monitoring  Long-term (current) use of anticoagulants      Anemia: I called to speak with patient's son, Dr. Killian, regarding recent drop in Hgb.  No answer, left VM.  Occult stool still needs  to be sent.  Will start on PPI omeprazole 20mg po QD.  Also will obtain iron studies and start iron if noted to be low.  If son agreeable would recommend holding asa until Hgb improved, will not make this change without his approval.  I did call and discuss POC with collaborating physician Dr. Lama today as well     Therapeutic INR for goal of 2-3    PLAN:    New Dose: coumadin 3mg M, W, 2mg Tuesday       Next INR: Thursday         Electronically signed by:  CODY Diaz CNP

## 2018-05-16 NOTE — PROGRESS NOTES
[place] [facility]    Chief Complaint   Patient presents with     custodial Acute       [place] Medical Record Number:  [id number]    HPI:    Francesco Killian is a 89 year old  (12/26/1920), who is being seen today for an episodic care visit at [facility].  HPI information obtained from: facility chart records, facility staff and patient report.Today's concern is:    Chronic atrial fibrillation (H)  Encounter for therapeutic drug monitoring  Long-term (current) use of anticoagulants  Chronic, rate controlled, on metoprolol.  He is anticoagulated with coumadin and asa per patient's son's preference who is a neurologist.  INR today is 2.3  He has received 2mg Thurs, Friday, Saturday, Tuesday, 3mg Sunday, Monday, Wednesday.  Denies/no reports of palpitations, dizziness, lightheadedness or shortness of breath.    Anemia, unspecified type  Hgb dropped from 11.8 on 5/9/18 to 9.9 5/14/18.  No acute signs of blood loss per patient and NH staff.  Occult stool ordered last Thursday, has not been collected.  Vitals stable and no acute signs of blood loss noted on Monday, patient was started on PPI and Hgb recheck ordered for today.  Patient is on coumadin and asa per son's (neurologist) recommendation.  His INR as noted above is WNL.  He denies/no reports of dizziness, lightheadedness, shortness of breath, palpitations.  Hgb stable since Monday      Lab Results   Component Value Date    HGB 9.8 05/17/2018         Last 3 BPs: 129/62, 135/60, 119/71  Recent Weighst:   Wt Readings from Last 5 Encounters:   05/16/18 145 lb 9.6 oz (66 kg)   05/14/18 145 lb 9.6 oz (66 kg)   05/10/18 145 lb 9.6 oz (66 kg)   05/07/18 146 lb 3.2 oz (66.3 kg)   04/30/18 142 lb 6.4 oz (64.6 kg)       ALLERGIES: Review of patient's allergies indicates no known allergies.  Past Medical, Surgical, Family and Social History reviewed and updated in [facility].    Current Outpatient Prescriptions   Medication Sig  Dispense Refill     ACETAMINOPHEN PO Take 500 mg by mouth 2 times daily And 500mg po BID prn pain       aspirin 81 MG tablet Take 81 mg by mouth daily        calcium carbonate (TUMS) 500 MG chewable tablet Take 2 chew tab by mouth 2 times daily as needed for heartburn        Citalopram Hydrobromide (CELEXA PO) Take 20 mg by mouth daily       FUROSEMIDE PO Take 20 mg by mouth 2 times daily       HYDRALAZINE HCL PO Take 25 mg by mouth 4 times daily GIVE AT 6-7am, 12n, 5pm,  for HTN Hold for SBP of less than or equal to 120       ketoconazole (NIZORAL) 2 % cream Apply to face BID PRN 30 g 1     levothyroxine (SYNTHROID/LEVOTHROID) 100 MCG tablet TAKE 1 TABLET BY MOUTH EVERY DAY. 90 tablet 0     MELATONIN PO Take 3 mg by mouth nightly as needed       METOPROLOL TARTRATE PO Take 25 mg by mouth 2 times daily for HTN Hold for SBP is<120 and HR is <60, plz call if hold x 2 in a row for any of these reasons.       Multiple Vitamins-Minerals (ICAPS PO) Take 1 capsule by mouth daily        Nutritional Supplements (BOOST) Take 1 Bottle by mouth 3 times daily (with meals)       OMEPRAZOLE PO Take 20 mg by mouth every morning       polyethylene glycol (MIRALAX/GLYCOLAX) Packet Take 17 g by mouth daily as needed for constipation 7 packet      tamsulosin (FLOMAX) 0.4 MG capsule Take by mouth At Bedtime       Warfarin Sodium (COUMADIN PO) Take as directed       Medications reviewed:  Medications reconciled to facility chart and changes were made to reflect current medications as identified as above med list. Below are the changes that were made:   Medications stopped since last EPIC medication reconciliation:   There are no discontinued medications.    Medications started since last [facility] medication reconciliation:  No orders of the defined types were placed in this encounter.        REVIEW OF SYSTEMS:  4 point ROS including Respiratory, CV, GI and , other than that noted in the HPI,  is negative    Physical Exam:  /62  " Pulse 52  Temp 97.5  F (36.4  C)  Resp 15  Ht 5' 8\" (1.727 m)  Wt 145 lb 9.6 oz (66 kg)  SpO2 97%  BMI 22.14 kg/m2  General: alert, in no distress, resting  Respiratory: non-labored, on room air, no cough  CV: irregular rhythm, rate controlled, no edema, grade 2/6 HS murmur   Neuro: no facial asymmetry, speech clear  Psych: orientated to person only     Recent Labs:     CBC RESULTS:   Recent Labs   Lab Test 05/14/18 05/09/18   1850  02/22/18   0701   WBC   --   5.9  8.3   RBC   --   3.66*  3.84*   HGB  9.9*  11.8*  12.2*   HCT   --   36.5*  38.2*   MCV   --   100  100   MCH   --   32.2  31.8   MCHC   --   32.3  31.9   RDW   --   13.9  13.2   PLT   --   145*  102*       Last Basic Metabolic Panel:  Recent Labs   Lab Test 05/14/18 05/09/18   1850   NA  136  134   POTASSIUM  4.4  4.8   CHLORIDE  105  102   KEZIA  9.1  9.3   CO2  23  27   BUN  39*  39*   CR  1.40*  1.49*   GLC  74  113*       TSH   Date Value Ref Range Status   03/29/2018 3.91 0.30 - 5.00 uIU/mL Final   07/11/2017 0.26 (L) 0.40 - 4.00 mU/L Final       Assessment/Plan:  (I48.2) Chronic atrial fibrillation (H)  (primary encounter diagnosis)  (Z51.81) Encounter for therapeutic drug monitoring  (Z79.01) Long-term (current) use of anticoagulants  Comment: chronic, rate controlled metoprolol   Plan: continue metoprolol  -coumadin 2mg Thurs, Fri, Sat, Tues, 3mg po Sun, Monday, Wednesday  -INR recheck 1 week     (D64.9) Anemia, unspecified type  Comment: noted 5/14, stable, Hgb has not dropped since, no acute s/s of blood loss  Plan: continue PPI  -occult stool still has not been collected by staff  -Hgb next week  -if son agreeable would recommend holding asa until Hgb increases, will not change without son's involvement       Electronically signed by  CODY Diaz CNP                  "

## 2018-05-24 NOTE — PROGRESS NOTES
[place] [facility]    HPI:    Francesco Killian is a 89 year old  (12/26/1920), who is being seen today for an episodic care visit at [facility].   HPI information obtained from: facility chart records and facility staff. Today's concern is INR/Coumadin management for A. Fib    Bleeding Signs/Symptoms:  None  Thromboembolic Signs/Symptoms:  None    Medication Changes:  No  Dietary Changes:  No  Activity Changes: No  Bacterial/Viral Infection:  No    Missed Coumadin Doses:  None    On ASA: Yes - 81 mg po q day    Other Concerns:  Yes: hx anemia, recent fall, also on asa, monitoring closely     OBJECTIVE:    INR Today:  2.2  Current Dose:  2mg Thurs, Friday, Saturday, Tuesday, 3mg Sunday, Monday, Wednesday    ASSESSMENT:  Chronic atrial fibrillation (H)  Encounter for therapeutic drug monitoring  Long-term (current) use of anticoagulants      Therapeutic INR for goal of 2-3    PLAN:    New Dose: No Change      Next INR: 1 week        Electronically signed by:  CODY Diaz CNP

## 2018-05-31 NOTE — PROGRESS NOTES
[place] [facility]    HPI:    Francesco Killian is a 89 year old  (12/26/1920), who is being seen today for an episodic care visit at [facility].   HPI information obtained from: facility chart records and facility staff. Today's concern is INR/Coumadin management for A. Fib    Bleeding Signs/Symptoms:  None  Thromboembolic Signs/Symptoms:  None    Medication Changes:  No  Dietary Changes:  No  Activity Changes: No  Bacterial/Viral Infection:  No    Missed Coumadin Doses:  None    On ASA: Yes - 81 mg po q day    Other Concerns:  No    OBJECTIVE:    INR Today:  2.4  Current Dose:   2mg Thurs, Friday, Saturday, Tuesday, 3mg Sunday, Monday, Wednesday    ASSESSMENT:  Chronic atrial fibrillation (H)  Encounter for therapeutic drug monitoring  Long-term (current) use of anticoagulants      Therapeutic INR for goal of 2-3    PLAN:    New Dose: No Change      Next INR: 2 weeks        Electronically signed by:  CODY Diaz CNP

## 2018-06-02 NOTE — TELEPHONE ENCOUNTER
Nursing called to report blood in stool and in the toilet.  No complaints and vitals stable.  Resident is on coumadin.    ORDERS:  INR and HgB level today.  Increase omeprazole 20mg daily to BID for 2 weeks and then down to 20mg daily again    Electronically signed by Yahaira Vega RN, CNP

## 2018-06-07 NOTE — PROGRESS NOTES
[place] [facility]    HPI:    Francesco Killian is a 89 year old  (12/26/1920), who is being seen today for an episodic care visit at [facility].   HPI information obtained from: facility chart records and facility staff. Today's concern is INR/Coumadin management for A. Fib    Bleeding Signs/Symptoms:  Yes, had episode of blood in stool earlier this week  Thromboembolic Signs/Symptoms:  None    Medication Changes:  Yes: increased omeprazole  Dietary Changes:  No  Activity Changes: No  Bacterial/Viral Infection:  No    Missed Coumadin Doses:  None    On ASA: Yes - 81 mg po q day    Other Concerns:  Yes: monitor closely, dual anticoagulation, episode of blood in stool earlier this week     OBJECTIVE:    INR Today:  2.4  Current Dose:  2mg Thurs, Friday, Saturday, Tuesday, 3mg Sunday, Monday, Wednesday       ASSESSMENT:  Chronic atrial fibrillation (H)  Encounter for therapeutic drug monitoring  Long-term (current) use of anticoagulants      Therapeutic INR for goal of 2-3    PLAN:    New Dose: No Change      Next INR: 1 week  -Check hgb weekly X 1 month  -staff to update if any further episodes of blood in the stool        Electronically signed by:  CODY Diaz CNP
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
## 2018-06-14 NOTE — PROGRESS NOTES
[place] [facility]    HPI:    Francesco Killian is a 89 year old  (12/26/1920), who is being seen today for an episodic care visit at [facility].   HPI information obtained from: facility chart records and facility staff. Today's concern is INR/Coumadin management for A. Fib    Bleeding Signs/Symptoms:  None  Thromboembolic Signs/Symptoms:  None    Medication Changes:  No  Dietary Changes:  No  Activity Changes: No  Bacterial/Viral Infection:  No    Missed Coumadin Doses:  None    On ASA: Yes - 81 mg po q day    Other Concerns:  Yes: bleeding risk on dual anticoagulation with hx of hgb drop, monitor closely    OBJECTIVE:    INR Today:  2.6  Current Dose:   2mg Thurs, Friday, Saturday, Tuesday, 3mg Sunday, Monday, Wednesday    ASSESSMENT:  Chronic atrial fibrillation (H)  Encounter for therapeutic drug monitoring  Long-term (current) use of anticoagulants      Therapeutic INR for goal of 2-3    PLAN:    New Dose: No Change      Next INR: 1 week        Electronically signed by:  CODY Diaz CNP

## 2018-06-14 NOTE — LETTER
6/14/2018        RE: Francesco Killian  [facility]  [address]  [facility] [address]          [place] [facility]    HPI:    Francesco Killian is a 89 year old  (12/26/1920), who is being seen today for an episodic care visit at [facility].   HPI information obtained from: facility chart records and facility staff. Today's concern is INR/Coumadin management for A. Fib    Bleeding Signs/Symptoms:  None  Thromboembolic Signs/Symptoms:  None    Medication Changes:  No  Dietary Changes:  No  Activity Changes: No  Bacterial/Viral Infection:  No    Missed Coumadin Doses:  None    On ASA: Yes - 81 mg po q day    Other Concerns:  Yes: bleeding risk on dual anticoagulation with hx of hgb drop, monitor closely    OBJECTIVE:    INR Today:  2.6  Current Dose:   2mg Thurs, Friday, Saturday, Tuesday, 3mg Sunday, Monday, Wednesday    ASSESSMENT:  Chronic atrial fibrillation (H)  Encounter for therapeutic drug monitoring  Long-term (current) use of anticoagulants      Therapeutic INR for goal of 2-3    PLAN:    New Dose: No Change      Next INR: 1 week        Electronically signed by:  CODY Diaz CNP            Sincerely,        CODY Diaz CNP
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
## 2018-06-21 NOTE — LETTER
6/21/2018        RE: Francesco Killian  [facility]  [address]  [facility] [address]          [place] [facility]    HPI:    Francesco Killian is a 89 year old  (12/26/1920), who is being seen today for an episodic care visit at [facility].   HPI information obtained from: facility chart records and facility staff. Today's concern is INR/Coumadin management for A. Fib, episodic emesis and loose stool       Bleeding Signs/Symptoms:  None  Thromboembolic Signs/Symptoms:  None    Medication Changes:  No  Dietary Changes:  No  Activity Changes: No  Bacterial/Viral Infection:  nursing reports that patient had one episdode of emesis and loose stool this morning     Missed Coumadin Doses:  None    On ASA: Yes - 81 mg po q day    Other Concerns:  yes, as above, one episode of emesis and loose stool noted this morning, also on coumadin and asa so need to monitor closely for bleeding 2mg Thurs, Friday, Saturday, Tuesday, 3mg Sunday, Monday, Wednesday    Emesis/Loose stool: nursing reports patient had small emesis noted this morning and one loose stool, no reports of nausea, afebrile, vitals stable.  Nursing denies any blood noted in stool.    Patient has not had any further episodes since one noted this morning.  He is seen up and eating his lunch, denies any GI upset.      OBJECTIVE:    INR Today:  3.8  Current Dose:  2mg Thurs, Friday, Saturday, Tuesday, 3mg Sunday, Monday, Wednesday    General: alert, in no distress, eating lunch  Respiratory: non-labored on room air  Neuro: no facial asymmetry, speech clear, no tremor  Psych: poor memory, insight and judgement, pleasant     ASSESSMENT:  Chronic atrial fibrillation (H)  Encounter for therapeutic drug monitoring  Long-term (current) use of anticoagulants      Non-intractable vomiting with nausea, unspecified vomiting type  Loose stools      Supratherapeutic INR for goal of 2-3    PLAN:    New Dose: hold coumadin today  and Friday, INR recheck Saturday  -if INR < 3 give 1mg Saturday and Sunday  -if INR >3 hold coumadin Saturday and Sunday    INR recheck Monday      Non-intractable vomiting with nausea, unspecified vomiting type  Loose stools  Acute episode noted this morning, asymptomatic when seen by provider, eating lunch, vitals stable.  Concern as patient's INR has previously been stable on current dose and went from 2.2 last week to 3.8, question if infection brewing  Plan: vitals Q-shift through the weekend  -CBC and BMP tomorrow  -staff to monitor patient closely and update provider with any changes in symptoms       Electronically signed by:  CODY Diaz CNP            Sincerely,        CODY Diaz CNP

## 2018-06-21 NOTE — PROGRESS NOTES
[place] [facility]    HPI:    Francesco Killian is a 89 year old  (12/26/1920), who is being seen today for an episodic care visit at [facility].   HPI information obtained from: facility chart records and facility staff. Today's concern is INR/Coumadin management for A. Fib, episodic emesis and loose stool       Bleeding Signs/Symptoms:  None  Thromboembolic Signs/Symptoms:  None    Medication Changes:  No  Dietary Changes:  No  Activity Changes: No  Bacterial/Viral Infection:  nursing reports that patient had one episdode of emesis and loose stool this morning     Missed Coumadin Doses:  None    On ASA: Yes - 81 mg po q day    Other Concerns:  yes, as above, one episode of emesis and loose stool noted this morning, also on coumadin and asa so need to monitor closely for bleeding 2mg Thurs, Friday, Saturday, Tuesday, 3mg Sunday, Monday, Wednesday    Emesis/Loose stool: nursing reports patient had small emesis noted this morning and one loose stool, no reports of nausea, afebrile, vitals stable.  Nursing denies any blood noted in stool.    Patient has not had any further episodes since one noted this morning.  He is seen up and eating his lunch, denies any GI upset.      OBJECTIVE:    INR Today:  3.8  Current Dose:  2mg Thurs, Friday, Saturday, Tuesday, 3mg Sunday, Monday, Wednesday    General: alert, in no distress, eating lunch  Respiratory: non-labored on room air  Neuro: no facial asymmetry, speech clear, no tremor  Psych: poor memory, insight and judgement, pleasant     ASSESSMENT:  Chronic atrial fibrillation (H)  Encounter for therapeutic drug monitoring  Long-term (current) use of anticoagulants      Non-intractable vomiting with nausea, unspecified vomiting type  Loose stools      Supratherapeutic INR for goal of 2-3    PLAN:    New Dose: hold coumadin today and Friday, INR recheck Saturday  -if INR < 3 give 1mg Saturday and Sunday  -if INR >3 hold coumadin Saturday and  Sunday    INR recheck Monday      Non-intractable vomiting with nausea, unspecified vomiting type  Loose stools  Acute episode noted this morning, asymptomatic when seen by provider, eating lunch, vitals stable.  Concern as patient's INR has previously been stable on current dose and went from 2.2 last week to 3.8, question if infection brewing  Plan: vitals Q-shift through the weekend  -CBC and BMP tomorrow  -staff to monitor patient closely and update provider with any changes in symptoms       Electronically signed by:  CODY Diaz CNP

## 2018-06-25 NOTE — LETTER
6/25/2018        RE: Francesco Killian  [facility]  [address]  [facility] [address]          [place] [facility]    HPI:    Francesco Killian is a 89 year old  (12/26/1920), who is being seen today for an episodic care visit at [facility].   HPI information obtained from: facility chart records and facility staff. Today's concern is INR/Coumadin management for A. Fib    Bleeding Signs/Symptoms:  None  Thromboembolic Signs/Symptoms:  None    Medication Changes:  No  Dietary Changes:  No  Activity Changes: No  Bacterial/Viral Infection:  Yes: last week noted with diarrhea, per staff none noted last couple of days    Missed Coumadin Doses:  Yes, held Thursday and Friday for supra therapeutic INR    On ASA: Yes - 81 mg po q day    Other Concerns:  Yes: closely monitor, dual anticoagulation, anemia    OBJECTIVE:    INR Today:  2.2  Current Dose:  Held Thursday and Friday, 1mg po Saturday Sunday     ASSESSMENT:  Chronic atrial fibrillation (H)  Encounter for therapeutic drug monitoring  Long-term (current) use of anticoagulants      Therapeutic INR for goal of 2-3    PLAN:    New Dose: 2mg po Monday, 1mg po Tuesday, Wednesday       Next INR: Thursday       Electronically signed by:  CODY Diaz CNP            Sincerely,        CODY Diaz CNP

## 2018-06-25 NOTE — PROGRESS NOTES
[place] [facility]    HPI:    Francesco Killian is a 89 year old  (12/26/1920), who is being seen today for an episodic care visit at [facility].   HPI information obtained from: facility chart records and facility staff. Today's concern is INR/Coumadin management for A. Fib    Bleeding Signs/Symptoms:  None  Thromboembolic Signs/Symptoms:  None    Medication Changes:  No  Dietary Changes:  No  Activity Changes: No  Bacterial/Viral Infection:  Yes: last week noted with diarrhea, per staff none noted last couple of days    Missed Coumadin Doses:  Yes, held Thursday and Friday for supra therapeutic INR    On ASA: Yes - 81 mg po q day    Other Concerns:  Yes: closely monitor, dual anticoagulation, anemia    OBJECTIVE:    INR Today:  2.2  Current Dose:  Held Thursday and Friday, 1mg po Saturday Sunday     ASSESSMENT:  Chronic atrial fibrillation (H)  Encounter for therapeutic drug monitoring  Long-term (current) use of anticoagulants      Therapeutic INR for goal of 2-3    PLAN:    New Dose: 2mg po Monday, 1mg po Tuesday, Wednesday       Next INR: Thursday       Electronically signed by:  CODY Diaz CNP

## 2018-06-28 NOTE — LETTER
6/28/2018        RE: Francesco Killian  [facility]  [address]  [facility] [address]          [place] [facility]    HPI:    Francesco Killian is a 89 year old  (12/26/1920), who is being seen today for an episodic care visit at [facility].   HPI information obtained from: facility chart records and facility staff. Today's concern is INR/Coumadin management for A. Fib    Bleeding Signs/Symptoms:  None  Thromboembolic Signs/Symptoms:  None    Medication Changes:  No  Dietary Changes:  No  Activity Changes: No  Bacterial/Viral Infection:  No    Missed Coumadin Doses:  Coumadin was held last Thursday, Friday for supratherapeutic INR    On ASA: Yes - 81 mg po q day    Other Concerns:  Yes: monitor closely, dual anticoagulation, recently with supratherapeutic INR, high risk for bleeding     OBJECTIVE:    INR Today:  2.1  Current Dose:  2mg Monday, 1mg Tuesday, Wednesday     ASSESSMENT:  Chronic atrial fibrillation (H)  Encounter for therapeutic drug monitoring  Long-term (current) use of anticoagulants      Therapeutic INR for goal of 2-3    PLAN:    New Dose: 2mg Thursday, 1mg all other days      Next INR: 1 week        Electronically signed by:  CODY Diaz CNP            Sincerely,        CODY Diaz CNP

## 2018-06-28 NOTE — PROGRESS NOTES
[place] [facility]    HPI:    Francesco Killian is a 89 year old  (12/26/1920), who is being seen today for an episodic care visit at [facility].   HPI information obtained from: facility chart records and facility staff. Today's concern is INR/Coumadin management for A. Fib    Bleeding Signs/Symptoms:  None  Thromboembolic Signs/Symptoms:  None    Medication Changes:  No  Dietary Changes:  No  Activity Changes: No  Bacterial/Viral Infection:  No    Missed Coumadin Doses:  Coumadin was held last Thursday, Friday for supratherapeutic INR    On ASA: Yes - 81 mg po q day    Other Concerns:  Yes: monitor closely, dual anticoagulation, recently with supratherapeutic INR, high risk for bleeding     OBJECTIVE:    INR Today:  2.1  Current Dose:  2mg Monday, 1mg Tuesday, Wednesday     ASSESSMENT:  Chronic atrial fibrillation (H)  Encounter for therapeutic drug monitoring  Long-term (current) use of anticoagulants      Therapeutic INR for goal of 2-3    PLAN:    New Dose: 2mg Thursday, 1mg all other days      Next INR: 1 week        Electronically signed by:  CODY Diaz CNP        
less than 40 yrs

## 2018-07-05 NOTE — PROGRESS NOTES
[place] [facility]    HPI:    Francesco Killian is a 89 year old  (12/26/1920), who is being seen today for an episodic care visit at [facility].   HPI information obtained from: facility chart records and facility staff. Today's concern is INR/Coumadin management for A. Fib    Bleeding Signs/Symptoms:  None  Thromboembolic Signs/Symptoms:  None    Medication Changes:  No  Dietary Changes:  No  Activity Changes: No  Bacterial/Viral Infection:  No    Missed Coumadin Doses:  None    On ASA: Yes - 81 mg po q day    Other Concerns:  Yes: monitor larry, dual anticoagulation with hx anemia    OBJECTIVE:    INR Today:  1.8  Current Dose:  2mg Thursday, 1mg all other days    ASSESSMENT:  Chronic atrial fibrillation (H)  Encounter for therapeutic drug monitoring  Long-term (current) use of anticoagulants      Subtherapeutic INR for goal of 2-3    PLAN:    New Dose: 2mg Tuesday, Thursday, 1mg po all other days      Next INR: 1 week        Electronically signed by:  CODY Diaz CNP

## 2018-07-05 NOTE — LETTER
7/5/2018        RE: Francesco Killian  [facility]  [address]  [facility] [address]          [place] [facility]    HPI:    Francesco Killian is a 89 year old  (12/26/1920), who is being seen today for an episodic care visit at [facility].   HPI information obtained from: facility chart records and facility staff. Today's concern is INR/Coumadin management for A. Fib    Bleeding Signs/Symptoms:  None  Thromboembolic Signs/Symptoms:  None    Medication Changes:  No  Dietary Changes:  No  Activity Changes: No  Bacterial/Viral Infection:  No    Missed Coumadin Doses:  None    On ASA: Yes - 81 mg po q day    Other Concerns:  Yes: monitor larry, dual anticoagulation with hx anemia    OBJECTIVE:    INR Today:  1.8  Current Dose:  2mg Thursday, 1mg all other days    ASSESSMENT:  Chronic atrial fibrillation (H)  Encounter for therapeutic drug monitoring  Long-term (current) use of anticoagulants      Subtherapeutic INR for goal of 2-3    PLAN:    New Dose: 2mg Tuesday, Thursday, 1mg po all other days      Next INR: 1 week        Electronically signed by:  CODY Diaz CNP            Sincerely,        CODY Diaz CNP

## 2018-07-12 NOTE — PROGRESS NOTES
[place] [facility]    HPI:    Francesco Killian is a 89 year old  (12/26/1920), who is being seen today for an episodic care visit at [facility].   HPI information obtained from: facility chart records and facility staff. Today's concern is INR/Coumadin management for A. Fib    Bleeding Signs/Symptoms:  None  Thromboembolic Signs/Symptoms:  None    Medication Changes:  No  Dietary Changes:  No  Activity Changes: No  Bacterial/Viral Infection:  No    Missed Coumadin Doses:  None    On ASA: Yes - 81 mg po q day    Other Concerns:  Yes: monitor closely, dual anticoagulation with hx of anemia     OBJECTIVE:    INR Today:  1.8  Current Dose:  2mg Tues, Thurs, 1mg all other days    ASSESSMENT:  Chronic atrial fibrillation (H)  Encounter for therapeutic drug monitoring  Long-term (current) use of anticoagulants      Subtherapeutic INR for goal of 2-3    PLAN:    New Dose: 2mg po Tues, Thursday, Saturday, 1mg po all other days       Next INR: 1 week        Electronically signed by:  CODY Diaz CNP

## 2018-07-12 NOTE — LETTER
7/12/2018        RE: Francesco Killian  [facility]  [address] [address]          [place] [facility]    HPI:    Francesco Killian is a 89 year old  (12/26/1920), who is being seen today for an episodic care visit at [facility].   HPI information obtained from: facility chart records and facility staff. Today's concern is INR/Coumadin management for A. Fib    Bleeding Signs/Symptoms:  None  Thromboembolic Signs/Symptoms:  None    Medication Changes:  No  Dietary Changes:  No  Activity Changes: No  Bacterial/Viral Infection:  No    Missed Coumadin Doses:  None    On ASA: Yes - 81 mg po q day    Other Concerns:  Yes: monitor closely, dual anticoagulation with hx of anemia     OBJECTIVE:    INR Today:  1.8  Current Dose:  2mg Tues, Thurs, 1mg all other days    ASSESSMENT:  Chronic atrial fibrillation (H)  Encounter for therapeutic drug monitoring  Long-term (current) use of anticoagulants      Subtherapeutic INR for goal of 2-3    PLAN:    New Dose: 2mg po Tues, Thursday, Saturday, 1mg po all other days       Next INR: 1 week        Electronically signed by:  CODY Diaz CNP            Sincerely,        CODY Diaz CNP

## 2018-07-19 NOTE — PROGRESS NOTES
[place] [facility]    HPI:    Francesco Killian is a 89 year old  (12/26/1920), who is being seen today for an episodic care visit at [facility].   HPI information obtained from: facility chart records and facility staff. Today's concern is INR/Coumadin management for A. Fib    Bleeding Signs/Symptoms:  None  Thromboembolic Signs/Symptoms:  None    Medication Changes:  No  Dietary Changes:  No  Activity Changes: No  Bacterial/Viral Infection:  No    Missed Coumadin Doses:  None    On ASA: Yes - 81 mg po q day    Other Concerns:  Yes: monitor closely, dual anticoagulation, hx anemia     OBJECTIVE:    INR Today:  2.4  Current Dose:   2mg po Tues, Thursday, Saturday, 1mg po all other days       ASSESSMENT:  Chronic atrial fibrillation (H)  Encounter for therapeutic drug monitoring  Long-term (current) use of anticoagulants      Therapeutic INR for goal of 2-3    PLAN:    New Dose: No Change      Next INR: 1 week        Electronically signed by:  CODY Diaz CNP

## 2018-07-19 NOTE — LETTER
7/19/2018        RE: Francesco Killian  [facility]  [address]  [facility] [address]          [place] [facility]    HPI:    Francesco Killian is a 89 year old  (12/26/1920), who is being seen today for an episodic care visit at [facility].   HPI information obtained from: facility chart records and facility staff. Today's concern is INR/Coumadin management for A. Fib    Bleeding Signs/Symptoms:  None  Thromboembolic Signs/Symptoms:  None    Medication Changes:  No  Dietary Changes:  No  Activity Changes: No  Bacterial/Viral Infection:  No    Missed Coumadin Doses:  None    On ASA: Yes - 81 mg po q day    Other Concerns:  Yes: monitor closely, dual anticoagulation, hx anemia     OBJECTIVE:    INR Today:  2.4  Current Dose:   2mg po Tues, Thursday, Saturday, 1mg po all other days       ASSESSMENT:  Chronic atrial fibrillation (H)  Encounter for therapeutic drug monitoring  Long-term (current) use of anticoagulants      Therapeutic INR for goal of 2-3    PLAN:    New Dose: No Change      Next INR: 1 week        Electronically signed by:  CODY Diaz CNP            Sincerely,        COYD Diaz CNP

## 2018-07-26 NOTE — PROGRESS NOTES
[place] [facility]    HPI:    Francesco Killian is a 89 year old  (12/26/1920), who is being seen today for an episodic care visit at [facility].   HPI information obtained from: facility chart records and facility staff. Today's concern is INR/Coumadin management for A. Fib    Bleeding Signs/Symptoms:  None  Thromboembolic Signs/Symptoms:  None    Medication Changes:  No  Dietary Changes:  No  Activity Changes: No  Bacterial/Viral Infection:  No    Missed Coumadin Doses:  None    On ASA: Yes - 81 mg po q day    Other Concerns:  Yes: monitor closely, on dual anticoagulation    OBJECTIVE:    INR Today:  2.4  Current Dose:  2mg po Tues, Thursday, Saturday, 1mg po all other days          ASSESSMENT:  Chronic atrial fibrillation (H)  Encounter for therapeutic drug monitoring  Long-term (current) use of anticoagulants      Therapeutic INR for goal of 2-3    PLAN:    New Dose: No Change      Next INR: 8/6/18         Electronically signed by:  CODY Diaz CNP

## 2018-07-26 NOTE — LETTER
7/26/2018        RE: Francesco Killian  [facility]  [address]  [facility] [address]          [place] [facility]    HPI:    Francesco Killian is a 89 year old  (12/26/1920), who is being seen today for an episodic care visit at [facility].   HPI information obtained from: facility chart records and facility staff. Today's concern is INR/Coumadin management for A. Fib    Bleeding Signs/Symptoms:  None  Thromboembolic Signs/Symptoms:  None    Medication Changes:  No  Dietary Changes:  No  Activity Changes: No  Bacterial/Viral Infection:  No    Missed Coumadin Doses:  None    On ASA: Yes - 81 mg po q day    Other Concerns:  Yes: monitor closely, on dual anticoagulation    OBJECTIVE:    INR Today:  2.4  Current Dose:  2mg po Tues, Thursday, Saturday, 1mg po all other days          ASSESSMENT:  Chronic atrial fibrillation (H)  Encounter for therapeutic drug monitoring  Long-term (current) use of anticoagulants      Therapeutic INR for goal of 2-3    PLAN:    New Dose: No Change      Next INR: 8/6/18         Electronically signed by:  CODY Diaz CNP            Sincerely,        CODY Diaz CNP

## 2018-08-05 NOTE — PROGRESS NOTES
[place] [facility]    HPI:    Francesco Killian is a 89 year old  (12/26/1920), who is being seen today for an episodic care visit at [facility].   HPI information obtained from: facility chart records and facility staff. Today's concern is INR/Coumadin management for A. Fib    Bleeding Signs/Symptoms:  None  Thromboembolic Signs/Symptoms:  None    Medication Changes:  No  Dietary Changes:  No  Activity Changes: No  Bacterial/Viral Infection:  No    Missed Coumadin Doses:  None    On ASA: Yes - 81 mg po q day    Other Concerns:  Yes: dual anticoagulation therapy with hx anemia, monitor closely    OBJECTIVE:    INR Today:  1.9  Current Dose:  2mg po Tues, Thursday, Friday, 1mg po all other days      ASSESSMENT:  Chronic atrial fibrillation (H)  Encounter for therapeutic drug monitoring  Long-term (current) use of anticoagulants        Therapeutic INR for goal of 2-3    PLAN:    New Dose: No Change      Next INR: 1 week        Electronically signed by:  CODY Diaz CNP

## 2018-08-06 NOTE — LETTER
8/6/2018        RE: Francesco Killian  [facility]  [address]  [facility] [address]          [place] [facility]    HPI:    Francesco Killian is a 89 year old  (12/26/1920), who is being seen today for an episodic care visit at [facility].   HPI information obtained from: facility chart records and facility staff. Today's concern is INR/Coumadin management for A. Fib    Bleeding Signs/Symptoms:  None  Thromboembolic Signs/Symptoms:  None    Medication Changes:  No  Dietary Changes:  No  Activity Changes: No  Bacterial/Viral Infection:  No    Missed Coumadin Doses:  None    On ASA: Yes - 81 mg po q day    Other Concerns:  Yes: dual anticoagulation therapy with hx anemia, monitor closely    OBJECTIVE:    INR Today:  1.9  Current Dose:  2mg po Tues, Thursday, Friday, 1mg po all other days      ASSESSMENT:  Chronic atrial fibrillation (H)  Encounter for therapeutic drug monitoring  Long-term (current) use of anticoagulants        Therapeutic INR for goal of 2-3    PLAN:    New Dose: No Change      Next INR: 1 week        Electronically signed by:  CODY Diaz CNP            Sincerely,        CODY Diaz CNP

## 2018-08-13 PROBLEM — E46 PROTEIN-CALORIE MALNUTRITION (H): Status: ACTIVE | Noted: 2018-01-01

## 2018-08-13 NOTE — PROGRESS NOTES
[facility]  Chief Complaint   Patient presents with     Annual Comprehensive Nursing Home     Nursing Home Regulatory       [facility] Record Number:  [id number]    HPI:    Francesco Killian is a 89 year old  (12/26/1920), who is being seen today for an annual comprehensive visit at [facility].  HPI information obtained from: facility chart records, facility staff, patient report and [facility] chart review.  Today's concerns are:    Protein-calorie malnutrition, unspecified severity (H)  Body mass index is 20.6 kg/(m^2).  Dietary follows in NH, gradual weight loss continues.  He was started on med pass 7/5, offered high dense foods at meals and snacks     Chronic atrial fibrillation (H)  Chronic, rate controlled with recent bradycardia (although patient appears asymptomatic of this) on metoprolol 25mg BID.  He is anticoagulated with coumadin and asa per patient's son's preference who is a neurologist.  Denies/no reports of palpitations, dizziness, lightheadedness or shortness of breath.  INR today is 2.0        Pulse Readings from Last 4 Encounters:   08/13/18 (!) 40   08/05/18 50   07/26/18 56   07/19/18 54          Chronic diastolic congestive heart failure (H)  Essential hypertension with goal blood pressure less than 140/90  Per history, ECHO 2/2017 with normal LV systolic function, EF 65%-70% but severe concentric left ventricular hypertrophy.  Mild/moderate RV dilation with mildly decrease RV systolic function.  Moderate aortic stenosis.  He is on lasix recently reduced from 20mg BID to 20mg in A.M and 10mg at noon due to increase in BUN and no s/s of fluid overload.  Also on metoprolol and hydralazine.   Denies/no reports of PND, orthopnea, hypoxia, increasing edema.  Hx of somewhat labile BP's and elevated readings, however, recent readings improved.  Permissive htn to avoid hypoperfusion to his brain.  He was to follow-up with his cardiologist May  2018, this apt was cancelled by family.   No reports of chest pain.  Mild weight gain as noted below, not thought to be fluid related.  As above, patient's BMI below recommendation, dietary attempting to improved nutritional input        Wt Readings from Last 5 Encounters:   08/13/18 135 lb 8 oz (61.5 kg)   08/05/18 135 lb 6.4 oz (61.4 kg)   07/26/18 134 lb 9.6 oz (61.1 kg)   07/19/18 131 lb 6.4 oz (59.6 kg)   07/12/18 131 lb 3.2 oz (59.5 kg)       Last 3 BP's: 152/72, 124/50, 132/56    Lab Results   Component Value Date    CR 1.27 08/09/2018     Lab Results   Component Value Date    POTASSIUM 4.2 08/09/2018         Wet senile macular degeneration (H)  Chronic, very poor vision baseline, legally blind.  Follows with opthalmology     Recurrent major depressive disorder, remission status unspecified (H)  Celexa increased beginning of May per family's request due to worsening depression. Patient was having a harder time adjusting to LTC, dislikes being apart from his wife.  Son reports they try and take him to see his wife 1-2 times per week, but difficult with their schedules.  Denies/no reports of thoughts to harm self or others.  Also follows with in-house psych.  Most recent PHQ-9 is 0         Coronary artery disease involving native heart without angina pectoris, unspecified vessel or lesion type  Per history, s/p CABG in the 1980's, per notes his primary presenting symptom at that time was fatigue.  He is anticoagulated with coumadin and asa.  No reports of chest pain, dyspnea or increasing fatigue     History of CVA (cerebrovascular accident)  History of multiple CVA's with cerebral and carotid artery stenosis.  He is on coumadin and asa, patient's son, a neurologist prefers to continue with these     Vascular dementia without behavioral disturbance  Limited memory, very forgetful, previously was living independently, increased cares required, primary reason for LTC admission.  Complicated by his poor vision and  "hearing.  Most recent SLUMS 3/24    Anemia, unspecified type  Noted with hgb drop after ER visits in May 2018 due to fall and scalp laceration.  He is on dual anticoagulation with coumadin and asa.  On omeprazole for GI protection.  No recent reports of acute blood loss.  Hgb has been trending up     Lab Results   Component Value Date    HGB 11.1 06/28/2018         Closed compression fracture of L1 lumbar vertebra with routine healing, subsequent encounter  Fall with traumatic L1 compression fracture and hospitalization end of February 2018.  Ortho consulted in hospital and recommended brace prn for pain management.  He completed therapy in the TCU and his pain significantly improved.  Today he denies pain, states \"that has all cleared up\".  He continues on scheduled APAP TID     Hypothyroidism, unspecified type  On synthroid  TSH   Date Value Ref Range Status   03/29/2018 3.91 0.30 - 5.00 uIU/mL Final       CKD (chronic kidney disease) stage 3, GFR 30-59 ml/min  Chronic with history of [place]O in past.     Lab Results   Component Value Date    CR 1.27 08/09/2018     GFR Estimate   Date Value Ref Range Status   08/09/2018 52 (L) >60 ml/min/1.73m2 Final   06/22/2018 43 (L) >60 mL/min/1.73m2 Final   05/14/2018 47 (A) >60 mL/min/1.73m2 Final     GFR Estimate If Black   Date Value Ref Range Status   08/09/2018 >60 >60 ml/min/1.73m2 Final   06/22/2018 52 (L) >60 mL/min/1.73m2 Final   05/14/2018 57 (A) >60 mL/min/1.73m2 Final       Benign prostatic hyperplasia without lower urinary tract symptoms  History of PVR >500 when inpatient, monitored some while in TCU.  Symptoms improved with Flomax, PVR were monitored only if symptomatic while in TCU.  Patient denies/no reports from staff on voiding habits       Abdominal aortic aneurysm (AAA) without rupture (H)  History of PAD and known AAA noted on US from 9/25/17, 5.6cm in diameter.  Denies/no reports of abdmonial pain.  EVAR procedure has been discussed with patient and family " in past and they have deferred       ALLERGIES: Review of patient's allergies indicates no known allergies.  PROBLEM LIST:  Patient Active Problem List   Diagnosis     AAA (abdominal aortic aneurysm) (H)     Dysphagia     Chronic atrial fibrillation (H)     Health Care Home     ACP (advance care planning)     Esophageal stricture     Macrocytic anemia     Other specified hypothyroidism     Right bundle branch block (RBBB)     Recurrent falls~occulovestibular syndrom     Wet senile macular degeneration (H)     Major depressive disorder, single episode, mild (H)     Essential hypertension with goal blood pressure less than 140/90     Acquired hypothyroidism     CHF (congestive heart failure) (H)     Acute kidney failure (H)     Compression fracture of L1 lumbar vertebra (H)     Supratherapeutic INR     Anticoagulated on Coumadin     History of CVA (cerebrovascular accident)     Coronary artery disease involving native heart without angina pectoris, unspecified vessel or lesion type     Protein-calorie malnutrition (H)     PAST MEDICAL HISTORY:  has a past medical history of AAA (abdominal aortic aneurysm) (H) (6/3/2013); Aortic stenosis; Bradycardia; Carotid occlusion, right; Chronic atrial fibrillation (H) (6/17/2013); CVA (cerebral infarction) (6/3/2013); Dysphagia (6/3/2013); HTN (hypertension) (6/3/2013); Hyperlipidemia LDL goal <130 (6/3/2013); Hypothyroidism (6/3/2013); Macular degeneration of both eyes; Recurrent falls~occulovestibular syndrom (9/2/2015); Right bundle branch block (RBBB) (9/2/2015); Squamous cell carcinoma; and Unspecified cerebral artery occlusion with cerebral infarction. He also has no past medical history of Basal cell carcinoma or Malignant melanoma (H).  PAST SURGICAL HISTORY:  has a past surgical history that includes GI surgery (1970s); Cardiac surgery (1980s); Eye surgery; colonoscopy; hernia repair; IR Renal/Visceral Stent/Atherect/PTA; turp; Esophagoscopy, gastroscopy, duodenoscopy  (EGD), combined (10/14/2013); Colonoscopy (11/4/2013); and NONSPECIFIC PROCEDURE.  FAMILY HISTORY: family history includes C.A.D. in his brother, father, maternal grandfather, maternal grandmother, mother, paternal grandfather, paternal grandmother, and sister; Coronary Artery Disease in his mother.  SOCIAL HISTORY:  reports that he has never smoked. He has never used smokeless tobacco. He reports that he does not drink alcohol or use illicit drugs.  IMMUNIZATIONS:  Most Recent Immunizations   Administered Date(s) Administered     Influenza (High Dose) 3 valent vaccine 09/25/2017     Influenza (IIV3) PF 10/22/2012     Pneumo Conj 13-V (2010&after) 04/18/2016     Pneumococcal 23 valent 12/09/2004     TD (ADULT, 7+) 05/09/2018     TDAP Vaccine (Boostrix) 05/09/2018     Tdap (Adacel,Boostrix) 04/29/2009     Zoster vaccine, live 03/23/2011     Above immunizations pulled from [facility]. [facility] and facility records also reconciled. Outstanding information sent to  to update [facility].  Future immunizations are not needed at this point as all recommended immunizations are up to date.   MEDICATIONS:  Current Outpatient Prescriptions   Medication Sig Dispense Refill     ACETAMINOPHEN PO Take 500 mg by mouth 3 times daily And 500mg po BID prn pain       aspirin 81 MG tablet Take 81 mg by mouth daily        calcium carbonate (TUMS) 500 MG chewable tablet Take 2 chew tab by mouth 2 times daily as needed for heartburn        Citalopram Hydrobromide (CELEXA PO) Take 20 mg by mouth daily       FUROSEMIDE PO Take 20 mg by mouth every morning And 10 mg at noon       HYDRALAZINE HCL PO Take 25 mg by mouth 4 times daily GIVE AT 6-7am, 12n, 5pm,  for HTN Hold for SBP of less than or equal to 120       ketoconazole (NIZORAL) 2 % cream Apply to face BID PRN 30 g 1     levothyroxine (SYNTHROID/LEVOTHROID) 100 MCG tablet TAKE 1 TABLET BY MOUTH EVERY DAY. 90 tablet 0     MELATONIN PO Take 3 mg by mouth nightly  as needed       METOPROLOL TARTRATE PO Take 25 mg by mouth 2 times daily for HTN Hold for SBP is<120 and HR is <60, plz call if hold x 2 in a row for any of these reasons.       Multiple Vitamins-Minerals (ICAPS PO) Take 1 capsule by mouth daily        polyethylene glycol (MIRALAX/GLYCOLAX) Packet Take 17 g by mouth daily as needed for constipation 7 packet      tamsulosin (FLOMAX) 0.4 MG capsule Take by mouth At Bedtime       Warfarin Sodium (COUMADIN PO) Take as directed       Medications reviewed:  Medications reconciled to facility chart and changes were made to reflect current medications as identified as above med list. Below are the changes that were made:   Medications stopped since last EPIC medication reconciliation:   There are no discontinued medications.    Medications started since last [facility] medication reconciliation:  No orders of the defined types were placed in this encounter.        Case Management:  I have reviewed the facility/SNF care plan/MDS which was done 6/25/18, including the falls risk, nutrition and pain screening. I also reviewed the current immunizations, and preventive care..Future cancer screening is not clinically indicated secondary to age/goals of care Patient's desire to return to the community is present, but is not able due to care needs . Current Level of Care is appropriate.    Advance Directive Discussion:    I reviewed the current advanced directives as reflected in EPIC, the POLST and the facility chart, and verified the congruency of orders. I contacted the first party, son,  Constantin and discussed the plan of Care.  I did review the advance directives with the resident.     Team Discussion:  I communicated with the appropriate disciplines involved with the Plan of Care:   Nursing      Patient Goal:  Patient's goal is pain control and comfort.    Information reviewed:  Medications, vital signs, orders, and nursing notes.    ROS:  10 point ROS of systems including  "Constitutional, Eyes, Respiratory, Cardiovascular, Gastroenterology, Genitourinary, Integumentary, Muscularskeletal, Psychiatric were all negative except for pertinent positives noted in my HPI.    Exam:  /72  Pulse (!) 40  Temp 97.6  F (36.4  C)  Resp 16  Ht 5' 8\" (1.727 m)  Wt 135 lb 8 oz (61.5 kg)  SpO2 94%  BMI 20.6 kg/m2  GENERAL APPEARANCE:  Alert, in no distress  Head: normocephalic, small and healing scab to back of skull, not open or draining   ENT:  Mouth and posterior oropharynx normal, moist mucous membranes, [place]  EYES:  EOM, conjunctivae, lids, pupils and irises normal  NECK:  No adenopathy,masses or thyromegaly  RESP:  respiratory effort and palpation of chest normal, lungs clear to auscultation , no respiratory distress, decreased at bases  CV:  Palpation and auscultation of heart done , irregular rhythm, rate controlled, no murmur, rub, or gallop, no edema, grade 3/6 murmur  ABDOMEN:  normal bowel sounds, soft, nontender, no hepatosplenomegaly or other masses  M/S:   Gait and station abnormal generalized weakness, no edema/erythema joints, CMS intact  NEURO:   Cranial nerves 2-12 are normal tested and grossly at patient's baseline, no tremor, normal muscle tone   PSYCH:  oriented to person only, mood and affect normal     Lab/Diagnostic data:      CBC RESULTS:   Recent Labs   Lab Test 06/28/18 06/22/18 05/17/18   WBC   --   4.9   --   4.7   RBC   --   3.39*   --   3.00*   HGB  11.1*  10.7*   < >  9.8*   HCT   --   33.6*   --   30.4*   MCV   --   99   --   101*   MCH   --   31.6   --   32.7   MCHC   --   31.8*   --   32.2   RDW   --   13.2   --   14.3   PLT   --   140   --   134*    < > = values in this interval not displayed.       Last Basic Metabolic Panel:  Recent Labs   Lab Test 08/09/18 06/22/18   NA  137  137   POTASSIUM  4.2  3.8   CHLORIDE  106  104   KEZIA  9.0  9.1   CO2  25  23   BUN  37*  33*   CR  1.27  1.50*   GLC  62*  63*       TSH   Date Value Ref Range Status "   03/29/2018 3.91 0.30 - 5.00 uIU/mL Final   07/11/2017 0.26 (L) 0.40 - 4.00 mU/L Final       ASSESSMENT/PLAN  (I48.2) Chronic atrial fibrillation (H)  (primary encounter diagnosis)  Comment: chronic with bradycardia  Plan: reduce metoprolol to 12.5mg po BID and monitor  -continue coumadin 2mg po Tues, Thursday, Friday, 1mg po all other days   -INR recheck 1 week     (E46) Protein-calorie malnutrition, unspecified severity (H)  Comment: Body mass index is 20.6 kg/(m^2).  Plan: dietary follows, on med pass and highly dense foods offered     (I50.32) Chronic diastolic congestive heart failure (H)  Comment: Per history, compensated   Plan: continue metoprolol (reduced dose) and lasix (dose recently reduced)  -daily weights, update NP if >2lbs/day or 5lbs/ week or increased edema, shortness of breath, hypoxia, ect  -BMP  3 months   -family declines cards involvement at this time     (H35.8395) Wet senile macular degeneration (H)  Comment: per history   Plan: requires assistance with cares  -follow with opthalmology per their recommendation     (F33.9) Recurrent major depressive disorder, remission status unspecified (H)  Comment: per history, improved with dose increase in Celexa May 2018  Plan: continue Celexa, has improved patient's mood and QOL, staff update with concerns, follows with in-house psych     (I71.4) Abdominal aortic aneurysm (AAA) without rupture (H)  Comment: per history, no abdominal pain, declined treatment  Plan: monitor for symptoms, no further surveillance recommended     (I25.10) Coronary artery disease involving native heart without angina pectoris, unspecified vessel or lesion type  Comment: per history, s/p bypass  Plan: control BP as above  -asa and coumadin     (Z86.73) History of CVA (cerebrovascular accident)  Comment: per history   Plan: anticoagulation with coumadin and asa per son (neurologist)     (F01.50) Vascular dementia without behavioral disturbance  Comment: previously noted and  progressive, requiring LTC now for safety.  Poor vision and hearing contributes   Plan: staff assist with cares  -d'c prn melatonin, not using, staff to update if concerns with insomnia    (D64.9) Anemia, unspecified type  Comment: per history, Hgb has been improving, on dual anticoagulation  Plan: frequent monitoring of INR's due to bleed risk  -continue Omeprazole for GI protection  -Hgb monitor monthly     (S32.010D) Closed compression fracture of L1 lumbar vertebra with routine healing, subsequent encounter  Comment: per history from fall end of February, no reports of pain today  Plan: reduce APAP 500mg po BID, monitor pain        (I10) Essential hypertension with goal blood pressure less than 140/90  BP goals are <150/90 mm Hg.This is higher than ACC and AHA recommendations due to goals of care, risk of dizziness and falls, risk of tissue/cerebral hypoperfusion and frailty. Patient is stable with current plan of care and routine assessment.    (E03.9) Hypothyroidism, unspecified type  Comment: per hx  Plan: continue synthroid  -TSH annually     (N18.3) CKD (chronic kidney disease) stage 3, GFR 30-59 ml/min  Comment: per history with history of [place]  Plan: renally adjust meds, avoid nephrotoxic meds  -BMP Q 3 months     (N40.0) Benign prostatic hyperplasia without lower urinary tract symptoms  Comment: per history with high PVR in past, now on Flomax and asymptomatic   Plan: continue Flomax, monitor, staff to update and obtain PVR if patient with symptoms.  If continues to be problem, consider urology referral     Called and spoke with patient's son, Dr. Killian, regarding POC        Electronically signed by:  CDOY Diaz CNP

## 2018-08-13 NOTE — LETTER
8/13/2018        RE: Francesco Killian  [facility]  [address]  [facility] [address]        [facility]  Chief Complaint   Patient presents with     Annual Comprehensive Nursing Home     Nursing Home Regulatory       [place] Medical Record Number:  [id number]    HPI:    Francesco Killian is a 89 year old  (12/26/1920), who is being seen today for an annual comprehensive visit at [facility].  HPI information obtained from: facility chart records, facility staff, patient report and [facility] chart review.  Today's concerns are:    Protein-calorie malnutrition, unspecified severity (H)  Body mass index is 20.6 kg/(m^2).  Dietary follows in NH, gradual weight loss continues.  He was started on med pass 7/5, offered high dense foods at meals and snacks     Chronic atrial fibrillation (H)  Chronic, rate controlled with recent bradycardia (although patient appears asymptomatic of this) on metoprolol 25mg BID.  He is anticoagulated with coumadin and asa per patient's son's preference who is a neurologist.  Denies/no reports of palpitations, dizziness, lightheadedness or shortness of breath.  INR today is 2.0        Pulse Readings from Last 4 Encounters:   08/13/18 (!) 40   08/05/18 50   07/26/18 56   07/19/18 54          Chronic diastolic congestive heart failure (H)  Essential hypertension with goal blood pressure less than 140/90  Per history, ECHO 2/2017 with normal LV systolic function, EF 65%-70% but severe concentric left ventricular hypertrophy.  Mild/moderate RV dilation with mildly decrease RV systolic function.  Moderate aortic stenosis.  He is on lasix recently reduced from 20mg BID to 20mg in A.M and 10mg at noon due to increase in BUN and no s/s of fluid overload.  Also on metoprolol and hydralazine.   Denies/no reports of PND, orthopnea, hypoxia, increasing edema.  Hx of somewhat labile BP's and elevated readings, however, recent  readings improved.  Permissive htn to avoid hypoperfusion to his brain.  He was to follow-up with his cardiologist May 2018, this apt was cancelled by family.   No reports of chest pain.  Mild weight gain as noted below, not thought to be fluid related.  As above, patient's BMI below recommendation, dietary attempting to improved nutritional input        Wt Readings from Last 5 Encounters:   08/13/18 135 lb 8 oz (61.5 kg)   08/05/18 135 lb 6.4 oz (61.4 kg)   07/26/18 134 lb 9.6 oz (61.1 kg)   07/19/18 131 lb 6.4 oz (59.6 kg)   07/12/18 131 lb 3.2 oz (59.5 kg)       Last 3 BP's: 152/72, 124/50, 132/56    Lab Results   Component Value Date    CR 1.27 08/09/2018     Lab Results   Component Value Date    POTASSIUM 4.2 08/09/2018         Wet senile macular degeneration (H)  Chronic, very poor vision baseline, legally blind.  Follows with opthalmology     Recurrent major depressive disorder, remission status unspecified (H)  Celexa increased beginning of May per family's request due to worsening depression. Patient was having a harder time adjusting to LTC, dislikes being apart from his wife.  Son reports they try and take him to see his wife 1-2 times per week, but difficult with their schedules.  Denies/no reports of thoughts to harm self or others.  Also follows with in-house psych.  Most recent PHQ-9 is 0         Coronary artery disease involving native heart without angina pectoris, unspecified vessel or lesion type  Per history, s/p CABG in the 1980's, per notes his primary presenting symptom at that time was fatigue.  He is anticoagulated with coumadin and asa.  No reports of chest pain, dyspnea or increasing fatigue     History of CVA (cerebrovascular accident)  History of multiple CVA's with cerebral and carotid artery stenosis.  He is on coumadin and asa, patient's son, a neurologist prefers to continue with these     Vascular dementia without behavioral disturbance  Limited memory, very forgetful, previously  "was living independently, increased cares required, primary reason for LTC admission.  Complicated by his poor vision and hearing.  Most recent SLUMS 3/24    Anemia, unspecified type  Noted with hgb drop after ER visits in May 2018 due to fall and scalp laceration.  He is on dual anticoagulation with coumadin and asa.  On omeprazole for GI protection.  No recent reports of acute blood loss.  Hgb has been trending up     Lab Results   Component Value Date    HGB 11.1 06/28/2018         Closed compression fracture of L1 lumbar vertebra with routine healing, subsequent encounter  Fall with traumatic L1 compression fracture and hospitalization end of February 2018.  Ortho consulted in hospital and recommended brace prn for pain management.  He completed therapy in the TCU and his pain significantly improved.  Today he denies pain, states \"that has all cleared up\".  He continues on scheduled APAP TID     Hypothyroidism, unspecified type  On synthroid  TSH   Date Value Ref Range Status   03/29/2018 3.91 0.30 - 5.00 uIU/mL Final       CKD (chronic kidney disease) stage 3, GFR 30-59 ml/min  Chronic with history of [place]O in past.     Lab Results   Component Value Date    CR 1.27 08/09/2018     GFR Estimate   Date Value Ref Range Status   08/09/2018 52 (L) >60 ml/min/1.73m2 Final   06/22/2018 43 (L) >60 mL/min/1.73m2 Final   05/14/2018 47 (A) >60 mL/min/1.73m2 Final     GFR Estimate If Black   Date Value Ref Range Status   08/09/2018 >60 >60 ml/min/1.73m2 Final   06/22/2018 52 (L) >60 mL/min/1.73m2 Final   05/14/2018 57 (A) >60 mL/min/1.73m2 Final       Benign prostatic hyperplasia without lower urinary tract symptoms  History of PVR >500 when inpatient, monitored some while in TCU.  Symptoms improved with Flomax, PVR were monitored only if symptomatic while in TCU.  Patient denies/no reports from staff on voiding habits       Abdominal aortic aneurysm (AAA) without rupture (H)  History of PAD and known AAA noted on US from " 9/25/17, 5.6cm in diameter.  Denies/no reports of abdmonial pain.  EVAR procedure has been discussed with patient and family in past and they have deferred       ALLERGIES: Review of patient's allergies indicates no known allergies.  PROBLEM LIST:  Patient Active Problem List   Diagnosis     AAA (abdominal aortic aneurysm) (H)     Dysphagia     Chronic atrial fibrillation (H)     Health Care Home     ACP (advance care planning)     Esophageal stricture     Macrocytic anemia     Other specified hypothyroidism     Right bundle branch block (RBBB)     Recurrent falls~occulovestibular syndrom     Wet senile macular degeneration (H)     Major depressive disorder, single episode, mild (H)     Essential hypertension with goal blood pressure less than 140/90     Acquired hypothyroidism     CHF (congestive heart failure) (H)     Acute kidney failure (H)     Compression fracture of L1 lumbar vertebra (H)     Supratherapeutic INR     Anticoagulated on Coumadin     History of CVA (cerebrovascular accident)     Coronary artery disease involving native heart without angina pectoris, unspecified vessel or lesion type     Protein-calorie malnutrition (H)     PAST MEDICAL HISTORY:  has a past medical history of AAA (abdominal aortic aneurysm) (H) (6/3/2013); Aortic stenosis; Bradycardia; Carotid occlusion, right; Chronic atrial fibrillation (H) (6/17/2013); CVA (cerebral infarction) (6/3/2013); Dysphagia (6/3/2013); HTN (hypertension) (6/3/2013); Hyperlipidemia LDL goal <130 (6/3/2013); Hypothyroidism (6/3/2013); Macular degeneration of both eyes; Recurrent falls~occulovestibular syndrom (9/2/2015); Right bundle branch block (RBBB) (9/2/2015); Squamous cell carcinoma; and Unspecified cerebral artery occlusion with cerebral infarction. He also has no past medical history of Basal cell carcinoma or Malignant melanoma (H).  PAST SURGICAL HISTORY:  has a past surgical history that includes GI surgery (1970s); Cardiac surgery (1980s);  Eye surgery; colonoscopy; hernia repair; IR Renal/Visceral Stent/Atherect/PTA; turp; Esophagoscopy, gastroscopy, duodenoscopy (EGD), combined (10/14/2013); Colonoscopy (11/4/2013); and NONSPECIFIC PROCEDURE.  FAMILY HISTORY: family history includes C.A.D. in his brother, father, maternal grandfather, maternal grandmother, mother, paternal grandfather, paternal grandmother, and sister; Coronary Artery Disease in his mother.  SOCIAL HISTORY:  reports that he has never smoked. He has never used smokeless tobacco. He reports that he does not drink alcohol or use illicit drugs.  IMMUNIZATIONS:  Most Recent Immunizations   Administered Date(s) Administered     Influenza (High Dose) 3 valent vaccine 09/25/2017     Influenza (IIV3) PF 10/22/2012     Pneumo Conj 13-V (2010&after) 04/18/2016     Pneumococcal 23 valent 12/09/2004     TD (ADULT, 7+) 05/09/2018     TDAP Vaccine (Boostrix) 05/09/2018     Tdap (Adacel,Boostrix) 04/29/2009     Zoster vaccine, live 03/23/2011     Above immunizations pulled from [employer]. [facility] and facility records also reconciled. Outstanding information sent to  to update [place] [facility].  Future immunizations are not needed at this point as all recommended immunizations are up to date.   MEDICATIONS:  Current Outpatient Prescriptions   Medication Sig Dispense Refill     ACETAMINOPHEN PO Take 500 mg by mouth 3 times daily And 500mg po BID prn pain       aspirin 81 MG tablet Take 81 mg by mouth daily        calcium carbonate (TUMS) 500 MG chewable tablet Take 2 chew tab by mouth 2 times daily as needed for heartburn        Citalopram Hydrobromide (CELEXA PO) Take 20 mg by mouth daily       FUROSEMIDE PO Take 20 mg by mouth every morning And 10 mg at noon       HYDRALAZINE HCL PO Take 25 mg by mouth 4 times daily GIVE AT 6-7am, 12n, 5pm,  for HTN Hold for SBP of less than or equal to 120       ketoconazole (NIZORAL) 2 % cream Apply to face BID PRN 30 g 1     levothyroxine  (SYNTHROID/LEVOTHROID) 100 MCG tablet TAKE 1 TABLET BY MOUTH EVERY DAY. 90 tablet 0     MELATONIN PO Take 3 mg by mouth nightly as needed       METOPROLOL TARTRATE PO Take 25 mg by mouth 2 times daily for HTN Hold for SBP is<120 and HR is <60, plz call if hold x 2 in a row for any of these reasons.       Multiple Vitamins-Minerals (ICAPS PO) Take 1 capsule by mouth daily        polyethylene glycol (MIRALAX/GLYCOLAX) Packet Take 17 g by mouth daily as needed for constipation 7 packet      tamsulosin (FLOMAX) 0.4 MG capsule Take by mouth At Bedtime       Warfarin Sodium (COUMADIN PO) Take as directed       Medications reviewed:  Medications reconciled to facility chart and changes were made to reflect current medications as identified as above med list. Below are the changes that were made:   Medications stopped since last EPIC medication reconciliation:   There are no discontinued medications.    Medications started since last [facility] medication reconciliation:  No orders of the defined types were placed in this encounter.        Case Management:  I have reviewed the facility/SNF care plan/MDS which was done 6/25/18, including the falls risk, nutrition and pain screening. I also reviewed the current immunizations, and preventive care..Future cancer screening is not clinically indicated secondary to age/goals of care Patient's desire to return to the community is present, but is not able due to care needs . Current Level of Care is appropriate.    Advance Directive Discussion:    I reviewed the current advanced directives as reflected in EPIC, the POLST and the facility chart, and verified the congruency of orders. I contacted the first party, son,  Constantin and discussed the plan of Care.  I did review the advance directives with the resident.     Team Discussion:  I communicated with the appropriate disciplines involved with the Plan of Care:   Nursing      Patient Goal:  Patient's goal is pain control and  "comfort.    Information reviewed:  Medications, vital signs, orders, and nursing notes.    ROS:  10 point ROS of systems including Constitutional, Eyes, Respiratory, Cardiovascular, Gastroenterology, Genitourinary, Integumentary, Muscularskeletal, Psychiatric were all negative except for pertinent positives noted in my HPI.    Exam:  /72  Pulse (!) 40  Temp 97.6  F (36.4  C)  Resp 16  Ht 5' 8\" (1.727 m)  Wt 135 lb 8 oz (61.5 kg)  SpO2 94%  BMI 20.6 kg/m2  GENERAL APPEARANCE:  Alert, in no distress  Head: normocephalic, small and healing scab to back of skull, not open or draining   ENT:  Mouth and posterior oropharynx normal, moist mucous membranes, [place]  EYES:  EOM, conjunctivae, lids, pupils and irises normal  NECK:  No adenopathy,masses or thyromegaly  RESP:  respiratory effort and palpation of chest normal, lungs clear to auscultation , no respiratory distress, decreased at bases  CV:  Palpation and auscultation of heart done , irregular rhythm, rate controlled, no murmur, rub, or gallop, no edema, grade 3/6 murmur  ABDOMEN:  normal bowel sounds, soft, nontender, no hepatosplenomegaly or other masses  M/S:   Gait and station abnormal generalized weakness, no edema/erythema joints, CMS intact  NEURO:   Cranial nerves 2-12 are normal tested and grossly at patient's baseline, no tremor, normal muscle tone   PSYCH:  oriented to person only, mood and affect normal     Lab/Diagnostic data:      CBC RESULTS:   Recent Labs   Lab Test 06/28/18 06/22/18 05/17/18   WBC   --   4.9   --   4.7   RBC   --   3.39*   --   3.00*   HGB  11.1*  10.7*   < >  9.8*   HCT   --   33.6*   --   30.4*   MCV   --   99   --   101*   MCH   --   31.6   --   32.7   MCHC   --   31.8*   --   32.2   RDW   --   13.2   --   14.3   PLT   --   140   --   134*    < > = values in this interval not displayed.       Last Basic Metabolic Panel:  Recent Labs   Lab Test 08/09/18 06/22/18   NA  137  137   POTASSIUM  4.2  3.8   CHLORIDE  106  104 "   KEZIA  9.0  9.1   CO2  25  23   BUN  37*  33*   CR  1.27  1.50*   GLC  62*  63*       TSH   Date Value Ref Range Status   03/29/2018 3.91 0.30 - 5.00 uIU/mL Final   07/11/2017 0.26 (L) 0.40 - 4.00 mU/L Final       ASSESSMENT/PLAN  (I48.2) Chronic atrial fibrillation (H)  (primary encounter diagnosis)  Comment: chronic with bradycardia  Plan: reduce metoprolol to 12.5mg po BID and monitor  -continue coumadin 2mg po Tues, Thursday, Friday, 1mg po all other days   -INR recheck 1 week     (E46) Protein-calorie malnutrition, unspecified severity (H)  Comment: Body mass index is 20.6 kg/(m^2).  Plan: dietary follows, on med pass and highly dense foods offered     (I50.32) Chronic diastolic congestive heart failure (H)  Comment: Per history, compensated   Plan: continue metoprolol (reduced dose) and lasix (dose recently reduced)  -daily weights, update NP if >2lbs/day or 5lbs/ week or increased edema, shortness of breath, hypoxia, ect  -BMP  3 months   -family declines cards involvement at this time     (H36.8048) Wet senile macular degeneration (H)  Comment: per history   Plan: requires assistance with cares  -follow with opthalmology per their recommendation     (F33.9) Recurrent major depressive disorder, remission status unspecified (H)  Comment: per history, improved with dose increase in Celexa May 2018  Plan: continue Celexa, has improved patient's mood and QOL, staff update with concerns, follows with in-house psych     (I71.4) Abdominal aortic aneurysm (AAA) without rupture (H)  Comment: per history, no abdominal pain, declined treatment  Plan: monitor for symptoms, no further surveillance recommended     (I25.10) Coronary artery disease involving native heart without angina pectoris, unspecified vessel or lesion type  Comment: per history, s/p bypass  Plan: control BP as above  -asa and coumadin     (Z86.73) History of CVA (cerebrovascular accident)  Comment: per history   Plan: anticoagulation with coumadin and  asa per son (neurologist)     (F01.50) Vascular dementia without behavioral disturbance  Comment: previously noted and progressive, requiring LTC now for safety.  Poor vision and hearing contributes   Plan: staff assist with cares  -d'c prn melatonin, not using, staff to update if concerns with insomnia    (D64.9) Anemia, unspecified type  Comment: per history, Hgb has been improving, on dual anticoagulation  Plan: frequent monitoring of INR's due to bleed risk  -continue Omeprazole for GI protection  -Hgb monitor monthly     (S32.010D) Closed compression fracture of L1 lumbar vertebra with routine healing, subsequent encounter  Comment: per history from fall end of February, no reports of pain today  Plan: reduce APAP 500mg po BID, monitor pain        (I10) Essential hypertension with goal blood pressure less than 140/90  BP goals are <150/90 mm Hg.This is higher than ACC and AHA recommendations due to goals of care, risk of dizziness and falls, risk of tissue/cerebral hypoperfusion and frailty. Patient is stable with current plan of care and routine assessment.    (E03.9) Hypothyroidism, unspecified type  Comment: per hx  Plan: continue synthroid  -TSH annually     (N18.3) CKD (chronic kidney disease) stage 3, GFR 30-59 ml/min  Comment: per history with history of [place]  Plan: renally adjust meds, avoid nephrotoxic meds  -BMP Q 3 months     (N40.0) Benign prostatic hyperplasia without lower urinary tract symptoms  Comment: per history with high PVR in past, now on Flomax and asymptomatic   Plan: continue Flomax, monitor, staff to update and obtain PVR if patient with symptoms.  If continues to be problem, consider urology referral     Called and spoke with patient's son,  Constantin, regarding POC        Electronically signed by:  CODY Diaz CNP          Sincerely,        Julissa Virk, CODY CNP

## 2018-08-20 NOTE — PROGRESS NOTES
[facility]    Chief Complaint   Patient presents with     Nursing Home Acute     INR Followup       [place] Medical Record Number:  [id number]    HPI:    rFancesco Killian is a 89 year old  (12/26/1920), who is being seen today for an episodic care visit at [facility].  HPI information obtained from: facility chart records, facility staff and [place] Epic chart review.Today's concern is:    Chronic atrial fibrillation (H)  Encounter for therapeutic drug monitoring  Long-term (current) use of anticoagulants  Chronic, rate controlled with recent bradycardia (although patient appears asymptomatic of this) on metoprolol 12.5mg BID (reduced last week).  He is anticoagulated with coumadin and asa per patient's son's preference who is a neurologist.  Denies/no reports of palpitations, dizziness, lightheadedness or shortness of breath.  INR today is 1.7 on coumadin 2mg po Tues, Thursday, Friday, 1mg po all other days       Acute kidney injury superimposed on chronic kidney disease (H)  CKD stage III with ORLANDO noted in the past.  End of last week, creatinine increased to 1.46 and BUN at 42.  I reduced his lasix from 20mg in A.M and 10mg in afternoon, to 10mg po every day.  His creatinine is improved today at 1.19, BUN still elevated at 40.      Thrombocytopenia (H)  Lab work today shows platelets at 117, as above, he is on asa and coumadin for anticoagulation.  No reports of abnormal bleeding or bruising noted.  Reviewing chart, patient with dx of chronic thrombocytopenia     Anemia, unspecified type  Noted with hgb drop after ER visits in May 2018 due to fall and scalp laceration.  He is on dual anticoagulation with coumadin and asa.  On omeprazole for GI protection.  No recent reports of acute blood loss.  Hgb was trending up but recently starting to trend down again.  I ordered for occult stool last week, has not been collected by staff.  No reports of acute blood loss  noted.  His Hgb was 11 end of June, today it is noted to be 10.  No reports of dizziness, lightheadedness, or shortness of breath     Essential hypertension with goal blood pressure less than 140/90  Significant cardiac history including CHF, htn, aortic stenosis.  On metoprolol and hydralazine, as noted above, his metoprolol was reduced last week due to bradycardia.  He has history of labile BP's and elevated readings, however, recently with much softer readings.  Permissive htn preferred to avoid hypoperfusion to his brain.  He was to follow with cardiology May 2018, apt cancelled by family.  No reports of chest pain.       Last 3 BP's: 119/50, 122/60, 96/58  Pulse Readings from Last 4 Encounters:   08/19/18 68   08/13/18 (!) 40   08/05/18 50   07/26/18 56     Wt Readings from Last 5 Encounters:   08/19/18 137 lb 6.4 oz (62.3 kg)   08/13/18 135 lb 8 oz (61.5 kg)   08/05/18 135 lb 6.4 oz (61.4 kg)   07/26/18 134 lb 9.6 oz (61.1 kg)   07/19/18 131 lb 6.4 oz (59.6 kg)       ALLERGIES: Review of patient's allergies indicates no known allergies.  Past Medical, Surgical, Family and Social History reviewed and updated in [facility].    Current Outpatient Prescriptions   Medication Sig Dispense Refill     ACETAMINOPHEN PO Take 500 mg by mouth 2 times daily        aspirin 81 MG tablet Take 81 mg by mouth daily        calcium carbonate (TUMS) 500 MG chewable tablet Take 2 chew tab by mouth 2 times daily as needed for heartburn        Citalopram Hydrobromide (CELEXA PO) Take 20 mg by mouth daily       FUROSEMIDE PO Take 10 mg by mouth every morning        HYDRALAZINE HCL PO Take 10 mg by mouth 4 times daily GIVE AT 6-7am, 12n, 5pm,  for HTN Hold for SBP of less than or equal to 120       ketoconazole (NIZORAL) 2 % cream Apply to face BID PRN 30 g 1     levothyroxine (SYNTHROID/LEVOTHROID) 100 MCG tablet TAKE 1 TABLET BY MOUTH EVERY DAY. 90 tablet 0     METOPROLOL TARTRATE PO Take 12.5 mg by mouth 2 times daily for HTN Hold  "for SBP is<120 and HR is <60, plz call if hold x 2 in a row for any of these reasons.       Multiple Vitamins-Minerals (ICAPS PO) Take 1 capsule by mouth daily        polyethylene glycol (MIRALAX/GLYCOLAX) Packet Take 17 g by mouth daily as needed for constipation 7 packet      tamsulosin (FLOMAX) 0.4 MG capsule Take by mouth At Bedtime       Warfarin Sodium (COUMADIN PO) Take as directed       Medications reviewed:  Medications reconciled to facility chart and changes were made to reflect current medications as identified as above med list. Below are the changes that were made:   Medications stopped since last EPIC medication reconciliation:   There are no discontinued medications.    Medications started since last [facility] medication reconciliation:  No orders of the defined types were placed in this encounter.        REVIEW OF SYSTEMS:  10 point ROS of systems including Constitutional, Eyes, Respiratory, Cardiovascular, Gastroenterology, Genitourinary, Integumentary, Muscularskeletal, Psychiatric were all negative except for pertinent positives noted in my HPI.    Physical Exam:  /50  Pulse 68  Temp 99.6  F (37.6  C)  Resp 14  Ht 5' 8\" (1.727 m)  Wt 137 lb 6.4 oz (62.3 kg)  SpO2 94%  BMI 20.89 kg/m2  GENERAL APPEARANCE:  Alert, in no distress  ENT:  Mouth and posterior oropharynx normal, moist mucous membranes, [place]  EYES:  EOM, conjunctivae, lids, pupils and irises normal  NECK:  No adenopathy,masses or thyromegaly  RESP:  respiratory effort and palpation of chest normal, lungs clear to auscultation , no respiratory distress, decreased at bases  CV:  Palpation and auscultation of heart done , irregular rhythm, rate controlled, no murmur, rub, or gallop, no edema, grade 3/6 murmur  ABDOMEN:  normal bowel sounds, soft, nontender, no hepatosplenomegaly or other masses  M/S:   Gait and station abnormal generalized weakness, no edema/erythema joints, CMS intact  NEURO:   Cranial nerves 2-12 are normal tested " and grossly at patient's baseline, no tremor, normal muscle tone   PSYCH:  oriented to person only, mood and affect normal        Recent Labs:     CBC RESULTS:   Recent Labs   Lab Test 08/16/18 06/28/18 06/22/18 05/17/18   WBC   --    --   4.9   --   4.7   RBC   --    --   3.39*   --   3.00*   HGB  10.4*  11.1*  10.7*   < >  9.8*   HCT   --    --   33.6*   --   30.4*   MCV   --    --   99   --   101*   MCH   --    --   31.6   --   32.7   MCHC   --    --   31.8*   --   32.2   RDW   --    --   13.2   --   14.3   PLT   --    --   140   --   134*    < > = values in this interval not displayed.       Last Basic Metabolic Panel:  Recent Labs   Lab Test 08/16/18 08/09/18   NA  137  137   POTASSIUM  3.7  4.2   CHLORIDE  108*  106   KEZIA  8.9  9.0   CO2  20*  25   BUN  42*  37*   CR  1.46*  1.27   GLC  155*  62*       TSH   Date Value Ref Range Status   03/29/2018 3.91 0.30 - 5.00 uIU/mL Final   07/11/2017 0.26 (L) 0.40 - 4.00 mU/L Final       Assessment/Plan:  (I48.2) Chronic atrial fibrillation (H)  (primary encounter diagnosis)  (Z51.81) Encounter for therapeutic drug monitoring  (Z79.01) Long-term (current) use of anticoagulants  Comment: INR subtherapeutic  Plan: coumadin 2mg po Mon-Wed  -INR recheck Thursday      (N17.9,  N18.9) Acute kidney injury superimposed on chronic kidney disease (H)  Comment: acute on chronic, creatine improved with lasix reduction   Plan: renally adjust meds, avoid nephrotoxic meds  -BMP repeat Thursday    (D69.6) Thrombocytopenia (H)  Comment: noted on labs today, per chart review history of chronic thrombocytopenia, no s/s of abnormal bleeding   Plan: need to monitor closely, on coumadin and asa  -will review with collaborating physician Dr. Lama  -consider peripheral blood smear      (D64.9) Anemia, unspecified type  Comment: normocytic, normochromic, no current s/s of acute blood loss, Hgb stable   Plan: consider GI w/u per family perference  -as above, close monitoring while on coumadin  and asa   -consider peripheral blood smear     (I10) Essential hypertension with goal blood pressure less than 140/90  Comment: prefer permissive htn given CVA history, fall risk and frailty, recently with softer BP's  Plan: reduce hydralazine 10mg QID  -monitor BP          Electronically signed by  CODY Diaz CNP

## 2018-08-20 NOTE — LETTER
8/20/2018        RE: Francesco Killian  [facility]  [address]  [facility] [address]        [place] [facility]    Chief Complaint   Patient presents with     Nursing Home Acute     INR Followup       [place] Medical Record Number:  [id number]    HPI:    Francesco Killian is a 89 year old  (12/26/1920), who is being seen today for an episodic care visit at [facility].  HPI information obtained from: facility chart records, facility staff and [place] Epic chart review.Today's concern is:    Chronic atrial fibrillation (H)  Encounter for therapeutic drug monitoring  Long-term (current) use of anticoagulants  Chronic, rate controlled with recent bradycardia (although patient appears asymptomatic of this) on metoprolol 12.5mg BID (reduced last week).  He is anticoagulated with coumadin and asa per patient's son's preference who is a neurologist.  Denies/no reports of palpitations, dizziness, lightheadedness or shortness of breath.  INR today is 1.7 on coumadin 2mg po Tues, Thursday, Friday, 1mg po all other days       Acute kidney injury superimposed on chronic kidney disease (H)  CKD stage III with ORLANDO noted in the past.  End of last week, creatinine increased to 1.46 and BUN at 42.  I reduced his lasix from 20mg in A.M and 10mg in afternoon, to 10mg po every day.  His creatinine is improved today at 1.19, BUN still elevated at 40.      Thrombocytopenia (H)  Lab work today shows platelets at 117, as above, he is on asa and coumadin for anticoagulation.  No reports of abnormal bleeding or bruising noted.  Reviewing chart, patient with dx of chronic thrombocytopenia     Anemia, unspecified type  Noted with hgb drop after ER visits in May 2018 due to fall and scalp laceration.  He is on dual anticoagulation with coumadin and asa.  On omeprazole for GI protection.  No recent reports of acute blood loss.  Hgb  was trending up but recently starting to trend  down again.  I ordered for occult stool last week, has not been collected by staff.  No reports of acute blood loss noted.  His Hgb was 11 end of June, today it is noted to be 10.  No reports of dizziness, lightheadedness, or shortness of breath     Essential hypertension with goal blood pressure less than 140/90  Significant cardiac history including CHF, htn, aortic stenosis.  On metoprolol and hydralazine, as noted above, his metoprolol was reduced last week due to bradycardia.  He has history of labile BP's and elevated readings, however, recently with much softer readings.  Permissive htn preferred to avoid hypoperfusion to his brain.  He was to follow with cardiology May 2018, apt cancelled by family.  No reports of chest pain.       Last 3 BP's: 119/50, 122/60, 96/58  Pulse Readings from Last 4 Encounters:   08/19/18 68   08/13/18 (!) 40   08/05/18 50   07/26/18 56     Wt Readings from Last 5 Encounters:   08/19/18 137 lb 6.4 oz (62.3 kg)   08/13/18 135 lb 8 oz (61.5 kg)   08/05/18 135 lb 6.4 oz (61.4 kg)   07/26/18 134 lb 9.6 oz (61.1 kg)   07/19/18 131 lb 6.4 oz (59.6 kg)       ALLERGIES: Review of patient's allergies indicates no known allergies.  Past Medical, Surgical, Family and Social History reviewed and updated in [employer].    Current Outpatient Prescriptions   Medication Sig Dispense Refill     ACETAMINOPHEN PO Take 500 mg by mouth 2 times daily        aspirin 81 MG tablet Take 81 mg by mouth daily        calcium carbonate (TUMS) 500 MG chewable tablet Take 2 chew tab by mouth 2 times daily as needed for heartburn        Citalopram Hydrobromide (CELEXA PO) Take 20 mg by mouth daily       FUROSEMIDE PO Take 10 mg by mouth every morning        HYDRALAZINE HCL PO Take 10 mg by mouth 4 times daily GIVE AT 6-7am, 12n, 5pm,  for HTN Hold for SBP of less than or equal to 120       ketoconazole (NIZORAL) 2 % cream Apply to face BID PRN 30 g 1     levothyroxine (SYNTHROID/LEVOTHROID) 100 MCG tablet TAKE 1  "TABLET BY MOUTH EVERY DAY. 90 tablet 0     METOPROLOL TARTRATE PO Take 12.5 mg by mouth 2 times daily for HTN Hold for SBP is<120 and HR is <60, plz call if hold x 2 in a row for any of these reasons.       Multiple Vitamins-Minerals (ICAPS PO) Take 1 capsule by mouth daily        polyethylene glycol (MIRALAX/GLYCOLAX) Packet Take 17 g by mouth daily as needed for constipation 7 packet      tamsulosin (FLOMAX) 0.4 MG capsule Take by mouth At Bedtime       Warfarin Sodium (COUMADIN PO) Take as directed       Medications reviewed:  Medications reconciled to facility chart and changes were made to reflect current medications as identified as above med list. Below are the changes that were made:   Medications stopped since last EPIC medication reconciliation:   There are no discontinued medications.    Medications started since last [facility] medication reconciliation:  No orders of the defined types were placed in this encounter.        REVIEW OF SYSTEMS:  10 point ROS of systems including Constitutional, Eyes, Respiratory, Cardiovascular, Gastroenterology, Genitourinary, Integumentary, Muscularskeletal, Psychiatric were all negative except for pertinent positives noted in my HPI.    Physical Exam:  /50  Pulse 68  Temp 99.6  F (37.6  C)  Resp 14  Ht 5' 8\" (1.727 m)  Wt 137 lb 6.4 oz (62.3 kg)  SpO2 94%  BMI 20.89 kg/m2  GENERAL APPEARANCE:  Alert, in no distress  ENT:  Mouth and posterior oropharynx normal, moist mucous membranes, [place]  EYES:  EOM, conjunctivae, lids, pupils and irises normal  NECK:  No adenopathy,masses or thyromegaly  RESP:  respiratory effort and palpation of chest normal, lungs clear to auscultation , no respiratory distress, decreased at bases  CV:  Palpation and auscultation of heart done , irregular rhythm, rate controlled, no murmur, rub, or gallop, no edema, grade 3/6 murmur  ABDOMEN:  normal bowel sounds, soft, nontender, no hepatosplenomegaly or other masses  M/S:   Gait and station " abnormal generalized weakness, no edema/erythema joints, CMS intact  NEURO:   Cranial nerves 2-12 are normal tested and grossly at patient's baseline, no tremor, normal muscle tone   PSYCH:  oriented to person only, mood and affect normal        Recent Labs:     CBC RESULTS:   Recent Labs   Lab Test 08/16/18 06/28/18 06/22/18 05/17/18   WBC   --    --   4.9   --   4.7   RBC   --    --   3.39*   --   3.00*   HGB  10.4*  11.1*  10.7*   < >  9.8*   HCT   --    --   33.6*   --   30.4*   MCV   --    --   99   --   101*   MCH   --    --   31.6   --   32.7   MCHC   --    --   31.8*   --   32.2   RDW   --    --   13.2   --   14.3   PLT   --    --   140   --   134*    < > = values in this interval not displayed.       Last Basic Metabolic Panel:  Recent Labs   Lab Test 08/16/18 08/09/18   NA  137  137   POTASSIUM  3.7  4.2   CHLORIDE  108*  106   KEZIA  8.9  9.0   CO2  20*  25   BUN  42*  37*   CR  1.46*  1.27   GLC  155*  62*       TSH   Date Value Ref Range Status   03/29/2018 3.91 0.30 - 5.00 uIU/mL Final   07/11/2017 0.26 (L) 0.40 - 4.00 mU/L Final       Assessment/Plan:  (I48.2) Chronic atrial fibrillation (H)  (primary encounter diagnosis)  (Z51.81) Encounter for therapeutic drug monitoring  (Z79.01) Long-term (current) use of anticoagulants  Comment: INR subtherapeutic  Plan: coumadin 2mg po Mon-Wed  -INR recheck Thursday      (N17.9,  N18.9) Acute kidney injury superimposed on chronic kidney disease (H)  Comment: acute on chronic, creatine improved with lasix reduction   Plan: renally adjust meds, avoid nephrotoxic meds  -BMP repeat Thursday    (D69.6) Thrombocytopenia (H)  Comment: noted on labs today, per chart review history of chronic thrombocytopenia, no s/s of abnormal bleeding   Plan: need to monitor closely, on coumadin and asa  -will review with collaborating physician Dr. Kelby  -consider peripheral blood smear      (D64.9) Anemia, unspecified type  Comment: normocytic, normochromic, no current s/s of acute  blood loss, Hgb stable   Plan: consider GI w/u per family perference  -as above, close monitoring while on coumadin and asa   -consider peripheral blood smear     (I10) Essential hypertension with goal blood pressure less than 140/90  Comment: prefer permissive htn given CVA history, fall risk and frailty, recently with softer BP's  Plan: reduce hydralazine 10mg QID  -monitor BP          Electronically signed by  CODY Diaz CNP                      Sincerely,        CODY Diaz CNP

## 2018-08-20 NOTE — Clinical Note
Dr. Lama,  Not sure on best way to manage this patient.  His son is a neurologist.  Noted thrombocytopenia today on labs, per chart review he has history of this but not recently that I can see.  Consider peripheral smear?   -Anemia was improving, but Hgb has been dropping again, at 10 today.  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  I feel with his age GI w/u risky, not sure what further w/u to do on him.  I have occult stool and iron studies pending, although feel iron less likely culprit since NCNC  -ORLANDO, has occurred in past.  His creatine is improved today but BUN still elevated at 40, worry he may be dry.  Lungs sounded clear today, worry if stop diuretic his CHF may worsen.  He is only on 10mg lasix daily, changed from 30mg last week  Appreciate any input on him.  His family is all with medical background and frequently question the POC  Thanks!

## 2018-08-23 NOTE — PROGRESS NOTES
[facility]    HPI:    Francesco Killian is a 89 year old  (12/26/1920), who is being seen today for an episodic care visit at [facility].   HPI information obtained from: facility chart records and facility staff. Today's concern is INR/Coumadin management for A. Fib    Bleeding Signs/Symptoms:  None  Thromboembolic Signs/Symptoms:  None    Medication Changes:  No  Dietary Changes:  No  Activity Changes: No  Bacterial/Viral Infection:  No    Missed Coumadin Doses:  None    On ASA: Yes - 81 mg po q day    Other Concerns:  Yes: need to monitor closely, on dual anticoagulation, also with thombocytopenia     OBJECTIVE:    INR Today:  1.6  Current Dose:  coumadin 2mg po Mon-Wed    ASSESSMENT:  Chronic atrial fibrillation (H)  Encounter for therapeutic drug monitoring  Long-term (current) use of anticoagulants  Subtherapeutic INR for goal of 2-3    PLAN:    New Dose: coumadin 3mg po Thurs, Fri, 2mg po sat, Sun       Next INR: Monday 8/27        Electronically signed by:  CODY Diaz CNP

## 2018-08-23 NOTE — LETTER
8/23/2018        RE: Francesco Killian  [facility]  [address] [address]          [place] [facility]    HPI:    Francesco Killian is a 89 year old  (12/26/1920), who is being seen today for an episodic care visit at [facility].   HPI information obtained from: facility chart records and facility staff. Today's concern is INR/Coumadin management for A. Fib    Bleeding Signs/Symptoms:  None  Thromboembolic Signs/Symptoms:  None    Medication Changes:  No  Dietary Changes:  No  Activity Changes: No  Bacterial/Viral Infection:  No    Missed Coumadin Doses:  None    On ASA: Yes - 81 mg po q day    Other Concerns:  Yes: need to monitor closely, on dual anticoagulation, also with thombocytopenia     OBJECTIVE:    INR Today:  1.6  Current Dose:  coumadin 2mg po Mon-Wed    ASSESSMENT:  Chronic atrial fibrillation (H)  Encounter for therapeutic drug monitoring  Long-term (current) use of anticoagulants  Subtherapeutic INR for goal of 2-3    PLAN:    New Dose: coumadin 3mg po Thurs, Fri, 2mg po sat, Sun       Next INR: Monday 8/27        Electronically signed by:  CODY Diaz CNP            Sincerely,        CODY Diaz CNP

## 2018-08-27 NOTE — LETTER
8/27/2018        RE: Francesco Killian  [facility]  [address]  [facility] [address]        [place] [facility]    Chief Complaint   Patient presents with     skilled nursing Acute       [place] Medical Record Number:  [id number]    HPI:    Francesco Killian is a 89 year old  (12/26/1920), who is being seen today for an episodic care visit at [facility].  HPI information obtained from: facility chart records, facility staff and [facility] chart review.Today's concern is:    Anemia, unspecified type  Noted with hgb drop after ER visits in May 2018 due to fall and scalp laceration.  He is on dual anticoagulation with coumadin and asa.  On omeprazole for GI protection.  No recent reports of acute blood loss.  Hgb was trending up but recently starting to trend down again.  I ordered for occult stool, has not been collected by staff.  No reports of acute blood loss noted.  His Hgb was 11 end of June, last week noted to be 10, NCNC.  No reports of dizziness, lightheadedness, or shortness of breath.  Discussion with son, Dr. Killian.  Decline GI w/u at this time, even though patient with NCNC, per son with history of some improvement in the past when on iron supplementation.  In agreement to start this, titrate coumadin to lower INR level of 1.5-2.0 given his bleeding risk      Thrombocytopenia (H)  Lab work last week shows platelets at 117.  He is on asa and coumadin as  for anticoagulation.   He sustained a skin tear over the weekend and per staff bled easily, were able to stop with compression bandage. Reviewing chart, patient with dx of chronic thrombocytopenia.  Discussion with son Dr. Killian, aware of chronic thrombocytopenia, not interested in further w/u at this time.  He is at high risk for repeat stroke/clot, so not wanting to stop coumadin or asa at this time.  In agreement, to titrate coumadin to keep INR levels lower at 1.5-2.0 given his anemia and  chronic thrombocytopenia         Chronic atrial fibrillation (H)  Encounter for therapeutic drug monitoring  Long-term (current) use of anticoagulants  Chronic, rate controlled with recent bradycardia (although patient appears asymptomatic of this) on metoprolol 12.5mg BID (reduced last week).  He is anticoagulated with coumadin and asa per patient's son's preference who is a neurologist.  Denies/no reports of palpitations, dizziness, lightheadedness or shortness of breath.  INR today is 2.6.  As noted above, due to anemia, we are changing his INR goal to 1.5-2.0, his son Dr. Killian is in agreement with this.   Currently on coumadin 3mg po Thurs, Fri, 2mg po sat, Sun        CKD Stage III  CKD stage III with ORLANDO noted in the past.   His lasix has been reduced some due to increase in creatinine and BUN.  Discussion with patient's son, TOBINAnaisgrant Constantin and Dr. Lama.  In agreement to leave lasix at current dose, given his aortic stenosis, very sensitive to any s/s of fluid overload.  Family is aware of patient's kidney function.  Currently with lasix 10mg po BID    Last 3 BP's: 135/59, 140/54, 131/76 mmHg  Pulse Readings from Last 4 Encounters:   08/27/18 62   08/23/18 91   08/19/18 68   08/13/18 (!) 40     Wt Readings from Last 5 Encounters:   08/27/18 137 lb 6.4 oz (62.3 kg)   08/23/18 137 lb 6.4 oz (62.3 kg)   08/19/18 137 lb 6.4 oz (62.3 kg)   08/13/18 135 lb 8 oz (61.5 kg)   08/05/18 135 lb 6.4 oz (61.4 kg)       ALLERGIES: Review of patient's allergies indicates no known allergies.  Past Medical, Surgical, Family and Social History reviewed and updated in [employer].    Current Outpatient Prescriptions   Medication Sig Dispense Refill     ACETAMINOPHEN PO Take 500 mg by mouth 2 times daily        aspirin 81 MG tablet Take 81 mg by mouth daily        calcium carbonate (TUMS) 500 MG chewable tablet Take 2 chew tab by mouth 2 times daily as needed for heartburn        Citalopram Hydrobromide (CELEXA PO) Take 20 mg by mouth daily    "    ferrous sulfate (IRON) 325 (65 Fe) MG tablet Take 325 mg by mouth Twice a day every other day       FUROSEMIDE PO Take 10 mg by mouth 2 times daily        HYDRALAZINE HCL PO Take 25 mg by mouth 4 times daily GIVE AT 6-7am, 12n, 5pm,  for HTN Hold for SBP of less than or equal to 120       ketoconazole (NIZORAL) 2 % cream Apply to face BID PRN 30 g 1     levothyroxine (SYNTHROID/LEVOTHROID) 100 MCG tablet TAKE 1 TABLET BY MOUTH EVERY DAY. 90 tablet 0     METOPROLOL TARTRATE PO Take 12.5 mg by mouth 2 times daily for HTN Hold for SBP is<120 and HR is <60, plz call if hold x 2 in a row for any of these reasons.       Multiple Vitamins-Minerals (ICAPS PO) Take 1 capsule by mouth daily        polyethylene glycol (MIRALAX/GLYCOLAX) Packet Take 17 g by mouth daily as needed for constipation 7 packet      tamsulosin (FLOMAX) 0.4 MG capsule Take by mouth At Bedtime       Warfarin Sodium (COUMADIN PO) Take as directed       Medications reviewed:  Medications reconciled to facility chart and changes were made to reflect current medications as identified as above med list. Below are the changes that were made:   Medications stopped since last EPIC medication reconciliation:   There are no discontinued medications.    Medications started since last [facility] medication reconciliation:  Orders Placed This Encounter   Medications     ferrous sulfate (IRON) 325 (65 Fe) MG tablet     Sig: Take 325 mg by mouth Twice a day every other day         REVIEW OF SYSTEMS:  4 point ROS including Respiratory, CV, GI and , other than that noted in the HPI,  is negative    Physical Exam:  /59  Pulse 62  Temp 97.8  F (36.6  C)  Resp 15  Ht 5' 8\" (1.727 m)  Wt 137 lb 6.4 oz (62.3 kg)  SpO2 94%  BMI 20.89 kg/m2  GENERAL APPEARANCE:  Alert, in no distress  EYES:  EOM, conjunctivae, lids, pupils and irises normal  NECK:  No adenopathy,masses or thyromegaly  RESP:  respiratory effort and palpation of chest normal, lungs clear to " auscultation , no respiratory distress, decreased at bases  CV:  Palpation and auscultation of heart done , irregular rhythm, rate controlled, no murmur, rub, or gallop, no edema, grade 3/6 murmur  M/S:   Gait and station abnormal generalized weakness, no edema/erythema joints, CMS intact  NEURO:   Cranial nerves 2-12 are normal tested and grossly at patient's baseline, no tremor, normal muscle tone   PSYCH:  oriented to person only, mood and affect normal     Recent Labs:     CBC RESULTS:   Recent Labs   Lab Test 08/20/18 08/16/18 06/22/18   WBC  5.1   --    --   4.9   RBC  3.09*   --    --   3.39*   HGB  10.0*  10.4*   < >  10.7*   HCT  30.9*   --    --   33.6*   MCV  100   --    --   99   MCH  32.4   --    --   31.6   MCHC  32.4   --    --   31.8*   RDW  13.7   --    --   13.2   PLT  117*   --    --   140    < > = values in this interval not displayed.       Last Basic Metabolic Panel:  Recent Labs   Lab Test 08/20/18 08/16/18   NA  138  137   POTASSIUM  4.4  3.7   CHLORIDE  109*  108*   KEZIA  8.9  8.9   CO2  24  20*   BUN  40*  42*   CR  1.19  1.46*   GLC  72  155*       TSH   Date Value Ref Range Status   03/29/2018 3.91 0.30 - 5.00 uIU/mL Final   07/11/2017 0.26 (L) 0.40 - 4.00 mU/L Final       Assessment/Plan:  (D64.9) Anemia, unspecified type  (primary encounter diagnosis)  Comment: normocytic, normochromic, no current s/s of acute blood loss, Hgb stable, family aware.  Discussed with Dr. Lama (collaborating physician) and patient's son, Dr. Killian  Plan:   - close monitoring while on coumadin and asa   -although NCNC, family would like to try iron supplementation, has been helpful in past.  Ferrous sulfate BID given every other day    (D69.6) Thrombocytopenia (H)  Comment: noted last week, per chart review history of chronic thrombocytopenia, no s/s of abnormal bleeding, was discussed with patient's son, Dr. Killian and Dr. Lama (collaborating physician)   Plan: need to monitor closely, on coumadin and  asa  -INR goal 1.5-2.0      (I48.2) Chronic atrial fibrillation (H)  (Z51.81) Encounter for therapeutic drug monitoring  (Z79.01) Long-term (current) use of anticoagulants  Comment: INR supratherapeutic given new goal of 1.5-2.0  Plan: hold coumadin today  -coumadin 2mg po Tues, Wed  -INR recheck Thursday    (N18.3) CKD (chronic kidney disease) stage 3, GFR 30-59 ml/min  Comment: chronic with ORLANDO noted in past and last week, discussed with Dr. Lama and patient's son,  Constantin   Plan: all in agreement with continue with lasix at current dose 10mg BID, given patient very sensitive to fluid overload with aortic stenosis  -BMP pending today       Electronically signed by  CODY Diaz CNP                      Sincerely,        CODY Diaz CNP

## 2018-08-30 NOTE — LETTER
8/30/2018        RE: Francesco Killian  [facility]  [address]  [facility] [address]          [place] [facility]    HPI:    Francesco Killian is a 89 year old  (12/26/1920), who is being seen today for an episodic care visit at [facility].   HPI information obtained from: facility chart records and facility staff. Today's concern is INR/Coumadin management for A. Fib    Bleeding Signs/Symptoms:  None  Thromboembolic Signs/Symptoms:  None    Medication Changes:  No  Dietary Changes:  No  Activity Changes: No  Bacterial/Viral Infection:  No    Missed Coumadin Doses:  None    On ASA: Yes - 81 mg po q day    Other Concerns:  Yes: hx anemia and thrombocytopenia, monitor INR's closely     OBJECTIVE:    INR Today:  2.3  Current Dose: Hold coumadin 8/27, then 2 mg PO daily    ASSESSMENT:  Chronic atrial fibrillation (H)  Encounter for therapeutic drug monitoring  Long-term (current) use of anticoagulants    Supratherapeutic INR for goal of 1.5-2.0 (goal recently changed)    PLAN:    New Dose: coumadin 1mg po Thurs, Fri, Sat, coumadin 2mg po Sun, Mon      Next INR: 9/4        Electronically signed by:  CODY Diaz CNP            Sincerely,        CODY Diaz CNP

## 2018-08-30 NOTE — PROGRESS NOTES
[place] [facility]    HPI:    Francesco Killian is a 89 year old  (12/26/1920), who is being seen today for an episodic care visit at [facility].   HPI information obtained from: facility chart records and facility staff. Today's concern is INR/Coumadin management for A. Fib    Bleeding Signs/Symptoms:  None  Thromboembolic Signs/Symptoms:  None    Medication Changes:  No  Dietary Changes:  No  Activity Changes: No  Bacterial/Viral Infection:  No    Missed Coumadin Doses:  None    On ASA: Yes - 81 mg po q day    Other Concerns:  Yes: hx anemia and thrombocytopenia, monitor INR's closely     OBJECTIVE:    INR Today:  2.3  Current Dose: Hold coumadin 8/27, then 2 mg PO daily    ASSESSMENT:  Chronic atrial fibrillation (H)  Encounter for therapeutic drug monitoring  Long-term (current) use of anticoagulants    Supratherapeutic INR for goal of 1.5-2.0 (goal recently changed)    PLAN:    New Dose: coumadin 1mg po Thurs, Fri, Sat, coumadin 2mg po Sun, Mon      Next INR: 9/4        Electronically signed by:  CODY Diaz CNP

## 2018-09-04 NOTE — LETTER
9/4/2018        RE: Francesco Killian  [facility]  [address] [address]          [place] [facility]    HPI:    Francesco Killian is a 89 year old  (12/26/1920), who is being seen today for an episodic care visit at [facility].   HPI information obtained from: facility chart records and facility staff. Today's concern is INR/Coumadin management for A. Fib    Bleeding Signs/Symptoms:  None  Thromboembolic Signs/Symptoms:  None    Medication Changes:  No  Dietary Changes:  No  Activity Changes: No  Bacterial/Viral Infection:  No    Missed Coumadin Doses:  None    On ASA: Yes - 81 mg po q day    Other Concerns:  Yes, history anemia and thrombocytopenia, on asa and coumadin monitor closely    OBJECTIVE:    INR Today:  2.1  Current Dose:  coumadin 1 mg po Thurs, Fri, Sat, coumadin 2 mg po Sun, Mon     ASSESSMENT:  Chronic atrial fibrillation (H)  Encounter for therapeutic drug monitoring  Long-term (current) use of anticoagulants    Therapeutic INR for goal of 1.5-2.0    PLAN:    New Dose: 1mg po QD      Next INR: Thursday 9/6        Electronically signed by:  CODY Diaz CNP            Sincerely,        CODY Diaz CNP

## 2018-09-04 NOTE — PROGRESS NOTES
[place] [facility]    HPI:    Francesco Killian is a 89 year old  (12/26/1920), who is being seen today for an episodic care visit at [facility].   HPI information obtained from: facility chart records and facility staff. Today's concern is INR/Coumadin management for A. Fib    Bleeding Signs/Symptoms:  None  Thromboembolic Signs/Symptoms:  None    Medication Changes:  No  Dietary Changes:  No  Activity Changes: No  Bacterial/Viral Infection:  No    Missed Coumadin Doses:  None    On ASA: Yes - 81 mg po q day    Other Concerns:  Yes, history anemia and thrombocytopenia, on asa and coumadin monitor closely    OBJECTIVE:    INR Today:  2.1  Current Dose:  coumadin 1 mg po Thurs, Fri, Sat, coumadin 2 mg po Sun, Mon     ASSESSMENT:  Chronic atrial fibrillation (H)  Encounter for therapeutic drug monitoring  Long-term (current) use of anticoagulants    Therapeutic INR for goal of 1.5-2.0    PLAN:    New Dose: 1mg po QD      Next INR: Thursday 9/6        Electronically signed by:  CODY Diaz CNP

## 2018-09-06 NOTE — LETTER
"    9/6/2018        RE: Francesco Killian  [facility]  [address]  [facility] [address]          [place] [facility]    HPI:    Francesco Killian is a 89 year old  (12/26/1920), who is being seen today for an episodic care visit at [facility].   HPI information obtained from: facility chart records and facility staff. Today's concern is INR/Coumadin management for A. Fib    Bleeding Signs/Symptoms:  None  Thromboembolic Signs/Symptoms:  None    Medication Changes:  Yes: starting antibiotics tomorrow   Dietary Changes:  No  Activity Changes: No  Bacterial/Viral Infection:  Yes: otitis externa    Missed Coumadin Doses:  None    On ASA: Yes - 81 mg po q day    Other Concerns:  Yes: anemia and thromcytopenia, on dual anticoagulation, monitor closely     Otitis Externa: notified by nursing staff that patient found with serous drainage from his left ear last evening, ear reported to be slightly edematous.  Patient sleeps on his left side routinely, even when moved to his right side he is able to roll back to his left side.  Patient reports to me that his ear is slightly more tender but \"not too bad\".  Denies changes in hearing, denies parathesias to his face/jaw.  His vitals are stable and he has been afebrile.  His ear has an abrasion noted to posterior helix    OBJECTIVE:    INR Today:  2.6  Current Dose:  1mg po QD      ASSESSMENT:  Chronic atrial fibrillation (H)  Encounter for therapeutic drug monitoring  Long-term (current) use of anticoagulants    General: alert, in no distress  Ears: bilateral TM pearly grey, no erythema of ear canals, left ear with serous drainage noted coming out of ear canal, entire left external ear edematous, erythematous, no warmth noted, erythema noted throughout entire right ear helix to tragus, and extends along lateral side of neck towards tonsillar lymph node area  Lymph: no cervical lymphadenopathy noted  RESP:  respiratory effort and " palpation of chest normal, lungs clear to auscultation , no respiratory distress, decreased at bases  CV:  Palpation and auscultation of heart done , irregular rhythm, rate controlled, no murmur, rub, or gallop, no edema, grade 3/6 murmur  M/S:   Gait and station abnormal generalized weakness, no edema/erythema joints, CMS intact  NEURO:   Cranial nerves 2-12 are normal tested and grossly at patient's baseline, no tremor, normal muscle tone   PSYCH:  oriented to person only, mood and affect normal        Supratherapeutic INR for goal of 1.5-2.0    PLAN:    New Dose: hold coumadin today, coumadin 0.5mg po Fri, Sat, Sun      Next INR: Monday 9/10    Otitis Externa:  Comment: acute, severe  -discussed with collaborating physician Dr. Lama.  Family has wanted to keep patient in NH setting if possible  Plan: staff leslie with sharpie area of erythema, monitor site q-shift and update provider if worsening  -Rocephin 1G IM with lidocaine every day X 3 days  -this provider off tomorrow, but will have staff check on patient and discuss with Dr. Lama tomorrow   -Called patient's son, Dr. Killian, no answer, left  regarding POC and gave my call back number if he would like me to send him to the hospital     Total time spent with patient visit was 36 minutes including patient visit, review of past records, phone call to patient contact and discussion on POC with NH staff and collaborating physician Dr. Lama. Greater than 50% of total time spent with counseling and coordinating care     Electronically signed by:  CODY Diaz CNP            Sincerely,        CODY Diaz CNP

## 2018-09-06 NOTE — PROGRESS NOTES
"  [place] [facility]    HPI:    Francesco Killian is a 89 year old  (12/26/1920), who is being seen today for an episodic care visit at [facility].   HPI information obtained from: facility chart records and facility staff. Today's concern is INR/Coumadin management for A. Fib    Bleeding Signs/Symptoms:  None  Thromboembolic Signs/Symptoms:  None    Medication Changes:  Yes: starting antibiotics tomorrow   Dietary Changes:  No  Activity Changes: No  Bacterial/Viral Infection:  Yes: otitis externa    Missed Coumadin Doses:  None    On ASA: Yes - 81 mg po q day    Other Concerns:  Yes: anemia and thromcytopenia, on dual anticoagulation, monitor closely     Otitis Externa: notified by nursing staff that patient found with serous drainage from his left ear last evening, ear reported to be slightly edematous.  Patient sleeps on his left side routinely, even when moved to his right side he is able to roll back to his left side.  Patient reports to me that his ear is slightly more tender but \"not too bad\".  Denies changes in hearing, denies parathesias to his face/jaw.  His vitals are stable and he has been afebrile.  His ear has an abrasion noted to posterior helix    OBJECTIVE:    INR Today:  2.6  Current Dose:  1mg po QD      ASSESSMENT:  Chronic atrial fibrillation (H)  Encounter for therapeutic drug monitoring  Long-term (current) use of anticoagulants    General: alert, in no distress  Ears: bilateral TM pearly grey, no erythema of ear canals, left ear with serous drainage noted coming out of ear canal, entire left external ear edematous, erythematous, no warmth noted, erythema noted throughout entire right ear helix to tragus, and extends along lateral side of neck towards tonsillar lymph node area  Lymph: no cervical lymphadenopathy noted  RESP:  respiratory effort and palpation of chest normal, lungs clear to auscultation , no respiratory distress, decreased at bases  CV: "  Palpation and auscultation of heart done , irregular rhythm, rate controlled, no murmur, rub, or gallop, no edema, grade 3/6 murmur  M/S:   Gait and station abnormal generalized weakness, no edema/erythema joints, CMS intact  NEURO:   Cranial nerves 2-12 are normal tested and grossly at patient's baseline, no tremor, normal muscle tone   PSYCH:  oriented to person only, mood and affect normal        Supratherapeutic INR for goal of 1.5-2.0    PLAN:    New Dose: hold coumadin today, coumadin 0.5mg po Fri, Sat, Sun      Next INR: Monday 9/10    Otitis Externa:  Comment: acute, severe  -discussed with collaborating physician Dr. Lama.  Family has wanted to keep patient in NH setting if possible  Plan: staff leslie with krystinie area of erythema, monitor site q-shift and update provider if worsening  -Rocephin 1G IM with lidocaine every day X 3 days  -this provider off tomorrow, but will have staff check on patient and discuss with Dr. Lama tomorrow   -Called patient's son, Dr. Killian, no answer, left  regarding POC and gave my call back number if he would like me to send him to the hospital     Total time spent with patient visit was 36 minutes including patient visit, review of past records, phone call to patient contact and discussion on POC with NH staff and collaborating physician Dr. Lama. Greater than 50% of total time spent with counseling and coordinating care     Electronically signed by:  CODY Diaz CNP

## 2018-09-10 NOTE — LETTER
9/10/2018        RE: Francesco Killian  [facility]  [address]  [facility] [address]        [place] [facility]    Chief Complaint   Patient presents with     Nursing Home Acute     INR Followup       [place] Medical Record Number:  [id number]    HPI:    Francesco Killian is a 89 year old  (12/26/1920), who is being seen today for an episodic care visit at [facility] .  HPI information obtained from: facility chart records, facility staff, patient report and [facility] chart review.Today's concern is:    Chronic atrial fibrillation (H)  Encounter for therapeutic drug monitoring  Long-term (current) use of anticoagulants  Chronic, rate controlled on metoprolol.  No reports of palpitations or shortness of breath.  He is anticoagulated with coumadin and baby asa, history of anemia and thrombocytopenia, so monitor closely.  No reports of bleeding or clots.  As noted below, he is on antibiotics currently for acute otitis externa.  Goal INR 1.5-2.0 given his anemia and thrombocytopenia.   INR today 1.4, has been on 0.5mg po every day since Thursday     Acute otitis externa of left ear, unspecified type  notified by nursing staff last Thursday that patient with new serous drainage from ear, ear was edematous and erythematous (please see this provider's note from 9/6 for further details).  Discussed with patient's son, Dr. Killian.  Agreeable to keep and treat in NH setting.  He was given 3 doses of IM Rocephin and transitioned to oral Keflex Sunday X 7 days.  His vitals have been stable, afebrile, no lymphadenopathy, CBC obtained Thursday without leukocytosis.    Today, ear looks significantly improved, almost no erythema noted, no drainage noted, swelling is gone, no lymphadenopathy, and no reports of pain.        Last 3 BP's: 141/69, 144/71, 148/56 mmHg  Pulse Readings from Last 4 Encounters:   09/10/18 83   09/06/18 55   09/04/18 50   08/30/18 55     Wt  Readings from Last 5 Encounters:   09/10/18 136 lb 9.6 oz (62 kg)   09/06/18 136 lb 9.6 oz (62 kg)   09/04/18 136 lb 9.6 oz (62 kg)   08/30/18 137 lb 6.4 oz (62.3 kg)   08/27/18 137 lb 6.4 oz (62.3 kg)       ALLERGIES: Review of patient's allergies indicates no known allergies.  Past Medical, Surgical, Family and Social History reviewed and updated in [facility].    Current Outpatient Prescriptions   Medication Sig Dispense Refill     ACETAMINOPHEN PO Take 500 mg by mouth 2 times daily        aspirin 81 MG tablet Take 81 mg by mouth daily        calcium carbonate (TUMS) 500 MG chewable tablet Take 2 chew tab by mouth 2 times daily as needed for heartburn        Cephalexin (KEFLEX PO) Take 500 mg by mouth 3 times daily       Citalopram Hydrobromide (CELEXA PO) Take 20 mg by mouth daily       ferrous sulfate (IRON) 325 (65 Fe) MG tablet Take 325 mg by mouth Twice a day every other day       FUROSEMIDE PO Take 10 mg by mouth 2 times daily        HYDRALAZINE HCL PO Take 25 mg by mouth 4 times daily GIVE AT 6-7am, 12n, 5pm,  for HTN Hold for SBP of less than or equal to 120       ketoconazole (NIZORAL) 2 % cream Apply to face BID PRN 30 g 1     levothyroxine (SYNTHROID/LEVOTHROID) 100 MCG tablet TAKE 1 TABLET BY MOUTH EVERY DAY. 90 tablet 0     METOPROLOL TARTRATE PO Take 12.5 mg by mouth 2 times daily for HTN Hold for SBP is<120 and HR is <60, plz call if hold x 2 in a row for any of these reasons.       Multiple Vitamins-Minerals (ICAPS PO) Take 1 capsule by mouth daily        polyethylene glycol (MIRALAX/GLYCOLAX) Packet Take 17 g by mouth daily as needed for constipation 7 packet      tamsulosin (FLOMAX) 0.4 MG capsule Take by mouth At Bedtime       Warfarin Sodium (COUMADIN PO) Take as directed       Medications reviewed:  Medications reconciled to facility chart and changes were made to reflect current medications as identified as above med list. Below are the changes that were made:   Medications stopped since  "last EPIC medication reconciliation:   There are no discontinued medications.    Medications started since last [facility] medication reconciliation:  Orders Placed This Encounter   Medications     Cephalexin (KEFLEX PO)     Sig: Take 500 mg by mouth 3 times daily         REVIEW OF SYSTEMS:  4 point ROS including Respiratory, CV, GI and , other than that noted in the HPI,  is negative    Physical Exam:  /69  Pulse 83  Temp 98.4  F (36.9  C)  Resp 16  Ht 5' 8\" (1.727 m)  Wt 136 lb 9.6 oz (62 kg)  SpO2 94%  BMI 20.77 kg/m2  General: alert, in no distress  Ears: bilateral TM pearly grey, no erythema of ear canals, left ear without drainage noted, no edema to left ear, no excess warmth noted, slight erythema noted to posterior side of left ear helix   Lymph: no cervical lymphadenopathy noted  RESP:  respiratory effort and palpation of chest normal, lungs clear to auscultation , no respiratory distress, decreased at bases  CV:  Palpation and auscultation of heart done , irregular rhythm, rate controlled, no murmur, rub, or gallop, no edema, grade 3/6 murmur  M/S:   Gait and station abnormal generalized weakness, no edema/erythema joints, CMS intact  NEURO:   Cranial nerves 2-12 are normal tested and grossly at patient's baseline, no tremor, normal muscle tone   PSYCH:  oriented to person only, mood and affect normal     Recent Labs:     Labs 9/7/18:      CBC RESULTS:   Recent Labs   Lab Test 09/06/18 08/30/18 08/20/18   WBC  4.3   --   5.1   RBC  3.03*   --   3.09*   HGB  10.1*  9.7*  10.0*   HCT  29.0*   --   30.9*   MCV  96   --   100   MCH  33.3   --   32.4   MCHC  34.8   --   32.4   RDW  13.7   --   13.7   PLT  126*   --   117*       Last Basic Metabolic Panel:  Recent Labs   Lab Test 08/27/18 08/20/18   NA  137  138   POTASSIUM  4.3  4.4   CHLORIDE  109*  109*   KEZIA  9.1  8.9   CO2  24  24   BUN  33*  40*   CR  1.10  1.19   GLC  70  72       TSH   Date Value Ref Range Status   03/29/2018 3.91 0.30 - 5.00 " uIU/mL Final   07/11/2017 0.26 (L) 0.40 - 4.00 mU/L Final       Assessment/Plan:  (I48.2) Chronic atrial fibrillation (H)  (primary encounter diagnosis)  (Z51.81) Encounter for therapeutic drug monitoring  (Z79.01) Long-term (current) use of anticoagulants  Comment: chronic, rate controlled, INR goal 1.5-2.0  Plan: coumadin 1mg po Mon, Tues, 0.5mg po Wed  -INR recheck Thursday, monitoring closely with infection and antibiotics, also with history of anemia and thrombocytopenia     (H60.502) Acute otitis externa of left ear, unspecified type  Comment: noted last Thursday, significantly improved  -vitals stable, no leukocytosis   Plan: continue oral keflex X 7 days  -staff to update if concerns or does not resolve       Electronically signed by  CODY Diaz CNP                      Sincerely,        CODY Diaz CNP

## 2018-09-10 NOTE — PROGRESS NOTES
[place] [facility]    Chief Complaint   Patient presents with     Nursing Home Acute     INR Followup       [place] Medical Record Number:  [id number]    HPI:    Francesco Killian is a 89 year old  (12/26/1920), who is being seen today for an episodic care visit at [facility] .  HPI information obtained from: facility chart records, facility staff, patient report and [facility] chart review.Today's concern is:    Chronic atrial fibrillation (H)  Encounter for therapeutic drug monitoring  Long-term (current) use of anticoagulants  Chronic, rate controlled on metoprolol.  No reports of palpitations or shortness of breath.  He is anticoagulated with coumadin and baby asa, history of anemia and thrombocytopenia, so monitor closely.  No reports of bleeding or clots.  As noted below, he is on antibiotics currently for acute otitis externa.  Goal INR 1.5-2.0 given his anemia and thrombocytopenia.   INR today 1.4, has been on 0.5mg po every day since Thursday     Acute otitis externa of left ear, unspecified type  notified by nursing staff last Thursday that patient with new serous drainage from ear, ear was edematous and erythematous (please see this provider's note from 9/6 for further details).  Discussed with patient's son, Dr. Killian.  Agreeable to keep and treat in NH setting.  He was given 3 doses of IM Rocephin and transitioned to oral Keflex Sunday X 7 days.  His vitals have been stable, afebrile, no lymphadenopathy, CBC obtained Thursday without leukocytosis.    Today, ear looks significantly improved, almost no erythema noted, no drainage noted, swelling is gone, no lymphadenopathy, and no reports of pain.        Last 3 BP's: 141/69, 144/71, 148/56 mmHg  Pulse Readings from Last 4 Encounters:   09/10/18 83   09/06/18 55   09/04/18 50   08/30/18 55     Wt Readings from Last 5 Encounters:   09/10/18 136 lb 9.6 oz (62 kg)   09/06/18 136 lb 9.6 oz (62 kg)   09/04/18 136 lb 9.6  oz (62 kg)   08/30/18 137 lb 6.4 oz (62.3 kg)   08/27/18 137 lb 6.4 oz (62.3 kg)       ALLERGIES: Review of patient's allergies indicates no known allergies.  Past Medical, Surgical, Family and Social History reviewed and updated in [facility].    Current Outpatient Prescriptions   Medication Sig Dispense Refill     ACETAMINOPHEN PO Take 500 mg by mouth 2 times daily        aspirin 81 MG tablet Take 81 mg by mouth daily        calcium carbonate (TUMS) 500 MG chewable tablet Take 2 chew tab by mouth 2 times daily as needed for heartburn        Cephalexin (KEFLEX PO) Take 500 mg by mouth 3 times daily       Citalopram Hydrobromide (CELEXA PO) Take 20 mg by mouth daily       ferrous sulfate (IRON) 325 (65 Fe) MG tablet Take 325 mg by mouth Twice a day every other day       FUROSEMIDE PO Take 10 mg by mouth 2 times daily        HYDRALAZINE HCL PO Take 25 mg by mouth 4 times daily GIVE AT 6-7am, 12n, 5pm,  for HTN Hold for SBP of less than or equal to 120       ketoconazole (NIZORAL) 2 % cream Apply to face BID PRN 30 g 1     levothyroxine (SYNTHROID/LEVOTHROID) 100 MCG tablet TAKE 1 TABLET BY MOUTH EVERY DAY. 90 tablet 0     METOPROLOL TARTRATE PO Take 12.5 mg by mouth 2 times daily for HTN Hold for SBP is<120 and HR is <60, plz call if hold x 2 in a row for any of these reasons.       Multiple Vitamins-Minerals (ICAPS PO) Take 1 capsule by mouth daily        polyethylene glycol (MIRALAX/GLYCOLAX) Packet Take 17 g by mouth daily as needed for constipation 7 packet      tamsulosin (FLOMAX) 0.4 MG capsule Take by mouth At Bedtime       Warfarin Sodium (COUMADIN PO) Take as directed       Medications reviewed:  Medications reconciled to facility chart and changes were made to reflect current medications as identified as above med list. Below are the changes that were made:   Medications stopped since last EPIC medication reconciliation:   There are no discontinued medications.    Medications started since last EPIC  "medication reconciliation:  Orders Placed This Encounter   Medications     Cephalexin (KEFLEX PO)     Sig: Take 500 mg by mouth 3 times daily         REVIEW OF SYSTEMS:  4 point ROS including Respiratory, CV, GI and , other than that noted in the HPI,  is negative    Physical Exam:  /69  Pulse 83  Temp 98.4  F (36.9  C)  Resp 16  Ht 5' 8\" (1.727 m)  Wt 136 lb 9.6 oz (62 kg)  SpO2 94%  BMI 20.77 kg/m2  General: alert, in no distress  Ears: bilateral TM pearly grey, no erythema of ear canals, left ear without drainage noted, no edema to left ear, no excess warmth noted, slight erythema noted to posterior side of left ear helix   Lymph: no cervical lymphadenopathy noted  RESP:  respiratory effort and palpation of chest normal, lungs clear to auscultation , no respiratory distress, decreased at bases  CV:  Palpation and auscultation of heart done , irregular rhythm, rate controlled, no murmur, rub, or gallop, no edema, grade 3/6 murmur  M/S:   Gait and station abnormal generalized weakness, no edema/erythema joints, CMS intact  NEURO:   Cranial nerves 2-12 are normal tested and grossly at patient's baseline, no tremor, normal muscle tone   PSYCH:  oriented to person only, mood and affect normal     Recent Labs:     Labs 9/7/18:      CBC RESULTS:   Recent Labs   Lab Test 09/06/18 08/30/18 08/20/18   WBC  4.3   --   5.1   RBC  3.03*   --   3.09*   HGB  10.1*  9.7*  10.0*   HCT  29.0*   --   30.9*   MCV  96   --   100   MCH  33.3   --   32.4   MCHC  34.8   --   32.4   RDW  13.7   --   13.7   PLT  126*   --   117*       Last Basic Metabolic Panel:  Recent Labs   Lab Test 08/27/18 08/20/18   NA  137  138   POTASSIUM  4.3  4.4   CHLORIDE  109*  109*   KEZIA  9.1  8.9   CO2  24  24   BUN  33*  40*   CR  1.10  1.19   GLC  70  72       TSH   Date Value Ref Range Status   03/29/2018 3.91 0.30 - 5.00 uIU/mL Final   07/11/2017 0.26 (L) 0.40 - 4.00 mU/L Final       Assessment/Plan:  (I48.2) Chronic atrial fibrillation " (H)  (primary encounter diagnosis)  (Z51.81) Encounter for therapeutic drug monitoring  (Z79.01) Long-term (current) use of anticoagulants  Comment: chronic, rate controlled, INR goal 1.5-2.0  Plan: coumadin 1mg po Mon, Tues, 0.5mg po Wed  -INR recheck Thursday, monitoring closely with infection and antibiotics, also with history of anemia and thrombocytopenia     (H60.502) Acute otitis externa of left ear, unspecified type  Comment: noted last Thursday, significantly improved  -vitals stable, no leukocytosis   Plan: continue oral keflex X 7 days  -staff to update if concerns or does not resolve       Electronically signed by  CODY Diaz CNP

## 2018-09-13 NOTE — PROGRESS NOTES
[place] [facility]    HPI:    Francesco Killian is a 89 year old  (12/26/1920), who is being seen today for an episodic care visit at [facility] .  HPI information obtained from: facility chart records and facility staff. Today's concern is INR/Coumadin management for A. Fib    Bleeding Signs/Symptoms:  None  Thromboembolic Signs/Symptoms:  None    Medication Changes:  Yes: finishing ceflex  Dietary Changes:  No  Activity Changes: No  Bacterial/Viral Infection:  Yes: celllitis of ear    Missed Coumadin Doses:  None    On ASA: Yes - 81 mg po q day    Other Concerns:  Yes: anemia and thrombocytopenia hx, monitor closely, also on asa    OBJECTIVE:    INR Today:  1.4  Current Dose: coumadin 1mg po Mon, Tues, 0.5mg po Wed    ASSESSMENT:  Chronic atrial fibrillation (H)  Encounter for therapeutic drug monitoring  Long-term (current) use of anticoagulants    Therapeutic INR for goal of 1.5-2.0    PLAN:    New Dose: 1mg Thurs, Friday, Monday, 0.5mg all other days of the week       Next INR: 1 week        Electronically signed by:  CODY Diaz CNP

## 2018-09-13 NOTE — LETTER
9/13/2018        RE: Francesco Killian  [facility]  [address] [facility] [address]          [place] [facility]    HPI:    Francesco Killian is a 89 year old  (12/26/1920), who is being seen today for an episodic care visit at [facility] .  HPI information obtained from: facility chart records and facility staff. Today's concern is INR/Coumadin management for A. Fib    Bleeding Signs/Symptoms:  None  Thromboembolic Signs/Symptoms:  None    Medication Changes:  Yes: finishing ceflex  Dietary Changes:  No  Activity Changes: No  Bacterial/Viral Infection:  Yes: celllitis of ear    Missed Coumadin Doses:  None    On ASA: Yes - 81 mg po q day    Other Concerns:  Yes: anemia and thrombocytopenia hx, monitor closely, also on asa    OBJECTIVE:    INR Today:  1.4  Current Dose: coumadin 1mg po Mon, Tues, 0.5mg po Wed    ASSESSMENT:  Chronic atrial fibrillation (H)  Encounter for therapeutic drug monitoring  Long-term (current) use of anticoagulants    Therapeutic INR for goal of 1.5-2.0    PLAN:    New Dose: 1mg Thurs, Friday, Monday, 0.5mg all other days of the week       Next INR: 1 week        Electronically signed by:  CODY Diaz CNP            Sincerely,        CODY Diaz CNP

## 2018-09-20 NOTE — LETTER
9/20/2018        RE: Francesco Killian  [facility]  [address] [facility] [address]          [facility] DISCHARGE SUMMARY    PATIENT'S NAME: Francesco Killian  YOB: 1920  MEDICAL RECORD NUMBER:  [id number]    PRIMARY CARE PROVIDER AND CLINIC RESPONSIBLE AFTER TRANSFER: CODY Patel CNP    CODE STATUS/ADVANCE DIRECTIVES DISCUSSION:   DNR / DNI     No Known Allergies    TRANSFERRING PROVIDERS: CODY Diaz CNP, Dr. Kelby MD  DATE OF SNF ADMISSION:  March / 24 / 2018  DATE OF SNF (anticipated) DISCHARGE: September / 24 / 2018  DISCHARGE DISPOSITION: [facility] Provider   Nursing Facility: [facility].  TCU stay 2/23/18 through 3/24/18.    Condition on Discharge:  Stable.  Function:  Ambulates with 4ww and SBA, transfers with assist of 1, w/c for longer distances, requires assistance with ADL;s  Cognitive Scores: BIMS 5/15    Equipment: walker and wheelchair    DISCHARGE DIAGNOSIS:   1. Chronic atrial fibrillation (H)    2. Chronic diastolic congestive heart failure (H)    3. CKD (chronic kidney disease) stage 3, GFR 30-59 ml/min    4. Essential hypertension with goal blood pressure less than 140/90    5. Thrombocytopenia (H)    6. Wet senile macular degeneration (H)    7. Recurrent major depressive disorder, remission status unspecified (H)    8. Abdominal aortic aneurysm (AAA) without rupture (H)    9. Coronary artery disease involving native heart without angina pectoris, unspecified vessel or lesion type    10. History of CVA (cerebrovascular accident)    11. Vascular dementia without behavioral disturbance    12. Closed compression fracture of L1 lumbar vertebra with routine healing, subsequent encounter    13. Hypothyroidism, unspecified type    14. Anemia due to blood loss, acute    15. Dysphagia, unspecified type        HPI Nursing Facility Course:  HPI information  obtained from: facility chart records, facility staff, patient report and [facility] chart review.    Chronic atrial fibrillation (H)  Chronic, rate controlled.  Had bradycardia which patient was asymptomatic of and his metoprolol was reduced to 12.5mg po BID in August. He is anticoagulated with coumadin and asa per patient's son's preference who is a neurologist.  Denies/no reports of palpitations, dizziness, lightheadedness or shortness of breath.  INR today is 1.3.   Due to anemia and thrombocytopenia,  his INR goal to 1.5-2.0, his son Dr. Killian is in agreement with this.   Currently on coumadin 1mg po Thurs, Friday, Monday, and 0.5mg po all other days        Pulse Readings from Last 4 Encounters:   09/20/18 64   09/13/18 51   09/10/18 83   09/06/18 55       Chronic diastolic congestive heart failure (H)  Essential hypertension with goal blood pressure less than 140/90  Per history, ECHO 2/2017 with normal LV systolic function, EF 65%-70% but severe concentric left ventricular hypertrophy.  Mild/moderate RV dilation with mildly decrease RV systolic function.  Moderate aortic stenosis.  He is on lasix 10mg BID (which has been adjusted up or down a few times since admission due to kidney function and fluid status) Patient is also on metoprolol and hydralazine.  Denies/no reports of PND, orthopnea, hypoxia, increasing edema.  His BP are somewhat labile and elevated at times.  Permissive htn to avoid hypoperfusion to his brain.   Family has elected to not follow with cardiology.  No reports of chest pain.  His weight is stable    Last 3 BP's: 155/65, 143/59, 136/64 mmHg    Wt Readings from Last 3 Encounters:   09/20/18 139 lb 6.4 oz (63.2 kg)   09/13/18 136 lb 9.6 oz (62 kg)   09/10/18 136 lb 9.6 oz (62 kg)          CKD (chronic kidney disease) stage 3, GFR 30-59 ml/min  Chronic with OR[place]O noted inpatient, improved with holding diuretics and IVF.  Creatinine was 1.68 in hospital, while in TCU he was discharged with  creatinine as noted below       Thrombocytopenia (H)  Lab work on 8/20 shows platelets at 117.  He is on asa and coumadin as  for anticoagulation.  Reviewing chart, patient with dx of chronic thrombocytopenia.  Discussion with son  Constantin, aware of chronic thrombocytopenia, not interested in further w/u at this time.  He is at high risk for repeat stroke/clot, so not wanting to stop coumadin or asa at this time.  In agreement, to titrate coumadin to keep INR levels lower at 1.5-2.0 given his anemia and chronic thrombocytopenia      Wet senile macular degeneration (H)  Chronic, very poor vision baseline, legally blind.  Follows with opthalmology     Recurrent major depressive disorder, remission status unspecified (H)  Celexa increased beginning of May per family's request due to worsening depression. Patient was having a harder time adjusting to LTC, dislikes being apart from his wife.  Son reports they try and take him to see his wife 1-2 times per week, but difficult with their schedules.  Denies/no reports of thoughts to harm self or others.  Also follows with in-house psych. Suspect that his dose can be reduced and med possibly d'c'd with transition to LTC where his wife resides    Abdominal aortic aneurysm (AAA) without rupture (H)  History of PAD and known AAA noted on US from 9/25/17, 5.6cm in diameter.  Denies/no reports of abdmonial pain.  EVAR procedure has been discussed with patient and family in past and they have deferred     Coronary artery disease involving native heart without angina pectoris, unspecified vessel or lesion type  Per history, s/p CABG in the 1980's, per notes his primary presenting symptom at that time was fatigue.  He is anticoagulated with coumadin and asa.  No reports of chest pain, dyspnea or increasing fatigue     History of CVA (cerebrovascular accident)  History of multiple CVA's with cerebral and carotid artery stenosis.  He is on coumadin and asa, patient's son, malathi  neurologist prefers to continue with these     Vascular dementia without behavioral disturbance  Limited memory, very forgetful, previously was living independently, increased cares required, primary reason for LTC admission.  Complicated by his poor vision and hearing    Closed compression fracture of L1 lumbar vertebra with routine healing, subsequent encounter  Fall with traumatic L1 compression fracture and hospitalization end of February 2018.  Ortho consulted in hospital and recommended brace prn for pain management.  He completed therapy in the TCU and his pain significantly improved.   On APAP 500mg BID with no reports of pain.      Hypothyroidism, unspecified type  Per history, on synthroid    Lab Results   Component Value Date    TSH 3.91 03/29/2018         Anemia due to blood loss, acute  Noted with hgb drop after ER visits in May 2018 due to fall and scalp laceration.  He is on dual anticoagulation with coumadin and asa.  On omeprazole for GI protection, as above also with thrombocytopenia.  No recent reports of acute blood loss.  Occult stool has been negative.    His Hgb was 11 end of June, trended down to around 9, at about 10, NCNC.  No reports of dizziness, lightheadedness, or shortness of breath.  Discussion with son, Dr. Killian.  Decline GI w/u at this time, even though patient with NCNC, per son with history of some improvement in the past when on iron supplementation.  In agreement to start this, titrate coumadin to lower INR level of 1.5-2.0 given his bleeding risk      Lab Results   Component Value Date    HGB 9.9 09/17/2018         Dysphagia, unspecified type  Per history, he is on mechanical soft diet.  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 He also has no past medical history of Basal cell carcinoma or Malignant melanoma (H).    DISCHARGE MEDICATIONS:  Current Outpatient Prescriptions   Medication Sig Dispense Refill     ACETAMINOPHEN PO Take 500 mg by mouth 2 times daily        aspirin 81 MG tablet Take 81 mg by mouth daily        calcium carbonate (TUMS) 500 MG chewable tablet Take 2 chew tab by mouth 2 times daily as needed for heartburn        Citalopram Hydrobromide (CELEXA PO) Take 20 mg by mouth daily       ferrous sulfate (IRON) 325 (65 Fe) MG tablet Take 325 mg by mouth Twice a day every other day       FUROSEMIDE PO Take 10 mg by mouth 2 times daily        HYDRALAZINE HCL PO Take 25 mg by mouth 4 times daily GIVE AT 6-7am, 12n, 5pm,  for HTN Hold for SBP of less than or equal to 120       ketoconazole (NIZORAL) 2 % cream Apply to face BID PRN 30 g 1     levothyroxine (SYNTHROID/LEVOTHROID) 100 MCG tablet TAKE 1 TABLET BY MOUTH EVERY DAY. 90 tablet 0     METOPROLOL TARTRATE PO Take 12.5 mg by mouth 2 times daily for HTN Hold for SBP is<120 and HR is <60, plz call if hold x 2 in a row for any of these reasons.       Multiple Vitamins-Minerals (ICAPS PO) Take 1 capsule by mouth daily        polyethylene glycol (MIRALAX/GLYCOLAX) Packet Take 17 g by mouth daily as needed for constipation 7 packet      tamsulosin (FLOMAX) 0.4 MG capsule Take by mouth At Bedtime       Warfarin Sodium (COUMADIN PO) Take as directed         MEDICATION CHANGES/RATIONALE:   1. Celexa increased per family request due to worsening depression upon admission to [facility] and being away from his wife  2. Lasix adjusted on couple of  "occasions r/t kidney function and fluid status  3. Metoprolol reduced due to bradycardia     Controlled medications sent with patient:   not applicable/none     ROS:    4 point ROS including Respiratory, CV, GI and , other than that noted in the HPI,  is negative    Physical Exam:   Vitals: /65  Pulse 64  Temp 97.4  F (36.3  C)  Resp 12  Ht 5' 8\" (1.727 m)  Wt 139 lb 6.4 oz (63.2 kg)  SpO2 94%  BMI 21.2 kg/m2  BMI= Body mass index is 21.2 kg/(m^2).    GENERAL APPEARANCE:  Alert, in no distress  ENT:  Mouth and posterior oropharynx normal, moist mucous membranes, [place]  NECK:  No adenopathy,masses or thyromegaly  RESP:  respiratory effort and palpation of chest normal, lungs clear to auscultation , no respiratory distress, decreased at bases  CV:  Palpation and auscultation of heart done , irregular rhythm, rate controlled, no rub, or gallop, no edema, grade 3/6 murmur  ABDOMEN:  normal bowel sounds, soft, nontender, no hepatosplenomegaly or other masses  M/S:   Gait and station abnormal generalized weakness, no edema/erythema joints, CMS intact  NEURO:  no focal findings  PSYCH:  oriented to person only, mood and affect normal     DISCHARGE PLAN: [facility]  Patient instructed to follow-up with:  PCP in 7 days      Current [place] scheduled appointments:  Future Appointments  Date Time Provider Department Center   9/20/2018 9:00 AM [facility] and placed by this provider: No    Pending labs: INR 9/24/18   labs     CBC RESULTS:   Recent Labs   Lab Test 09/17/18 09/06/18 08/20/18   WBC   --   4.3   --   5.1   RBC   --   3.03*   --   3.09*   HGB  9.9*  10.1*   < >  10.0*   HCT   --   29.0*   --   30.9*   MCV   --   96   --   100   MCH   --   33.3   --   32.4   MCHC   --   34.8   --   32.4   RDW   --   13.7   --   13.7   PLT   --   126*   --   117*    < > = values in this " interval not displayed.       Last Basic Metabolic Panel:  Recent Labs   Lab Test 08/27/18 08/20/18   NA  137  138   POTASSIUM  4.3  4.4   CHLORIDE  109*  109*   KEZIA  9.1  8.9   CO2  24  24   BUN  33*  40*   CR  1.10  1.19   GLC  70  72       TSH   Date Value Ref Range Status   03/29/2018 3.91 0.30 - 5.00 uIU/mL Final   07/11/2017 0.26 (L) 0.40 - 4.00 mU/L Final       Discharge Treatments: INR and coumadin monitoring, next INR due 9/24, monitoring frequently due to anemia and thrombocytopenia, labile INR's due to poor po intake     TOTAL DISCHARGE TIME:   Greater than 30 minutes  Electronically signed by:  CODY Diaz CNP      Sincerely,        CODY Diaz CNP

## 2018-09-20 NOTE — PROGRESS NOTES
[place] [facility] DISCHARGE SUMMARY    PATIENT'S NAME: Francesco Killian  YOB: 1920  MEDICAL RECORD NUMBER:  [id number]    PRIMARY CARE PROVIDER AND CLINIC RESPONSIBLE AFTER TRANSFER: CODY Patel CNP    CODE STATUS/ADVANCE DIRECTIVES DISCUSSION:   DNR / DNI     No Known Allergies    TRANSFERRING PROVIDERS: CODY Diaz CNP, Dr. Kelby MD  DATE OF SNF ADMISSION:  March / 24 / 2018  DATE OF SNF (anticipated) DISCHARGE: September / 24 / 2018  DISCHARGE DISPOSITION: [facility] Provider   Nursing Facility: [facility].  TCU stay 2/23/18 through 3/24/18.    Condition on Discharge:  Stable.  Function:  Ambulates with 4ww and SBA, transfers with assist of 1, w/c for longer distances, requires assistance with ADL;s  Cognitive Scores: BIMS 5/15    Equipment: walker and wheelchair    DISCHARGE DIAGNOSIS:   1. Chronic atrial fibrillation (H)    2. Chronic diastolic congestive heart failure (H)    3. CKD (chronic kidney disease) stage 3, GFR 30-59 ml/min    4. Essential hypertension with goal blood pressure less than 140/90    5. Thrombocytopenia (H)    6. Wet senile macular degeneration (H)    7. Recurrent major depressive disorder, remission status unspecified (H)    8. Abdominal aortic aneurysm (AAA) without rupture (H)    9. Coronary artery disease involving native heart without angina pectoris, unspecified vessel or lesion type    10. History of CVA (cerebrovascular accident)    11. Vascular dementia without behavioral disturbance    12. Closed compression fracture of L1 lumbar vertebra with routine healing, subsequent encounter    13. Hypothyroidism, unspecified type    14. Anemia due to blood loss, acute    15. Dysphagia, unspecified type        HPI Nursing Facility Course:  HPI information obtained from: facility chart records, facility staff, patient report and [facility] chart review.    Chronic atrial  fibrillation (H)  Chronic, rate controlled.  Had bradycardia which patient was asymptomatic of and his metoprolol was reduced to 12.5mg po BID in August. He is anticoagulated with coumadin and asa per patient's son's preference who is a neurologist.  Denies/no reports of palpitations, dizziness, lightheadedness or shortness of breath.  INR today is 1.3.  Due to anemia and thrombocytopenia,  his INR goal to 1.5-2.0, his son  Constantin is in agreement with this.   Currently on coumadin 1mg po Thurs, Friday, Monday, and 0.5mg po all other days        Pulse Readings from Last 4 Encounters:   09/20/18 64   09/13/18 51   09/10/18 83   09/06/18 55       Chronic diastolic congestive heart failure (H)  Essential hypertension with goal blood pressure less than 140/90  Per history, ECHO 2/2017 with normal LV systolic function, EF 65%-70% but severe concentric left ventricular hypertrophy.  Mild/moderate RV dilation with mildly decrease RV systolic function.  Moderate aortic stenosis.  He is on lasix 10mg BID (which has been adjusted up or down a few times since admission due to kidney function and fluid status) Patient is also on metoprolol and hydralazine.  Denies/no reports of PND, orthopnea, hypoxia, increasing edema.  His BP are somewhat labile and elevated at times.  Permissive htn to avoid hypoperfusion to his brain.  Family has elected to not follow with cardiology.  No reports of chest pain.  His weight is stable    Last 3 BP's: 155/65, 143/59, 136/64 mmHg    Wt Readings from Last 3 Encounters:   09/20/18 139 lb 6.4 oz (63.2 kg)   09/13/18 136 lb 9.6 oz (62 kg)   09/10/18 136 lb 9.6 oz (62 kg)          CKD (chronic kidney disease) stage 3, GFR 30-59 ml/min  Chronic with [place]O noted inpatient, improved with holding diuretics and IVF.  Creatinine was 1.68 in hospital, while in TCU he was discharged with creatinine as noted below       Thrombocytopenia (H)  Lab work on 8/20 shows platelets at 117.  He is on asa and coumadin  as  for anticoagulation.  Reviewing chart, patient with dx of chronic thrombocytopenia.  Discussion with son  Constantin, aware of chronic thrombocytopenia, not interested in further w/u at this time.  He is at high risk for repeat stroke/clot, so not wanting to stop coumadin or asa at this time.  In agreement, to titrate coumadin to keep INR levels lower at 1.5-2.0 given his anemia and chronic thrombocytopenia      Wet senile macular degeneration (H)  Chronic, very poor vision baseline, legally blind.  Follows with opthalmology     Recurrent major depressive disorder, remission status unspecified (H)  Celexa increased beginning of May per family's request due to worsening depression. Patient was having a harder time adjusting to LTC, dislikes being apart from his wife.  Son reports they try and take him to see his wife 1-2 times per week, but difficult with their schedules.  Denies/no reports of thoughts to harm self or others.  Also follows with in-house psych. Suspect that his dose can be reduced and med possibly d'c'd with transition to LTC where his wife resides    Abdominal aortic aneurysm (AAA) without rupture (H)  History of PAD and known AAA noted on US from 9/25/17, 5.6cm in diameter.  Denies/no reports of abdmonial pain.  EVAR procedure has been discussed with patient and family in past and they have deferred     Coronary artery disease involving native heart without angina pectoris, unspecified vessel or lesion type  Per history, s/p CABG in the 1980's, per notes his primary presenting symptom at that time was fatigue.  He is anticoagulated with coumadin and asa.  No reports of chest pain, dyspnea or increasing fatigue     History of CVA (cerebrovascular accident)  History of multiple CVA's with cerebral and carotid artery stenosis.  He is on coumadin and asa, patient's son, a neurologist prefers to continue with these     Vascular dementia without behavioral disturbance  Limited memory, very forgetful,  previously was living independently, increased cares required, primary reason for LTC admission.  Complicated by his poor vision and hearing    Closed compression fracture of L1 lumbar vertebra with routine healing, subsequent encounter  Fall with traumatic L1 compression fracture and hospitalization end of February 2018.  Ortho consulted in hospital and recommended brace prn for pain management.  He completed therapy in the TCU and his pain significantly improved.  On APAP 500mg BID with no reports of pain.      Hypothyroidism, unspecified type  Per history, on synthroid    Lab Results   Component Value Date    TSH 3.91 03/29/2018         Anemia due to blood loss, acute  Noted with hgb drop after ER visits in May 2018 due to fall and scalp laceration.  He is on dual anticoagulation with coumadin and asa.  On omeprazole for GI protection, as above also with thrombocytopenia.  No recent reports of acute blood loss.  Occult stool has been negative.    His Hgb was 11 end of June, trended down to around 9, at about 10, NCNC.  No reports of dizziness, lightheadedness, or shortness of breath.  Discussion with son, Dr. Killian.  Decline GI w/u at this time, even though patient with NCNC, per son with history of some improvement in the past when on iron supplementation.  In agreement to start this, titrate coumadin to lower INR level of 1.5-2.0 given his bleeding risk      Lab Results   Component Value Date    HGB 9.9 09/17/2018         Dysphagia, unspecified type  Per history, he is on mechanical soft diet.  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 He also has no past medical history of Basal cell carcinoma or Malignant melanoma (H).    DISCHARGE MEDICATIONS:  Current Outpatient Prescriptions   Medication Sig Dispense Refill     ACETAMINOPHEN PO Take 500 mg by mouth 2 times daily        aspirin 81 MG tablet Take 81 mg by mouth daily        calcium carbonate (TUMS) 500 MG chewable tablet Take 2 chew tab by mouth 2 times daily as needed for heartburn        Citalopram Hydrobromide (CELEXA PO) Take 20 mg by mouth daily       ferrous sulfate (IRON) 325 (65 Fe) MG tablet Take 325 mg by mouth Twice a day every other day       FUROSEMIDE PO Take 10 mg by mouth 2 times daily        HYDRALAZINE HCL PO Take 25 mg by mouth 4 times daily GIVE AT 6-7am, 12n, 5pm,  for HTN Hold for SBP of less than or equal to 120       ketoconazole (NIZORAL) 2 % cream Apply to face BID PRN 30 g 1     levothyroxine (SYNTHROID/LEVOTHROID) 100 MCG tablet TAKE 1 TABLET BY MOUTH EVERY DAY. 90 tablet 0     METOPROLOL TARTRATE PO Take 12.5 mg by mouth 2 times daily for HTN Hold for SBP is<120 and HR is <60, plz call if hold x 2 in a row for any of these reasons.       Multiple Vitamins-Minerals (ICAPS PO) Take 1 capsule by mouth daily        polyethylene glycol (MIRALAX/GLYCOLAX) Packet Take 17 g by mouth daily as needed for constipation 7 packet      tamsulosin (FLOMAX) 0.4 MG capsule Take by mouth At Bedtime       Warfarin Sodium (COUMADIN PO) Take as directed         MEDICATION CHANGES/RATIONALE:   1. Celexa increased per family request due to worsening depression upon admission to LTC and being away from his wife  2. Lasix adjusted on couple of occasions r/t kidney function and fluid status  3. Metoprolol reduced due to bradycardia     Controlled medications sent with  "patient:   not applicable/none     ROS:    4 point ROS including Respiratory, CV, GI and , other than that noted in the HPI,  is negative    Physical Exam:   Vitals: /65  Pulse 64  Temp 97.4  F (36.3  C)  Resp 12  Ht 5' 8\" (1.727 m)  Wt 139 lb 6.4 oz (63.2 kg)  SpO2 94%  BMI 21.2 kg/m2  BMI= Body mass index is 21.2 kg/(m^2).    GENERAL APPEARANCE:  Alert, in no distress  ENT:  Mouth and posterior oropharynx normal, moist mucous membranes, [place]  NECK:  No adenopathy,masses or thyromegaly  RESP:  respiratory effort and palpation of chest normal, lungs clear to auscultation , no respiratory distress, decreased at bases  CV:  Palpation and auscultation of heart done , irregular rhythm, rate controlled, no rub, or gallop, no edema, grade 3/6 murmur  ABDOMEN:  normal bowel sounds, soft, nontender, no hepatosplenomegaly or other masses  M/S:   Gait and station abnormal generalized weakness, no edema/erythema joints, CMS intact  NEURO:  no focal findings  PSYCH:  oriented to person only, mood and affect normal     DISCHARGE PLAN: [facility]  Patient instructed to follow-up with:  PCP in 7 days      Current [place] scheduled appointments:  Future Appointments  Date Time Provider Department Center   9/20/2018 9:00 AM Julissa Virk APRN CNP [facility] needed and placed by this provider: No    Pending labs: INR 9/24/18  [facility] labs     CBC RESULTS:   Recent Labs   Lab Test 09/17/18 09/06/18 08/20/18   WBC   --   4.3   --   5.1   RBC   --   3.03*   --   3.09*   HGB  9.9*  10.1*   < >  10.0*   HCT   --   29.0*   --   30.9*   MCV   --   96   --   100   MCH   --   33.3   --   32.4   MCHC   --   34.8   --   32.4   RDW   --   13.7   --   13.7   PLT   --   126*   --   117*    < > = values in this interval not displayed.       Last Basic Metabolic Panel:  Recent Labs   Lab Test 08/27/18 08/20/18   NA  137  138   POTASSIUM  " 4.3  4.4   CHLORIDE  109*  109*   KEZIA  9.1  8.9   CO2  24  24   BUN  33*  40*   CR  1.10  1.19   GLC  70  72       TSH   Date Value Ref Range Status   03/29/2018 3.91 0.30 - 5.00 uIU/mL Final   07/11/2017 0.26 (L) 0.40 - 4.00 mU/L Final       Discharge Treatments: INR and coumadin monitoring, next INR due 9/24, monitoring frequently due to anemia and thrombocytopenia, labile INR's due to poor po intake     TOTAL DISCHARGE TIME:   Greater than 30 minutes  Electronically signed by:  CODY Diaz CNP

## 2018-09-20 NOTE — Clinical Note
Nas David,  This very sweet patient is coming to you on Monday.  Dr. Lama is very familiar with him and his wife is at [facility] as well.  He will be much happier being close to her.  He is due for INR Monday.  We are watching them very closely due to his anemia and thrombocytopenia, labile at times, poor po intake and albumin.  Goal is 1.5-2.0, it was 1.3 today, he is on 1mg po daily until Monday  His family is all very medical, son is neurologist at [employer]   Let me know if any questions  Thank! Jaycee

## 2018-09-26 NOTE — TELEPHONE ENCOUNTER
Patient on Coumadin with goal INR 1.5-2. Admitted to LTC on 9/24. Was supposed to get INR checked on 9/24 but apparently this did not get done and he did not get any Coumadin on 9/24. Today INR 1.3. Asked staff to give 1 mg PO coumadin daily and check INR on 9/28    Electronically signed by CODY Hunt, GNP

## 2018-10-02 NOTE — PROGRESS NOTES
[place] [facility]  PRIMARY CARE PROVIDER AND CLINIC:  Joann Quintero [address] / [place] [address]  Chief Complaint   Patient presents with     [facility] Medical Record Number:  [id number]    HPI:    Francesco Killian is a 89 year old  (12/26/1920),admitted to the [facility] TCU stay 2/23/18 through 2/23/18.  Admitted to this facility for  rehab, medical management and nursing care.  HPI information obtained from: facility chart records, facility staff, patient report and [facility] chart review.      Current issues are:      Chronic atrial fibrillation (H)  Patient on ASA and Coumadin and INR range is 1.5-2.0 per previous NP report. Has taken 12 mg coumadin in the past week. INR today 1.6. HRR.     Dysphagia, unspecified type  History of CVA (cerebrovascular accident)  Is on an altered diet - dietician asks for speech eval at SNF.     Abdominal aortic aneurysm (AAA) without rupture (H)  Essential hypertension with goal blood pressure less than 140/90  Chronic congestive heart failure, unspecified heart failure type (H)  Coronary artery disease involving native heart without angina pectoris, unspecified vessel or lesion type  By history. No pain.     Other specified hypothyroidism  On synthroid.   Lab Results   Component Value Date    TSH 3.91 03/29/2018    TSH 0.26 07/11/2017        Major depressive disorder, single episode, mild (H)  Appears calm and in good spirits.     Last 3 BPs: (10/2) 134/61, 163/79, 152/59, 126/62, (10/1) 159/68, 156/60, 152/64, 190/80, (9/30) 143/68, 153/73, 152/72, 165/85 mmHg  HR Ranges: 48-72 bpm    Current Weight: (10/2) 143.3, 141.6, (10/1) 143.3, (9/30) 141.8, (9/27) 140  lbs    INR's:  (9/25) 1.3, (9/24) 1.3,   Lab Results   Component Value Date    INR 2.50 05/09/2018    INR 3.07 02/23/2018    INR 3.47 02/22/2018       CODE STATUS/ADVANCE DIRECTIVES DISCUSSION:   DNR /  DNI  Patient's living condition: lives in a skilled nursing facility    ALLERGIES:Review of patient's allergies indicates no known allergies.  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 He also has no past medical history of Basal cell carcinoma or Malignant melanoma (H).  PAST SURGICAL HISTORY:  has a past surgical history that includes GI surgery (1970s); Cardiac surgery (1980s); Eye surgery; colonoscopy; hernia repair; IR Renal/Visceral Stent/Atherect/PTA; turp; Esophagoscopy, gastroscopy, duodenoscopy (EGD), combined (10/14/2013); Colonoscopy (11/4/2013); and NONSPECIFIC PROCEDURE.  FAMILY HISTORY: family history includes C.A.D. in his brother, father, maternal grandfather, maternal grandmother, mother, paternal grandfather, paternal grandmother, and sister; Coronary Artery Disease in his mother.  SOCIAL HISTORY:  reports that he has never smoked. He has never used smokeless tobacco. He reports that he does not drink alcohol or use illicit drugs.    Post Discharge Medication Reconciliation Status: discharge medications reconciled and changed, per note/orders (see AVS).  Current Outpatient Prescriptions   Medication Sig Dispense Refill     ACETAMINOPHEN PO Take 500 mg by mouth 2 times daily        aspirin 81 MG tablet Take 81 mg by mouth daily (children's aspirin).       Calcium Carbonate Antacid 400 MG CHEW Take 400 mg by mouth 2 times daily as needed       Citalopram  Hydrobromide (CELEXA PO) Take 20 mg by mouth daily       ferrous sulfate (IRON) 325 (65 Fe) MG tablet Take 325 mg by mouth Twice a day every other day       FUROSEMIDE PO Take 10 mg by mouth 2 times daily        HYDRALAZINE HCL PO Take 25 mg by mouth 4 times daily GIVE AT 6-7am, 12n, 5pm,  for HTN Hold for SBP of less than or equal to 120       ketoconazole (NIZORAL) 2 % cream Apply to face BID PRN 30 g 1     levothyroxine (SYNTHROID/LEVOTHROID) 100 MCG tablet TAKE 1 TABLET BY MOUTH EVERY DAY. 90 tablet 0     METOPROLOL TARTRATE PO Take 12.5 mg by mouth 2 times daily for HTN Hold for SBP is<120 and HR is <60, plz call if hold x 2 in a row for any of these reasons.       Multiple Vitamins-Minerals (ICAPS PO) Take 1 capsule by mouth daily        polyethylene glycol (MIRALAX/GLYCOLAX) Packet Take 17 g by mouth daily as needed for constipation 7 packet      tamsulosin (FLOMAX) 0.4 MG capsule Take by mouth At Bedtime       Warfarin Sodium (COUMADIN PO) Take as directed         ROS:  Limited secondary to cognitive impairment but today pt reports no pain, no dyspnea, no other concerns.     Exam:  /60  Pulse 84  Wt 119 lb 11.2 oz (54.3 kg)  BMI 18.2 kg/m2  GENERAL APPEARANCE:  Alert, in no distress, pleasant, cooperative, oriented x self, wife and place  EYES:  EOM, lids, pupils and irises normal, sclera clear and conjunctiva normal, no discharge or mattering on lids or lashes noted  ENT:  Mouth normal, moist mucous membranes, nose normal without drainage or crusting, external ears without lesions, hearing acuity intact  RESP:  respiratory effort and palpation of chest normal, no chest wall tenderness, no respiratory distress, Lung sounds clear, patient is on room air  CV:  Palpation and auscultation of heart done, rate and rhythm controlled and irregularly irregular, no murmur, no rub or gallop. Edema none bilateral lower extremities.   ABDOMEN:  normal bowel sounds, soft, nontender.  M/S:   Gait and  station wheelchair bound and walks with assist , no tenderness or swelling of the joints; able to move all extremities   NEURO: cranial nerves 2-12 grossly intact, no facial asymmetry, no speech deficits and able to follow directions, resting tremor of hands  PSYCH:  insight and judgement at baseline, memory forgetful, affect and mood normal     Lab/Diagnostic data:  CBC RESULTS:   Recent Labs   Lab Test 09/17/18 09/06/18 08/20/18   WBC   --   4.3   --   5.1   RBC   --   3.03*   --   3.09*   HGB  9.9*  10.1*   < >  10.0*   HCT   --   29.0*   --   30.9*   MCV   --   96   --   100   MCH   --   33.3   --   32.4   MCHC   --   34.8   --   32.4   RDW   --   13.7   --   13.7   PLT   --   126*   --   117*    < > = values in this interval not displayed.       Last Basic Metabolic Panel:  Recent Labs   Lab Test 08/27/18 08/20/18   NA  137  138   POTASSIUM  4.3  4.4   CHLORIDE  109*  109*   KEZIA  9.1  8.9   CO2  24  24   BUN  33*  40*   CR  1.10  1.19   GLC  70  72       Liver Function Studies -   Recent Labs   Lab Test  09/07/17   1512  02/03/17   1950   PROTTOTAL  8.1  8.2   ALBUMIN  3.6  3.6   BILITOTAL  0.4  0.3   ALKPHOS  105  82   AST  26  29   ALT  22  23       TSH   Date Value Ref Range Status   03/29/2018 3.91 0.30 - 5.00 uIU/mL Final   07/11/2017 0.26 (L) 0.40 - 4.00 mU/L Final       ASSESSMENT/PLAN:  Chronic atrial fibrillation (H)  Chronic, rate controlled. INR 1.6. Give Coumadin 1 mg PO Mondays and 2 mg PO other days. INR check one week.     Dysphagia, unspecified type  Chronic, hx cva. Will ask for st eval and treat.     Abdominal aortic aneurysm (AAA) without rupture (H)  Chronic, meds as above. Monitor.     Other specified hypothyroidism  Chronic, meds as above. TSH in March 2019    Essential hypertension with goal blood pressure less than 140/90  Chronic. Meds as above. Vs per routine. Bmp in march 2019    Major depressive disorder, single episode, mild (H)  Chronic and stable. Monitor.     Chronic congestive  heart failure, unspecified heart failure type (H)  Chronic, fluid balanced. Meds as above. Vs and wt per routine. Bmp next year.     History of CVA (cerebrovascular accident)  On coumadin. Monitor.     Coronary artery disease involving native heart without angina pectoris, unspecified vessel or lesion type  By history. Meds as above. Monitor.      Orders:  Check TSH, CBC and BMP in March of 2019 diagnosis anemia, CKD, hypothyroidism  Coumadin 1 mg PO Monday and 2 mg PO other days. inr one week.     Total time spent with patient visit at the skilled nursing facility was 35 min including patient visit and review of past records. Greater than 50% of total time spent with counseling and coordinating care due to review of history, current issues, POC to address concerns as noted above    Electronically signed by:  CODY Hunt CNP

## 2018-10-04 NOTE — MR AVS SNAPSHOT
After Visit Summary   10/4/2018    Francesco Killian    MRN: [id number]           Patient Information     Date Of Birth          12/26/1920        Visit Information        Provider Department      10/4/2018 12:30 PM Joann Quintero APRN CNP [facility]        Today's Diagnoses     Chronic atrial fibrillation (H)    -  1    Dysphagia, unspecified type        Abdominal aortic aneurysm (AAA) without rupture (H)        Other specified hypothyroidism        Essential hypertension with goal blood pressure less than 140/90        Major depressive disorder, single episode, mild (H)        Chronic congestive heart failure, unspecified heart failure type (H)        History of CVA (cerebrovascular accident)        Coronary artery disease involving native heart without angina pectoris, unspecified vessel or lesion type           Follow-ups after your visit        Your next 10 appointments already scheduled     Oct 11, 2018  3:15 PM T   Nursing Home with Bonnie Lama MD   [facility] ([facility])    [address] [place] [address] [id number] [phone]              Who to contact     If you have questions or need follow up information about today's clinic visit or your schedule please contact [facility] directly at [phone].  Normal or non-critical lab and imaging results will be communicated to you by MyChart, letter or phone within 4 business days after the clinic has received the results. If you do not hear from us within 7 days, please contact the clinic through MyChart or phone. If you have a critical or abnormal lab result, we will notify you by phone as soon as possible.  Submit refill requests through [employer] or call your pharmacy and they will forward the refill request to us. Please allow 3 business days for your refill to be completed.          Additional Information About Your Visit        MyChart Information      [employer] gives you secure access to your electronic health record. If you see a primary care provider, you can also send messages to your care team and make appointments. If you have questions, please call your primary care clinic.  If you do not have a primary care provider, please call [phone] and they will assist you.        Care EveryWhere ID     This is your Care EveryWhere ID. This could be used by other organizations to access your [facility] medical records  [id number]        Your Vitals Were     Pulse BMI (Body Mass Index)                84 18.2 kg/m2           Blood Pressure from Last 3 Encounters:   10/02/18 122/60   09/20/18 155/65   09/13/18 142/50    Weight from Last 3 Encounters:   10/02/18 119 lb 11.2 oz (54.3 kg)   09/20/18 139 lb 6.4 oz (63.2 kg)   09/13/18 136 lb 9.6 oz (62 kg)              Today, you had the following     No orders found for display       Primary Care Provider Office Phone # Fax #    Joann CODY [facility] [phone]       [address] [facility] [address]  [facility] [address]        Equal Access to Services     [facility]: Hadii aad ku hadasho Soomaali, waaxda luqadaha, qaybta kaalmada adeegyada, singh remy hayblainen loretta allan . So [facility] [phone].    ATENCIÓN: Si habla español, tiene a costa disposición servicios gratuitos de asistencia lingüística. Lesia al [phone].    We comply with applicable federal civil rights laws and [place] laws. We do not discriminate on the basis of race, color, national origin, age, disability, sex, sexual orientation, or gender identity.            Thank you!     Thank you for choosing [facility]  for your care. Our goal is always to provide you with excellent care. Hearing back from our patients is one way we can continue to improve our services. Please take a few minutes to complete the written survey that you may receive in the mail after your visit with us. Thank you!             Your Updated  Medication List - Protect others around you: Learn how to safely use, store and throw away your medicines at www.disposemymeds.org.          This list is accurate as of 10/4/18 11:59 PM.  Always use your most recent med list.                   Brand Name Dispense Instructions for use Diagnosis    ACETAMINOPHEN PO      Take 500 mg by mouth 2 times daily        aspirin 81 MG tablet      Take 81 mg by mouth daily (children's aspirin).        Calcium Carbonate Antacid 400 MG Chew      Take 400 mg by mouth 2 times daily as needed        CELEXA PO      Take 20 mg by mouth daily        COUMADIN PO      Take as directed        ferrous sulfate 325 (65 Fe) MG tablet    IRON     Take 325 mg by mouth Twice a day every other day        FLOMAX 0.4 MG capsule   Generic drug:  tamsulosin      Take by mouth At Bedtime        FUROSEMIDE PO      Take 10 mg by mouth 2 times daily        HYDRALAZINE HCL PO      Take 25 mg by mouth 4 times daily GIVE AT 6-7am, 12n, 5pm,  for HTN Hold for SBP of less than or equal to 120        ICAPS PO      Take 1 capsule by mouth daily        ketoconazole 2 % cream    NIZORAL    30 g    Apply to face BID PRN    Dermatitis, seborrheic       levothyroxine 100 MCG tablet    SYNTHROID/LEVOTHROID    90 tablet    TAKE 1 TABLET BY MOUTH EVERY DAY.    Other specified hypothyroidism       METOPROLOL TARTRATE PO      Take 12.5 mg by mouth 2 times daily for HTN Hold for SBP is<120 and HR is <60, plz call if hold x 2 in a row for any of these reasons.        polyethylene glycol Packet    MIRALAX/GLYCOLAX    7 packet    Take 17 g by mouth daily as needed for constipation    Closed compression fracture of first lumbar vertebra, initial encounter (H)

## 2018-10-04 NOTE — LETTER
10/4/2018        RE: Francesco Killian  [facility]  [address]  [place] [address]        [facility]  PRIMARY CARE PROVIDER AND CLINIC:  Joann Quintero S [address] [facility] [address] / [place] [address]  Chief Complaint   Patient presents with     [facility] Care     [facility] Medical Record Number:  [id number]    HPI:    Francesco Killian is a 89 year old  (12/26/1920),admitted to the [facility] from [facility] stay 2/23/18 through 2/23/18.  Admitted to this facility for  rehab, medical management and nursing care.  HPI information obtained from: facility chart records, facility staff, patient report and [facility] chart review.      Current issues are:      Chronic atrial fibrillation (H)  Patient on ASA and Coumadin and INR range is 1.5-2.0 per previous NP report. Has taken 12 mg coumadin in the past week. INR today 1.6. HRR.     Dysphagia, unspecified type  History of CVA (cerebrovascular accident)  Is on an altered diet - dietician asks for speech eval at SNF.     Abdominal aortic aneurysm (AAA) without rupture (H)  Essential hypertension with goal blood pressure less than 140/90  Chronic congestive heart failure, unspecified heart failure type (H)  Coronary artery disease involving native heart without angina pectoris, unspecified vessel or lesion type  By history. No pain.     Other specified hypothyroidism  On synthroid.   Lab Results   Component Value Date    TSH 3.91 03/29/2018    TSH 0.26 07/11/2017        Major depressive disorder, single episode, mild (H)  Appears calm and in good spirits.     Last 3 BPs: (10/2) 134/61, 163/79, 152/59, 126/62, (10/1) 159/68, 156/60, 152/64, 190/80, (9/30) 143/68, 153/73, 152/72, 165/85 mmHg  HR Ranges: 48-72 bpm    Current Weight: (10/2) 143.3, 141.6, (10/1) 143.3, (9/30) 141.8, (9/27) 140  lbs    INR's:  (9/25) 1.3, (9/24) 1.3,   Lab Results   Component Value Date     INR 2.50 05/09/2018    INR 3.07 02/23/2018    INR 3.47 02/22/2018       CODE STATUS/ADVANCE DIRECTIVES DISCUSSION:   DNR / DNI  Patient's living condition: lives in a skilled nursing facility    ALLERGIES:Review of patient's allergies indicates no known allergies.  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 He also has no past medical history of Basal cell carcinoma or Malignant melanoma (H).  PAST SURGICAL HISTORY:  has a past surgical history that includes GI surgery (1970s); Cardiac surgery (1980s); Eye surgery; colonoscopy; hernia repair; IR Renal/Visceral Stent/Atherect/PTA; turp; Esophagoscopy, gastroscopy, duodenoscopy (EGD), combined (10/14/2013); Colonoscopy (11/4/2013); and NONSPECIFIC PROCEDURE.  FAMILY HISTORY: family history includes C.A.D. in his brother, father, maternal grandfather, maternal grandmother, mother, paternal grandfather, paternal grandmother, and sister; Coronary Artery Disease in his mother.  SOCIAL HISTORY:  reports that he has never smoked. He has never used smokeless tobacco. He reports that he does not drink alcohol or use illicit drugs.    Post Discharge Medication Reconciliation Status: discharge medications reconciled and changed, per note/orders (see AVS).  Current Outpatient Prescriptions   Medication Sig Dispense Refill     ACETAMINOPHEN PO Take 500 mg by mouth 2 times daily        aspirin 81 MG tablet Take 81 mg by mouth daily (children's  aspirin).       Calcium Carbonate Antacid 400 MG CHEW Take 400 mg by mouth 2 times daily as needed       Citalopram Hydrobromide (CELEXA PO) Take 20 mg by mouth daily       ferrous sulfate (IRON) 325 (65 Fe) MG tablet Take 325 mg by mouth Twice a day every other day       FUROSEMIDE PO Take 10 mg by mouth 2 times daily        HYDRALAZINE HCL PO Take 25 mg by mouth 4 times daily GIVE AT 6-7am, 12n, 5pm,  for HTN Hold for SBP of less than or equal to 120       ketoconazole (NIZORAL) 2 % cream Apply to face BID PRN 30 g 1     levothyroxine (SYNTHROID/LEVOTHROID) 100 MCG tablet TAKE 1 TABLET BY MOUTH EVERY DAY. 90 tablet 0     METOPROLOL TARTRATE PO Take 12.5 mg by mouth 2 times daily for HTN Hold for SBP is<120 and HR is <60, plz call if hold x 2 in a row for any of these reasons.       Multiple Vitamins-Minerals (ICAPS PO) Take 1 capsule by mouth daily        polyethylene glycol (MIRALAX/GLYCOLAX) Packet Take 17 g by mouth daily as needed for constipation 7 packet      tamsulosin (FLOMAX) 0.4 MG capsule Take by mouth At Bedtime       Warfarin Sodium (COUMADIN PO) Take as directed         ROS:  Limited secondary to cognitive impairment but today pt reports no pain, no dyspnea, no other concerns.     Exam:  /60  Pulse 84  Wt 119 lb 11.2 oz (54.3 kg)  BMI 18.2 kg/m2  GENERAL APPEARANCE:  Alert, in no distress, pleasant, cooperative, oriented x self, wife and place  EYES:  EOM, lids, pupils and irises normal, sclera clear and conjunctiva normal, no discharge or mattering on lids or lashes noted  ENT:  Mouth normal, moist mucous membranes, nose normal without drainage or crusting, external ears without lesions, hearing acuity intact  RESP:  respiratory effort and palpation of chest normal, no chest wall tenderness, no respiratory distress, Lung sounds clear, patient is on room air  CV:  Palpation and auscultation of heart done, rate and rhythm controlled and irregularly irregular, no murmur, no rub or  gallop. Edema none bilateral lower extremities.   ABDOMEN:  normal bowel sounds, soft, nontender.  M/S:   Gait and station wheelchair bound and walks with assist , no tenderness or swelling of the joints; able to move all extremities   NEURO: cranial nerves 2-12 grossly intact, no facial asymmetry, no speech deficits and able to follow directions, resting tremor of hands  PSYCH:  insight and judgement at baseline, memory forgetful, affect and mood normal     Lab/Diagnostic data:  CBC RESULTS:   Recent Labs   Lab Test 09/17/18 09/06/18 08/20/18   WBC   --   4.3   --   5.1   RBC   --   3.03*   --   3.09*   HGB  9.9*  10.1*   < >  10.0*   HCT   --   29.0*   --   30.9*   MCV   --   96   --   100   MCH   --   33.3   --   32.4   MCHC   --   34.8   --   32.4   RDW   --   13.7   --   13.7   PLT   --   126*   --   117*    < > = values in this interval not displayed.       Last Basic Metabolic Panel:  Recent Labs   Lab Test 08/27/18 08/20/18   NA  137  138   POTASSIUM  4.3  4.4   CHLORIDE  109*  109*   KEZIA  9.1  8.9   CO2  24  24   BUN  33*  40*   CR  1.10  1.19   GLC  70  72       Liver Function Studies -   Recent Labs   Lab Test  09/07/17   1512  02/03/17   1950   PROTTOTAL  8.1  8.2   ALBUMIN  3.6  3.6   BILITOTAL  0.4  0.3   ALKPHOS  105  82   AST  26  29   ALT  22  23       TSH   Date Value Ref Range Status   03/29/2018 3.91 0.30 - 5.00 uIU/mL Final   07/11/2017 0.26 (L) 0.40 - 4.00 mU/L Final       ASSESSMENT/PLAN:  Chronic atrial fibrillation (H)  Chronic, rate controlled. INR 1.6. Give Coumadin 1 mg PO Mondays and 2 mg PO other days. INR check one week.     Dysphagia, unspecified type  Chronic, hx cva. Will ask for st eval and treat.     Abdominal aortic aneurysm (AAA) without rupture (H)  Chronic, meds as above. Monitor.     Other specified hypothyroidism  Chronic, meds as above. TSH in March 2019    Essential hypertension with goal blood pressure less than 140/90  Chronic. Meds as above. Vs per routine. Bmp in  march 2019    Major depressive disorder, single episode, mild (H)  Chronic and stable. Monitor.     Chronic congestive heart failure, unspecified heart failure type (H)  Chronic, fluid balanced. Meds as above. Vs and wt per routine. Bmp next year.     History of CVA (cerebrovascular accident)  On coumadin. Monitor.     Coronary artery disease involving native heart without angina pectoris, unspecified vessel or lesion type  By history. Meds as above. Monitor.      Orders:  Check TSH, CBC and BMP in March of 2019 diagnosis anemia, CKD, hypothyroidism  Coumadin 1 mg PO Monday and 2 mg PO other days. inr one week.     Total time spent with patient visit at the skilled nursing facility was 35 min including patient visit and review of past records. Greater than 50% of total time spent with counseling and coordinating care due to review of history, current issues, POC to address concerns as noted above    Electronically signed by:  CODY Hunt CNP                    Sincerely,        CODY Hunt CNP

## 2018-10-21 NOTE — PROGRESS NOTES
Francesco Killian is a 89 year old male seen October 11, 2018 at [facility] unit where he was admitted this month.   He was previously at [facility], moved here to be with his wife who is also a resident here.    Patient is seen on the unit, up to WC.  He is smiling and engaged, talks about the past.    States he feels fine, denies any current pain.   Seems less depressed than he was at [facility].     By chart review, patient had [facility] hospitalization in February for L1 compression fracture with back pain.   He discharged to [facility] TCU and was there for one month, not able to regain prior independence and is LTC for permanent placement.     Patient has a h/o atrial fib for which he is anticoagulated with warfarin, has a h/o multiple strokes.    Also has CHFpEF, RHF severe CORDELL and moderate AS.       Past Medical History:   Diagnosis Date     AAA (abdominal aortic aneurysm) (H) 6/3/2013     Aortic stenosis      Bradycardia     Dr Stanford didn't think pacer needed at this time     Carotid occlusion, right     see above note     Chronic atrial fibrillation (H) 6/17/2013     CVA (cerebral infarction) 6/3/2013    DANILO and both vertebral art blocked-family son (neurologist) requests BP to run a bit higher since flow is via L ICA     Dysphagia 6/3/2013     HTN (hypertension) 6/3/2013    and RA stenosis, s/p stents at [facility]     Hyperlipidemia LDL goal <130 6/3/2013     Hypothyroidism 6/3/2013     Macular degeneration of both eyes      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    Three children: Son  Wilder Killian is POA.     ROS:  Limited, but negative other than above.   SLUMS 3/24    EXAM:  Up to WC, NAD  /61  Pulse 67  Temp 97.9  F (36.6  C)   Resp 24  Wt 143 lb 8 oz (65.1 kg)  SpO2 95%  BMI 21.82 kg/m2   Neck supple without adenopathy  Lungs decreased BS, few bibasilar rales  Heart irreg s1s2, 3/6 SINCERE across the precordium, 2/6 HSM at left axillary line  Abd soft, NT, no distention, +BS  Ext trace ankle edema  Neuro: non-focal, generalized weakness  Psych: affect okay      Last Comprehensive Metabolic Panel:  Sodium   Date Value Ref Range Status   10/10/2018 137 136 - 145 mmol/L Final     Potassium   Date Value Ref Range Status   10/10/2018 4.3 3.5 - 5.0 mmol/L Final     Chloride   Date Value Ref Range Status   10/10/2018 106 98 - 107 mmol/L Final     Carbon Dioxide   Date Value Ref Range Status   10/10/2018 25 22 - 31 mmol/L Final     Anion Gap   Date Value Ref Range Status   10/10/2018 6 5 - 18 mmol/L Final     Glucose   Date Value Ref Range Status   10/10/2018 75 70 - 125 mg/dL Final     Urea Nitrogen   Date Value Ref Range Status   10/10/2018 28 8 - 28 mg/dL Final     Creatinine   Date Value Ref Range Status   10/10/2018 1.23 0.70 - 1.30 mg/dL Final     GFR Estimate   Date Value Ref Range Status   10/10/2018 54 (A) >60 mL/min/1.73m2 Final     Calcium   Date Value Ref Range Status   10/10/2018 8.9 8.5 - 10.5 mg/dL Final     Lab Results   Component Value Date    HGB 11.0 10/12/2018          IMP/PLAN:   (D62) Anemia due to blood loss, acute    Comment: hgb stable at 11, no further bleeding episodes.     He is on warfarin and ASA     Plan: continue omeprazole, follow hgb    Further GI workup at the discretion of his family.        (S32.010D) Closed compression fracture of L1 lumbar vertebra with routine healing, subsequent encounter   Comment: less back pain, improving mobility     Plan: scheduled acetaminophen, local measures, activity as tolerated.       (I48.2) Chronic atrial fibrillation (H)  Comment: controlled VR on metoprolol  Plan: warfarin per INR for stroke prophylaxis; would aim to keep INR 2-2.5 given bleeding risks.       (Z86.73)  History of CVA (cerebrovascular accident)  (F01.50) Vascular dementia without behavioral disturbance  Comment: low SLUMS score, decreased functional status   Cognition worsened by loss of vision and hearing.      Plan: LTC support for meds, meals, activity       (I25.10) Coronary artery disease involving native heart without angina pectoris, unspecified vessel or lesion type  (I50.32) Chronic diastolic congestive heart failure (H)  Comment: stable volume status today, AS on exam  Plan: daily ASA, beta blocker for secondary prevention; remains on low dose furosemide.        (I12.9,  N18.3) Benign hypertension with chronic kidney disease, stage III  Comment:   BP Readings from Last 3 Encounters:   10/11/18 149/61   10/02/18 122/60   09/20/18 155/65      Plan: continue hydralazine, metoprolol    (I71.4) Abdominal aortic aneurysm (AAA) without rupture (H)  Comment: 5.6 cm  Plan: patient and family disinclined to repair.   No further surveillance.        (E03.9) Hypothyroidism, unspecified type  Comment:   TSH   Date Value Ref Range Status   03/29/2018 3.91 0.30 - 5.00 uIU/mL Final    Plan: same dose levothyroxine.       (F32.9) Reactive depression  Comment: improved with tx, and now that he is with his wife.    Plan:citalopram 20 mg/day      AD: patient is DNR/DNI    Bonnie Lama MD

## 2018-10-22 NOTE — PROGRESS NOTES
[facility]    Chief Complaint   Patient presents with     jail Acute       [facility] Medical Record Number:  [id number]  Place of Service where encounter took place:  [facility] ([facility]) [[id number]]    HPI:    Francesco Killian is a 89 year old  (12/26/1920), who is being seen today for an episodic care visit.  HPI information obtained from: facility chart records, facility staff, patient report and [facility] chart review.    Today's concern is:  Abdominal distension  Adynamic ileus (H)  Patient with loose stools last several weeks, started on PRN imodium. No abdominal pain and negative c diff on testing. Yesterday patient appeared to have increased abdominal distension. Obtained xray which showed possible adynamic lieus. Patient denies pain, no n/v. Spoke with primary MD Dr Lama - plan is to stop iron supplements as he may not be tolerating them at this time. Will change diet to clear liquids x 2 days, then advance as tolerated. Repeat abdominal xray tomorrow and f/u with results or any change in condition. Updated primary contact son  Constantin re: above status and plan and son is in agreement.     Iron deficiency anemia due to chronic blood loss  Patient's iron will be stopped at this time in view of abdominal symptoms.   Lab Results   Component Value Date    HGB 11.0 10/12/2018    HGB 9.9 09/17/2018        Chronic congestive heart failure, unspecified heart failure type (H)  Weight gain  Patient's weight has been gradually increasing since admission. He has been on twice daily nutritional supplements and his appetite is improved. Family feel he is just happier at this facility and is eating better. Note weight gain of 13 lbs since admission. No s/s fluid overload or increase in edema. Continues low dose lasix.   Last 3 BPs:  10/22/2018 22:45 142 / 69 mmHg  10/22/2018 16:47 145 / 97 mmHg   10/22/2018 14:00 130 / 80 mmHg   HR Ranges: 42-83bpm  Current Weight:  10/22/2018 09:11 153.4 Lbs   10/13/2018 13:24 147.5 Lbs    ALLERGIES: Review of patient's allergies indicates no known allergies.  Past Medical, Surgical, Family and Social History reviewed and updated in [facility].    Current Outpatient Prescriptions   Medication Sig Dispense Refill     ACETAMINOPHEN PO Take 500 mg by mouth 2 times daily        aspirin 81 MG tablet Take 81 mg by mouth daily (children's aspirin).       Banana Flakes (BANATROL PLUS) PACK Take 1 packet by mouth 3 times daily       Calcium Carbonate Antacid 400 MG CHEW Take 400 mg by mouth 2 times daily as needed       Citalopram Hydrobromide (CELEXA PO) Take 20 mg by mouth daily       FUROSEMIDE PO Take 10 mg by mouth 2 times daily        HYDRALAZINE HCL PO Take 25 mg by mouth 4 times daily GIVE AT 6-7am, 12n, 5pm,  for HTN Hold for SBP of less than or equal to 120       ketoconazole (NIZORAL) 2 % cream Apply to face BID PRN 30 g 1     levothyroxine (SYNTHROID/LEVOTHROID) 100 MCG tablet TAKE 1 TABLET BY MOUTH EVERY DAY. 90 tablet 0     loperamide (IMODIUM) 2 MG capsule Take 2 mg by mouth every 6 hours as needed for diarrhea       METOPROLOL TARTRATE PO Take 12.5 mg by mouth 2 times daily for HTN Hold for SBP is<120 and HR is <60, plz call if hold x 2 in a row for any of these reasons.       Multiple Vitamins-Minerals (ICAPS PO) Take 1 capsule by mouth daily        polyethylene glycol (MIRALAX/GLYCOLAX) Packet Take 17 g by mouth daily as needed for constipation 7 packet      tamsulosin (FLOMAX) 0.4 MG capsule Take by mouth At Bedtime       Warfarin Sodium (COUMADIN PO) Take as directed       Medications reviewed:  Medications reconciled to facility chart and changes were made to reflect current medications as identified as above med list. Below are the changes that were made:   Medications stopped since last EPIC medication reconciliation:   There are no discontinued medications.    Medications started since last [facility] medication reconciliation:  No  orders of the defined types were placed in this encounter.    REVIEW OF SYSTEMS:  Limited secondary to cognitive impairment but today pt reports no pain, no dyspnea, no GI complaints.     Physical Exam:  /69  Pulse 61  Temp 97.9  F (36.6  C)  Resp 24  Wt 153 lb 6.4 oz (69.6 kg)  BMI 23.32 kg/m2  GENERAL APPEARANCE:  Alert, in no distress, pleasant, cooperative, oriented x self, wife and place  EYES:  EOM, lids, pupils and irises normal, sclera clear and conjunctiva normal, no discharge or mattering on lids or lashes noted  ENT:  Mouth normal, moist mucous membranes, nose normal without drainage or crusting, external ears without lesions, hearing acuity intact  RESP:  respiratory effort and palpation of chest normal, no chest wall tenderness, no respiratory distress, Lung sounds clear, patient is on room air  CV:  Palpation and auscultation of heart done, rate and rhythm controlled and irregularly irregular, no murmur, no rub or gallop. Edema none bilateral lower extremities.   ABDOMEN:  Large distended abdomen, no tenderness. Has distant sounding bowel sounds.   M/S:   Gait and station wheelchair bound and walks with assist , no tenderness or swelling of the joints; able to move all extremities   NEURO: cranial nerves 2-12 grossly intact, no facial asymmetry, no speech deficits and able to follow directions, resting tremor of hands  PSYCH:  insight and judgement at baseline, memory forgetful, affect and mood normal       Recent Labs:   CBC RESULTS:   Recent Labs   Lab Test 10/12/18 09/17/18 09/06/18  08/20/18   WBC   --    --   4.3   --   5.1   RBC   --    --   3.03*   --   3.09*   HGB  11.0*  9.9*  10.1*   < >  10.0*   HCT   --    --   29.0*   --   30.9*   MCV   --    --   96   --   100   MCH   --    --   33.3   --   32.4   MCHC   --    --   34.8   --   32.4   RDW   --    --   13.7   --   13.7   PLT   --    --   126*   --   117*    < > = values in this interval not displayed.       Last Basic Metabolic  Panel:  Recent Labs   Lab Test 10/10/18 10/08/18   NA  137  138   POTASSIUM  4.3  4.9   CHLORIDE  106  106   KEZIA  8.9  8.9   CO2  25  24   BUN  28  34*   CR  1.23  1.40*   GLC  75  77       Liver Function Studies -   Recent Labs   Lab Test  09/07/17   1512  02/03/17   1950   PROTTOTAL  8.1  8.2   ALBUMIN  3.6  3.6   BILITOTAL  0.4  0.3   ALKPHOS  105  82   AST  26  29   ALT  22  23       TSH   Date Value Ref Range Status   03/29/2018 3.91 0.30 - 5.00 uIU/mL Final   07/11/2017 0.26 (L) 0.40 - 4.00 mU/L Final     Assessment/Plan:  1. Abdominal distension    2. Adynamic ileus (H)    3. Iron deficiency anemia due to chronic blood loss    4. Chronic congestive heart failure, unspecified heart failure type (H)    5. Weight gain    acute on chronic issues. Likely intolerant of iron supplements at this time. Eating better and gaining weight so no increase in lasix indicated. Will order bowel rest, f/u with abdominal xray tomorrow.     Orders:  1. Stop iron  2. Abdominal xray on 10/24  3. Clear liquid diet x 2 days, then advance as tolerated.   4. Stop nutritional supplements.     Total time spent with patient visit was 35 min including patient visit, review of past records, phone call to patient contact and conversation with primary MD as well as staff at SNF. Greater than 50% of total time spent with counseling and coordinating care due to review of history, current status and concerns and POC to address.      Electronically signed by  CODY Hunt CNP

## 2018-10-23 NOTE — MR AVS SNAPSHOT
After Visit Summary   10/23/2018    Francesco Killian    MRN: [id number]           Patient Information     Date Of Birth          12/26/1920        Visit Information        Provider Department      10/23/2018 2:30 PM Joann Quintero APRN CNP [facility]        Today's Diagnoses     Abdominal distension    -  1    Adynamic ileus (H)        Iron deficiency anemia due to chronic blood loss        Chronic congestive heart failure, unspecified heart failure type (H)        Weight gain           Follow-ups after your visit        Who to contact     If you have questions or need follow up information about today's clinic visit or your schedule please contact [facility] directly at [phone].  Normal or non-critical lab and imaging results will be communicated to you by MyChart, letter or phone within 4 business days after the clinic has received the results. If you do not hear from us within 7 days, please contact the clinic through [employer] or phone. If you have a critical or abnormal lab result, we will notify you by phone as soon as possible.  Submit refill requests through [employer] or call your pharmacy and they will forward the refill request to us. Please allow 3 business days for your refill to be completed.          Additional Information About Your Visit        MyChart Information     [employer] gives you secure access to your electronic health record. If you see a primary care provider, you can also send messages to your care team and make appointments. If you have questions, please call your primary care clinic.  If you do not have a primary care provider, please call [phone] and they will assist you.        Care EveryWhere ID     This is your Care EveryWhere ID. This could be used by other organizations to access your [facility] medical records  [id number]        Your Vitals Were     Pulse Temperature Respirations BMI (Body Mass Index)          61 97.9  F (36.6   C) 24 23.32 kg/m2         Blood Pressure from Last 3 Encounters:   10/22/18 142/69   10/11/18 149/61   10/02/18 122/60    Weight from Last 3 Encounters:   10/22/18 153 lb 6.4 oz (69.6 kg)   10/11/18 143 lb 8 oz (65.1 kg)   10/02/18 119 lb 11.2 oz (54.3 kg)              Today, you had the following     No orders found for display         Today's Medication Changes          These changes are accurate as of 10/23/18 11:59 PM.  If you have any questions, ask your nurse or doctor.               Stop taking these medicines if you haven't already. Please contact your care team if you have questions.     ferrous sulfate 325 (65 Fe) MG tablet   Commonly known as:  IRON                    Primary Care Provider Office Phone # Fax #    CODY [facility] [phone]       [address] [facility] [address]  [facility] [address]        Equal Access to Services     [facility]: Hadii aad ku hadasho Sokathleen, waaxda luqadaha, qaybta kaalmada adeegyada, singh allan . So [facility] [phone].    ATENCIÓN: Si habla español, tiene a costa disposición servicios gratuitos de asistencia lingüística. Llame al [phone].    We comply with applicable federal civil rights laws and [place] laws. We do not discriminate on the basis of race, color, national origin, age, disability, sex, sexual orientation, or gender identity.            Thank you!     Thank you for choosing [facility]  for your care. Our goal is always to provide you with excellent care. Hearing back from our patients is one way we can continue to improve our services. Please take a few minutes to complete the written survey that you may receive in the mail after your visit with us. Thank you!             Your Updated Medication List - Protect others around you: Learn how to safely use, store and throw away your medicines at www.disposemymeds.org.          This list is accurate as of 10/23/18 11:59 PM.  Always use your most  recent med list.                   Brand Name Dispense Instructions for use Diagnosis    ACETAMINOPHEN PO      Take 500 mg by mouth 2 times daily        aspirin 81 MG tablet      Take 81 mg by mouth daily (children's aspirin).        BANATROL PLUS Pack      Take 1 packet by mouth 3 times daily        Calcium Carbonate Antacid 400 MG Chew      Take 400 mg by mouth 2 times daily as needed        CELEXA PO      Take 20 mg by mouth daily        COUMADIN PO      Take as directed        FLOMAX 0.4 MG capsule   Generic drug:  tamsulosin      Take by mouth At Bedtime        FUROSEMIDE PO      Take 10 mg by mouth 2 times daily        HYDRALAZINE HCL PO      Take 25 mg by mouth 4 times daily GIVE AT 6-7am, 12n, 5pm,  for HTN Hold for SBP of less than or equal to 120        ICAPS PO      Take 1 capsule by mouth daily        ketoconazole 2 % cream    NIZORAL    30 g    Apply to face BID PRN    Dermatitis, seborrheic       levothyroxine 100 MCG tablet    SYNTHROID/LEVOTHROID    90 tablet    TAKE 1 TABLET BY MOUTH EVERY DAY.    Other specified hypothyroidism       loperamide 2 MG capsule    IMODIUM     Take 2 mg by mouth every 6 hours as needed for diarrhea        METOPROLOL TARTRATE PO      Take 12.5 mg by mouth 2 times daily for HTN Hold for SBP is<120 and HR is <60, plz call if hold x 2 in a row for any of these reasons.        nystatin [id number] UNIT/GM Powd    MYCOSTATIN     Apply topically 2 times daily Apply to groin topically two times a day for redness for 7 Days        polyethylene glycol Packet    MIRALAX/GLYCOLAX    7 packet    Take 17 g by mouth daily as needed for constipation    Closed compression fracture of first lumbar vertebra, initial encounter (H)

## 2018-10-23 NOTE — LETTER
10/23/2018        RE: Francesco Killian  [facility]  [address]  [facility] [address]        [facility]    Chief Complaint   Patient presents with     assisted Acute       [facility] Medical Record Number:  [id number]  Place of Service where encounter took place:  [facility] ([facility] [[id number]]    HPI:    Francesco Killian is a 89 year old  (12/26/1920), who is being seen today for an episodic care visit.  HPI information obtained from: facility chart records, facility staff, patient report and [facility] chart review.    Today's concern is:  Abdominal distension  Adynamic ileus (H)  Patient with loose stools last several weeks, started on PRN imodium. No abdominal pain and negative c diff on testing. Yesterday patient appeared to have increased abdominal distension. Obtained xray which showed possible adynamic lieus. Patient denies pain, no n/v. Spoke with primary MD Dr Lama - plan is to stop iron supplements as he may not be tolerating them at this time. Will change diet to clear liquids x 2 days, then advance as tolerated. Repeat abdominal xray tomorrow and f/u with results or any change in condition. Updated primary contact son  Constantin re: above status and plan and son is in agreement.     Iron deficiency anemia due to chronic blood loss  Patient's iron will be stopped at this time in view of abdominal symptoms.   Lab Results   Component Value Date    HGB 11.0 10/12/2018    HGB 9.9 09/17/2018        Chronic congestive heart failure, unspecified heart failure type (H)  Weight gain  Patient's weight has been gradually increasing since admission. He has been on twice daily nutritional supplements and his appetite is improved. Family feel he is just happier at this facility and is eating better. Note weight gain of 13 lbs since admission. No s/s fluid overload or increase in edema. Continues low dose lasix.   Last 3 BPs:  10/22/2018  22:45 142 / 69 mmHg  10/22/2018 16:47 145 / 97 mmHg   10/22/2018 14:00 130 / 80 mmHg   HR Ranges: 42-83bpm  Current Weight: 10/22/2018 09:11 153.4 Lbs   10/13/2018 13:24 147.5 Lbs    ALLERGIES: Review of patient's allergies indicates no known allergies.  Past Medical, Surgical, Family and Social History reviewed and updated in [facility].    Current Outpatient Prescriptions   Medication Sig Dispense Refill     ACETAMINOPHEN PO Take 500 mg by mouth 2 times daily        aspirin 81 MG tablet Take 81 mg by mouth daily (children's aspirin).       Banana Flakes (BANATROL PLUS) PACK Take 1 packet by mouth 3 times daily       Calcium Carbonate Antacid 400 MG CHEW Take 400 mg by mouth 2 times daily as needed       Citalopram Hydrobromide (CELEXA PO) Take 20 mg by mouth daily       FUROSEMIDE PO Take 10 mg by mouth 2 times daily        HYDRALAZINE HCL PO Take 25 mg by mouth 4 times daily GIVE AT 6-7am, 12n, 5pm,  for HTN Hold for SBP of less than or equal to 120       ketoconazole (NIZORAL) 2 % cream Apply to face BID PRN 30 g 1     levothyroxine (SYNTHROID/LEVOTHROID) 100 MCG tablet TAKE 1 TABLET BY MOUTH EVERY DAY. 90 tablet 0     loperamide (IMODIUM) 2 MG capsule Take 2 mg by mouth every 6 hours as needed for diarrhea       METOPROLOL TARTRATE PO Take 12.5 mg by mouth 2 times daily for HTN Hold for SBP is<120 and HR is <60, plz call if hold x 2 in a row for any of these reasons.       Multiple Vitamins-Minerals (ICAPS PO) Take 1 capsule by mouth daily        polyethylene glycol (MIRALAX/GLYCOLAX) Packet Take 17 g by mouth daily as needed for constipation 7 packet      tamsulosin (FLOMAX) 0.4 MG capsule Take by mouth At Bedtime       Warfarin Sodium (COUMADIN PO) Take as directed       Medications reviewed:  Medications reconciled to facility chart and changes were made to reflect current medications as identified as above med list. Below are the changes that were made:   Medications stopped since last EPIC medication  reconciliation:   There are no discontinued medications.    Medications started since last [facility] medication reconciliation:  No orders of the defined types were placed in this encounter.    REVIEW OF SYSTEMS:  Limited secondary to cognitive impairment but today pt reports no pain, no dyspnea, no GI complaints.     Physical Exam:  /69  Pulse 61  Temp 97.9  F (36.6  C)  Resp 24  Wt 153 lb 6.4 oz (69.6 kg)  BMI 23.32 kg/m2  GENERAL APPEARANCE:  Alert, in no distress, pleasant, cooperative, oriented x self, wife and place  EYES:  EOM, lids, pupils and irises normal, sclera clear and conjunctiva normal, no discharge or mattering on lids or lashes noted  ENT:  Mouth normal, moist mucous membranes, nose normal without drainage or crusting, external ears without lesions, hearing acuity intact  RESP:  respiratory effort and palpation of chest normal, no chest wall tenderness, no respiratory distress, Lung sounds clear, patient is on room air  CV:  Palpation and auscultation of heart done, rate and rhythm controlled and irregularly irregular, no murmur, no rub or gallop. Edema none bilateral lower extremities.   ABDOMEN:  Large distended abdomen, no tenderness. Has distant sounding bowel sounds.   M/S:   Gait and station wheelchair bound and walks with assist , no tenderness or swelling of the joints; able to move all extremities   NEURO: cranial nerves 2-12 grossly intact, no facial asymmetry, no speech deficits and able to follow directions, resting tremor of hands  PSYCH:  insight and judgement at baseline, memory forgetful, affect and mood normal       Recent Labs:   CBC RESULTS:   Recent Labs   Lab Test 10/12/18 09/17/18 09/06/18  08/20/18   WBC   --    --   4.3   --   5.1   RBC   --    --   3.03*   --   3.09*   HGB  11.0*  9.9*  10.1*   < >  10.0*   HCT   --    --   29.0*   --   30.9*   MCV   --    --   96   --   100   MCH   --    --   33.3   --   32.4   MCHC   --    --   34.8   --   32.4   RDW   --    --    13.7   --   13.7   PLT   --    --   126*   --   117*    < > = values in this interval not displayed.       Last Basic Metabolic Panel:  Recent Labs   Lab Test 10/10/18 10/08/18   NA  137  138   POTASSIUM  4.3  4.9   CHLORIDE  106  106   KEZIA  8.9  8.9   CO2  25  24   BUN  28  34*   CR  1.23  1.40*   GLC  75  77       Liver Function Studies -   Recent Labs   Lab Test  09/07/17   1512  02/03/17   1950   PROTTOTAL  8.1  8.2   ALBUMIN  3.6  3.6   BILITOTAL  0.4  0.3   ALKPHOS  105  82   AST  26  29   ALT  22  23       TSH   Date Value Ref Range Status   03/29/2018 3.91 0.30 - 5.00 uIU/mL Final   07/11/2017 0.26 (L) 0.40 - 4.00 mU/L Final     Assessment/Plan:  1. Abdominal distension    2. Adynamic ileus (H)    3. Iron deficiency anemia due to chronic blood loss    4. Chronic congestive heart failure, unspecified heart failure type (H)    5. Weight gain    acute on chronic issues. Likely intolerant of iron supplements at this time. Eating better and gaining weight so no increase in lasix indicated. Will order bowel rest, f/u with abdominal xray tomorrow.     Orders:  1. Stop iron  2. Abdominal xray on 10/24  3. Clear liquid diet x 2 days, then advance as tolerated.   4. Stop nutritional supplements.     Total time spent with patient visit was 35 min including patient visit, review of past records, phone call to patient contact and conversation with primary MD as well as staff at SNF. Greater than 50% of total time spent with counseling and coordinating care due to review of history, current status and concerns and POC to address.      Electronically signed by  CODY Hunt CNP                      Sincerely,        CODY Hunt CNP

## 2018-10-30 PROBLEM — J69.0 ASPIRATION PNEUMONIA (H): Status: ACTIVE | Noted: 2018-01-01

## 2018-10-30 NOTE — PHARMACY-ADMISSION MEDICATION HISTORY
Admission medication history interview status for this patient is complete. See [facility] admission navigator for allergy information, prior to admission medications and immunization status.     Medication history interview source(s): MAR from nursing home  Medication history resources (including written lists, pill bottles, clinic record):None  Primary pharmacy:     Changes made to PTA medication list:  Added:  Warfarin dose  Deleted:  None  Changed:  I-Caps to Preservision.    Actions taken by pharmacist (provider contacted, etc):None     Additional medication history information:None    Medication reconciliation/reorder completed by provider prior to medication history? Yes    Do you take OTC medications (eg tylenol, ibuprofen, fish oil, eye/ear drops, etc)?  Yes, as listed.    For patients on insulin therapy:  No     Prior to Admission medications    Medication Sig Last Dose Taking? Auth Provider   ACETAMINOPHEN PO Take 500 mg by mouth 2 times daily  10/30/2018 at 0800 Yes Reported, Patient   Multiple Vitamins-Minerals (PRESERVISION AREDS PO) Take 1 capsule by mouth daily 10/30/2018 at 0800 Yes Unknown, Entered By History   Warfarin Sodium (COUMADIN PO) Take by mouth daily , 1mg on Mon & Fri & 2mg ROW 10/29/2018 at pm Yes Reported, Patient   aspirin 81 MG tablet Take 81 mg by mouth daily (children's aspirin). 10/29/2018 at 0800  Reported, Patient   Banana Flakes (BANATROL PLUS) PACK Take 1 packet by mouth 3 times daily   Reported, Patient   Calcium Carbonate Antacid 400 MG CHEW Take 400 mg by mouth 2 times daily as needed prn  Reported, Patient   Citalopram Hydrobromide (CELEXA PO) Take 20 mg by mouth daily 10/29/2018 at am  Reported, Patient   FUROSEMIDE PO Take 10 mg by mouth 2 times daily  10/29/2018 at 1200  Reported, Patient   HYDRALAZINE HCL PO Take 25 mg by mouth 4 times daily GIVE AT 6-7am, 12n, 5pm,  for HTN Hold for SBP of less than or equal to 120 10/30/2018 at 0800  Reported, Patient   ketoconazole  (NIZORAL) 2 % cream Apply to face BID PRN prn  Miranda Avendaño, CURLY   levothyroxine (SYNTHROID/LEVOTHROID) 100 MCG tablet TAKE 1 TABLET BY MOUTH EVERY DAY. 10/30/2018 at 0630  Angel Corea MD   loperamide (IMODIUM) 2 MG capsule Take 2 mg by mouth every 6 hours as needed for diarrhea prn  Reported, Patient   METOPROLOL TARTRATE PO Take 12.5 mg by mouth 2 times daily for HTN Hold for SBP is<120 and HR is <60, plz call if hold x 2 in a row for any of these reasons. 10/29/2018 at 2000  Reported, Patient   polyethylene glycol (MIRALAX/GLYCOLAX) Packet Take 17 g by mouth daily as needed for constipation prn  Favian Pereira MD   tamsulosin (FLOMAX) 0.4 MG capsule Take 0.4 mg by mouth At Bedtime  10/29/2018 at 2000  Reported, Patient

## 2018-10-30 NOTE — IP AVS SNAPSHOT
"          [place] [address] [facility]: [phone]                                              INTERAGENCY TRANSFER FORM - LAB / IMAGING / EKG / EMG RESULTS   10/30/2018                    Hospital Admission Date: 10/30/2018  RUBIO MCCARTNEY   : 1920  Sex: Male        Attending Provider: Amy Corey MD     Allergies:  No Known Allergies    Infection:  None   Service:  GENERAL MEDI    Ht:  1.727 m (5' 8\")   Wt:  66 kg (145 lb 9.6 oz)   Admission Wt:  69.4 kg (153 lb)    BMI:  22.14 kg/m 2   BSA:  1.78 m 2            Patient PCP Information     Provider PCP Type    CODY Hunt CNP General         Lab Results - 3 Days      Urine Culture Aerobic Bacterial [[id number]] (Abnormal)  Resulted: 18 1026, Result status: Final result    Ordering provider: Sarah Rosas MD  10/30/18 1024 Resulting lab: [facility]    Specimen Information    Type Source Collected On   Midstream Urine  10/30/18 1041          Components       Value Reference Range Flag Lab   Specimen Description Midstream Urine      Special Requests Specimen received in preservative   75   Culture Micro --  A 225   Result:         >100,000 colonies/mL  Enterococcus faecalis     Culture Micro --  A 225   Result:         50,000 to 100,000 colonies/mL  Aerococcus urinae  Identification obtained by MALDI-TOF mass spectrometry research use only database. Test   characteristics determined and verified by the [facility]   ([facility]) [place], [place].     Culture Micro --   225   Result:         <10,000 colonies/mL  urogenital irina  Susceptibility testing not routinely done              INR [[id number]] (Abnormal)  Resulted: 18 0733, Result status: Final result    Ordering provider: Amy Corey MD  18 0000 Resulting lab: [facility]    Specimen Information    Type Source Collected On   Blood  18 0708          " Components       Value Reference Range Flag Lab   INR 1.70 0.86 - 1.14 [facility]            Blood culture [[id number]]  Resulted: 11/03/18 0529, Result status: Preliminary result    Ordering provider: Amy Corey MD  10/31/18 0710 Resulting lab: MICRO RAPID TESTING LAB    Specimen Information    Type Source Collected On   Blood  10/31/18 0733   Comment:  Right Arm          Components       Value Reference Range Flag Lab   Specimen Description Blood Right Arm      Special Requests Aerobic and anaerobic bottles received   75   Culture Micro No growth after 3 days   226            Blood culture [[id number]]  Resulted: 11/03/18 0529, Result status: Preliminary result    Ordering provider: Amy Corey MD  10/31/18 0710 Resulting lab: MICRO RAPID TESTING LAB    Specimen Information    Type Source Collected On   Blood  10/31/18 0748   Comment:  Left Arm          Components       Value Reference Range Flag Lab   Specimen Description Blood Left Arm      Special Requests Aerobic and anaerobic bottles received   75   Culture Micro No growth after 3 days   226            Blood culture ONE site [[id number]]  Resulted: 11/03/18 0528, Result status: Preliminary result    Ordering provider: Sarah Rosas MD  10/30/18 1028 Resulting lab: MICRO RAPID TESTING LAB    Specimen Information    Type Source Collected On   Blood  10/30/18 1121   Comment:  Right Arm          Components       Value Reference Range Flag Lab   Specimen Description Blood Right Arm      Special Requests Aerobic and anaerobic bottles received   75   Culture Micro No growth after 4 days   226            Blood culture ONE site [[id number]] (Abnormal)  Resulted: 11/02/18 0816, Result status: Final result    Ordering provider: Sarah Rosas MD  10/30/18 1024 Resulting lab: [facility]    Specimen Information    Type Source Collected On   Blood  10/30/18 1031   Comment:  Left Arm          Components        Value Reference Range Flag Lab   Specimen Description Blood Left Arm      Special Requests Aerobic and anaerobic bottles received   226   Culture Micro --  A 225   Result:         Cultured on the 1st day of incubation:  Enterococcus faecalis     Culture Micro --   225   Result:         Critical Value/Significant Value, preliminary result only, called to and read back by  SAURAV CORREA RN @0055 10/31/18. SCG     Culture Micro --   225   Result:         (Note)  POSITIVE for ENTEROCOCCUS FAECALIS and NEGATIVE for Uma/vanB genes  by Verigene multiplex nucleic acid test. Final identification and  antimicrobial susceptibility testing will be verified by standard  methods.    Specimen tested with Verigene multiplex, gram-positive blood culture  nucleic acid test for the following targets: Staph aureus, Staph  epidermidis, Staph lugdunensis, other Staph species, Enterococcus  faecalis, Enterococcus faecium, Streptococcus species, S. agalactiae,  S. anginosus grp., S. pneumoniae, S. pyogenes, Listeria sp., mecA  (methicillin resistance) and Uma/B (vancomycin resistance).    Critical Value/Significant Value called to and read back by  SAURAV CORREA RN ([id number]).  10.31.18  0341 GJS              Basic metabolic panel [[id number]] (Abnormal)  Resulted: 11/02/18 0706, Result status: Final result    Ordering provider: Porter Mahmood MD  11/02/18 0000 Resulting lab: [facility]    Specimen Information    Type Source Collected On   Blood  11/02/18 0629          Components       Value Reference Range Flag Lab   Sodium 143 133 - 144 mmol/L  FrRdHs   Potassium 3.5 3.4 - 5.3 mmol/L  FrRdHs   Chloride 108 94 - 109 mmol/L  FrRdHs   Carbon Dioxide 28 20 - 32 mmol/L  FrRdHs   Anion Gap 7 3 - 14 mmol/L  FrRdHs   Glucose 84 70 - 99 mg/dL  FrRdHs   Urea Nitrogen 24 7 - 30 mg/dL  FrRdHs   Creatinine 1.45 0.66 - 1.25 mg/dL H FrRdHs   GFR Estimate 45 >60 mL/min/1.7m2 L FrRdHs   Comment:  Non  GFR Calc   GFR  Estimate If Black 54 >60 mL/min/1.7m2 L FrRdHs   Comment:  African American GFR Calc   Calcium 7.8 8.5 - 10.1 mg/dL L FrRdHs            Magnesium (Add on or recollect) [[id number]]  Resulted: 11/02/18 0706, Result status: Final result    Ordering provider: César Mcbride MD  11/02/18 0549 Resulting lab: [facility]    Specimen Information    Type Source Collected On   Blood  11/02/18 0629          Components       Value Reference Range Flag Lab   Magnesium 2.2 1.6 - 2.3 mg/dL  FrRdHs            INR [[id number]] (Abnormal)  Resulted: 11/02/18 0653, Result status: Final result    Ordering provider: Amy Corey MD  11/02/18 0000 Resulting lab: [facility]    Specimen Information    Type Source Collected On   Blood  11/02/18 0629          Components       Value Reference Range Flag Lab   INR 1.95 0.86 - 1.14 H FrRdHs            CBC with platelets [[id number]] (Abnormal)  Resulted: 11/02/18 0645, Result status: Final result    Ordering provider: Porter Mahmood MD  11/02/18 0000 Resulting lab: [facility]    Specimen Information    Type Source Collected On   Blood  11/02/18 0629          Components       Value Reference Range Flag Lab   WBC 5.3 4.0 - 11.0 10e9/L  FrRdHs   RBC Count 3.38 4.4 - 5.9 10e12/L L FrRdHs   Hemoglobin 10.7 13.3 - 17.7 g/dL L FrRdHs   Hematocrit 34.3 40.0 - 53.0 % L FrRdHs    78 - 100 fl H FrRdHs   MCH 31.7 26.5 - 33.0 pg  FrRdHs   MCHC 31.2 31.5 - 36.5 g/dL L FrRdHs   RDW 13.5 10.0 - 15.0 %  FrRdHs   Platelet Count 108 150 - 450 10e9/L L FrRdHs            Basic metabolic panel [[id number]] (Abnormal)  Resulted: 11/01/18 0717, Result status: Final result    Ordering provider: Amy Corey MD  11/01/18 0000 Resulting lab: [facility]    Specimen Information    Type Source Collected On   Blood  11/01/18 0623          Components       Value Reference Range Flag Lab   Sodium 139 133 - 144 mmol/L  [facility]    Potassium 3.5 3.4 - 5.3 mmol/L  FrRdHs   Chloride 107 94 - 109 mmol/L  FrRdHs   Carbon Dioxide 28 20 - 32 mmol/L  FrRdHs   Anion Gap 4 3 - 14 mmol/L  FrRdHs   Glucose 94 70 - 99 mg/dL  FrRdHs   Urea Nitrogen 27 7 - 30 mg/dL  FrRdHs   Creatinine 1.57 0.66 - 1.25 mg/dL H FrRdHs   GFR Estimate 41 >60 mL/min/1.7m2 L FrRdHs   Comment:  Non  GFR Calc   GFR Estimate If Black 50 >60 mL/min/1.7m2 L FrRdHs   Comment:  African American GFR Calc   Calcium 8.2 8.5 - 10.1 mg/dL L FrRdHs            INR [[id number]] (Abnormal)  Resulted: 11/01/18 0649, Result status: Final result    Ordering provider: Amy Corey MD  11/01/18 0000 Resulting lab: [facility]    Specimen Information    Type Source Collected On   Blood  11/01/18 0623          Components       Value Reference Range Flag Lab   INR 2.32 0.86 - 1.14 H FrRdHs            CBC with platelets [[id number]] (Abnormal)  Resulted: 11/01/18 0638, Result status: Final result    Ordering provider: Amy Corey MD  11/01/18 0000 Resulting lab: [facility]    Specimen Information    Type Source Collected On   Blood  11/01/18 0623          Components       Value Reference Range Flag Lab   WBC 5.1 4.0 - 11.0 10e9/L  FrRdHs   RBC Count 3.29 4.4 - 5.9 10e12/L L FrRdHs   Hemoglobin 10.7 13.3 - 17.7 g/dL L FrRdHs   Hematocrit 32.8 40.0 - 53.0 % L FrRdHs    78 - 100 fl  FrRdHs   MCH 32.5 26.5 - 33.0 pg  FrRdHs   MCHC 32.6 31.5 - 36.5 g/dL  FrRdHs   RDW 13.4 10.0 - 15.0 %  FrRdHs   Platelet Count 102 150 - 450 10e9/L L FrRdHs            Basic metabolic panel [[id number]] (Abnormal)  Resulted: 10/31/18 0707, Result status: Final result    Ordering provider: Kelly Reynolds PA  10/31/18 0001 Resulting lab: [facility]    Specimen Information    Type Source Collected On   Blood  10/31/18 0637          Components       Value Reference Range Flag Lab   Sodium 138 133 - 144 mmol/L  FrRdHs   Potassium 3.9  3.4 - 5.3 mmol/L  FrRdHs   Chloride 105 94 - 109 mmol/L  FrRdHs   Carbon Dioxide 26 20 - 32 mmol/L  FrRdHs   Anion Gap 7 3 - 14 mmol/L  FrRdHs   Glucose 88 70 - 99 mg/dL  FrRdHs   Urea Nitrogen 27 7 - 30 mg/dL  FrRdHs   Creatinine 1.51 0.66 - 1.25 mg/dL H FrRdHs   GFR Estimate 43 >60 mL/min/1.7m2 L FrRdHs   Comment:  Non  GFR Calc   GFR Estimate If Black 52 >60 mL/min/1.7m2 L FrRdHs   Comment:  African American GFR Calc   Calcium 8.2 8.5 - 10.1 mg/dL L FrRdHs            INR [[id number]] (Abnormal)  Resulted: 10/31/18 0702, Result status: Final result    Ordering provider: Amy Corey MD  10/31/18 0001 Resulting lab: [facility]    Specimen Information    Type Source Collected On   Blood  10/31/18 0637          Components       Value Reference Range Flag Lab   INR 2.51 0.86 - 1.14 H FrRdHs            CBC with platelets [[id number]] (Abnormal)  Resulted: 10/31/18 0649, Result status: Final result    Ordering provider: Kelly Reynolds PA  10/31/18 0001 Resulting lab: [facility]    Specimen Information    Type Source Collected On   Blood  10/31/18 0637          Components       Value Reference Range Flag Lab   WBC 8.6 4.0 - 11.0 10e9/L  FrRdHs   RBC Count 3.40 4.4 - 5.9 10e12/L L FrRdHs   Hemoglobin 11.0 13.3 - 17.7 g/dL L FrRdHs   Hematocrit 33.6 40.0 - 53.0 % L FrRdHs   MCV 99 78 - 100 fl  FrRdHs   MCH 32.4 26.5 - 33.0 pg  FrRdHs   MCHC 32.7 31.5 - 36.5 g/dL  FrRdHs   RDW 13.4 10.0 - 15.0 %  FrRdHs   Platelet Count 104 150 - 450 10e9/L L FrRdHs            Testing Performed By     Lab - Abbreviation Name Director Address Valid Date Range    12 - FrRdHs [facility] Unknown [address] [facility] [address] 05/08/15 1057 - Present    75 - Unknown [facility] Unknown [address] [facility] [address] 01/15/15 1019 - Present    225 - Unknown [facility] Unknown [address] [facility]  [facility] [address] 14 0954 - Present    226 - Unknown MICRO RAPID TESTING LAB Unknown [address] [facility] [address] 14 [address]955 - Present            Unresulted Labs (24h ago through future)    Start       Ordered    18 0600  INR  (warfarin (COUMADIN) Pharmacy Consult-INITIAL ORDER)  DAILY,   Routine      10/31/18 0846         ECG/EMG Results      Echocardiogram Complete [[id number]]  Resulted: 10/30/18 1439, Result status: Edited Result - FINAL    Ordering provider: Valentin Bobby PA  10/30/18 1412 Resulted by: Jordy Fernandes MD    Performed: 10/30/18 1507 - 10/30/18 1507 Resulting lab: RADIOLOGY RESULTS    Narrative:       [id number]  [facility]  [id number]  [id number]^DIAMANTE^VALENTIN^PRABHJOT           [facility]  Echocardiography Laboratory  [address] [place] [address]        Name: RUBIO MCCARTNEY  MRN: [id number]  : 1920  Study Date: 10/30/2018 02:39 PM  Age: 89 yrs  Gender: Male  Patient Location: [facility]  Reason For Study: CHF  Ordering Physician: VALENTIN BOBBY  Referring Physician: Joann Quintero  Performed By: Zan White RDCS     BSA: 1.8 m2  Height: 69 in  Weight: 147 lb  HR: 59  BP: 100/49 mmHg  _____________________________________________________________________________  __        Procedure  Complete Echo Adult.  _____________________________________________________________________________  __        Interpretation Summary     Right ventricular systolic pressure is elevated, consistent with severe  pulmonary hypertension.  There is moderate (2+) tricuspid regurgitation.  Mild to moderate valvular aortic stenosis.  The ascending aorta is Mildly dilated.  Grade III or advanced diastolic dysfunction.  Pulmonary pressures are significantly higher than before. The study was  technically difficult.  _____________________________________________________________________________  __        Left Ventricle  The left ventricle is normal in  size. Diastolic Doppler findings (E/E' ratio  and/or other parameters) suggest left ventricular filling pressures are  increased. Left ventricular systolic function is normal. The visual ejection  fraction is estimated at 60-65%. Grade III or advanced diastolic dysfunction.  Flattened septum is consistent with RV pressure overload.     Right Ventricle  The right ventricle is normal size. The right ventricular systolic function is  borderline reduced.     Atria  There is severe biatrial enlargement. There is no color Doppler evidence of an  atrial shunt.     Mitral Valve  There is mild to moderate mitral annular calcification. The mitral valve  leaflets are mildly thickened. The mitral papillary muscle appears thickened  and/or calcified. There is mild (1+) mitral regurgitation.        Tricuspid Valve  There is moderate (2+) tricuspid regurgitation. The right ventricular systolic  pressure is approximated at 72.4 mmHg plus the right atrial pressure. Right  ventricular systolic pressure is elevated, consistent with severe pulmonary  hypertension.     Aortic Valve  The aortic valve is trileaflet. There is trace aortic regurgitation. The  calculated aortic valve are is 1.4 cm^2. The peak AoV pressure gradient is  28.0 mmHg. The mean AoV pressure gradient is 13.3 mmHg. Mild to moderate  valvular aortic stenosis.     Pulmonic Valve  There is no pulmonic valvular regurgitation.     Vessels  The aortic root is normal size. The ascending aorta is Mildly dilated. Dilated  inferior vena cava.     Pericardium  There is no pericardial effusion.        Rhythm  The rhythm was sinus bradycardia.  _____________________________________________________________________________  __  MMode/2D Measurements & Calculations  IVSd: 1.4 cm     LVIDd: 4.5 cm  LVIDs: 2.9 cm  LVPWd: 1.3 cm  FS: 36.1 %  LV mass(C)d: 224.7 grams  LV mass(C)dI: 124.0 grams/m2  Ao root diam: 3.3 cm  LA dimension: 5.8 cm  asc Aorta Diam: 3.8 cm  LA/Ao: 1.7  LVOT diam:  2.4 cm  LVOT area: 4.4 cm2  LA Volume (BP): 177.0 ml  LA Volume Index (BP): 97.8 ml/m2  RWT: 0.57           Doppler Measurements & Calculations  MV E max bertin: 104.4 cm/sec  MV A max bertin: 38.1 cm/sec  MV E/A: 2.7  MV max P.4 mmHg  MV mean P.2 mmHg  MV V2 VTI: 32.1 cm  MVA(VTI): 2.3 cm2  MV dec time: 0.18 sec  Ao V2 max: 261.3 cm/sec  Ao max P.0 mmHg  Ao V2 mean: 168.9 cm/sec  Ao mean P.3 mmHg  Ao V2 VTI: 52.9 cm  MEGA(I,D): 1.4 cm2  MEGA(V,D): 1.4 cm2  AI P1/2t: 437.8 msec  LV V1 max P.7 mmHg  LV V1 max: 82.0 cm/sec  LV V1 VTI: 16.4 cm  CO(LVOT): 4.9 l/min  CI(LVOT): 2.7 l/min/m2  SV(LVOT): 72.7 ml  SI(LVOT): 40.1 ml/m2  TR max bertin: 425.3 cm/sec  TR max P.4 mmHg  AV Bertin Ratio (DI): 0.31  MEGA Index (cm2/m2): 0.76  E/E' av.4  Lateral E/e': 20.5  Medial E/e': 26.2              _____________________________________________________________________________  __        Report approved by: Zeb Pete 10/30/2018 04:24 PM       1    Type Source Collected On     10/30/18 1439          View Image (below)              Encounter-Level Documents:     There are no encounter-level documents.      Order-Level Documents:     There are no order-level documents.

## 2018-10-30 NOTE — IP AVS SNAPSHOT
[place] [address] [facility]    [address]    [facility] [address]    Phone:  [phone]    Fax:  [phone]                                       After Visit Summary   10/30/2018    Francesco Killian    MRN: [id number]           After Visit Summary Signature Page     I have received my discharge instructions, and my questions have been answered. I have discussed any challenges I see with this plan with the nurse or doctor.    ..........................................................................................................................................  Patient/Patient Representative Signature      ..........................................................................................................................................  Patient Representative Print Name and Relationship to Patient    ..................................................               ................................................  Date                                   Time    ..........................................................................................................................................  Reviewed by Signature/Title    ...................................................              ..............................................  Date                                               Time          22EPIC Rev 08/18

## 2018-10-30 NOTE — ED PROVIDER NOTES
History     Chief Complaint:  Nausea and bloody vomit    The history is provided by the patient and the nursing home. The history is limited by the condition of the patient.      Francesco Killian is an anticoagulated 89 year old male on coumadin with a history of AAA, Aortic stenosis, RBBB, atrial fibrillation, CHF, carotid occulusion HTN, hyperlipidemia, CVA, bradycardia, squamous cell carcinoma, and recurrent falls due to oculovestibular syndrome who presents to the emergency department via EMS for evaluation of nausea. Of note, the patient resides in a nursing home. Earlier this morning, the patient was found supine and vomiting, some of which was bloody. Nursing home staff found thought he may be aspirating. He also had oxygen saturations in the 80%. EMS was called, who transported the patient here to the emergency department.    Here, the patient presents running a fever of 100.6 axillary. The patient states he is tired but denies abdominal pain, chest pain, dizziness, and weakness. He denies any pain.       Allergies:  NKDA     Medications:    Coumadin  Aspirin, 81 mg  Celexa  Banatrol  Furosemide  Hydralazine  Imodium  levothyroxine  Ketoconazole  miralax  Flomax  Lasix  Coreg  Lisinopril    Past Medical History:    Compression fracture L1   Hypothyroidism  Depression  Macular degeneration  CAD  CVA  Acute kidney failure  CHF  HTN  RBBB  Macrocytic anemia  Dysphagia  AAA  Chronic atrial fibrillation  Aortic stenosis  Bradycardia  Recurrent falls due to oculovestibular syndrome  Squamous cell carcinoma  BPH  renal artery stenosis  Ischemia cardiomyopathy    Past Surgical History:    Left arm surgery  Vagotomy pyloroplasty  nasoseptal repair  CABG  Colonoscopy  EGD  Bilateral cataract surgery  TURP  Hernia repair  Duodenal ulcer repair    Family History:    CAD: Mother, Father, Paternal grandmother, paternal grandmother, maternal grandmother, maternal grandfather, sister, brother    Social History:  Negative  for tobacco use.  Negative for alcohol use.  Patient resides in a nursing home  Marital Status:        Review of Systems   Constitutional: Positive for fatigue and fever.   Cardiovascular: Negative for chest pain.   Gastrointestinal: Positive for nausea and vomiting (blood tinged vomit per staff). Negative for abdominal pain.   Neurological: Negative for dizziness and weakness.   All other systems reviewed and are negative.    Physical Exam     Patient Vitals for the past 24 hrs:   BP Temp Temp src Pulse Heart Rate Resp SpO2 Weight   10/30/18 1322 - 98.2  F (36.8  C) Temporal - - - - -   10/30/18 1300 157/75 - - - 66 - 98 % -   10/30/18 1245 137/69 - - - - - - -   10/30/18 1230 134/61 - - - 59 - 99 % -   10/30/18 1215 126/54 - - - - - - -   10/30/18 1200 119/57 - - - 63 - 98 % -   10/30/18 1145 116/51 - - - - - - -   10/30/18 1130 126/59 - - - 64 - 95 % -   10/30/18 1126 - - - - 67 - 96 % -   10/30/18 1122 128/59 - - - - - - -   10/30/18 1032 - 101.8  F (38.8  C) Rectal - - - - -   10/30/18 1016 (!) 195/103 100.6  F (38.1  C) Axillary 93 - 24 92 % 69.4 kg (153 lb)         Physical Exam  General: Frail appearing elderly male, sitting upright.  Eyes: PERRL, Conjunctive within normal limits  ENT: Dry mucous membranes, oropharynx clear.   CV: Normal S1S2, no murmur, rub or gallop. Irregular rhythm. Normal rate.  Resp: Crackles at bilateral bases, right>left.  Normal respiratory effort.  GI: Abdomen is soft, nontender and nondistended. No palpable masses. No rebound or guarding.  MSK: No edema. Nontender.  with hands but otherwise does not move extremities on command.   Skin: Warm and dry. No rashes or lesions or ecchymoses on visible skin.  Neuro: Alert to person. Briefly answers questions. Does not know year. Does not always follow commands, but is appropriately following those he does. No focal findings appreciated.   Psych: Blunted affect.      Emergency Department Course   ECG:  Indication: Bloody  vomit  Time: 1020  Vent. Rate 98 bpm. AR interval *. QRS duration 160. QT/QTc 428/546. P-R-T axis * -41 -2. Atrial fibrillation. Left axis bundle branch block. Agrees with computer interpretation. No significant change compared to prior EKG on 10/4/17.  Abnormal ECG. Read time: 1030.     Imaging:  Radiographic findings were communicated with the patient who voiced understanding of the findings.    XR Chest 2 views:   Previous sternotomy. Stable mild to moderate cardiomegaly.  Interstitial prominence throughout suspicious for congestive heart  failure/pulmonary edema. There may be small pleural effusion seen on  the lateral view. Although the findings suggest congestive heart  failure it would be difficult to exclude infiltrate/pneumonia in the  left lung base. The above findings are slightly more prominent than on  the comparison study. As per radiology.     CT Head without contrast:   Diffuse cerebral volume loss and cerebral white matter  changes consistent with chronic small vessel ischemic disease. No  change from the comparison study.  As per radiology.    Laboratory:  ABG: pH 7.40 / PCO2 39 / PO2 86 / Bicarb 24 / FIO2 2 / Oxyheme 95 / Base Deficit 0.4  Blood cultures x 2: In process  Lactic acid whole blood: 1.4  CBC: WBC: 11.7 (H0, HGB: 11.5 (L), PLT: 136 (L)  CMP: Glucose 100 (H0, GFR 59 (L), o/w WNL (Creatinine: 1.15)  INR: 2.31 (H)  Procalcitonin: In process  Troponin: 0.247  BNP: 84915 (H)  ABO/Rh Type and screen: O+  UA with micro: blood small, protein albumin 100, Leukocyte Esterase trace, WBC 26 (H), RBC 57 (H), bacteria few, mucous present o/w negative  Urine culture aerobic bacterial: in process    Interventions:  1039 NS 1L IV  Zofran, 4 mg, IV injection  1040 Tylenol 975 mg rectally given  1319 Lasix 20 mg IV  1320 Zosyn 3.375 g IV    Emergency Department Course:  1015 Nursing notes and vitals reviewed.  I performed an exam of the patient as documented above.     IV inserted. Medicine administered  as documented above. Blood drawn. This was sent to the lab for further testing, results above.    The patient was placed on continuous pulse oximetry and cardiac monitoring while here in the ED.      EKG obtained in the ED, see results above.     The patient provided a urine sample here in the emergency department. This was sent for laboratory testing, findings above.     The patient was sent for a chest xray and head CT while in the emergency department, findings above.     1210 I rechecked the patient and discussed the results of his workup thus far.     1235  I consulted with Gabriela Waters for Dr. Corey of the hospitalist services. They are in agreement to accept the patient for admission.    1246 I consulted with Gabriela Waters again regarding the patient's elevated troponin of 0.247.    Findings and plan explained to the Patient who consents to admission. Discussed the patient with Dr. Gabriela Waters, who will admit the patient to a medical bed for further monitoring, evaluation, and treatment.  Impression & Plan    Medical Decision Making:  Francesco Killian is a 89 year old male DNR/DNI presents to the emergency department with concerns with nausea with vomiting and possible aspiration from nursing facility. His has a history of dysphagia and aspiration pneumonia would not be unlikely to be concerned in this setting of cough, fever, and mild hypoxia. He here is maintained on supplemental Os by nasal cannula. He is not tachycardic or hypotensive. He was however tachypneic with a fever, likely he has sepsis due to pneumonia. His urine analysis did shoe pyuria and hematuria and I suspect this is probably secondary to a traumatic alcaraz insertion rather than infection. Urine culture is pending, as are blood cultures. He was somewhat altered here, although I do not know if this is chronic. There was nothing focal on his examination but head CT was obtained given his history of anticoagulation with coumadin.  "Fortunately this was not acutely abnormally. He INR was therapeutic and there are no other evidence to suggest active bleeding. There was a note from patient's staff for hematemesis and this was not present here and was described as \"blood tinged\". Here in the ED, he abdomen was benign. There was no nausea or vomiting, patient just noting he felt tired. Given the concerns for sepsis and probable pneumonia, he was treated with zosyn for aspiration coverage. He was given low dose IV fluids with concerns for possible pulmonary edema although kike CHF given on examination there are no other concerns for volume overload. His troponin was elevated in the setting of possible pulmonary edema suggesting possible heart failure. The clinicity of this is not clear. It is likely related to ischemia or injury. Due to the concerns for possible blood tinged vomiting aspirin was avoided at this time, awaiting serial troponins. This was discussed with the hospitalist admitting team.  He will be admitted to a medical bed for further care.    Diagnosis:    ICD-10-CM    1. Sepsis, due to unspecified organism (H) A41.9    2. Pneumonia due to infectious organism, unspecified laterality, unspecified part of lung J18.9    3. Anticoagulated on Coumadin Z51.81     Z79.01    4. Elevated troponin R74.8    5. Acute pulmonary edema (H) J81.0        Disposition:  Admitted to [facility] for Dr. Corey to a telemetry bed.    Scribe Disclosure:  I, Heena Mcneil, am serving as a scribe on 10/30/2018 at 10:13 AM to personally document services performed by Sarah Rosas MD based on my observations and the provider's statements to me.     Heena Mcneil  10/30/2018   [facility] EMERGENCY DEPARTMENT       Sarah Rosas MD  10/30/18 1434    "

## 2018-10-30 NOTE — TELEPHONE ENCOUNTER
Patient with emesis x 4 this morning, now blood tinged. Staff report patient likely aspirated, sats now 88% on room air and appears to have dyspnea. -200s this AM. Initially asked staff to obtain CXR and abdominal xray and stat CBC with diff and BMP, give PRN zofran. However patient continued to have emesis so at this time recommend ED eval if OK with patient and family. Staff will call family with update on status and recommendation for ed eval.     Electronically signed by CODY Hunt GNP

## 2018-10-30 NOTE — ED NOTES
[facility]  ED Nurse Handoff Report    Francesco Killian is a 89 year old male   ED Chief complaint: Cough and Fever  . ED Diagnosis:   Final diagnoses:   Sepsis, due to unspecified organism (H)   Pneumonia due to infectious organism, unspecified laterality, unspecified part of lung   Anticoagulated on Coumadin   Elevated troponin   Acute pulmonary edema (H)     Allergies: No Known Allergies    Code Status: DNR / DNI  Activity level - Baseline/Home:  Independent. Activity Level - Current:   Stand with Assist of 2. Lift room needed: No. Bariatric: No   Needed: No   Isolation: No. Infection: Not Applicable.     Vital Signs:   Vitals:    10/30/18 1145 10/30/18 1200 10/30/18 1215 10/30/18 1230   BP: 116/51 119/57 126/54 134/61   Pulse:       Resp:       Temp:       TempSrc:       SpO2:  98%  99%   Weight:           Cardiac Rhythm:  ,      Pain level: 0-10 Pain Scale: 0  Patient confused: yes, at times however he is [place] and this plays a factor at times. Patient Falls Risk: Yes.   Elimination Status: Urethral catheter (alcaraz) in place; refer to orders to discontinue as per protocol    Patient Report - Initial Complaint: vomiting, shortness of breath. Focused Assessment:  The history is provided by the patient and the nursing home. The history is limited by the condition of the patient.      Francesco Killian is an anticoagulated 89 year old male on coumadin with a history of AAA, Aortic stenosis, RBBB, atrial fibrillation, CHF, carotid occulusion HTN, hyperlipidemia, CVA, bradycardia, squamous cell carcinoma, and recurrent falls due to oculovestibular syndrome who presents to the emergency department via EMS for evaluation of nausea. Of note, the patient resides in a nursing home. Earlier this morning, the patient was found supine and vomiting, some of which was bloody. Nursing home staff found thought he may be aspirating. He also had oxygen saturations in the 80%. EMS was called, who transported the  patient here to the emergency department.     Here, the patient presents running a fever of 100.6 axillary. The patient states he is tired but denies abdominal pain, chest pain, dizzy, and weak. He denies any pain  Tests Performed: labs, xray, CT. Abnormal Results:   Labs Ordered and Resulted from Time of ED Arrival Up to the Time of Departure from the ED   CBC WITH PLATELETS DIFFERENTIAL - Abnormal; Notable for the following:        Result Value    WBC 11.7 (*)     RBC Count 3.58 (*)     Hemoglobin 11.5 (*)     Hematocrit 35.4 (*)     Platelet Count 136 (*)     Absolute Neutrophil 10.5 (*)     Absolute Lymphocytes 0.5 (*)     All other components within normal limits   COMPREHENSIVE METABOLIC PANEL - Abnormal; Notable for the following:     Glucose 100 (*)     GFR Estimate 59 (*)     All other components within normal limits   INR - Abnormal; Notable for the following:     INR 2.31 (*)     All other components within normal limits   ROUTINE UA WITH MICROSCOPIC - Abnormal; Notable for the following:     Blood Urine Small (*)     Protein Albumin Urine 100 (*)     Leukocyte Esterase Urine Trace (*)     WBC Urine 26 (*)     RBC Urine 57 (*)     Bacteria Urine Few (*)     Mucous Urine Present (*)     All other components within normal limits   NT PROBNP INPATIENT - Abnormal; Notable for the following:     N-Terminal Pro BNP Inpatient 96780 (*)     All other components within normal limits   LACTIC ACID WHOLE BLOOD   TROPONIN I   BLOOD GAS ARTERIAL AND OXYHGB   NT PROBNP INPATIENT   TROPONIN I   PROCALCITONIN   BLOOD GAS ARTERIAL AND OXYHGB   PULSE OXIMETRY NURSING   CARDIAC CONTINUOUS MONITORING   PERIPHERAL IV CATHETER   INDWELLING URINARY CATHETER (ALCARAZ)   ABO/RH TYPE AND SCREEN   BLOOD CULTURE   URINE CULTURE AEROBIC BACTERIAL   BLOOD CULTURE   .   Treatments provided: IV fluids, medications, alcaraz placed  Family Comments: talked with daughter-in-law on the phone and she will be over after work. Please call her if  you have questions or concerns.  OBS brochure/video discussed/provided to patient:  N/A  ED Medications:   Medications   lidocaine 1 % 1 mL (not administered)   lidocaine (LMX4) cream (not administered)   sodium chloride (PF) 0.9% PF flush 3 mL (not administered)   sodium chloride (PF) 0.9% PF flush 3 mL (not administered)   ondansetron (ZOFRAN) injection 4 mg (4 mg Intravenous Given 10/30/18 1039)   lidocaine (XYLOCAINE) 2 % topical gel (not administered)   piperacillin-tazobactam (ZOSYN) infusion 3.375 g (not administered)   furosemide (LASIX) injection 20 mg (not administered)   0.9% sodium chloride BOLUS (0 mLs Intravenous Stopped 10/30/18 1130)   acetaminophen (TYLENOL) Suppository 975 mg (975 mg Rectal Given 10/30/18 1040)     Drips infusing:  No  For the majority of the shift, the patient's behavior Green. Interventions performed were None.     Severe Sepsis OR Septic Shock Diagnosis Present: No      ED Nurse Name/Phone Number: Malika Davila,   12:58 PM    RECEIVING UNIT ED HANDOFF REVIEW    Above ED Nurse Handoff Report was reviewed: Yes  Reviewed by: Bettie Ramirez on October 30, 2018 at 1:24 PM

## 2018-10-30 NOTE — ED TRIAGE NOTES
89-year-old male presents to the ER with complaints of a cough and fever. Pt vomited this morning and staff at the NH was worried that he had aspirated the vomit. Pt is [place]. Denies pain at this time. Skin warm to the touch. Sats 88% at the NH prior to arrival.

## 2018-10-30 NOTE — PLAN OF CARE
OT/PT orders received, chart reviewed. Pt recently admitted d/t aspiration pneumonia.  Will hold OT/PT evals to tomorrow to allow further medical management and optimize pts participation in assessment.

## 2018-10-30 NOTE — ED NOTES
Bed: ED30  Expected date: 10/30/18  Expected time: 9:57 AM  Means of arrival: Ambulance  Comments:  Jose Serrano

## 2018-10-30 NOTE — H&P
[facility]     Internal Medicine History and Physical        Date of Admission: 10/30/2018    Assessment & Plan  Francesco Killian is a 89 year old man with a history of CAD s/p CABG in 1980s, AAA, moderate aortic stenosis, chronic a fib on warfarin w/ multiple prior CVAs, carotid stenosis, HTN, HLD, CKD, BPH, macular degeneration, memory impairment, and recurrent falls who resides in LTC and presented to ED w/ emesis x 4 (latter of which was blood-tinged) and dyspnea (w/ O2 sats found to be 80%). Being admitted w/ suspected aspiration pneumonia.     #Sepsis 2/2 Aspiration Pneumonia. Temp 101.8, WBC 11.7, RR 24. Lactate wnl. Altered mental status. Requiring 2L NC to maintain O2 sats. CXR w/ interstitial prominence thought suspicious for pulmonary edema but difficult to exclude infiltrate/pneumonia in left lung base. High aspiration risk w/ known dysphagia in the past and emesis x 4 today. UA w/ trace LE, 26 WBC. Received 1L NS in ED and appears hemodynamically stable.   - Add on procal.   - Blood and urine cultures pending.   - Continue Zosyn started in ED. Low threshold to add vancomycin if clinical decompensation.   - NPO pending improvement in mentation and SLP consult.     #Acute Encephalopathy. Currently somnolent and unable to assess orientation. Opens eyes to voice, groans. Per chart has some memory impairment but normally oriented to self, wife, place and sits up in w/c (ambulates w/ assist). Likely d/t infection.   - Close monitoring of mentation. Currently able to protect airway.   - Fall precautions.   - Q4hr neuro checks.   - Treatment of sepsis as above.     #N/V w/ Hematemesis. Hematemesis could be d/t Abril-Harris tear given report of onset after 4 episodes of emesis. Is anticoagulated on warfarin w/ INR 2.31 and plt 136K. Does appear to have some abdominal discomfort on exam so upper GI pathology/bleed does remain in differential. Hgb stable 11.5. Also concern for ongoing adynamic ileus or  pseudo-obstruction (based on 10/22 x-ray and 10/23 NH visit) as the initial etiology of his N/V.   - NPO and BID IV PPI for now.   - Stat abdominal x-ray.   - Low threshold for NG tube to decompress if recurrent N/V.   - Would consult GI if recurrent hematemesis or evidence of unresolving pseudo-obstruction.   - Will hold warfarin tonight and reassess in AM pending further hematemesis.     #Troponinemia, Hx CAD s/p CABG. Trop 0.087. Does not appear to be having chest pain but is not a reliable historian. EKG w/o ST changes (RBBB is not new). Had a CABG in the 1980s. Consider ACS but suspect more type II/demand MI related to acute illness.   - Tele.   - Trend trop Q6hr x 2 additional.   - No ASA given in ED d/t hematemesis.   - Could consider heparin gtt and d/w cardiology if rising troponin and no evidence of GIB. Holding warfarin tonight as above.     #Acute on Chronic CHF Exacerbation. Last echo 2/2017 w/ normal LV size, EF 65-70%, severe concentric LVH, likely diastolic dysfunction, RV mild to moderately dilated w/ mildly decreased RV systolic function, trace MR, trace-mild TR, moderate aortic stenosis. BNP 21309 from 3710 in 2/2017. Requiring O2 w/ CXR as above although do suspect acute aspiration event contributing. Weight 153 lb stable from 10/22 but up 6 lbs from 10/13 (had been attributed to improved nutrition).   - Agree w/ Lasix 20 mg IV given in ED (takes 10 mg PO BID at home). Follow response (has Constantino for strict I&Os).   - Daily weights.     #Prolonged QT Interval. 518 ms in May --> 546 ms today. On Celexa 20 mg. K 3.7.   - Tele.   - Check Mg.   - Hold home Celexa for now, follow QT interval.   - May need to consider something other than Zofran if requiring frequent antiemetic.     #HTN. BP 110s-150s/50s-70s in ED. Son (neurologist) favors keeping SBP at least around 120 to avoid hypotension/ensure cerebral perfusion w/ extensive neuro/stroke hx.   - Holding home metoprolol 12.5 mg BID and hydralazine  25 mg QID w/ unsafe swallow. Can use PRN IV if SBP persistently > 160.      #BPH. Holding home Flomax w/ unsafe swallow and currently voiding via Constantino.     #Hypothyroidism. Changed home Synthroid to IV until able to safely swallow.     Diet: NPO until no evidence of GIB and cleared by SLP  Fluids: Received 1L NS in ED, IVF currently stopped d/t concern for pulmonary edema   DVT Prophylaxis: warfarin  Code Status: DNR/DNI - has POLST on file    Left message for son Dr. Killian to update on plan of care.     Kelly Reynolds PA-C  Hospitalist Service    Chief Complaint   Vomiting    History is obtained from the ED physician and NH notes. Patient unable to participate.     History of Present Illness   Patient was brought to the ED from his LTC facility after being noted to have emesis x 4 today. The last episode apparently was blood-tinged and he was found lying supine at that time. O2 sats were found to be 80% prompting EMS to be called. Upon arrival here patient was febrile, somnolent, and requiring oxygen. Started on Zosyn due to concern for aspiration pneumonia. Of note, patient was seen at his facility on 10/23 with concern for adynamic ileus based on x-ray findings after previously having several weeks of loose stools and starting on PRN Imodium, and then developing abdominal distension. His iron supplements were stopped at that time. Was started on a clear liquid diet with plan to advance as tolerated after 2 days. In the ED he appears to have pain when pressing on the abdomen. Per facility notes, his weight has been gradually increasing since admission there - family felt that he was happier at this facility (where his wife is also a resident) and eating better. Weight was 147.5 lbs on 10/13 and 153.4 lbs on 10/22. Here is 153 lbs.     Review of Systems   10 point ROS performed and negative unless otherwise noted in HPI    Past Medical History    I have reviewed this patient's medical history and updated it with  pertinent information if needed.   Past Medical History:   Diagnosis Date     AAA (abdominal aortic aneurysm) (H) 6/3/2013     Aortic stenosis      BPH (benign prostatic hyperplasia)      Bradycardia     Dr Stanford didn't think pacer needed at this time     CAD (coronary artery disease)     s/p CABG     Carotid occlusion, right     see above note     Chronic atrial fibrillation (H)      Compression fracture of L1 lumbar vertebra (H)      CVA (cerebral infarction)     DANILO and both vertebral art blocked-family son (neurologist) requests BP to run a bit higher since flow is via L ICA     Depression      Dysphagia      HTN (hypertension)     and RA stenosis, s/p stents at [facility]     Hyperlipidemia LDL goal <130      Hypothyroidism      Ischemic cardiomyopathy      Macular degeneration of both eyes      Recurrent falls~occulovestibular syndrom      Right bundle branch block (RBBB)      Squamous cell carcinoma     skin     Unspecified cerebral artery occlusion with cerebral infarction     x 2, uses a cane        Past Surgical History   I have reviewed this patient's surgical history and updated it with pertinent information if needed.  Past Surgical History:   Procedure Laterality Date     C NONSPECIFIC PROCEDURE      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 [facility] at time of RA stenting     CARDIAC SURGERY  1980s    CABG     COLONOSCOPY  11/4/2013    Procedure: COLONOSCOPY;  COLONOSCOPY ;  Surgeon: Aguilar Arechiga MD;  Location:  GI     ESOPHAGOSCOPY, GASTROSCOPY, DUODENOSCOPY (EGD), COMBINED  10/14/2013    Procedure: COMBINED ESOPHAGOSCOPY, GASTROSCOPY, DUODENOSCOPY (EGD);  COMBINED ESOPHAGOSCOPY, GASTROSCOPY, DUODENOSCOPY (EGD) ;  Surgeon: Aguilar Arechiga MD;  Location:  GI     EYE SURGERY      bilat cataracts     GI SURGERY  1970s    duodenal ulcer repair     HERNIA REPAIR      bilat inguinal     IR RENAL/VISCERAL STENT/ATHERECT/PTA       TURP          Social History   Social History   Substance Use Topics      Smoking status: Never Smoker     Smokeless tobacco: Never Used     Alcohol use No     Resides at [facility]. Wife is also a resident there. Son Dr. Killian is a neurologist and primary contact.     Family History   I have reviewed this patient's family history and updated it with pertinent information if needed.   Family History   Problem Relation Age of Onset     C.A.D. Mother      Coronary Artery Disease Mother      C.A.D. Father      C.A.D. Maternal Grandmother      C.A.D. Maternal Grandfather      C.A.D. Paternal Grandmother      C.A.D. Paternal Grandfather      C.A.D. Brother      C.A.D. Sister        Prior to Admission Medications   Prior to Admission Medications   Prescriptions Last Dose Informant Patient Reported? Taking?   ACETAMINOPHEN PO   Yes No   Sig: Take 500 mg by mouth 2 times daily    Banana Flakes (BANATROL PLUS) PACK   Yes No   Sig: Take 1 packet by mouth 3 times daily   Calcium Carbonate Antacid 400 MG CHEW   Yes No   Sig: Take 400 mg by mouth 2 times daily as needed   Citalopram Hydrobromide (CELEXA PO)   Yes No   Sig: Take 20 mg by mouth daily   FUROSEMIDE PO   Yes No   Sig: Take 10 mg by mouth 2 times daily    HYDRALAZINE HCL PO   Yes No   Sig: Take 25 mg by mouth 4 times daily GIVE AT 6-7am, 12n, 5pm,  for HTN Hold for SBP of less than or equal to 120   METOPROLOL TARTRATE PO   Yes No   Sig: Take 12.5 mg by mouth 2 times daily for HTN Hold for SBP is<120 and HR is <60, plz call if hold x 2 in a row for any of these reasons.   Multiple Vitamins-Minerals (ICAPS PO)   Yes No   Sig: Take 1 capsule by mouth daily    Warfarin Sodium (COUMADIN PO)   Yes No   Sig: Take as directed   aspirin 81 MG tablet   Yes No   Sig: Take 81 mg by mouth daily (children's aspirin).   ketoconazole (NIZORAL) 2 % cream   No No   Sig: Apply to face BID PRN   levothyroxine (SYNTHROID/LEVOTHROID) 100 MCG tablet   No No   Sig: TAKE 1 TABLET BY MOUTH EVERY DAY.    loperamide (IMODIUM) 2 MG capsule   Yes No   Sig: Take 2 mg by mouth every 6 hours as needed for diarrhea   polyethylene glycol (MIRALAX/GLYCOLAX) Packet   No No   Sig: Take 17 g by mouth daily as needed for constipation   tamsulosin (FLOMAX) 0.4 MG capsule   Yes No   Sig: Take by mouth At Bedtime      Facility-Administered Medications: None       Allergies    No Known Allergies    Physical Exam   /75  Pulse 93  Temp 98.2  F (36.8  C) (Temporal)  Resp 24  Wt 69.4 kg (153 lb)  SpO2 98%  BMI 23.26 kg/m2  GENERAL: Somnolent. Arouses to voice, open eyes, groans. Does not answer questions appropriately.   HEENT: Anicteric sclera. PERRL. Mucous membranes moist.   CV: RRR. S1, S2. Soft systolic murmur.   RESPIRATORY: Effort normal on 2L NC. Lungs w/ scattered crackles, no wheezing.   GI: Abdomen soft and non distended, bowel sounds faint. Appears to have mild generalized tenderness w/ palpation.   MUSCULOSKELETAL: No joint swelling or tenderness.   NEUROLOGICAL: Moves all extremities. Does not follow commands for full neuro exam.   EXTREMITIES: No peripheral edema. Intact bilateral pedal pulses.   SKIN: No jaundice. No rashes.     Data   Data reviewed today: I reviewed all medications, new labs and imaging results over the last 24 hours. I personally reviewed CXR and head CT.

## 2018-10-30 NOTE — PLAN OF CARE
Problem: Patient Care Overview  Goal: Plan of Care/Patient Progress Review  Outcome: No Change  Pt arrived on the floor at 1400. Disoriented x 4. Unable to follow verbal commands. Nasal cannula 1 L sats 97%. Up - Assist x 2. Constantino in place with good output. Diet - NPO. Soft BP - md notified - hold off on giving more lasix. LS coarse. Blanchable redness to coccyx. Abdominal X-ray and ECHO performed on the floor.

## 2018-10-30 NOTE — IP AVS SNAPSHOT
MRN:[id number]                      After Visit Summary   10/30/2018    Francesco Killian    MRN: [id number]           Thank you!     Thank you for choosing [facility] for your care. Our goal is always to provide you with excellent care. Hearing back from our patients is one way we can continue to improve our services. Please take a few minutes to complete the written survey that you may receive in the mail after you visit. If you would like to speak to someone directly about your visit please contact Patient Relations at [phone]. Thank you!          Patient Information     Date Of Birth          12/26/1920        Designated Caregiver       Most Recent Value    Caregiver    Will someone help with your care after discharge? yes    Name of designated caregiver [employer]    Phone number of caregiver On file    Caregiver address On file      About your hospital stay     You were admitted on:  October 30, 2018 You last received care in the:  [place] [address] [facility]    You were discharged on:  November 3, 2018        Reason for your hospital stay       Sepsis                  Who to Call     For medical emergencies, please call 911.  For non-urgent questions about your medical care, please call your primary care provider or clinic, [phone]          Attending Provider     Provider Specialty    Sarah Rosas MD Emergency Medicine    [facility], Amy Luna MD Internal Medicine       Primary Care Provider Office Phone # Fax #    [facility] [phone]      After Care Instructions     Activity - Up ad ce           Advance Diet as Tolerated       Follow this diet upon discharge: Orders Placed This Encounter      Dysphagia Diet Level 1 Pureed Nectar Thickened Liquids (pre-thickened or use instant food thickener)            General info for SNF       Length of Stay Estimate: Long Term Care  Condition at Discharge: Stable  Level of  care:skilled   Rehabilitation Potential: Fair  Admission H&P remains valid and up-to-date: Yes  Recent Chemotherapy: N/A  Use Nursing Home Standing Orders: Yes            Mantoux instructions       Give two-step Mantoux (PPD) Per Facility Policy Yes                  Follow-up Appointments     Follow Up and recommended labs and tests       Follow up with longterm physician.  The following labs/tests are recommended: INR in two day .  Follow up with PCP in 1-2 weeks                  Additional Services     Occupational Therapy Adult Consult       Evaluate and treat as clinically indicated.            Physical Therapy Adult Consult       Evaluate and treat as clinically indicated.            Speech Language Path Adult Consult       Evaluate and treat as clinically indicated.    Reason:  Dysphagia                  Warfarin Instruction     You have started taking a medicine called warfarin. This is a blood-thinning medicine (anticoagulant). It helps prevent and treat blood clots.      Before leaving the hospital, make sure you know how much to take and how long to take it.      You will need regular blood tests to make sure your blood is clotting safely. It is very important to see your doctor for regular blood tests.    Talk to your doctor before taking any new medicine (this includes over-the-counter drugs and herbal products). Many medicines can interact with warfarin. This may cause more bleeding or too much clotting.     Eating a lot of vitamin K--found in green, leafy vegetables--can change the way warfarin works in your body. Do NOT avoid these foods. Instead, try to eat the same amount each day.     Bleeding is the most common side-effect of warfarin. You may notice bleeding gums, a bloody nose, bruises and bleeding longer when you cut yourself. See a doctor at once if:   o You cough up blood  o You find blood in your stool (poop)  o You have a deep cut, or a cut that bleeds longer than 10 minutes   o You  "have a bad cut, hard fall, accident or hit your head (go to urgent care or the emergency room).    For women who can get pregnant: This medicine can harm an unborn baby. Be very careful not to get pregnant while taking this medicine. If you think you might be pregnant, call your doctor right away.    For more information, read \"Guide to Warfarin Therapy,  the booklet you received in the hospital.        Pending Results     Date and Time Order Name Status Description    10/31/2018 0710 Blood culture Preliminary     10/31/2018 0710 Blood culture Preliminary     10/30/2018 1028 Blood culture ONE site Preliminary             Statement of Approval     Ordered          11/03/18 0931  I have reviewed and agree with all the recommendations and orders detailed in this document.  EFFECTIVE NOW     Approved and electronically signed by:  Porter Mahmood MD             Admission Information     Date & Time Provider Department Dept. Phone    10/30/2018 Amy Corey MD [place] [address] [facility] [phone]      Your Vitals Were     Blood Pressure Pulse Temperature Respirations Weight Pulse Oximetry    111/45 (BP Location: Right arm) 56 97.1  F (36.2  C) (Oral) 20 66 kg (145 lb 9.6 oz) 92%    BMI (Body Mass Index)                   22.14 kg/m2           [facility] Information     [facility] gives you secure access to your electronic health record. If you see a primary care provider, you can also send messages to your care team and make appointments. If you have questions, please call your primary care clinic.  If you do not have a primary care provider, please call [phone] and they will assist you.        Care EveryWhere ID     This is your Care EveryWhere ID. This could be used by other organizations to access your [place] medical records  [id number]        Equal Access to Services     IMAN MULTANI AH: marcus Cabrera qaybta kaalmada adeegyada, waxay idiin hayaan adeeg " lashaundelphinekali montesaan ah. So [facility] [phone].    ATENCIÓN: Si gena rainey, tiene a costa disposición servicios gratuitos de asistencia lingüística. Lesia al [phone].    We comply with applicable federal civil rights laws and [place] laws. We do not discriminate on the basis of race, color, national origin, age, disability, sex, sexual orientation, or gender identity.               Review of your medicines      START taking        Dose / Directions    amoxicillin-clavulanate 875-125 MG per tablet   Commonly known as:  AUGMENTIN   Used for:  Sepsis, due to unspecified organism (H)        Dose:  1 tablet   Take 1 tablet by mouth 2 times daily   Quantity:  28 tablet   Refills:  0         CONTINUE these medicines which may have CHANGED, or have new prescriptions. If we are uncertain of the size of tablets/capsules you have at home, strength may be listed as something that might have changed.        Dose / Directions    COUMADIN 1 MG tablet   This may have changed:    - how much to take  - additional instructions   Generic drug:  warfarin        Dose:  1.5 mg   Take 1.5 tablets (1.5 mg) by mouth daily 1.5 mg po daily for 2 days then INR on 11/5 for further dosing.Goal INR around 2   Quantity:  30 tablet   Refills:  0       metoprolol tartrate 25 MG tablet   Commonly known as:  LOPRESSOR   This may have changed:    - how much to take  - additional instructions   Used for:  Sepsis, due to unspecified organism (H)        Dose:  6.25 mg   Take 0.25 tablets (6.25 mg) by mouth 2 times daily   Quantity:  60 tablet   Refills:  0         CONTINUE these medicines which have NOT CHANGED        Dose / Directions    ACETAMINOPHEN PO   Indication:  Pain        Dose:  500 mg   Take 500 mg by mouth 2 times daily   Refills:  0       aspirin 81 MG tablet        Dose:  81 mg   Take 81 mg by mouth daily (children's aspirin).   Refills:  0       BANATROL PLUS Pack        Dose:  1 packet   Take 1 packet by mouth 3 times daily   Refills:  0        Calcium Carbonate Antacid 400 MG Chew   Indication:  Stomach Upset        Dose:  400 mg   Take 400 mg by mouth 2 times daily as needed   Refills:  0       CELEXA PO        Dose:  20 mg   Take 20 mg by mouth daily   Refills:  0       FLOMAX 0.4 MG capsule   Indication:  Benign Enlargement of Prostate   Generic drug:  tamsulosin        Dose:  0.4 mg   Take 0.4 mg by mouth At Bedtime   Refills:  0       FUROSEMIDE PO   Indication:  High Blood Pressure Disorder        Dose:  10 mg   Take 10 mg by mouth 2 times daily   Refills:  0       HYDRALAZINE HCL PO        Dose:  25 mg   Take 25 mg by mouth 4 times daily GIVE AT 6-7am, 12n, 5pm,  for HTN Hold for SBP of less than or equal to 120   Refills:  0       ketoconazole 2 % cream   Commonly known as:  NIZORAL   Used for:  Dermatitis, seborrheic        Apply to face BID PRN   Quantity:  30 g   Refills:  1       levothyroxine 100 MCG tablet   Commonly known as:  SYNTHROID/LEVOTHROID   Used for:  Other specified hypothyroidism        TAKE 1 TABLET BY MOUTH EVERY DAY.   Quantity:  90 tablet   Refills:  0       loperamide 2 MG capsule   Commonly known as:  IMODIUM        Dose:  2 mg   Take 2 mg by mouth every 6 hours as needed for diarrhea   Refills:  0       polyethylene glycol Packet   Commonly known as:  MIRALAX/GLYCOLAX   Used for:  Closed compression fracture of first lumbar vertebra, initial encounter (H)        Dose:  17 g   Take 17 g by mouth daily as needed for constipation   Quantity:  7 packet   Refills:  0       PRESERVISION AREDS PO        Dose:  1 capsule   Take 1 capsule by mouth daily   Refills:  0            Where to get your medicines      Some of these will need a paper prescription and others can be bought over the counter. Ask your nurse if you have questions.     You don't need a prescription for these medications     amoxicillin-clavulanate 875-125 MG per tablet    metoprolol tartrate 25 MG tablet                Protect others around you: Learn  how to safely use, store and throw away your medicines at www.disposemymeds.org.        ANTIBIOTIC INSTRUCTION     You've Been Prescribed an Antibiotic - Now What?  Your healthcare team thinks that you or your loved one might have an infection. Some infections can be treated with antibiotics, which are powerful, life-saving drugs. Like all medications, antibiotics have side effects and should only be used when necessary. There are some important things you should know about your antibiotic treatment.      Your healthcare team may run tests before you start taking an antibiotic.    Your team may take samples (e.g., from your blood, urine or other areas) to run tests to look for bacteria. These test can be important to determine if you need an antibiotic at all and, if you do, which antibiotic will work best.      Within a few days, your healthcare team might change or even stop your antibiotic.    Your team may start you on an antibiotic while they are working to find out what is making you sick.    Your team might change your antibiotic because test results show that a different antibiotic would be better to treat your infection.    In some cases, once your team has more information, they learn that you do not need an antibiotic at all. They may find out that you don't have an infection, or that the antibiotic you're taking won't work against your infection. For example, an infection caused by a virus can't be treated with antibiotics. Staying on an antibiotic when you don't need it is more likely to be harmful than helpful.      You may experience side effects from your antibiotic.    Like all medications, antibiotics have side effects. Some of these can be serious.    Let you healthcare team know if you have any known allergies when you are admitted to the hospital.    One significant side effect of nearly all antibiotics is the risk of severe and sometimes deadly diarrhea caused by Clostridium difficile (C.  Difficile). This occurs when a person takes antibiotics because some good germs are destroyed. Antibiotic use allows C. diificile to take over, putting patients at high risk for this serious infection.    As a patient or caregiver, it is important to understand your or your loved one's antibiotic treatment. It is especially important for caregivers to speak up when patients can't speak for themselves. Here are some important questions to ask your healthcare team.    What infection is this antibiotic treating and how do you know I have that infection?    What side effects might occur from this antibiotic?    How long will I need to take this antibiotic?    Is it safe to take this antibiotic with other medications or supplements (e.g., vitamins) that I am taking?     Are there any special directions I need to know about taking this antibiotic? For example, should I take it with food?    How will I be monitored to know whether my infection is responding to the antibiotic?    What tests may help to make sure the right antibiotic is prescribed for me?      Information provided by:  www.cdc.gov/getsmart  U.S. Department of Health and Human Services  Centers for disease Control and Prevention  National Center for Emerging and Zoonotic Infectious Diseases  Division of Healthcare Quality Promotion             Medication List: This is a list of all your medications and when to take them. Check marks below indicate your daily home schedule. Keep this list as a reference.      Medications           Morning Afternoon Evening Bedtime As Needed    ACETAMINOPHEN PO   Take 500 mg by mouth 2 times daily                                amoxicillin-clavulanate 875-125 MG per tablet   Commonly known as:  AUGMENTIN   Take 1 tablet by mouth 2 times daily                                aspirin 81 MG tablet   Take 81 mg by mouth daily (children's aspirin).                                BANATROL PLUS Pack   Take 1 packet by mouth 3 times  daily                                Calcium Carbonate Antacid 400 MG Chew   Take 400 mg by mouth 2 times daily as needed                                CELEXA PO   Take 20 mg by mouth daily                                COUMADIN 1 MG tablet   Take 1.5 tablets (1.5 mg) by mouth daily 1.5 mg po daily for 2 days then INR on 11/5 for further dosing.Goal INR around 2   Last time this was given:  1 mg on 11/2/2018  5:27 PM   Generic drug:  warfarin                                FLOMAX 0.4 MG capsule   Take 0.4 mg by mouth At Bedtime   Last time this was given:  0.4 mg on 11/2/2018 10:03 PM   Generic drug:  tamsulosin                                FUROSEMIDE PO   Take 10 mg by mouth 2 times daily                                HYDRALAZINE HCL PO   Take 25 mg by mouth 4 times daily GIVE AT 6-7am, 12n, 5pm,  for HTN Hold for SBP of less than or equal to 120   Last time this was given:  25 mg on 11/3/2018  9:24 AM                                ketoconazole 2 % cream   Commonly known as:  NIZORAL   Apply to face BID PRN                                levothyroxine 100 MCG tablet   Commonly known as:  SYNTHROID/LEVOTHROID   TAKE 1 TABLET BY MOUTH EVERY DAY.   Last time this was given:  100 mcg on 11/3/2018  6:58 AM                                loperamide 2 MG capsule   Commonly known as:  IMODIUM   Take 2 mg by mouth every 6 hours as needed for diarrhea                                metoprolol tartrate 25 MG tablet   Commonly known as:  LOPRESSOR   Take 0.25 tablets (6.25 mg) by mouth 2 times daily   Last time this was given:  6.25 mg on 11/3/2018  9:24 AM                                polyethylene glycol Packet   Commonly known as:  MIRALAX/GLYCOLAX   Take 17 g by mouth daily as needed for constipation                                PRESERVISION AREDS PO   Take 1 capsule by mouth daily

## 2018-10-30 NOTE — PROGRESS NOTES
"An ABG was completed on the pt's right radial with no complications noted during the procedure.  Vital signs:  Temp: 101.8  F (38.8  C) Temp src: Rectal BP: 128/59 Pulse: 93 Heart Rate: 67 Resp: 24 SpO2: 96 % O2 Device: Nasal cannula     Weight: 69.4 kg (153 lb)  Estimated body mass index is 23.26 kg/(m^2) as calculated from the following:    Height as of 9/20/18: 1.727 m (5' 8\").    Weight as of this encounter: 69.4 kg (153 lb).        PAST MEDICAL HISTORY:   Past Medical History:   Diagnosis Date     AAA (abdominal aortic aneurysm) (H) 6/3/2013     Aortic stenosis      Bradycardia     Dr Stanford didn't think pacer needed at this time     Carotid occlusion, right     see above note     Chronic atrial fibrillation (H) 6/17/2013     CVA (cerebral infarction) 6/3/2013    DANILO and both vertebral art blocked-family son (neurologist) requests BP to run a bit higher since flow is via L ICA     Dysphagia 6/3/2013     HTN (hypertension) 6/3/2013    and RA stenosis, s/p stents at [place]     Hyperlipidemia LDL goal <130 6/3/2013     Hypothyroidism 6/3/2013     Macular degeneration of both eyes      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     Right bundle branch block (RBBB) 9/2/2015     Squamous cell carcinoma      Unspecified cerebral artery occlusion with cerebral infarction     x 2, uses a cane       PAST SURGICAL HISTORY:   Past Surgical History:   Procedure Laterality Date     C NONSPECIFIC PROCEDURE      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 [place] at time of RA stenting     CARDIAC SURGERY  1980s    CABg     COLONOSCOPY      normal exams     COLONOSCOPY  11/4/2013    Procedure: COLONOSCOPY;  COLONOSCOPY ;  Surgeon: Aguilar Arechiga MD;  Location:  GI     ESOPHAGOSCOPY, GASTROSCOPY, DUODENOSCOPY (EGD), COMBINED  10/14/2013    Procedure: COMBINED ESOPHAGOSCOPY, GASTROSCOPY, DUODENOSCOPY (EGD);  COMBINED ESOPHAGOSCOPY, GASTROSCOPY, DUODENOSCOPY (EGD) ;  Surgeon: Aguilar Arechiga MD;  Location:  GI "     EYE SURGERY      bilat cataracts     GI SURGERY  1970s    duodenal ulcer reapair     HERNIA REPAIR      bilat inguinal     IR RENAL/VISCERAL STENT/ATHERECT/PTA       TURP         FAMILY HISTORY:   Family History   Problem Relation Age of Onset     C.A.D. Mother      Coronary Artery Disease Mother      C.A.D. Father      C.A.D. Maternal Grandmother      C.A.D. Maternal Grandfather      C.A.TOBIN. Paternal Grandmother      C.A.D. Paternal Grandfather      C.A.D. Brother      C.A.D. Sister        SOCIAL HISTORY:   Social History   Substance Use Topics     Smoking status: Never Smoker     Smokeless tobacco: Never Used     Alcohol use No     Noble Patton RT  10/30/2018

## 2018-10-30 NOTE — IP AVS SNAPSHOT
` `     [place] [address] MEDICAL SURGICAL: [phone]            Medication Administration Report for Francesco Killian as of 11/03/18 1345   Legend:    Given Hold Not Given Due Canceled Entry Other Actions    Time Time (Time) Time  Time-Action       Inactive    Active    Linked        Medications 10/28/18 10/29/18 10/30/18 10/31/18 11/01/18 11/02/18 11/03/18    acetaminophen (TYLENOL) Suppository 650 mg  Dose: 650 mg  Freq: EVERY 4 HOURS PRN Route: RE  PRN Reason: mild pain  Start: 10/30/18 1412   Admin Instructions: Alternate ibuprofen (if ordered) with acetaminophen.  Maximum acetaminophen dose from all sources = 75 mg/kg/day not to exceed 4 grams/day.    Admin. Amount: 1 suppository (1 × 650 mg suppository)  Dispense Loc: [facility]               hydrALAZINE (APRESOLINE) injection 10 mg  Dose: 10 mg  Freq: EVERY 4 HOURS PRN Route: IV  PRN Reason: high blood pressure  PRN Comment: give for SBP > 180  Start: 10/31/18 0749   Admin Instructions: For ordered IV doses 1-40 mg, give IV Push undiluted over 1 minute.    Admin. Amount: 10 mg = 0.5 mL Conc: 20 mg/mL  Last Admin: 11/02/18 0658  Dispense Loc: [facility]  Volume: 0.5 mL          0658 (10 mg)-Given            hydrALAZINE (APRESOLINE) tablet 25 mg  Dose: 25 mg  Freq: 3 TIMES DAILY Route: PO  Start: 11/02/18 1600   Admin Instructions: Hold if sbp< 100    Admin. Amount: 1 tablet (1 × 25 mg tablet)  Last Admin: 11/03/18 0924  Dispense Loc: [facility]          1624 (25 mg)-Given       2203 (25 mg)-Given        0924 (25 mg)-Given       [ ] 1600       [ ] 2200           levothyroxine (SYNTHROID/LEVOTHROID) tablet 100 mcg  Dose: 100 mcg  Freq: EVERY MORNING BEFORE BREAKFAST Route: PO  Start: 10/31/18 0900   Admin Instructions: Separate oral administration of iron- or calcium-containing products and levothyroxine by at least 4 hours.    Admin. Amount: 1 tablet (1 × 100 mcg tablet)  Last Admin: 11/03/18 0658  Dispense Loc: [facility]        0920 (100  "mcg)-Given        0643 (100 mcg)-Given        0637 (100 mcg)-Given        0658 (100 mcg)-Given           lidocaine (LMX4) cream  Freq: EVERY 1 HOUR PRN Route: Top  PRN Reason: pain  PRN Comment: with VAD insertion or accessing implanted port.  Start: 10/30/18 1015   Admin Instructions: Do NOT give if patient has a history of allergy to any local anesthetic or any \"linda\" product.   Apply 30 minutes prior to VAD insertion or port access.  MAX Dose:  2.5 g (  of 5 g tube)    Dispense Loc:  [facility]               lidocaine 1 % 1 mL  Dose: 1 mL  Freq: EVERY 1 HOUR PRN Route: OTHER  PRN Comment: mild pain with VAD insertion or accessing implanted port  Start: 10/30/18 1015   Admin Instructions: Do NOT give if patient has a history of allergy to any local anesthetic or any \"linda\" product. MAX dose 1 mL subcutaneous OR intradermal in divided doses.    Admin. Amount: 1 mL  Dispense Loc: [facility]  Volume: 2 mL               melatonin tablet 1 mg  Dose: 1 mg  Freq: AT BEDTIME PRN Route: PO  PRN Reason: sleep  Start: 10/30/18 1412   Admin Instructions: Do not give unless at least 6 hours of uninterrupted sleep is expected.    Admin. Amount: 1 tablet (1 × 1 mg tablet)  Dispense Loc:  [facility]               metoprolol tartrate (LOPRESSOR) quarter-tab 6.25 mg  Dose: 6.25 mg  Freq: 2 TIMES DAILY Route: PO  Start: 11/02/18 2100   Admin Instructions: Hold for SBP < 100 or HR < 60    Admin. Amount: 1 quarter-tab (1 × 6.25 mg quarter-tab)  Last Admin: 11/03/18 0924  Dispense Loc:  [facility]          (2043)-Not Given        0924 (6.25 mg)-Given       [ ] 2100           miconazole (MICATIN; MICRO GUARD) 2 % powder  Freq: EVERY 1 HOUR PRN Route: Top  PRN Reason: other  PRN Comment: topical candidiasis  Start: 11/01/18 2215   Admin Instructions: Apply to affected area.      Dispense Loc:  Main Pharmacy               naloxone (NARCAN) injection 0.1-0.4 mg  Dose: 0.1-0.4 mg  Freq: EVERY 2 MIN PRN Route: IV  PRN Reason: opioid " reversal  Start: 10/30/18 1412   Admin Instructions: For respiratory rate LESS than or EQUAL to 8.  Partial reversal dose:  0.1 mg titrated q 2 minutes for Analgesia Side Effects Monitoring Sedation Level of 3 (frequently drowsy, arousable, drifts to sleep during conversation).Full reversal dose:  0.4 mg bolus for Analgesia Side Effects Monitoring Sedation Level of 4 (somnolent, minimal or no response to stimulation).  For ordered IV doses 0.1-2mg give IVP. Give each 0.4mg over 15 seconds in emergency situations. For non-emergent situations further dilute in 9mL of NS to facilitate titration of response.    Admin. Amount: 0.1-0.4 mg = 0.25-1 mL Conc: 0.4 mg/mL  Dispense Loc: [facility]  Volume: 1 mL               ondansetron (ZOFRAN) injection 4 mg  Dose: 4 mg  Freq: EVERY 6 HOURS PRN Route: IV  PRN Reasons: nausea,vomiting  Start: 10/30/18 1412   Admin Instructions: May repeat in 30 minutes as needed, up to 3 doses.  Irritant. For ordered IV doses 0.1-4 mg, give IV Push undiluted over 2-5 minutes.    Admin. Amount: 4 mg = 2 mL Conc: 4 mg/2 mL  Dispense Loc: [facility]  Infused Over: 2-5 Minutes  Administrations Remainin  Volume: 2 mL               pantoprazole (PROTONIX) EC tablet 40 mg  Dose: 40 mg  Freq: 2 TIMES DAILY BEFORE MEALS Route: PO  Start: 18 0730   Admin Instructions: Changing pantoprazole from IV to oral therapy at a dose of 40 mg bid.    DO NOT CRUSH.    Admin. Amount: 1 tablet (1 × 40 mg tablet)  Last Admin: 18 0658  Dispense Loc: [facility]         0643 (40 mg)-Given       1631 (40 mg)-Given        0637 (40 mg)-Given       1624 (40 mg)-Given        0658 (40 mg)-Given       [ ] 1630           Patient is already receiving anticoagulation with heparin, enoxaparin (LOVENOX), warfarin (COUMADIN)  or other anticoagulant medication  Freq: CONTINUOUS PRN Route: XX  Start: 10/30/18 1412   Dispense Loc: Non Rx dispense               piperacillin-tazobactam (ZOSYN) infusion 2.25 g  Dose:  2.25 g  Freq: EVERY 6 HOURS Route: IV  Indications of Use: ASPIRATION PNEUMONIA  Last Dose: 2.25 g (11/03/18 1333)  Start: 10/30/18 1900   Admin. Amount: 2.25 g = 50 mL Conc: 2.25 g/50 mL  Last Admin: 11/03/18 1333  Dispense Loc: [facility]  Infused Over: 30 Minutes  Volume: 50 mL   Current Line: Peripheral IV 10/30/18 Left      1929 (2.25 g)-New Bag        0126 (2.25 g)-New Bag       0634 (2.25 g)-New Bag       1252 (2.25 g)-New Bag       1845 (2.25 g)-New Bag        0029 (2.25 g)-New Bag       0643 (2.25 g)-New Bag       1222 (2.25 g)-New Bag       1820 (2.25 g)-New Bag        0142 (2.25 g)-New Bag       0637 (2.25 g)-New Bag       1305 (2.25 g)-New Bag       1827 (2.25 g)-New Bag        0124 (2.25 g)-New Bag       0658 (2.25 g)-New Bag       1333 (2.25 g)-New Bag       [ ] 1900           sodium chloride (PF) 0.9% PF flush 3 mL  Dose: 3 mL  Freq: EVERY 8 HOURS Route: IK  Start: 10/30/18 1016   Admin Instructions: And Q1H PRN, to lock peripheral IV dormant line.    Admin. Amount: 3 mL  Last Admin: 11/03/18 1333  Dispense Loc: [facility] Stock  Volume: 4 mL   Current Line: Peripheral IV 10/30/18 Left             (1739)-Not Given [C]        (0234)-Not Given       0920 (3 mL)-Given       1845 (3 mL)-Given        0029 (3 mL)-Given       0737 (3 mL)-Given              1638 (3 mL)-Given        0142 (3 mL)-Given       (0835)-Not Given [C]       1827 (3 mL)-Given        0123 (3 mL)-Given       1333 (3 mL)-Given       [ ] 2100           sodium chloride (PF) 0.9% PF flush 3 mL  Dose: 3 mL  Freq: EVERY 1 HOUR PRN Route: IK  PRN Reason: line flush  PRN Comment: for peripheral IV flush post IV meds  Start: 10/30/18 1015   Admin. Amount: 3 mL  Last Admin: 10/30/18 1558  Dispense Loc: [facility] Stock  Volume: 4 mL   Current Line: Peripheral IV 10/30/18 Left      1558 (3 mL)-Given               tamsulosin (FLOMAX) capsule 0.4 mg  Dose: 0.4 mg  Freq: AT BEDTIME Route: PO  Indications of Use: BENIGN PROSTATIC  HYPERTROPHY  Start: 18   Admin Instructions: Administer 30 minutes after the same meal each day.  Capsules should be swallowed whole; do not crush chew or open.    Admin. Amount: 1 capsule (1 × 0.4 mg capsule)  Last Admin: 18  Dispense Loc: [facility]         2105 (0.4 mg)-Given        2203 (0.4 mg)-Given        [ ] 2200           Warfarin Therapy Reminder (Check START DATE - warfarin may be starting in the FUTURE)  Dose: 1 each  Freq: CONTINUOUS PRN Route: XX  Start: 10/31/18 0841   Admin Instructions: *Note to reorder warfarin daily*  Pharmacy Warfarin Dosing Service  Patient is on Warfarin Therapy - check for daily order    Admin. Amount: 1 each  Dispense Loc: [facility]              Completed Medications  Medications 10/28/18 10/29/18 10/30/18 10/31/18 11/01/18 11/02/18 11/03/18         Dose: 1 mg  Freq: ONCE AT 6PM Route: PO  Start: 18 1800   End: 18   Admin. Amount: 1 tablet (1 × 1 mg tablet)  Last Admin: 18  Dispense Loc: [facility]  Administrations Remainin          1727 (1 mg)-Given              Dose: 1 mg  Freq: ONCE AT 6PM Route: PO  Start: 18 1800   End: 18 181   Admin. Amount: 1 tablet (1 × 1 mg tablet)  Last Admin: 18 181  Dispense Loc: [facility]  Administrations Remainin         1816 (1 mg)-Given            Discontinued Medications  Medications 10/28/18 10/29/18 10/30/18 10/31/18 11/01/18 11/02/18 11/03/18         Freq: HOLD Route: XX  Start: 10/30/18 1412   End: 18 1330   Order specific questions:  Medication(s) to hold: warfarin  Parameter for hold (doses,days,conditions) : HOLD until provider places a RESUME medication order     Dispense Loc:  Main Pharmacy         1330-Med Discontinued           Dose: 12.5 mg  Freq: 3 TIMES DAILY Route: PO  Start: 18 1600   End: 18 0926   Admin Instructions: Hold if sbp< 100    Admin. Amount: 1 half-tab (1 × 12.5 mg half-tab)  Last Admin: 18  0907  Dispense Loc: [facility]         1631 (12.5 mg)-Given       2247 (12.5 mg)-Given        0907 (12.5 mg)-Given       0926-Med Discontinued          Dose: 10 mg  Freq: 3 TIMES DAILY Route: PO  Start: 10/31/18 0900   End: 11/01/18 1255   Admin Instructions: Hold if sbp< 100    Admin. Amount: 1 tablet (1 × 10 mg tablet)  Last Admin: 11/01/18 0734  Dispense Loc: [facility]        0920 (10 mg)-Given       1608 (10 mg)-Given       2227 (10 mg)-Given        0734 (10 mg)-Given              1255-Med Discontinued           Dose: 12.5 mg  Freq: 2 TIMES DAILY Route: PO  Start: 11/01/18 1300   End: 11/01/18 1254   Admin. Amount: 1 half-tab (1 × 12.5 mg half-tab)         1254-Med Discontinued           Dose: 12.5 mg  Freq: 2 TIMES DAILY Route: PO  Start: 10/31/18 0900   End: 11/02/18 1116   Admin Instructions: Hold for SBP < 100 or HR < 60    Admin. Amount: 1 half-tab (1 × 12.5 mg half-tab)  Last Admin: 11/01/18 2105  Dispense Loc: [facility]        0920 (12.5 mg)-Given       2012 (12.5 mg)-Given        (0734)-Not Given              2105 (12.5 mg)-Given        0834-Hold       1116-Med Discontinued          Dose: 40 mg  Freq: EVERY 12 HOURS Route: IV  Start: 10/31/18 1400   End: 10/31/18 1352   Admin Instructions: Irritant. For ordered IV doses 1-40 mg, give IV Push over 2 minutes when reconstituted with 10mL NS.    Admin. Amount: 40 mg  Volume: 10 mL        1352-Med Discontinued            Dose: 40 mg  Freq: EVERY 12 HOURS Route: IV  Start: 10/30/18 1415   End: 10/31/18 1354   Admin Instructions: Irritant. For ordered IV doses 1-40 mg, give IV Push over 2 minutes when reconstituted with 10mL NS.    Admin. Amount: 40 mg  Last Admin: 10/31/18 1349  Dispense Loc: [facility]  Volume: 10 mL   Current Line: Peripheral IV 10/30/18 Left      1558 (40 mg)-Given        0230 (40 mg)-Given       1349 (40 mg)-Given       1354-Med Discontinued            Dose: 0.4 mg  Freq: AT BEDTIME Route: PO  Start: 10/31/18 2200   End: 11/01/18  1254   Admin Instructions: Administer 30 minutes after the same meal each day.  Capsules should be swallowed whole; do not crush chew or open.    Admin. Amount: 1 capsule (1 × 0.4 mg capsule)  Last Admin: 10/31/18 2013  Dispense Loc:  [facility]         (0.4 mg)-Given               1254-Med Discontinued           Dose: 1 mg  Freq: ONCE AT 6PM Route: PO  Start: 10/31/18 1800   End: 10/31/18 161   Admin. Amount: 1 tablet (1 × 1 mg tablet)  Dispense Loc:  [facility]  Administrations Remainin        1617-Med Discontinued       Medications 10/28/18 10/29/18 10/30/18 10/31/18 11/01/18 11/02/18 11/03/18

## 2018-10-30 NOTE — IP AVS SNAPSHOT
` `     [place] [address] MEDICAL SURGICAL: [phone]                 INTERAGENCY TRANSFER FORM - NOTES (H&P, Discharge Summary, Consults, Procedures, Therapies)   10/30/2018                    Hospital Admission Date: 10/30/2018  RUBIO KILLIAN   : 1920  Sex: Male        Patient PCP Information     Provider PCP Type    CODY Hunt CNP General         History & Physicals      H&P by Kelly Reynolds PA at 10/30/2018 12:38 PM     Author:  Kelly Reynolds PA Service:  Hospitalist Author Type:  Physician Assistant - C    Filed:  10/30/2018  2:05 PM Date of Service:  10/30/2018 12:38 PM Creation Time:  10/30/2018 12:38 PM    Status:  Attested :  Kelly Reynolds PA (Physician Assistant - C)    Cosigner:  Amy Corey MD at 10/30/2018  2:53 PM        Attestation signed by Amy Corey MD at 10/30/2018  2:53 PM        Physician Attestation   I, Amy Corey MD, have reviewed and discussed with the advanced practice provider their history, physical and plan for Rubio Killian. I did not participate in a shared visit by interviewing or examining the patient and this should be billed as an advanced practice provider only visit.    Agree with the assessment  & treatment plan in place.    Amy Corey MD  Date of Service (when I saw the patient):                                [facility]     Internal Medicine History and Physical        Date of Admission: 10/30/2018    Assessment & Plan  Rubio Killian is a 89 year old man with a history of CAD s/p CABG in 1980s, AAA, moderate aortic stenosis, chronic a fib on warfarin w/ multiple prior CVAs, carotid stenosis, HTN, HLD, CKD, BPH, macular degeneration, memory impairment, and recurrent falls who resides in LTC and presented to ED w/ emesis x 4 (latter of which was blood-tinged) and dyspnea (w/ O2 sats found to be 8[WG1.1]0[WG1.2]%). Being admitted w/ suspected  aspiration pneumonia.[WG1.1]     #Sepsis 2/2 Aspiration Pneumonia. Temp 101.8, WBC 11.7, RR 24. Lactate wnl. Altered mental status. Requiring 2L NC to maintain O2 sats. CXR w/ interstitial prominence thought suspicious for pulmonary edema but difficult to exclude infiltrate/pneumonia in left lung base. High aspiration risk w/ known dysphagia in the past and emesis x 4 today. UA w/ trace LE, 26 WBC. Received 1L NS in ED and appears hemodynamically stable.   - Add on procal.   - Blood and urine cultures pending.   - Continue Zosyn started in ED. Low threshold to add vancomycin if clinical decompensation.   - NPO pending improvement in mentation and SLP consult.     #Acute Encephalopathy. Currently somnolent and unable to assess orientation. Opens eyes to voice, groans. Per chart has some memory impairment but normally oriented to self, wife, place and sits up in w/c (ambulates w/ assist). Likely d/t infection.   - Close monitoring of mentation. Currently able to protect airway.   - Fall precautions.[WG1.3]   - Q4hr neuro checks.[WG1.4]   - Treatment of sepsis as above.     #[WG1.3]N/V w/[WG1.5] Hematemesis.[WG1.3] Hematemesis c[WG1.5]ould be d/t Abril-Harris tear given report of onset after 4 episodes of emesis. Is anticoagulated on warfarin w/ INR 2.31 and plt 136K. Does appear to have some abdominal discomfort on exam so[WG1.3] upper[WG1.5] GI[WG1.3] pathology/bleed[WG1.5] does remain in differential.[WG1.3] Hgb stable 11.5. Also concern for ongoing adynamic ileus or pseudo-obstruction (based on 10/22 x-ray and 10/23 NH visit) as the initial etiology of his N/V.[WG1.5]   - NPO and BID IV PPI for now.[WG1.3]   - Stat abdominal x-ray.   - Low threshold for NG tube to decompress if recurrent N/V.   - Would consult GI if recurrent hematemesis or evidence of unresolving pseudo-obstruction.   -[WG1.5] Will hold warfarin tonight and reassess in AM pending further hematemesis.[WG1.6]     #Troponinemia, Hx CAD s/p  CABG.[WG1.3] Trop 0.087[WG1.5]. Does not appear to be having chest pain but is not a reliable historian.[WG1.3] EKG w/o ST changes (RBBB is not new).[WG1.5] Had a CABG in the 1980s. Consider ACS but suspect more type II/demand MI related to acute illness.   - Tele.   - Trend trop Q6hr x 2 additional.   - No ASA given in ED d/t hematemesis.[WG1.3]   - Could consider heparin gtt and d/w cardiology if rising troponin[WG1.5] and no evidence of GIB. Holding warfarin tonight as above.[WG1.6]     #Acute on Chronic CHF Exacerbation.[WG1.3] Last echo 2/2017 w/ normal LV size, EF 65-70%, severe concentric LVH, likely diastolic dysfunction, RV mild to moderately dilated w/ mildly decreased RV systolic function, trace MR, trace-mild TR, moderate aortic stenosis. BNP 58145 from 3710 in 2/2017. Requiring O2 w/ CXR as above although do suspect acute aspiration event contributing. Weight 153 lb stable from 10/22 but up 6 lbs from 10/13 (had been attributed to improved nutrition).   - Agree w/ Lasix 20 mg IV given in ED (takes 10 mg PO BID at home). Follow response (has Constantino for strict I&Os).   - Daily weights.[WG1.4]     #Prolonged QT Interval. 518 ms in May --> 546 ms today.[WG1.5] On Celexa 20 mg. K 3.7.   - Tele.   - Check Mg.   - Hold home Celexa for now, follow QT interval.   - May need to consider something other than Zofran if requiring frequent antiemetic.     #HTN. BP 110s-150s/50s-70s in ED. Son (neurologist) favors keeping SBP at least around 120 to[WG1.4] avoid hypotension/[WG1.6]ensure cerebral perfusion w/ extensive neuro/stroke hx.   - Holding home metoprolol 12.5 mg BID and hydralazine 25 mg QID w/ unsafe swallow. Can use PRN IV if SBP persistently > 160.      #BPH. Holding home Flomax w/ unsafe swallow and currently voiding via Constantino.     #Hypothyroidism. Changed home Synthroid to IV until able to safely swallow.[WG1.4]     Diet: NPO until no evidence of GIB and cleared by SLP  Fluids:[WG1.1] Received 1L NS in ED,  IVF currently stopped d/t concern for pulmonary edema[WG1.7]   DVT Prophylaxis: warfarin  Code Status: DNR/DNI - has POLST on file[WG1.1]    Left message for son Dr. Killian to update on plan of care.[WG1.8]     Kelly Reynolds PA-C  Hospitalist Service    Chief Complaint   Vomiting[WG1.1]    History is obtained from the ED physician and NH notes. Patient unable to participate.[WG1.2]     History of Present Illness[WG1.1]   Patient was brought to the ED from his LT facility after being noted to have emesis x 4 today. The last episode apparently was blood-tinged and he was found lying supine at that time. O2 sats were found to be 80% prompting EMS to be called. Upon arrival here patient was febrile, somnolent, and requiring oxygen. Started on Zosyn due to concern for aspiration pneumonia. Of note, patient was seen at his facility on 10/23 with concern for adynamic ileus based on x-ray findings after previously having several weeks of loose stools and starting on PRN Imodium, and then developing abdominal distension. His iron supplements were stopped at that time. Was started on a clear liquid diet with plan to advance as tolerated after 2 days. In the ED he appears to have pain when pressing on the abdomen. Per facility notes, his weight has been gradually increasing since admission there - family felt that he was happier at this facility (where his wife is also a resident) and eating better. Weight was 147.5 lbs on 10/13 and 153.4 lbs on 10/22. Here is 153 lbs.[WG1.2]     Review of Systems   10 point ROS performed and negative unless otherwise noted in HPI    Past Medical History    I have reviewed this patient's medical history and updated it with pertinent information if needed.[WG1.1]   Past Medical History:   Diagnosis Date     AAA (abdominal aortic aneurysm) (H) 6/3/2013     Aortic stenosis      BPH (benign prostatic hyperplasia)      Bradycardia     Dr Stanford didn't think pacer needed at this time     CAD  (coronary artery disease)     s/p CABG     Carotid occlusion, right     see above note     Chronic atrial fibrillation (H)      Compression fracture of L1 lumbar vertebra (H)      CVA (cerebral infarction)     DANILO and both vertebral art blocked-family son (neurologist) requests BP to run a bit higher since flow is via L ICA     Depression      Dysphagia      HTN (hypertension)     and RA stenosis, s/p stents at [place]     Hyperlipidemia LDL goal <130      Hypothyroidism      Ischemic cardiomyopathy      Macular degeneration of both eyes      Recurrent falls~occulovestibular syndrom      Right bundle branch block (RBBB)      Squamous cell carcinoma     skin     Unspecified cerebral artery occlusion with cerebral infarction     x 2, uses a cane[WG1.9]        Past Surgical History   I have reviewed this patient's surgical history and updated it with pertinent information if needed.[WG1.1]  Past Surgical History:   Procedure Laterality Date     C NONSPECIFIC PROCEDURE      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 [place] at time of RA stenting     CARDIAC SURGERY  1980s    CABG     COLONOSCOPY  11/4/2013    Procedure: COLONOSCOPY;  COLONOSCOPY ;  Surgeon: Aguilar Arechiga MD;  Location:  GI     ESOPHAGOSCOPY, GASTROSCOPY, DUODENOSCOPY (EGD), COMBINED  10/14/2013    Procedure: COMBINED ESOPHAGOSCOPY, GASTROSCOPY, DUODENOSCOPY (EGD);  COMBINED ESOPHAGOSCOPY, GASTROSCOPY, DUODENOSCOPY (EGD) ;  Surgeon: Aguilar Arechiga MD;  Location:  GI     EYE SURGERY      bilat cataracts     GI SURGERY  1970s    duodenal ulcer repair     HERNIA REPAIR      bilat inguinal     IR RENAL/VISCERAL STENT/ATHERECT/PTA       TURP[WG1.9]          Social History[WG1.1]   Social History   Substance Use Topics     Smoking status: Never Smoker     Smokeless tobacco: Never Used     Alcohol use No[WG1.9]     Resides at [facility] unit. Wife is also a resident there. Son Dr. Killian is a neurologist and  primary contact.     Family History   I have reviewed this patient's family history and updated it with pertinent information if needed.[WG1.1]   Family History   Problem Relation Age of Onset     C.A.D. Mother      Coronary Artery Disease Mother      C.A.D. Father      C.A.D. Maternal Grandmother      C.A.D. Maternal Grandfather      C.A.D. Paternal Grandmother      C.A.D. Paternal Grandfather      C.A.D. Brother      C.A.D. Sister[WG1.9]        Prior to Admission Medications[WG1.1]   Prior to Admission Medications   Prescriptions Last Dose Informant Patient Reported? Taking?   ACETAMINOPHEN PO   Yes No   Sig: Take 500 mg by mouth 2 times daily    Banana Flakes (BANATROL PLUS) PACK   Yes No   Sig: Take 1 packet by mouth 3 times daily   Calcium Carbonate Antacid 400 MG CHEW   Yes No   Sig: Take 400 mg by mouth 2 times daily as needed   Citalopram Hydrobromide (CELEXA PO)   Yes No   Sig: Take 20 mg by mouth daily   FUROSEMIDE PO   Yes No   Sig: Take 10 mg by mouth 2 times daily    HYDRALAZINE HCL PO   Yes No   Sig: Take 25 mg by mouth 4 times daily GIVE AT 6-7am, 12n, 5pm,  for HTN Hold for SBP of less than or equal to 120   METOPROLOL TARTRATE PO   Yes No   Sig: Take 12.5 mg by mouth 2 times daily for HTN Hold for SBP is<120 and HR is <60, plz call if hold x 2 in a row for any of these reasons.   Multiple Vitamins-Minerals (ICAPS PO)   Yes No   Sig: Take 1 capsule by mouth daily    Warfarin Sodium (COUMADIN PO)   Yes No   Sig: Take as directed   aspirin 81 MG tablet   Yes No   Sig: Take 81 mg by mouth daily (children's aspirin).   ketoconazole (NIZORAL) 2 % cream   No No   Sig: Apply to face BID PRN   levothyroxine (SYNTHROID/LEVOTHROID) 100 MCG tablet   No No   Sig: TAKE 1 TABLET BY MOUTH EVERY DAY.   loperamide (IMODIUM) 2 MG capsule   Yes No   Sig: Take 2 mg by mouth every 6 hours as needed for diarrhea   polyethylene glycol (MIRALAX/GLYCOLAX) Packet   No No   Sig: Take 17 g by mouth daily as needed for  constipation   tamsulosin (FLOMAX) 0.4 MG capsule   Yes No   Sig: Take by mouth At Bedtime      Facility-Administered Medications: None[WG1.9]       Allergies[WG1.1]    No Known Allergies[WG1.9]    Physical Exam[WG1.1]   /75  Pulse 93  Temp 98.2  F (36.8  C) (Temporal)  Resp 24  Wt 69.4 kg (153 lb)  SpO2 98%  BMI 23.26 kg/m2[WG1.9]  GENERAL:[WG1.1] Somnolent. Arouses to voice, open eyes, groans. Does not answer questions appropriately.[WG1.2]   HEENT: Anicteric sclera. PERRL. Mucous membranes moist.   CV: RRR. S1, S2.[WG1.1] Soft systolic murmur.[WG1.2]   RESPIRATORY: Effort normal on[WG1.1] 2L NC[WG1.2]. Lungs[WG1.1] w/ scattered crackles, no wheezing.[WG1.2]   GI: Abdomen soft and non distended, bowel sounds[WG1.1] faint[WG1.2].[WG1.1] Appears to have mild generalized tenderness w/ palpation.[WG1.2]   MUSCULOSKELETAL: No joint swelling or tenderness.   NEUROLOGICAL: Moves all extremities.[WG1.1] Does not follow commands for full neuro exam.[WG1.2]   EXTREMITIES: No peripheral edema. Intact bilateral pedal pulses.   SKIN: No jaundice. No rashes.     Data   Data reviewed today: I reviewed all medications, new labs and imaging results over the last 24 hours. I personally reviewed CXR and head CT.[WG1.1]          Revision History        User Key Date/Time User Provider Type Action    > WG1.8 10/30/2018  2:05 PM Kelly Reynolds PA Physician Assistant - C Sign     WG1.9 10/30/2018  2:04 PM Kelly Reynolds PA Physician Assistant - C Sign     WG1.6 10/30/2018  2:03 PM Kelly Reynolds PA Physician Assistant - C      WG1.7 10/30/2018  1:57 PM Kelly Reynolds PA Physician Assistant - C      WG1.4 10/30/2018  1:49 PM Kelly Reynolds PA Physician Assistant - C      WG1.5 10/30/2018  1:39 PM Kelly Reynolds PA Physician Assistant - C      WG1.3 10/30/2018  1:19 PM Kelly Reynolds PA Physician Assistant - C      WG1.2 10/30/2018  1:06 PM Kelly Reynolds PA Physician  Assistant - C      WG1.1 10/30/2018 12:38 PM Kelly Reynolds PA Physician Assistant - C                   Discharge Summaries     No notes of this type exist for this encounter.         Consult Notes      Consults by Stella Dalton LSW at 11/3/2018 11:25 AM     Author:  Stella Dalton LSW Service:  Social Work Author Type:      Filed:  11/3/2018 11:25 AM Date of Service:  11/3/2018 11:25 AM Creation Time:  11/3/2018 11:21 AM    Status:  Signed :  Stella Dalton LSW ()     Consult Orders:    1. Social Work IP Consult [[id number]] ordered by Porter Mahmood MD at 11/02/18 1235                Care Transition Initial Assessment - DEREJE  Reason For Consult: discharge planning[TM1.1]  Active Problems:    Aspiration pneumonia (H)[TM1.2]       DATA  Lives With: facility resident  Living Arrangements: assisted living (memory care facility)  Description of Support System: Supportive, Involved  Who is your support system?: Children  Support Assessment: Adequate family and caregiver support.   Identified issues/concerns regarding health management: Pt is a resident of [facility] and needs to return today.     Quality Of Family Relationships: supportive, helpful, involved  Transportation Available: agency transportation    ASSESSMENT  Concerns to be addressed: Pt is a resident of [facility] and needs to return today. DEREJE spoke to Jojo at [facility] [phone], pt can return after 3:00pm. Per nursing pt is an assit of two and needs WC transportation at discharge. DEREJE arranged WC transport with  at 3:30pm. DEREJE spoke to pt's nurse and RAMIRO Sherman to provide an update. DEREJE sent the discharge order to [facility].     PLAN  Patient anticipates discharging to:  [facility] at 3:30 via .[TM1.1]     Stella Dalton[TM1.3], MSW  Casual SW [phone][TM1.1]              Revision History        User Key Date/Time User Provider Type Action    > TM1.3 11/3/2018 11:25 AM Stella Dalton LSW  Sign     TM1.2  11/3/2018 11:22 AM Stella Dalton LSW       TM1.1 11/3/2018 11:21 AM Stella Dalton LSW              Consults signed by Oliver Parks MD at 11/1/2018  8:10 AM      Author:  Oliver Parks MD Service:  Infectious Disease Author Type:  Physician    Filed:  11/1/2018  8:10 AM Date of Service:  10/31/2018  1:35 PM Creation Time:  10/31/2018  1:54 PM    Status:  Signed :  Oliver Parks MD (Physician)         Consult Date:  10/31/2018      INFECTIOUS DISEASE CONSULTATION       LOCATION:  Room 300, [facility].      REFERRING PHYSICIAN:  Venessa Jackman MD      IMPRESSION:   1.  A 89-year-old male with acute sepsis, nausea and multiple vomiting episodes and some respiratory failure thought to be possible aspiration pneumonia, but now 1 of 2 admission blood cultures growing enterococcus, this is not likely a respiratory pathogen, so focus shifts to either abdomen or urine.   2.  Nausea with possible aspiration event, some hypoxia, so possibly even that is a second diagnosis.   3.  Abnormal urinalysis, now has a Constantino catheter in place, question urine infection is source of enterococcus.  Await urine culture.   4.  Encephalopathy, improved.   5.  Aortic stenosis fairly prominent murmur, some risk of endocarditis given enterococcus in the blood.   6.  Atrial fibrillation with prolonged QT, but no pacemaker in place.   7.  Congestive heart failure.   8.  Acute renal insufficiency.      RECOMMENDATIONS:   1.  Zosyn for now, not worth vancomycin toxicity, will address the issue if it is a penicillin-resistant enterococcus.   2.  Await the second admission blood culture and follow-up cultures that have been done to see how prominent the Enterococcus bacteremia, further workup directed at that issue as it is a prominent high-grade bacteremia.      HISTORY:  This 89-year-old male is seen in consultation due to bacteremia, encephalopathy and sepsis.  The patient has a history of  several underlying medical problems including some congestive heart failure, atrial fibrillation, and congestive heart failure.  He has also had an abdominal aneurysm and aortic stenosis.  He was admitted with vague general deterioration that included nausea, encephalopathy and general deterioration.  He was noted to have vomited and his x-ray suggested some lower lobe infiltrates, and he had hypoxia, so possible aspiration event.  His admission urinalysis is abnormal, not clear whether he is having urinary symptoms and he does not recall either.  He now has a Constantino catheter in place which is bothering him quite a bit.  He denies any abdominal pain or other focal symptoms.  His admission blood cultures 1 out of 2 are growing enterococcus.      PAST MEDICAL HISTORY:  Congestive heart failure, history of atrial fibrillation, history of aortic stenosis, history of abdominal aneurysm.      ALLERGIES:  NONE.      SOCIAL HISTORY:  He is DNR/DNI.  No recent travels or exposures.  No known historical resistant pathogens.      MEDICATIONS:  As listed.      REVIEW OF SYSTEMS:  Currently, somewhat confused but able to communicate reasonably well.  Denies any particular pain or discomfort, denies any prior urinary tract symptoms.  Does not think he has been coughing, does feel slightly short of breath.      PHYSICAL EXAMINATION:   GENERAL:  The patient appears his stated age, is fully awake and alert but mildly confused and not a good historian.   VITAL SIGNS:  Currently are normal except for pulse of 130.  He is afebrile at present, but his admission temperature was 101.8.   HEENT:  No thrush or intraoral lesions.  Pupils reactive.  No facial skin rashes.   NECK:  Supple and nontender, no meningismus, no lymphadenopathy or thyromegaly.   HEART:  Irregular rhythm, tachycardic, has a fairly prominent systolic murmur along the left sternal border.   LUNGS:  Crackles at both bases, okay air movement, is hypoxic.   ABDOMEN:  Soft,  nontender even to relatively deep palpation.  No flank tenderness.  Constantino catheter now in place.   EXTREMITIES:  A trace of edema.   NEUROLOGIC:  Fairly intact except for the mild confusion.      LABORATORY DATA:  Blood culture 1 of 2 admission blood cultures growing enterococcus, creatinine up to 1.51, which is above normal prior baseline, procalcitonin elevated white blood cell count 11,700, normal in the 4589-6062 range.      Thank you very much for this consultation.  I will follow the patient with you.         OLIVER CARROLL MD             D: 10/31/2018   T: 10/31/2018   MT: CC      Name:     RUBIO MCCARTNEY   MRN:      [id number]        Account:       [id number]   :      1920           Consult Date:  10/31/2018      Document: [id number]       cc: Venessa Quintero APRN, CNP   [SD1.1]   Revision History        User Key Date/Time User Provider Type Action    > SD1.1 2018  8:10 AM Oliver Carroll MD Physician Sign            Consults by Oliver Carroll MD at 10/31/2018 12:38 PM     Author:  Oliver Carroll MD Service:  Infectious Disease Author Type:  Physician    Filed:  10/31/2018 12:38 PM Date of Service:  10/31/2018 12:38 PM Creation Time:  10/31/2018 12:37 PM    Status:  Signed :  Oliver Carroll MD (Physician)         ID consult dictated IMP 1 89 yo male fever/sepsis/hypoxia/vomiting, ? Aspiration, some infiltrate but now 1 BC enterococcus, ? Urine vs other source    REC 1 zosyn alone await cxs and adjust[SD1.1]     Revision History        User Key Date/Time User Provider Type Action    > SD1.1 10/31/2018 12:38 PM Oliver Carroll MD Physician Sign                     Progress Notes - Physician (Notes from 10/31/18 through 18)      Progress Notes by Glenn Matamoros MD at 2018  2:33 PM     Author:  Glenn Matamoros MD Service:  Infectious Disease Author Type:  Physician    Filed:  2018  4:37 PM Date of Service:  2018  2:33 PM Creation Time:   11/2/2018  2:33 PM    Status:  Signed :  Glenn Matamoros MD (Physician)         [facility]  Infectious Disease Progress Note          Assessment and Plan:   IMPRESSION:   1.  A 89-year-old male with acute sepsis, nausea and multiple vomiting episodes and some respiratory failure thought to be possible aspiration pneumonia, but now 1 of 2 admission blood cultures growing enterococcus, this is not likely a respiratory pathogen, so focus shifts to either abdomen or urine.   2.  Nausea with possible aspiration event, some hypoxia, so possibly even that is a second diagnosis.   3.  Abnormal urinalysis, now has a Constantino catheter in place, question urine infection is source of enterococcus.  Await urine culture.   4.  Encephalopathy, improved.   5.  Aortic stenosis fairly prominent murmur, some risk of endocarditis given enterococcus in the blood.   6.  Atrial fibrillation with prolonged QT, but no pacemaker in place.   7.  Congestive heart failure.   8.  Acute renal insufficiency.       RECOMMENDATIONS:   1.  Zosyn for now, covers bacteremia/UTI and ? aspiration .   2.  Neg second admission blood culture and follow-up cultures , + UC enterococcus and aerococcus both covered by zosyn, Enterococcus bacteremia very likely from UTI and not endovascular infection, IV while here but 2 weeks po augmentin likely OK if does well        Interval History:   no new complaints mild confusion, no pain, enterococcus sens, 1/2 BC over 100K UC, O2 stable              Medications:       hydrALAZINE  25 mg Oral TID     levothyroxine  100 mcg Oral QAM AC     metoprolol tartrate  6.25 mg Oral BID     pantoprazole  40 mg Oral BID AC     piperacillin-tazobactam  2.25 g Intravenous Q6H     sodium chloride (PF)  3 mL Intracatheter Q8H     tamsulosin  0.4 mg Oral At Bedtime     warfarin  1 mg Oral ONCE at 18:00                  Physical Exam:   Blood pressure 130/52, pulse 56, temperature 97  F (36.1  C), temperature  source Oral, resp. rate 18, weight 65.3 kg (143 lb 14.4 oz), SpO2 97 %.  Wt Readings from Last 2 Encounters:   11/02/18 65.3 kg (143 lb 14.4 oz)   10/22/18 69.6 kg (153 lb 6.4 oz)     Vital Signs with Ranges  Temp:  [96.7  F (35.9  C)-97.7  F (36.5  C)] 97  F (36.1  C)  Pulse:  [51-56] 56  Heart Rate:  [47-58] 58  Resp:  [16-18] 18  BP: (130-192)/(52-85) 130/52  SpO2:  [94 %-98 %] 97 %    Constitutional: Awake, alert, cooperative, no apparent distress confusion same   Lungs: Clear to auscultation bilaterally, few crackles or wheezing   Cardiovascular: Regular rate and rhythm, normal S1 and S2, and same murmur noted   Abdomen: Normal bowel sounds, soft, non-distended, non-tender   Skin: No rashes, no cyanosis, no edema   Other:           Data:   All microbiology laboratory data reviewed.  Recent Labs   Lab Test  11/02/18   0629  11/01/18   0623  10/31/18   0637   WBC  5.3  5.1  8.6   HGB  10.7*  10.7*  11.0*   HCT  34.3*  32.8*  33.6*   MCV  102*  100  99   PLT  108*  102*  104*     Recent Labs   Lab Test  11/02/18   0629  11/01/18   0623  10/31/18   0637   CR  1.45*  1.57*  1.51*     No lab results found.  Recent Labs   Lab Test  10/31/18   0748  10/31/18   0733  10/30/18   1121  10/30/18   1041  10/30/18   1031  02/03/17   2013  02/03/17   1950   CULT  No growth after 2 days  No growth after 2 days  No growth after 3 days  >100,000 colonies/mL  Enterococcus faecalis  *  50,000 to 100,000 colonies/mL  Aerococcus urinae  Identification obtained by MALDI-TOF mass spectrometry research use only database. Test   characteristics determined and verified by the [facility]   ([facility]) [place], [place].  Susceptibility testing in progress  *  <10,000 colonies/mL  urogenital irina  Susceptibility testing not routinely done    Cultured on the 1st day of incubation:  Enterococcus faecalis  *  Critical Value/Significant Value, preliminary result only, called to and read back by  SAURAV CORREA RN  @0055 10/31/18. SCG    (Note)  POSITIVE for ENTEROCOCCUS FAECALIS and NEGATIVE for Uma/vanB genes  by Verigene multiplex nucleic acid test. Final identification and  antimicrobial susceptibility testing will be verified by standard  methods.    Specimen tested with Verigene multiplex, gram-positive blood culture  nucleic acid test for the following targets: Staph aureus, Staph  epidermidis, Staph lugdunensis, other Staph species, Enterococcus  faecalis, Enterococcus faecium, Streptococcus species, S. agalactiae,  S. anginosus grp., S. pneumoniae, S. pyogenes, Listeria sp., mecA  (methicillin resistance) and Uma/B (vancomycin resistance).    Critical Value/Significant Value called to and read back by  SAURAV CORREA RN ([email or web address]).  10.31.18  0341 GJS    No growth  No growth[DS1.1]          Revision History        User Key Date/Time User Provider Type Action    > DS1.1 11/2/2018  4:37 PM Glenn Matamoros MD Physician Sign            Progress Notes by Porter Mahmood MD at 11/2/2018 12:29 PM     Author:  Porter Mahmood MD Service:  Hospitalist Author Type:  Physician    Filed:  11/2/2018 12:34 PM Date of Service:  11/2/2018 12:29 PM Creation Time:  11/2/2018 12:29 PM    Status:  Signed :  Porter Mahmood MD (Physician)         [facility]  Hospitalist Progress Note for 10/31/2018:     Reason for hospital stay : sepsis          Assessment and Plan:   Francesco Killian is a 89 year old man with a history of CAD s/p CABG in 1980s, AAA, moderate aortic stenosis, chronic a fib on warfarin w/ multiple prior CVAs, carotid stenosis, HTN, HLD, CKD, BPH, macular degeneration, memory impairment, and recurrent falls who resides in LTC and presented to ED w/ emesis x 4 (latter of which was blood-tinged) and dyspnea (w/ O2 sats found to be 80%). Patient was  admitted w/ suspected aspiration pneumonia.         Sepsis 2/2 Aspiration Pneumonia:    Pt presented with recurrent emesis,and dyspnea. Found to  haveTemp 101.8,O2 sat 80% WBC 11.7, RR 24. Lactate wnl. Altered mental status. Requiring 2L NC to maintain O2 sats. CXR w/ interstitial prominence thought suspicious for pulmonary edema but difficult to exclude infiltrate/pneumonia in left lung base. High aspiration risk w/ known dysphagia in the past and emesis x 4 today. UA w/ trace LE, 26 WBC.  -- on zosyn   -- afebrile , improving   -- continue zosy for now and may transition to oral Augmentin on discharge per ID recommendation      Enterococcus faecalis bacteremia likely from UTI     - BC fron L arm on 10/30 +ve for Enterococcus faecalis. BC from R arm -ve so far  -  urine cultures  >100,000 colonies/mL  Enterococcus faecalis  -- ID assisting with abx     Dysphagia   - SLP consulted, started him on a dysphagia diet with nectar thickend liq..   --May need a speech therapy evaluation on discharge      Septic Encephalopathy.   - CT head w/o -ve for acute pathology  - suspect baseline cognitive dysfunction   --Appears to be improving and patient is stable without any evidence of acute delirium   N/V w/ Hematemesis:   -- resolved , monitor       Elevated troponin 2/2 Demand Ischemia:   Hx CAD s/p CABG in 1980's:   Did not appear to be having chest pain but is not a reliable historian. EKG w/o ST changes (RBBB is not new). No ASA given in ED d/t hematemesis.   Trop 0.087-> peaked to 0.750-> 0.708.   - ECHO shows LVEF 60-65%,advanced grade 3  diastolic dysfunction, severe pulmonary hypertension.  - continue Tele.        Acute on Chronic diastolic CHF Exacerbation     Last echo 2/2017 w/ normal LV size, EF 65-70%, severe concentric LVH, likely diastolic dysfunction, RV mild to moderately dilated w/ mildly decreased RV systolic function, trace MR, trace-mild TR, moderate aortic stenosis. BNP 02270 from 3710 in 2/2017. Requiring O2 w/ CXR as above although do suspect acute aspiration event contributing. Weight 153 lb stable from 10/22 but up 6 lbs from 10/13 (had been  attributed to improved nutrition).   - received Lasix 20 mg IV given in ED (takes 10 mg PO BID at home). Diuresed, -ve 1,125 ml  -May resume Lasix on discharge       Acute Hypoxia:  2/2 pneumonia & CHF exacerbation:  - plan as above    Abnormal telemetry strips: Patient had nonsustained V. tach 4 beats, cardiac pause of 2.3 seconds as well as bradycardia intermittently in 40s.  Patient is asymptomatic.  Care plan is discussed with his family  --Plan is to decrease metoprolol to 6.5 mg twice a day, monitor on telemetry for now  ast prolonged QT Interval:  - continue to Hold home Celexa for now, follow QT interval.   -Electrolyte monitoring and replacement     HTN:   PTA on metoprolol 12.5 mg BID and hydralazine 25 mg QID   SBP 90-150s. Son (neurologist) favors keeping SBP at least around 120 to avoid hypotension/ensure cerebral perfusion w/ extensive neuro/stroke hx.   - BP not well controlled   -- continue current medications   - has additional IV Hydralazine prn for SBP> 180        BPH:  - may resume  home Flomax      Hypothyroidism:  -  on IV Synthroid for now , will change to po       Diet: ADAT     DVT Prophylaxis: on chronic warfarin  Code Status: DNR/DNI - has POLST on file  Disposition: Patient may discharge to a long-term care facility over the weekend if he continues to do well.      Porter Mahmood              Interval History:      Patient is seen and examined by me today and medical record reviewed.Overnight events noted and care discussed with nursing staff.    Francesco Killian is  doing well; no cp, sob, n/v/d, or abd pain.    No fever or chills     Constantino in place     Some loose stool     Labs noted        --Care plan discussed with family, please review my previous progress note earlier today                Medications:       hydrALAZINE  25 mg Oral TID     levothyroxine  100 mcg Oral QAM AC     metoprolol tartrate  6.25 mg Oral BID     pantoprazole  40 mg Oral BID AC     piperacillin-tazobactam   2.25 g Intravenous Q6H     sodium chloride (PF)  3 mL Intracatheter Q8H     tamsulosin  0.4 mg Oral At Bedtime     acetaminophen, hydrALAZINE, lidocaine 4%, lidocaine (buffered or not buffered), melatonin, miconazole, naloxone, ondansetron, - MEDICATION INSTRUCTIONS -, sodium chloride (PF), Warfarin Therapy Reminder               Physical Exam:   Blood pressure 130/52, pulse 56, temperature 97  F (36.1  C), temperature source Oral, resp. rate 18, weight 65.3 kg (143 lb 14.4 oz), SpO2 97 %.  Wt Readings from Last 4 Encounters:   18 65.3 kg (143 lb 14.4 oz)   10/22/18 69.6 kg (153 lb 6.4 oz)   10/11/18 65.1 kg (143 lb 8 oz)   10/02/18 54.3 kg (119 lb 11.2 oz)         Vital Sign Ranges  Temperature Temp  Av.2  F (37.3  C)  Min: 97.8  F (36.6  C)  Max: 101.8  F (38.8  C)   Blood pressure Systolic (24hrs), Av , Min:100 , Max:195        Diastolic (24hrs), Av, Min:49, Max:103      Pulse Pulse  Av.5  Min: 60  Max: 93   Respirations Resp  Av.5  Min: 18  Max: 24   Pulse oximetry SpO2  Av.4 %  Min: 92 %  Max: 99 %           Constitutional: Awake, alert, confused,. In no apparent resp distress   Lungs: Diminished but clear to auscultation bilaterally, no crackles or wheezing   Cardiovascular: Regular rate and rhythm, normal S1 and S2, and no murmur noted   Abdomen: Flat, soft,non-tender. Normal bowel sounds. Indwelling alcaraz in place.   Skin: No rashes, no cyanosis, no edema               Data:          All laboratory and imaging data in the past 24 hours reviewed       Recent Labs  Lab 18  0629 18  0623 10/31/18  0637   WBC 5.3 5.1 8.6   HGB 10.7* 10.7* 11.0*   HCT 34.3* 32.8* 33.6*   * 100 99   * 102* 104*       Recent Labs  Lab 10/31/18  0748 10/31/18  0733 10/30/18  1121 10/30/18  1041 10/30/18  1031   CULT No growth after 2 days No growth after 2 days No growth after 3 days >100,000 colonies/mLEnterococcus faecalis*  Culture in progress Cultured on the 1st day  of incubation:Enterococcus faecalis*  Critical Value/Significant Value, preliminary result only, called to and read back bySAURAV CORREA RN @0055 10/31/18. SCG  (Note)POSITIVE for ENTEROCOCCUS FAECALIS and NEGATIVE for Uma/vanB genesby Verigene multiplex nucleic acid test. Final identification andantimicrobial susceptibility testing will be verified by standardmethods.Specimen tested with Verigene multiplex, gram-positive blood culturenucleic acid test for the following targets: Staph aureus, Staphepidermidis, Staph lugdunensis, other Staph species, Enterococcusfaecalis, Enterococcus faecium, Streptococcus species, S. agalactiae,S. anginosus grp., S. pneumoniae, S. pyogenes, Listeria sp., mecA(methicillin resistance) and Uma/B (vancomycin resistance).Critical Value/Significant Value called to and read back by  LOU FINE ([email or web address]).  10.31.18  0341 GJS       Recent Labs  Lab 11/02/18  0629 11/01/18  0623 10/31/18  0637 10/30/18  1032    139 138 135   POTASSIUM 3.5 3.5 3.9 3.7   CHLORIDE 108 107 105 103   CO2 28 28 26 23   ANIONGAP 7 4 7 9   GLC 84 94 88 100*   BUN 24 27 27 25   CR 1.45* 1.57* 1.51* 1.15   GFRESTIMATED 45* 41* 43* 59*   GFRESTBLACK 54* 50* 52* 71   KEZIA 7.8* 8.2* 8.2* 8.5   MAG 2.2  --   --  1.8   PHOS  --   --   --  2.6   PROTTOTAL  --   --   --  7.3   ALBUMIN  --   --   --  3.6   BILITOTAL  --   --   --  1.1   ALKPHOS  --   --   --  104   AST  --   --   --  27   ALT  --   --   --  16         Recent Labs  Lab 11/02/18  0629 11/01/18  0623 10/31/18  0637 10/30/18  1032   GLC 84 94 88 100*             Recent Labs  Lab 11/02/18  0629 11/01/18  0623 10/31/18  0637   INR 1.95* 2.32* 2.51*             Recent Labs  Lab 10/30/18  2227 10/30/18  1619 10/30/18  1122   TROPI 0.708* 0.750* 0.231*       Recent Results (from the past 48 hour(s))   XR Abdomen Port 1 View    Narrative    ABDOMEN ONE VIEW PORTABLE October 30, 2018 2:35 PM     HISTORY: Concern for adynamic ileus on 10/22 [facility] x-ray now  presenting  w/ vomiting and has tenderness on abdominal exam. Evaluate for  obstruction.     COMPARISON: February 20, 2018.       Impression    IMPRESSION: Small amount of stool. Nonobstructed bowel gas pattern.    JASKARAN DOSS MD[MA1.1]                Revision History        User Key Date/Time User Provider Type Action    > MA1.1 11/2/2018 12:34 PM Porter Mahmood MD Physician Sign            Progress Notes by Porter Mahmood MD at 11/2/2018  9:26 AM     Author:  Porter Mahmood MD Service:  Hospitalist Author Type:  Physician    Filed:  11/2/2018 11:23 AM Date of Service:  11/2/2018  9:26 AM Creation Time:  11/2/2018  9:26 AM    Status:  Addendum :  Porter Mahmood MD (Physician)           Patient was seen and examined by me today and medical record reviewed.Plan of care was discussed with patient/family and staff.  Complete progress note will follow.Please refer to my note for detail progress at a later time     Interval History/objective :    Francesco Killian is  doing well; no cp, sob, n/v/d, or abd pain.    No fever or chills     Alcaraz in place     Some loose stool     Labs noted         Current problem/Assessment:      Improving     HTN       Plan for today       Remove alcaraz     Increase hydralazine to 25 mg 3 times a day    Enterococcus urinary tract infection which can be transitioned to oral Augmentin as recommended by ID on discharge    Discharge planning  Update care plan with family[MA1.1]     Addendum   -- rubi in 40's at times   -- NSVT 4 beats , pause 2.3 sec   -- asymptomatic   Plan   --decrease bb to 6.25 mg   --spoke with family -Ortiz[MA1.2]      Revision History        User Key Date/Time User Provider Type Action    > MA1.2 11/2/2018 11:23 AM Porter Mahmood MD Physician Addend     MA1.1 11/2/2018  9:28 AM Porter Mahmood MD Physician Sign            Progress Notes by César Mcbride MD at 11/2/2018  5:47 AM     Author:  César Mcbride MD Service:   "Hospitalist Author Type:  Physician    Filed:  11/2/2018  5:48 AM Date of Service:  11/2/2018  5:47 AM Creation Time:  11/2/2018  5:47 AM    Status:  Signed :  César Mcbride MD (Physician)         Cross cover:   Notified about asymptomatic cardiac pause  On telemetry  Check lytes[AG1.1]     Revision History        User Key Date/Time User Provider Type Action    > AG1.1 11/2/2018  5:48 AM César Mcbride MD Physician Sign            Progress Notes by Dieudonne Rodriguez PT at 11/1/2018  4:34 PM     Author:  Dieudonne Rodriguez PT Service:  (none) Author Type:  Physical Therapist    Filed:  11/1/2018  4:34 PM Date of Service:  11/1/2018  4:34 PM Creation Time:  11/1/2018  4:34 PM    Status:  Signed :  Dieudonne Rodriguez PT (Physical Therapist)            11/01/18 1623   Quick Adds   Type of Visit Initial PT Evaluation   Living Environment   Lives With facility resident   Living Arrangements assisted living  (memory care facility)   Home Accessibility no concerns   Living Environment Comment Appears pt resides in a MCU, pt unable to provide accurate info.  Per chart \"PTA was at [facility] memory care unit.\"   Self-Care   Current Activity Tolerance fair   Equipment Currently Used at Home (unclear)   Activity/Exercise/Self-Care Comment Pt unable to provide reliable PLOF data, does state he uses a walker and a wc, unclear accuracy of this.   Functional Level Prior   Fall history within last six months yes  (recurrent falls per chart review)   Which of the above functional risks had a recent onset or change? ambulation;transferring;toileting;bathing;cognition   Prior Functional Level Comment appears pt is ambulatory at baseline, unclear if uses AD consistently   General Information   Onset of Illness/Injury or Date of Surgery - Date 10/30/18   Referring Physician Kelly Reynolds PA   Patient/Family Goals Statement pt does not state.  Per chart review, \"He is currently a LTC " "pt in the TCU waiting to move to a LTC bed. Pt will discharge back to the TCU on discharge.\"   Pertinent History of Current Problem (include personal factors and/or comorbidities that impact the POC) Francesco Killian is a 89 year old man with a history of CAD s/p CABG in 1980s, AAA, moderate aortic stenosis, chronic a fib on warfarin w/ multiple prior CVAs, carotid stenosis, HTN, HLD, CKD, BPH, macular degeneration, memory impairment, and recurrent falls who resides in LTC and presented to ED w/ emesis x 4 (latter of which was blood-tinged) and dyspnea (w/ O2 sats found to be 80%). Being admitted w/ suspected aspiration pneumonia.   Low vision and [place] at baseline.   Precautions/Limitations fall precautions  (currently on 2L nc O2)   General Observations pt resting in bed, pleasantly confused, SaO2 96% on 2lpm O2, BP elevated once sitting /85.   Cognitive Status Examination   Orientation person   Cognitive Comment Pt disoriented, confused, able to follow single step commands   Pain Assessment   Patient Currently in Pain No   Integumentary/Edema   Integumentary/Edema Comments mild generalized swelling B lower legs, brusing noted posterior distal L thigh   Posture    Posture Forward head position;Protracted shoulders;Kyphosis   Range of Motion (ROM)   ROM Comment WFL BLEs PROM, WFL BUEs AROM > 90 deg, mild kyphotic posture   Strength   Strength Comments Able to demo SLR each leg, at least 4/5 strength available, unable to further MMT due to confusion.   Bed Mobility   Bed Mobility Comments SBA-min A supine > sit EOB.   Transfer Skills   Transfer Comments Sit <> stand with FWW and min A x 1   Gait   Gait Comments Amb at bedside with FWW and min A x 1 for path negotiation due to low vision, generalized unsteadiness noted, unclear if uses FWW consistently at baseline.   Balance   Balance Comments Impaired standing balance and gait stability, confusion noted, fall risk, needing Ax1 for safety with FWW.   Sensory " "Examination   Sensory Perception Comments pt denies n/t in feet   General Therapy Interventions   Planned Therapy Interventions balance training;gait training;neuromuscular re-education;strengthening;stretching;ROM;transfer training;risk factor education;home program guidelines;progressive activity/exercise   Clinical Impression   Criteria for Skilled Therapeutic Intervention yes, treatment indicated   PT Diagnosis Impaired functional mobility safety and tolerance   Influenced by the following impairments Generalized weakness, low vision, confusion, impaired balance, high BP and impaired pulmonary status   Functional limitations due to impairments Impaired safety and tolerance with functional transfers, gait skills, fall risk, limited activity tolerance   Clinical Presentation Evolving/Changing   Clinical Presentation Rationale fluctuating medical status, elevated BP, PMH, unclear baseline status/function   Clinical Decision Making (Complexity) Moderate complexity   Therapy Frequency` 5 times/week  (decrease frequency if appropriate)   Predicted Duration of Therapy Intervention (days/wks) 3-5 days   Anticipated Equipment Needs at Discharge (consider FWW if not already using one?)   Anticipated Discharge Disposition Long Term Care Facility   Risk & Benefits of therapy have been explained Yes   Patient, Family & other staff in agreement with plan of care Yes   [facility] [employer]  \"6 Clicks\"   2016, Trustees of [facility], under license to [employer].  All rights reserved.   6 Clicks Short Forms Basic Mobility Inpatient Short Form   [facility] [employer]  \"6 Clicks\" V.2 Basic Mobility Inpatient Short Form   1. Turning from your back to your side while in a flat bed without using bedrails? 3 - A Little   2. Moving from lying on your back to sitting on the side of a flat bed without using bedrails? 3 - A Little   3. Moving to and from a bed to a chair (including a wheelchair)? 3 - A Little   4. " Standing up from a chair using your arms (e.g., wheelchair, or bedside chair)? 3 - A Little   5. To walk in hospital room? 3 - A Little   6. Climbing 3-5 steps with a railing? 3 - A Little   Basic Mobility Raw Score (Score out of 24.Lower scores equate to lower levels of function) 18   Total Evaluation Time   Total Evaluation Time (Minutes) 15[CS1.1]        Revision History        User Key Date/Time User Provider Type Action    > CS1.1 11/1/2018  4:34 PM Dieudonne Rodriguez, PT Physical Therapist Sign            Progress Notes by Porter Mahmood MD at 11/1/2018 12:40 PM     Author:  Porter Mahmood MD Service:  Hospitalist Author Type:  Physician    Filed:  11/1/2018 12:53 PM Date of Service:  11/1/2018 12:40 PM Creation Time:  11/1/2018 12:40 PM    Status:  Signed :  Porter Mahmood MD (Physician)         [facility]  Hospitalist Progress Note for 10/31/2018:     Reason for hospital stay : sepsis          Assessment and Plan:   Francesco Killian is a 89 year old man with a history of CAD s/p CABG in 1980s, AAA, moderate aortic stenosis, chronic a fib on warfarin w/ multiple prior CVAs, carotid stenosis, HTN, HLD, CKD, BPH, macular degeneration, memory impairment, and recurrent falls who resides in LTC and presented to ED w/ emesis x 4 (latter of which was blood-tinged) and dyspnea (w/ O2 sats found to be 80%). Patient was  admitted w/ suspected aspiration pneumonia.         Sepsis 2/2 Aspiration Pneumonia:    Pt presented with recurrent emesis,and dyspnea. Found to haveTemp 101.8,O2 sat 80% WBC 11.7, RR 24. Lactate wnl. Altered mental status. Requiring 2L NC to maintain O2 sats. CXR w/ interstitial prominence thought suspicious for pulmonary edema but difficult to exclude infiltrate/pneumonia in left lung base. High aspiration risk w/ known dysphagia in the past and emesis x 4 today. UA w/ trace LE, 26 WBC.  -- on zosyn   -- afebrile , improving   -- continue zosy   -- ID following    -    Enterococcus faecalis bacteremia likely from UTI     - BC fron L arm on 10/30 +ve for Enterococcus faecalis. BC from R arm -ve so far  -  urine cultures  >100,000 colonies/mL  Enterococcus faecalis  -- ID assisting with abx     Dysphagia   - SLP consulted, started him on a dysphagia diet with nectar thickend liq..        Septic Encephalopathy.   - CT head w/o -ve for acute pathology  - suspect baseline cognitive dysfunction       N/V w/ Hematemesis:   -- resolved , monitor       Elevated troponin 2/2 Demand Ischemia:   Hx CAD s/p CABG in 1980's:   Did not appear to be having chest pain but is not a reliable historian. EKG w/o ST changes (RBBB is not new). No ASA given in ED d/t hematemesis.   Trop 0.087-> peaked to 0.750-> 0.708.   - ECHO shows LVEF 60-65%,advanced grade 3  diastolic dysfunction, severe pulmonary hypertension.  - continue Tele.        Acute on Chronic diastolic CHF Exacerbation     Last echo 2/2017 w/ normal LV size, EF 65-70%, severe concentric LVH, likely diastolic dysfunction, RV mild to moderately dilated w/ mildly decreased RV systolic function, trace MR, trace-mild TR, moderate aortic stenosis. BNP 23478 from 3710 in 2/2017. Requiring O2 w/ CXR as above although do suspect acute aspiration event contributing. Weight 153 lb stable from 10/22 but up 6 lbs from 10/13 (had been attributed to improved nutrition).   - received Lasix 20 mg IV given in ED (takes 10 mg PO BID at home). Diuresed, -ve 1,125 ml  -  hold PTA Lasix for now due to worsening creatinine & pt with improved symptoms  - continue Daily weights.       Acute Hypoxia:  2/2 pneumonia & CHF exacerbation:  - plan as above     Prolonged QT Interval:   518 ms in May --> 546 ms today. On Celexa 20 mg. K 3.7.   - continue Tele.   - Check Mg.   - continue to Hold home Celexa for now, follow QT interval.       HTN:   PTA on metoprolol 12.5 mg BID and hydralazine 25 mg QID   SBP 90-150s. Son (neurologist) favors keeping SBP at least  around 120 to avoid hypotension/ensure cerebral perfusion w/ extensive neuro/stroke hx.   - BP not well controlled   -- continue current medications   metoprolol 12.5 mg BID, hydralazine 25 mg TID instead of QID for now & hold Lasix due to [place].  - has additional IV Hydralazine prn for SBP> 180        BPH:  - may resume  home Flomax      Hypothyroidism:  -  on IV Synthroid for now , will change to po       Diet: ADAT     DVT Prophylaxis: on chronic warfarin  Code Status: DNR/DNI - has POLST on file  Disposition: PTA was at [facility] memory care unit.  Son Dr. Killian who is a neurologist is out of town. Daughter in law Sherman who works in palliative care at [phone] is the  while son out of town.She will be  updated on Pt's condition..[MA1.1]    Porter Mahmood[MA1.2]              Interval History:      Patient is seen and examined by me today and medical record reviewed.Overnight events noted and care discussed with nursing staff.    no new complaints, denies chest pain   No fever                           Medications:       hydrALAZINE  10 mg Oral TID     levothyroxine  100 mcg Oral QAM AC     metoprolol tartrate  12.5 mg Oral BID     pantoprazole  40 mg Oral BID AC     piperacillin-tazobactam  2.25 g Intravenous Q6H     sodium chloride (PF)  3 mL Intracatheter Q8H     tamsulosin  0.4 mg Oral At Bedtime     acetaminophen, HOLD MEDICATION, hydrALAZINE, lidocaine 4%, lidocaine (buffered or not buffered), melatonin, naloxone, ondansetron, - MEDICATION INSTRUCTIONS -, sodium chloride (PF), Warfarin Therapy Reminder               Physical Exam:   Blood pressure 160/56, pulse 60, temperature 97.1  F (36.2  C), temperature source Oral, resp. rate 18, weight 64.2 kg (141 lb 9.6 oz), SpO2 96 %.  Wt Readings from Last 4 Encounters:   11/01/18 64.2 kg (141 lb 9.6 oz)   10/22/18 69.6 kg (153 lb 6.4 oz)   10/11/18 65.1 kg (143 lb 8 oz)   10/02/18 54.3 kg (119 lb 11.2 oz)         Vital  Sign Ranges  Temperature Temp  Av.2  F (37.3  C)  Min: 97.8  F (36.6  C)  Max: 101.8  F (38.8  C)   Blood pressure Systolic (24hrs), Av , Min:100 , Max:195        Diastolic (24hrs), Av, Min:49, Max:103      Pulse Pulse  Av.5  Min: 60  Max: 93   Respirations Resp  Av.5  Min: 18  Max: 24   Pulse oximetry SpO2  Av.4 %  Min: 92 %  Max: 99 %           Constitutional: Awake, alert, confused,. In no apparent resp distress   Lungs: Diminished but clear to auscultation bilaterally, no crackles or wheezing   Cardiovascular: Regular rate and rhythm, normal S1 and S2, and no murmur noted   Abdomen: Flat, soft,non-tender. Normal bowel sounds. Indwelling alcaraz in place.   Skin: No rashes, no cyanosis, no edema               Data:          All laboratory and imaging data in the past 24 hours reviewed[MA1.1]       Recent Labs  Lab 18  0623 10/31/18  0637 10/30/18  1032   WBC 5.1 8.6 11.7*   HGB 10.7* 11.0* 11.5*   HCT 32.8* 33.6* 35.4*    99 99   * 104* 136*       Recent Labs  Lab 10/31/18  0748 10/31/18  0733 10/30/18  1121 10/30/18  1041 10/30/18  1031   CULT No growth after 17 hours No growth after 17 hours No growth after 2 days >100,000 colonies/mLEnterococcus faecalisSusceptibility testing in progress*  Culture in progress Cultured on the 1st day of incubation:Enterococcus faecalis*  Critical Value/Significant Value, preliminary result only, called to and read back bySAURAV CORREA RN @0055 10/31/18. SCG  (Note)POSITIVE for ENTEROCOCCUS FAECALIS and NEGATIVE for Uma/vanB genesby Verigene multiplex nucleic acid test. Final identification andantimicrobial susceptibility testing will be verified by standardmethods.Specimen tested with Verigene multiplex, gram-positive blood culturenucleic acid test for the following targets: Staph aureus, Staphepidermidis, Staph lugdunensis, other Staph species, Enterococcusfaecalis, Enterococcus faecium, Streptococcus species, S. agalactiae,S.  anginosus grp., S. pneumoniae, S. pyogenes, Listeria sp., mecA(methicillin resistance) and Uma/B (vancomycin resistance).Critical Value/Significant Value called to and read back by  LOU FINE ([email or web address]).  10.31.18  0341 GJS       Recent Labs  Lab 11/01/18  0623 10/31/18  0637 10/30/18  1032    138 135   POTASSIUM 3.5 3.9 3.7   CHLORIDE 107 105 103   CO2 28 26 23   ANIONGAP 4 7 9   GLC 94 88 100*   BUN 27 27 25   CR 1.57* 1.51* 1.15   GFRESTIMATED 41* 43* 59*   GFRESTBLACK 50* 52* 71   KEZIA 8.2* 8.2* 8.5   MAG  --   --  1.8   PHOS  --   --  2.6   PROTTOTAL  --   --  7.3   ALBUMIN  --   --  3.6   BILITOTAL  --   --  1.1   ALKPHOS  --   --  104   AST  --   --  27   ALT  --   --  16         Recent Labs  Lab 11/01/18  0623 10/31/18  0637 10/30/18  1032   GLC 94 88 100*             Recent Labs  Lab 11/01/18  0623 10/31/18  0637 10/30/18  1032   INR 2.32* 2.51* 2.31*             Recent Labs  Lab 10/30/18  2227 10/30/18  1619 10/30/18  1122   TROPI 0.708* 0.750* 0.231*[MA1.3]       Recent Results (from the past 48 hour(s))   XR Abdomen Port 1 View    Narrative    ABDOMEN ONE VIEW PORTABLE October 30, 2018 2:35 PM     HISTORY: Concern for adynamic ileus on 10/22 NH x-ray now presenting  w/ vomiting and has tenderness on abdominal exam. Evaluate for  obstruction.     COMPARISON: February 20, 2018.       Impression    IMPRESSION: Small amount of stool. Nonobstructed bowel gas pattern.    JASKARAN DOSS MD[MA1.1]                Revision History        User Key Date/Time User Provider Type Action    > MA1.3 11/1/2018 12:53 PM Porter Mahmood MD Physician Sign     MA1.2 11/1/2018 12:51 PM Porter Mahmood MD Physician      MA1.1 11/1/2018 12:40 PM Porter Mahmood MD Physician             Progress Notes by Oliver Parks MD at 11/1/2018 10:33 AM     Author:  Oliver Parks MD Service:  Infectious Disease Author Type:  Physician    Filed:  11/1/2018 10:37 AM Date of Service:  11/1/2018 10:33 AM Creation Time:   11/1/2018 10:33 AM    Status:  Signed :  Oliver Parks MD (Physician)         [facility]  Infectious Disease Progress Note          Assessment and Plan:   IMPRESSION:   1.  A 89-year-old male with acute sepsis, nausea and multiple vomiting episodes and some respiratory failure thought to be possible aspiration pneumonia, but now 1 of 2 admission blood cultures growing enterococcus, this is not likely a respiratory pathogen, so focus shifts to either abdomen or urine.   2.  Nausea with possible aspiration event, some hypoxia, so possibly even that is a second diagnosis.   3.  Abnormal urinalysis, now has a Constantino catheter in place, question urine infection is source of enterococcus.  Await urine culture.   4.  Encephalopathy, improved.   5.  Aortic stenosis fairly prominent murmur, some risk of endocarditis given enterococcus in the blood.   6.  Atrial fibrillation with prolonged QT, but no pacemaker in place.   7.  Congestive heart failure.   8.  Acute renal insufficiency.       RECOMMENDATIONS:   1.  Zosyn for now, covers bacteremia/UTI and ? aspiration .   2.  Neg second admission blood culture and follow-up cultures , + UC enterococcus Enterococcus bacteremia very likely from UTI and not endovascular infection, IV while here but 2 weeks po augmentin likely OK if does well        Interval History:   no new complaints mild confusion, no pain, enterococcus sens, 1/2 BC over 100K UC, O2 stable              Medications:       hydrALAZINE  10 mg Oral TID     levothyroxine  100 mcg Oral QAM AC     metoprolol tartrate  12.5 mg Oral BID     pantoprazole  40 mg Oral BID AC     piperacillin-tazobactam  2.25 g Intravenous Q6H     sodium chloride (PF)  3 mL Intracatheter Q8H     tamsulosin  0.4 mg Oral At Bedtime                  Physical Exam:   Blood pressure 160/56, pulse 60, temperature 97.1  F (36.2  C), temperature source Oral, resp. rate 18, weight 64.2 kg (141 lb 9.6 oz), SpO2 96 %.  Wt  Readings from Last 2 Encounters:   11/01/18 64.2 kg (141 lb 9.6 oz)   10/22/18 69.6 kg (153 lb 6.4 oz)     Vital Signs with Ranges  Temp:  [96.7  F (35.9  C)-99.3  F (37.4  C)] 97.1  F (36.2  C)  Heart Rate:  [50-79] 50  Resp:  [18-24] 18  BP: (142-160)/(56-79) 160/56  SpO2:  [96 %-98 %] 96 %    Constitutional: Awake, alert, cooperative, no apparent distress confusion same   Lungs: Clear to auscultation bilaterally, few crackles or wheezing   Cardiovascular: Regular rate and rhythm, normal S1 and S2, and same murmur noted   Abdomen: Normal bowel sounds, soft, non-distended, non-tender   Skin: No rashes, no cyanosis, no edema   Other:           Data:   All microbiology laboratory data reviewed.  Recent Labs   Lab Test  11/01/18   0623  10/31/18   0637  10/30/18   1032   WBC  5.1  8.6  11.7*   HGB  10.7*  11.0*  11.5*   HCT  32.8*  33.6*  35.4*   MCV  100  99  99   PLT  102*  104*  136*     Recent Labs   Lab Test  11/01/18   0623  10/31/18   0637  10/30/18   1032   CR  1.57*  1.51*  1.15     No lab results found.  Recent Labs   Lab Test  10/31/18   0748  10/31/18   0733  10/30/18   1121  10/30/18   1041  10/30/18   1031  02/03/17   2013  02/03/17   1950   CULT  No growth after 17 hours  No growth after 17 hours  No growth after 2 days  >100,000 colonies/mL  Enterococcus faecalis  Susceptibility testing in progress  *  Culture in progress  Cultured on the 1st day of incubation:  Enterococcus faecalis  *  Critical Value/Significant Value, preliminary result only, called to and read back by  SAURAV CORREA RN @0055 10/31/18. SCG    (Note)  POSITIVE for ENTEROCOCCUS FAECALIS and NEGATIVE for Uma/vanB genes  by [place] multiplex nucleic acid test. Final identification and  antimicrobial susceptibility testing will be verified by standard  methods.    Specimen tested with Verigene multiplex, gram-positive blood culture  nucleic acid test for the following targets: Staph aureus, Staph  epidermidis, Staph lugdunensis,  other Staph species, Enterococcus  faecalis, Enterococcus faecium, Streptococcus species, S. agalactiae,  S. anginosus grp., S. pneumoniae, S. pyogenes, Listeria sp., mecA  (methicillin resistance) and Uma/B (vancomycin resistance).    Critical Value/Significant Value called to and read back by  SAURAV CORREA RN ([email or web address]).  10.31.18  0341 GJS    No growth  No growth[SD1.1]          Revision History        User Key Date/Time User Provider Type Action    > SD1.1 11/1/2018 10:37 AM Oliver Parks MD Physician Sign            Progress Notes by Jesica Thorpe CM at 11/1/2018 10:07 AM     Author:  Jesica Thorpe CM Service:  Care Coordinator Author Type:      Filed:  11/1/2018 10:08 AM Date of Service:  11/1/2018 10:07 AM Creation Time:  11/1/2018 10:07 AM    Status:  Signed :  Jesica Thorpe CM ()         CM contacted [facility] ([phone]) to inquire about pt's level of care (TCU vs LTC vs MC). LM with Jojo - Admission to return call. Provided her with CM's contact information.     CM will continue to follow patient until discharge for any additional needs.     Laina Thorpe RN, BSN, [facility]  [phone][BS1.1]       Revision History        User Key Date/Time User Provider Type Action    > BS1.1 11/1/2018 10:08 AM Jesica Thorpe CM  Sign            Progress Notes by Temi Tarango MA at 11/1/2018  1:00 PM     Author:  Temi Tarango MA Service:  (none) Author Type:  Medical Assistant    Filed:  11/1/2018  9:45 AM Encounter Date:  11/1/2018 Status:  Signed    :  Temi Tarango MA (Medical Assistant)               This encounter was opened in error. Please disregard.[SD1.1]     Revision History        User Key Date/Time User Provider Type Action    > SD1.1 11/1/2018  9:45 AM Temi Tarango MA Medical Assistant Sign            Progress Notes by Amy Corey MD at 10/31/2018  7:51 AM     Author:  Amy Corey  MD Cheryl Service:  Hospitalist Author Type:  Physician    Filed:  10/31/2018  4:25 PM Date of Service:  10/31/2018  7:51 AM Creation Time:  10/31/2018  7:51 AM    Status:  Addendum :  Amy Corey MD (Physician)         [facility]  Hospitalist Progress Note for 10/31/2018:          Assessment and Plan:   Francesco Killian is a 89 year old man with a history of CAD s/p CABG in 1980s, AAA, moderate aortic stenosis, chronic a fib on warfarin w/ multiple prior CVAs, carotid stenosis, HTN, HLD, CKD, BPH, macular degeneration, memory impairment, and recurrent falls who resides in LTC and presented to ED w/ emesis x 4 (latter of which was blood-tinged) and dyspnea (w/ O2 sats found to be 80%). Being admitted w/ suspected aspiration pneumonia.         Sepsis 2/2 Aspiration Pneumonia[PK1.1]:   Possible Enterococcus faecalis bacteremia:  Pt presented with recurrent emesis,[PK1.2]and dyspnea[PK1.1]. Found to have[PK1.2]Temp 101.8,[PK1.1]O2 sat 80%[PK1.2] WBC 11.7, RR 24. Lactate wnl. Altered mental status. Requiring 2L NC to maintain O2 sats. CXR w/ interstitial prominence thought suspicious for pulmonary edema but difficult to exclude infiltrate/pneumonia in left lung base. High aspiration risk w/ known dysphagia in the past and emesis x 4 today. UA w/ trace LE, 26 WBC. Received 1L NS in ED and appears hemodynamically stable.    - B[PK1.1]C fron L arm on 10/30 +ve for[PK1.3] Enterococcus faecalis[PK1.2]. BC from R arm -ve so far  -[PK1.3]  urine cultures pending.[PK1.1] Repeat BC today & follow cultures.[PK1.3]  - Continue Zosyn started in ED[PK1.1]for now[PK1.4] .[PK1.1]  - Request ID consult for further AB plan[PK1.4]  - SLP consult[PK1.1]ed, started him on a dysphagia diet with nectar thickend liq.[PK1.4].    [PK1.1]    Septic[PK1.3] Encephalopathy.[PK1.1]   On admission pt was[PK1.3] somnolent and unable to assess orientation. Opens eyes to voice, groans. Per chart has some memory  impairment but normally oriented to self, wife, place and sits up in w/c (ambulates w/ assist). Likely d/t infection.[PK1.1]    On admission, c[PK1.3]lose monitoring of mentation. Currently able to protect airway. Fall precautions. Q4hr neuro checks.[PK1.1]   - CT head w/o -ve for acute pathology[PK1.3]  -[PK1.1] more awake this AM ,but confused, ? If this is his baseline  - continue current treatment[PK1.3]       N/V w/ Hematemesis[PK1.1]:[PK1.2]   Hematemesis could be d/t Abril-Harris tear given report of onset after 4 episodes of emesis. Is anticoagulated on warfarin w/ INR 2.31 and plt 136K.   Does appear to have some abdominal discomfort on exam[PK1.1] &[PK1.2] concern for ongoing adynamic ileus or pseudo-obstruction (based on 10/22 x-ray and 10/23 NH visit) as the initial etiology of his N/V.[PK1.1]   X-ray abd on admis[PK1.2]  -[PK1.1] was[PK1.3] NPO and BID IV PPI .[PK1.1] PTA ASA & warfarin held yesterday.[PK1.3]  - abdominal x-ray[PK1.1]- showed nonobstructive bowel gas pattern.[PK1.3]  -[PK1.1] no further[PK1.3] N/V[PK1.1], -ve abd exam today[PK1.3],[PK1.4] starting po diet per SLP[PK1.3]  -[PK1.1] resume PTA ASA &[PK1.3] warfarin[PK1.1] when able(INR 2.51 today)[PK1.3]   - monitor for bleeding & hgb. No need for GI consult for now.[PK1.4]   [PK1.1]   Elevated troponin 2/2 Demand Ischemia:[PK1.5]   Hx CAD s/p CABG[PK1.1] in 1980's:[PK1.5]   D[PK1.1]id[PK1.5] not appear to be having chest pain but is not a reliable historian. EKG w/o ST changes (RBBB is not new). No ASA given in ED d/t hematemesis.   Trop 0.087[PK1.1]-> peaked to 0.750-> 0.708[PK1.5].   -[PK1.1] ECHO show[PK1.5]s[PK1.2] LVEF 60-65%[PK1.5],advanced grade 3  diastolic dysfunction, severe pulmonary hypertension.[PK1.2]  - continue[PK1.5] Tele.    -[PK1.1] this AM r[PK1.2]esume[PK1.5]d[PK1.2] PTA[PK1.5] ASA[PK1.1], BB as no evidence for GIB,BP better & pt cleared to take po diet[PK1.2]       Acute on Chronic[PK1.1] diastolic[PK1.2] CHF  Exacerbation.   Last echo 2/2017 w/ normal LV size, EF 65-70%, severe concentric LVH, likely diastolic dysfunction, RV mild to moderately dilated w/ mildly decreased RV systolic function, trace MR, trace-mild TR, moderate aortic stenosis. BNP 66995 from 3710 in 2/2017. Requiring O2 w/ CXR as above although do suspect acute aspiration event contributing. Weight 153 lb stable from 10/22 but up 6 lbs from 10/13 (had been attributed to improved nutrition).   -[PK1.1] received[PK1.3] Lasix 20 mg IV given in ED (takes 10 mg PO BID at home).[PK1.1] Diuresed, -ve 1,125 ml  -  hold PTA Lasix for now due to worsening creatinine & pt with improved symptoms  - continue Daily[PK1.3] weights.    [PK1.1]   Acute Hypoxia:  2/2 pneumonia & CHF exacerbation:  - plan as above[PK1.2]     Prolonged QT Interval[PK1.1]:[PK1.5]   518 ms in May --> 546 ms today. On Celexa 20 mg. K 3.7.   -[PK1.1] continue[PK1.5] Tele.   - Check Mg.   - Hold home Celexa for now, follow QT interval.       HTN[PK1.1]:   PTA on[PK1.5] metoprolol 12.5 mg BID and hydralazine 25 mg QID[PK1.1]   S[PK1.5]BP[PK1.1] 90[PK1.5]-150s. Son (neurologist) favors keeping SBP at least around 120 to avoid[PK1.1] hypotension/ensure c[PK1.5]erebral perfusion w/ extensive neuro/stroke hx.[PK1.1]   - BP soft last evening after receiving 20 mg IV lasix. This morning /80[PK1.5]  - cleared by SLP to start dysphagia diet[PK1.3]  -[PK1.5] resume[PK1.3]  PTA[PK1.5]  metoprolol 12.5 mg BID[PK1.1],[PK1.3] hydralazine 25 mg[PK1.1] TID instead of[PK1.3] QID[PK1.1] for now & hold Lasix due to [place].[PK1.3]  - has[PK1.5] additional IV[PK1.3] Hydralazine prn for SBP> 180[PK1.5]        BPH[PK1.1]:  -[PK1.5] Holding home Flomax[PK1.1] pending swallow eval. C[PK1.5]urrently voiding via Constantino.       Hypothyroidism[PK1.1]:  - on IV[PK1.5] Synthroid[PK1.1] for now pending swallow eval.[PK1.5]      Diet: NPO until cleared by SLP  DVT Prophylaxis: on chronic warfarin  Code Status: DNR/DNI - has  POLST on file  Disposition:[PK1.1] PTA was at [facility] memory care unit. Request PT  Eval. Pt will require 2-3 or more days in the hospital.[PK1.5]    S[PK1.6]on Dr. Killian[PK1.5] who[PK1.7] is a neurologist[PK1.5] is out of town. Daughter in law Whit who works in palliative care at *[phone] is the  while son out of town.She was updated on Pt's condition..[PK1.6]    Amy Corey MD.  Hospitalist [phone] (7am -6 pm)[PK1.5]                 Interval History:   Awake. Pt trying to pull at his alcaraz this morning & needed directions to not pull at it.              Medications:[PK1.1]       [START ON 2018] levothyroxine  50 mcg Intravenous Daily     pantoprazole (PROTONIX) IV  40 mg Intravenous Q12H     piperacillin-tazobactam  2.25 g Intravenous Q6H     sodium chloride (PF)  3 mL Intracatheter Q8H     acetaminophen, HOLD MEDICATION, hydrALAZINE, lidocaine 4%, lidocaine (buffered or not buffered), melatonin, naloxone, ondansetron, - MEDICATION INSTRUCTIONS -, sodium chloride (PF)[PK1.8]               Physical Exam:[PK1.1]   Blood pressure 172/80, pulse 60, temperature 98  F (36.7  C), temperature source Axillary, resp. rate 18, weight 66.5 kg (146 lb 9.6 oz), SpO2 96 %.  Wt Readings from Last 4 Encounters:   10/31/18 66.5 kg (146 lb 9.6 oz)   10/22/18 69.6 kg (153 lb 6.4 oz)   10/11/18 65.1 kg (143 lb 8 oz)   10/02/18 54.3 kg (119 lb 11.2 oz)[PK1.8]         Vital Sign Ranges  Temperature Temp  Av.2  F (37.3  C)  Min: 97.8  F (36.6  C)  Max: 101.8  F (38.8  C)   Blood pressure Systolic (24hrs), Av , Min:100 , Max:195        Diastolic (24hrs), Av, Min:49, Max:103      Pulse Pulse  Av.5  Min: 60  Max: 93   Respirations Resp  Av.5  Min: 18  Max: 24   Pulse oximetry SpO2  Av.4 %  Min: 92 %  Max: 99 %         Intake/Output Summary (Last 24 hours) at 10/31/18 7062  Last data filed at 10/31/18 0700   Gross per 24 hour   Intake                0  ml   Output             1125 ml   Net            -1125 ml       Constitutional: Awake, alert,[PK1.1] confused, thinks we are using him as a guinea pig . In[PK1.5] no apparent[PK1.1] resp[PK1.5] distress   Lungs:[PK1.1] Diminished but c[PK1.5]lear to auscultation bilaterally, no crackles or wheezing   Cardiovascular: Regular rate and rhythm, normal S1 and S2, and no murmur noted   Abdomen:[PK1.1] Flat,[PK1.5] soft,non-tender[PK1.1].[PK1.5] Normal bowel sounds[PK1.1]. Indwelling alcaraz in place.[PK1.5]   Skin: No rashes, no cyanosis, no edema               Data:          Lab Results   Component Value Date     10/31/2018     10/30/2018     10/10/2018    Lab Results   Component Value Date    CHLORIDE 105 10/31/2018    CHLORIDE 103 10/30/2018    CHLORIDE 106 10/10/2018    Lab Results   Component Value Date    BUN 27 10/31/2018    BUN 25 10/30/2018    BUN 28 10/10/2018      Lab Results   Component Value Date    POTASSIUM 3.9 10/31/2018    POTASSIUM 3.7 10/30/2018    POTASSIUM 4.3 10/10/2018    Lab Results   Component Value Date    CO2 26 10/31/2018    CO2 23 10/30/2018    CO2 25 10/10/2018    Lab Results   Component Value Date    CR 1.51 10/31/2018    CR 1.15 10/30/2018    CR 1.23 10/10/2018[PK1.1]        Lab Results   Component Value Date    NTBNPI 90899 (H) 10/30/2018    NTBNPI 66096 (H) 10/30/2018    NTBNPI 3710 (H) 02/03/2017     Lab Results   Component Value Date    WBC 8.6 10/31/2018    WBC 11.7 (H) 10/30/2018    WBC 4.3 09/06/2018    HGB 11.0 (L) 10/31/2018    HGB 11.5 (L) 10/30/2018    HGB 11.0 (L) 10/12/2018    HCT 33.6 (L) 10/31/2018    HCT 35.4 (L) 10/30/2018    HCT 29.0 (L) 09/06/2018    MCV 99 10/31/2018    MCV 99 10/30/2018    MCV 96 09/06/2018     (L) 10/31/2018     (L) 10/30/2018     (L) 09/06/2018[PK1.8]          Revision History        User Key Date/Time User Provider Type Action    > PK1.6 10/31/2018  4:25 PM Amy Corey MD Physician Addend     [N/A]  10/31/2018  2:45 PM Amy Corey MD Physician Addend     PK1.7 10/31/2018  2:43 PM Amy Corey MD Physician Sign     PK1.4 10/31/2018  2:39 PM Amy Corey MD Physician      PK1.3 10/31/2018  9:12 AM Amy Corey MD Physician      PK1.2 10/31/2018  8:44 AM Amy Corey MD Physician      PK1.5 10/31/2018  8:02 AM Amy Corey MD Physician      PK1.8 10/31/2018  7:52 AM Amy Corey MD Physician      PK1.1 10/31/2018  7:51 AM Amy Corey MD Physician             Progress Notes by Destinee Rodriguez, RN at 10/31/2018 10:18 AM     Author:  Destinee Rodriguez, RN Service:  (none) Author Type:      Filed:  10/31/2018  1:50 PM Date of Service:  10/31/2018 10:18 AM Creation Time:  10/31/2018 10:18 AM    Status:  Addendum :  Destinee Rodriguez, RN ()         Care Coordination:[AH1.1]  CTS following due to noted elevated BNP on admission with possible aspiration PNA. Pt comes in from [facility] TCU where he receives full cares.[AH1.2] Call placed to [facility] to verify pt residence. Verified that pt lives at [facility] and has a bed hold. He is currently a LTC pt in the TCU waiting to move to a LTC bed. Pt will discharge back to the TCU on discharge. CTS/SW to call Jojo at [facility] [phone] on discharge with plan of care and fax MD orders.[AH1.1] He will continue to be followed by [facility] on discharge.[AH1.2]    Destinee Rodriguez RN CTS[AH1.1]     Revision History        User Key Date/Time User Provider Type Action    > AH1.2 10/31/2018  1:50 PM Destinee Rodriguez, RN Case Manager Addend     AH1.1 10/31/2018 10:20 AM Destinee Rodriguez RN Case Manager Sign            Progress Notes by Destinee Munson SLP at 10/31/2018  8:54 AM     Author:  Destinee Munson SLP Service:  (none) Author Type:  Speech Language    Filed:  10/31/2018  8:56 AM Date of Service:   10/31/2018  8:54 AM Creation Time:  10/31/2018  8:54 AM    Status:  Signed :  Destinee Munson, SLP (Speech Language)            10/31/18 0844   General Information   Onset Date 10/30/18   Start of Care Date 10/31/18   Referring Physician Kelly FULLER   Patient Profile Review/OT: Additional Occupational Profile Info See Profile for full history and prior level of function   Patient/Family Goals Statement Did not clearly state.  To be determined during course of Tx.   Swallowing Evaluation Bedside swallow evaluation   Behaviorial Observations Confused   Mode of current nutrition NPO   Respiratory Status O2 Supply   Type of O2 supply Nasal cannula   Comments Per MD note: Francesco Killian is a 89 year old man with a history of CAD s/p CABG in 1980s, AAA, moderate aortic stenosis, chronic a fib on warfarin w/ multiple prior CVAs, carotid stenosis, HTN, HLD, CKD, BPH, macular degeneration, memory impairment, and recurrent falls who resides in LTC and presented to ED w/ emesis x 4 (latter of which was blood-tinged) and dyspnea (w/ O2 sats found to be 80%). Being admitted w/ suspected aspiration pneumonia.      Clinical evaluation completed 2/21/18:   Bedside swallow eval completed today. Pt presents with moderate oropharyngeal and suspected esophageal dysphagia. Per RN, pt noted to regurgitate PO yesterday and pt with hx of esophageal strictures.Recommend pt cautiously initiate nectar thick full liquids. Pt should be fully upright and alert for all PO, take small single sips/bites, double swallow, alternate between consistenices, and remain upright for 30-60 minutes following PO. Hold PO should pt demonstrate overt s/sx of aspiration or if s/sx of regurigitation observed. Pending goals of care, pt may benefit from GI consult to assess esophageal integrity given hx of strictures and report of regurgitation of PO.      2/23/18 Tx note:   Pt with overt s/sx of aspiration on large sip of nectar thick liquids, however  no other s/sx of aspiration with cues to take small sips and double swallow. Recommend advance to dysphagia diet 2 and nectar thick liquids with intermittent supervision. Caregivers to encourage small sips/bites and double swallows       Clinical Swallow Evaluation   Oral Musculature generally intact  (dry tongue decreased liingual ROM)   Dentition (missing a few teeth)   Laryngeal Function Cough;Throat clear;Swallow;Voicing initiated;Dry swallow palpated  (decreased elevation)   Clinical Swallow Eval: Thin Liquid Texture Trial   Mode of Presentation, Thin Liquids cup   Volume of Liquid or Food Presented sips x 4   Oral Phase of Swallow Premature pharyngeal entry   Pharyngeal Phase of Swallow reduction in laryngeal movement;repeated swallows  (delay)   Diagnostic Statement throat clearing, cough, SOB   Clinical Swallow Eval: Nectar Thick Liquid Texture Trial   Mode of Presentation, Nectar spoon;fed by clinician   Volume of Nectar Presented tsps x 5   Oral Phase, Nectar Premature pharyngeal entry   Pharyngeal Phase, Nectar reduction in laryngeal movement  (delay)   Diagnostic Statement no overt signs of aspiration   Clinical Swallow Eval: Puree Solid Texture Trial   Mode of Presentation, Puree spoon   Volume of Puree Presented tsps x 10   Oral Phase, Puree Premature pharyngeal entry   Pharyngeal Phase, Puree reduction in laryngeal movement;repeated swallows  (delay)   Diagnostic Statement cough x 1 - ? related, hard double swallows used without cough noted   Swallow Compensations   Swallow Compensations Effortful swallow;Pacing;Reduce amounts;Multiple swallow   Esophageal Phase of Swallow   Patient reports or presents with symptoms of esophageal dysphagia Yes   Esophageal comments Burping at times; hx of esophageal strictures   Swallow Eval: Clinical Impressions   Skilled Criteria for Therapy Intervention Skilled criteria met.  Treatment indicated.   Functional Assessment Scale (FAS) 2   Treatment Diagnosis  moderate-severe oral-pharyngeal dysphagia    Diet texture recommendations Dysphagia diet level 1;Nectar thick liquids   Recommended Feeding/Eating Techniques (see below)   Therapy Frequency 5 times/wk   Predicted Duration of Therapy Intervention (days/wks) 1 week   Anticipated Discharge Disposition extended care facility   Risks and Benefits of Treatment have been explained. Yes   Patient, family and/or staff in agreement with Plan of Care Yes   Clinical Impression Comments Patient presents with moderate-severe oral-pharyngeal dysphagia at bedside.  Deficits/risk factors include decreased awareness/confusion, delayed swallow response with decreased elevation, need for multiple swallows, throat clear and coughing with thin liquids, and cough x 1 during puree trial.  Patient also has a hx of esophageal dysphagia, esophageal strictures.  Patient tolerated nectar by spoon and additional puree trials with mod-max cues to use precautions and with feeding assist.  Safety for po intake appears borderline if strategies are not effective.  Recommend close monitoring of po tolerance and hold po if increased aspiration signs/decreased respiratory status noted.  Recommend a dysphagia diet level 1 and nectar thick liquids with 1:1 supervision/assist, sit up at 90 degrees, liquids by spoon, double hard swallows, slow rate, crush meds and give with puree, remain upright for 30-60 minutes after eating.  Plan to continue swallow Tx to assess diet tolerance and trial upgrades as indicated.   Total Evaluation Time   Total Evaluation Time (Minutes) 10[AH1.1]        Revision History        User Key Date/Time User Provider Type Action    > AH1.1 10/31/2018  8:56 AM Destinee Munson, SLP Speech Language Sign            Progress Notes by Venessa Jackman MD at 10/31/2018  3:51 AM     Author:  Venessa Jackman MD Service:  Hospitalist Author Type:  Physician    Filed:  10/31/2018  3:52 AM Date of Service:  10/31/2018  3:51 AM  "Creation Time:  10/31/2018  3:51 AM    Status:  Signed :  Venessa Jackman MD (Physician)         Paged regarding blood culture growing enterococcus faecalis    Patient is currently on Zosyn    For amp sensitive enterococcus, Zosyn should provide adequate coverage.  Would continue the same for now until sensitivities return[RS1.1]     Revision History        User Key Date/Time User Provider Type Action    > RS1.1 10/31/2018  3:52 AM Venessa Jackman MD Physician Sign                  Procedure Notes     No notes of this type exist for this encounter.         Progress Notes - Therapies (Notes from 10/31/18 through 11/03/18)      Progress Notes by Dieudonne Rodriguez PT at 11/1/2018  4:34 PM     Author:  Dieudonne Rodriguez PT Service:  (none) Author Type:  Physical Therapist    Filed:  11/1/2018  4:34 PM Date of Service:  11/1/2018  4:34 PM Creation Time:  11/1/2018  4:34 PM    Status:  Signed :  Dieudonne Rodriguez PT (Physical Therapist)            11/01/18 1623   Quick Adds   Type of Visit Initial PT Evaluation   Living Environment   Lives With facility resident   Living Arrangements assisted living  (memory care facility)   Home Accessibility no concerns   Living Environment Comment Appears pt resides in a MCU, pt unable to provide accurate info.  Per chart \"PTA was at [facility] memory care [facility].\"   Self-Care   Current Activity Tolerance fair   Equipment Currently Used at Home (unclear)   Activity/Exercise/Self-Care Comment Pt unable to provide reliable PLOF data, does state he uses a walker and a wc, unclear accuracy of this.   Functional Level Prior   Fall history within last six months yes  (recurrent falls per chart review)   Which of the above functional risks had a recent onset or change? ambulation;transferring;toileting;bathing;cognition   Prior Functional Level Comment appears pt is ambulatory at baseline, unclear if uses AD consistently   General Information " "  Onset of Illness/Injury or Date of Surgery - Date 10/30/18   Referring Physician Kelly Reynolds PA   Patient/Family Goals Statement pt does not state.  Per chart review, \"He is currently a LTC pt in the TCU waiting to move to a LTC bed. Pt will discharge back to the TCU on discharge.\"   Pertinent History of Current Problem (include personal factors and/or comorbidities that impact the POC) Francesco Killian is a 89 year old man with a history of CAD s/p CABG in 1980s, AAA, moderate aortic stenosis, chronic a fib on warfarin w/ multiple prior CVAs, carotid stenosis, HTN, HLD, CKD, BPH, macular degeneration, memory impairment, and recurrent falls who resides in LTC and presented to ED w/ emesis x 4 (latter of which was blood-tinged) and dyspnea (w/ O2 sats found to be 80%). Being admitted w/ suspected aspiration pneumonia.   Low vision and [place] at baseline.   Precautions/Limitations fall precautions  (currently on 2L nc O2)   General Observations pt resting in bed, pleasantly confused, SaO2 96% on 2lpm O2, BP elevated once sitting /85.   Cognitive Status Examination   Orientation person   Cognitive Comment Pt disoriented, confused, able to follow single step commands   Pain Assessment   Patient Currently in Pain No   Integumentary/Edema   Integumentary/Edema Comments mild generalized swelling B lower legs, brusing noted posterior distal L thigh   Posture    Posture Forward head position;Protracted shoulders;Kyphosis   Range of Motion (ROM)   ROM Comment WFL BLEs PROM, WFL BUEs AROM > 90 deg, mild kyphotic posture   Strength   Strength Comments Able to demo SLR each leg, at least 4/5 strength available, unable to further MMT due to confusion.   Bed Mobility   Bed Mobility Comments SBA-min A supine > sit EOB.   Transfer Skills   Transfer Comments Sit <> stand with FWW and min A x 1   Gait   Gait Comments Amb at bedside with FWW and min A x 1 for path negotiation due to low vision, generalized unsteadiness " "noted, unclear if uses FWW consistently at baseline.   Balance   Balance Comments Impaired standing balance and gait stability, confusion noted, fall risk, needing Ax1 for safety with FWW.   Sensory Examination   Sensory Perception Comments pt denies n/t in feet   General Therapy Interventions   Planned Therapy Interventions balance training;gait training;neuromuscular re-education;strengthening;stretching;ROM;transfer training;risk factor education;home program guidelines;progressive activity/exercise   Clinical Impression   Criteria for Skilled Therapeutic Intervention yes, treatment indicated   PT Diagnosis Impaired functional mobility safety and tolerance   Influenced by the following impairments Generalized weakness, low vision, confusion, impaired balance, high BP and impaired pulmonary status   Functional limitations due to impairments Impaired safety and tolerance with functional transfers, gait skills, fall risk, limited activity tolerance   Clinical Presentation Evolving/Changing   Clinical Presentation Rationale fluctuating medical status, elevated BP, PMH, unclear baseline status/function   Clinical Decision Making (Complexity) Moderate complexity   Therapy Frequency` 5 times/week  (decrease frequency if appropriate)   Predicted Duration of Therapy Intervention (days/wks) 3-5 days   Anticipated Equipment Needs at Discharge (consider FWW if not already using one?)   Anticipated Discharge Disposition Long Term Care Facility   Risk & Benefits of therapy have been explained Yes   Patient, Family & other staff in agreement with plan of care Yes   [facility] [employer] TM \"6 Clicks\"   2016, Trustees of [facility], under license to [employer].  All rights reserved.   6 Clicks Short Forms Basic Mobility Inpatient Short Form   [facility] AM-PAC  \"6 Clicks\" V.2 Basic Mobility Inpatient Short Form   1. Turning from your back to your side while in a flat bed without using bedrails? 3 - A Little "   2. Moving from lying on your back to sitting on the side of a flat bed without using bedrails? 3 - A Little   3. Moving to and from a bed to a chair (including a wheelchair)? 3 - A Little   4. Standing up from a chair using your arms (e.g., wheelchair, or bedside chair)? 3 - A Little   5. To walk in hospital room? 3 - A Little   6. Climbing 3-5 steps with a railing? 3 - A Little   Basic Mobility Raw Score (Score out of 24.Lower scores equate to lower levels of function) 18   Total Evaluation Time   Total Evaluation Time (Minutes) 15[CS1.1]        Revision History        User Key Date/Time User Provider Type Action    > CS1.1 11/1/2018  4:34 PM Dieudonne Rodriguez, PT Physical Therapist Sign            Progress Notes by Destinee Munson SLP at 10/31/2018  8:54 AM     Author:  Destinee Munson SLP Service:  (none) Author Type:  Speech Language    Filed:  10/31/2018  8:56 AM Date of Service:  10/31/2018  8:54 AM Creation Time:  10/31/2018  8:54 AM    Status:  Signed :  Destinee Munson SLP (Speech Language)            10/31/18 0844   General Information   Onset Date 10/30/18   Start of Care Date 10/31/18   Referring Physician Kelly FULLER   Patient Profile Review/OT: Additional Occupational Profile Info See Profile for full history and prior level of function   Patient/Family Goals Statement Did not clearly state.  To be determined during course of Tx.   Swallowing Evaluation Bedside swallow evaluation   Behaviorial Observations Confused   Mode of current nutrition NPO   Respiratory Status O2 Supply   Type of O2 supply Nasal cannula   Comments Per MD note: Francesco Killian is a 89 year old man with a history of CAD s/p CABG in 1980s, AAA, moderate aortic stenosis, chronic a fib on warfarin w/ multiple prior CVAs, carotid stenosis, HTN, HLD, CKD, BPH, macular degeneration, memory impairment, and recurrent falls who resides in LTC and presented to ED w/ emesis x 4 (latter of which was blood-tinged) and dyspnea  (w/ O2 sats found to be 80%). Being admitted w/ suspected aspiration pneumonia.      Clinical evaluation completed 2/21/18:   Bedside swallow eval completed today. Pt presents with moderate oropharyngeal and suspected esophageal dysphagia. Per RN, pt noted to regurgitate PO yesterday and pt with hx of esophageal strictures.Recommend pt cautiously initiate nectar thick full liquids. Pt should be fully upright and alert for all PO, take small single sips/bites, double swallow, alternate between consistenices, and remain upright for 30-60 minutes following PO. Hold PO should pt demonstrate overt s/sx of aspiration or if s/sx of regurigitation observed. Pending goals of care, pt may benefit from GI consult to assess esophageal integrity given hx of strictures and report of regurgitation of PO.      2/23/18 Tx note:   Pt with overt s/sx of aspiration on large sip of nectar thick liquids, however no other s/sx of aspiration with cues to take small sips and double swallow. Recommend advance to dysphagia diet 2 and nectar thick liquids with intermittent supervision. Caregivers to encourage small sips/bites and double swallows       Clinical Swallow Evaluation   Oral Musculature generally intact  (dry tongue decreased liingual ROM)   Dentition (missing a few teeth)   Laryngeal Function Cough;Throat clear;Swallow;Voicing initiated;Dry swallow palpated  (decreased elevation)   Clinical Swallow Eval: Thin Liquid Texture Trial   Mode of Presentation, Thin Liquids cup   Volume of Liquid or Food Presented sips x 4   Oral Phase of Swallow Premature pharyngeal entry   Pharyngeal Phase of Swallow reduction in laryngeal movement;repeated swallows  (delay)   Diagnostic Statement throat clearing, cough, SOB   Clinical Swallow Eval: Nectar Thick Liquid Texture Trial   Mode of Presentation, Nectar spoon;fed by clinician   Volume of Nectar Presented tsps x 5   Oral Phase, Nectar Premature pharyngeal entry   Pharyngeal Phase, Nectar  reduction in laryngeal movement  (delay)   Diagnostic Statement no overt signs of aspiration   Clinical Swallow Eval: Puree Solid Texture Trial   Mode of Presentation, Puree spoon   Volume of Puree Presented tsps x 10   Oral Phase, Puree Premature pharyngeal entry   Pharyngeal Phase, Puree reduction in laryngeal movement;repeated swallows  (delay)   Diagnostic Statement cough x 1 - ? related, hard double swallows used without cough noted   Swallow Compensations   Swallow Compensations Effortful swallow;Pacing;Reduce amounts;Multiple swallow   Esophageal Phase of Swallow   Patient reports or presents with symptoms of esophageal dysphagia Yes   Esophageal comments Burping at times; hx of esophageal strictures   Swallow Eval: Clinical Impressions   Skilled Criteria for Therapy Intervention Skilled criteria met.  Treatment indicated.   Functional Assessment Scale (FAS) 2   Treatment Diagnosis moderate-severe oral-pharyngeal dysphagia    Diet texture recommendations Dysphagia diet level 1;Nectar thick liquids   Recommended Feeding/Eating Techniques (see below)   Therapy Frequency 5 times/wk   Predicted Duration of Therapy Intervention (days/wks) 1 week   Anticipated Discharge Disposition extended care facility   Risks and Benefits of Treatment have been explained. Yes   Patient, family and/or staff in agreement with Plan of Care Yes   Clinical Impression Comments Patient presents with moderate-severe oral-pharyngeal dysphagia at bedside.  Deficits/risk factors include decreased awareness/confusion, delayed swallow response with decreased elevation, need for multiple swallows, throat clear and coughing with thin liquids, and cough x 1 during puree trial.  Patient also has a hx of esophageal dysphagia, esophageal strictures.  Patient tolerated nectar by spoon and additional puree trials with mod-max cues to use precautions and with feeding assist.  Safety for po intake appears borderline if strategies are not effective.   Recommend close monitoring of po tolerance and hold po if increased aspiration signs/decreased respiratory status noted.  Recommend a dysphagia diet level 1 and nectar thick liquids with 1:1 supervision/assist, sit up at 90 degrees, liquids by spoon, double hard swallows, slow rate, crush meds and give with puree, remain upright for 30-60 minutes after eating.  Plan to continue swallow Tx to assess diet tolerance and trial upgrades as indicated.   Total Evaluation Time   Total Evaluation Time (Minutes) 10[AH1.1]        Revision History        User Key Date/Time User Provider Type Action    > AH1.1 10/31/2018  8:56 AM Destinee Munson, SLP Speech Language Sign

## 2018-10-30 NOTE — IP AVS SNAPSHOT
"[place] [address] MEDICAL SURGICAL: [phone]                                              INTERAGENCY TRANSFER FORM - PHYSICIAN ORDERS   10/30/2018                    Hospital Admission Date: 10/30/2018  RUBIO MCCARTNEY   : 1920  Sex: Male        Attending Provider: Amy Corey MD     Allergies:  No Known Allergies    Infection:  None   Service:  GENERAL MEDI    Ht:  1.727 m (5' 8\")   Wt:  66 kg (145 lb 9.6 oz)   Admission Wt:  69.4 kg (153 lb)    BMI:  22.14 kg/m 2   BSA:  1.78 m 2            Patient PCP Information     Provider PCP Type    CODY Hunt CNP General      ED Clinical Impression     Diagnosis Description Comment Added By Time Added    Sepsis, due to unspecified organism (H) [A41.9] Sepsis, due to unspecified organism (H) [A41.9]  Sarah Rosas MD 10/30/2018 12:22 PM    Pneumonia due to infectious organism, unspecified laterality, unspecified part of lung [J18.9] Pneumonia due to infectious organism, unspecified laterality, unspecified part of lung [J18.9]  Sarah Rosas MD 10/30/2018 12:22 PM    Anticoagulated on Coumadin [Z51.81, Z79.01] Anticoagulated on Coumadin [Z51.81, Z79.01]  Sarah Rosas MD 10/30/2018 12:23 PM    Elevated troponin [R74.8] Elevated troponin [R74.8]  Sarah Rosas MD 10/30/2018 12:48 PM    Acute pulmonary edema (H) [J81.0] Acute pulmonary edema (H) [J81.0]  Sarah Rosas MD 10/30/2018 12:49 PM      Hospital Problems as of 11/3/2018              Priority Class Noted POA    Aspiration pneumonia (H) Medium  10/30/2018 Yes      Non-Hospital Problems as of 11/3/2018              Priority Class Noted    AAA (abdominal aortic aneurysm) (H) Medium  6/3/2013    Dysphagia Medium  6/3/2013    Chronic atrial fibrillation (H) Medium  2013    Health Care Home Medium  2013    Esophageal stricture Medium  2013    Macrocytic anemia Medium  2013    Other specified hypothyroidism Medium  " 6/22/2015    Right bundle branch block (RBBB) Medium  9/2/2015    Recurrent falls~occulovestibular syndrom Medium  9/2/2015    Wet senile macular degeneration (H) Medium  10/26/2015    Major depressive disorder, single episode, mild (H) Medium  4/21/2016    Essential hypertension with goal blood pressure less than 140/90 Medium  10/24/2016    Acquired hypothyroidism Medium  10/24/2016    CHF (congestive heart failure) (H) Medium  2/3/2017    Acute kidney failure (H) Medium  2/6/2017    Compression fracture of L1 lumbar vertebra (H) Medium  2/20/2018    Supratherapeutic INR Medium  2/27/2018    Anticoagulated on Coumadin Medium  2/27/2018    History of CVA (cerebrovascular accident) Medium  3/22/2018    Coronary artery disease involving native heart without angina pectoris, unspecified vessel or lesion type Medium  3/26/2018    Protein-calorie malnutrition (H) Medium  8/13/2018      Code Status History     Date Active Date Inactive Code Status Order ID Comments User Context    11/3/2018  9:26 AM  DNR/DNI [id number]  Porter Mahmood MD Outpatient    10/30/2018  2:12 PM 11/3/2018  9:26 AM DNR/DNI [id number]  Kelly Reynolds PA Inpatient    2/23/2018 10:26 AM 10/30/2018  2:12 PM DNR/DNI [id number]  Favian Pereira MD Outpatient    2/20/2018  4:28 PM 2/23/2018 10:26 AM DNR/DNI [id number]  Sydnie Johnson PA-C Inpatient    2/6/2017 11:11 AM 2/20/2018  4:28 PM DNR/DNI [id number]  Jordon Esparza MD Outpatient    2/4/2017  1:37 AM 2/6/2017 11:11 AM DNR/DNI [id number]  Jordon Esparza MD Inpatient    8/31/2015  2:10 PM 2/4/2017  1:37 AM DNR/DNI [id number]  Javier Schulte DO Outpatient    8/29/2015  9:45 PM 8/31/2015  2:10 PM DNR/DNI [id number]  Georgette Mcrae RN Inpatient    8/29/2015  9:01 PM 8/29/2015  9:44 PM Full Code [id number]  Katia Abreu MD Inpatient         Medication Review      START taking        Dose / Directions Comments    amoxicillin-clavulanate 875-125 MG per tablet    Commonly known as:  AUGMENTIN   Used for:  Sepsis, due to unspecified organism (H)        Dose:  1 tablet   Take 1 tablet by mouth 2 times daily   Quantity:  28 tablet   Refills:  0          CONTINUE these medications which may have CHANGED, or have new prescriptions. If we are uncertain of the size of tablets/capsules you have at home, strength may be listed as something that might have changed.        Dose / Directions Comments    COUMADIN 1 MG tablet   This may have changed:    - how much to take  - additional instructions   Generic drug:  warfarin        Dose:  1.5 mg   Take 1.5 tablets (1.5 mg) by mouth daily 1.5 mg po daily for 2 days then INR on 11/5 for further dosing.Goal INR around 2   Quantity:  30 tablet   Refills:  0        metoprolol tartrate 25 MG tablet   Commonly known as:  LOPRESSOR   This may have changed:    - how much to take  - additional instructions   Used for:  Sepsis, due to unspecified organism (H)        Dose:  6.25 mg   Take 0.25 tablets (6.25 mg) by mouth 2 times daily   Quantity:  60 tablet   Refills:  0          CONTINUE these medications which have NOT CHANGED        Dose / Directions Comments    ACETAMINOPHEN PO   Indication:  Pain        Dose:  500 mg   Take 500 mg by mouth 2 times daily   Refills:  0        aspirin 81 MG tablet        Dose:  81 mg   Take 81 mg by mouth daily (children's aspirin).   Refills:  0        BANATROL PLUS Pack        Dose:  1 packet   Take 1 packet by mouth 3 times daily   Refills:  0        Calcium Carbonate Antacid 400 MG Chew   Indication:  Stomach Upset        Dose:  400 mg   Take 400 mg by mouth 2 times daily as needed   Refills:  0        CELEXA PO        Dose:  20 mg   Take 20 mg by mouth daily   Refills:  0        FLOMAX 0.4 MG capsule   Indication:  Benign Enlargement of Prostate   Generic drug:  tamsulosin        Dose:  0.4 mg   Take 0.4 mg by mouth At Bedtime   Refills:  0        FUROSEMIDE PO   Indication:  High Blood Pressure Disorder         Dose:  10 mg   Take 10 mg by mouth 2 times daily   Refills:  0        HYDRALAZINE HCL PO        Dose:  25 mg   Take 25 mg by mouth 4 times daily GIVE AT 6-7am, 12n, 5pm,  for HTN Hold for SBP of less than or equal to 120   Refills:  0        ketoconazole 2 % cream   Commonly known as:  NIZORAL   Used for:  Dermatitis, seborrheic        Apply to face BID PRN   Quantity:  30 g   Refills:  1        levothyroxine 100 MCG tablet   Commonly known as:  SYNTHROID/LEVOTHROID   Used for:  Other specified hypothyroidism        TAKE 1 TABLET BY MOUTH EVERY DAY.   Quantity:  90 tablet   Refills:  0        loperamide 2 MG capsule   Commonly known as:  IMODIUM        Dose:  2 mg   Take 2 mg by mouth every 6 hours as needed for diarrhea   Refills:  0        polyethylene glycol Packet   Commonly known as:  MIRALAX/GLYCOLAX   Used for:  Closed compression fracture of first lumbar vertebra, initial encounter (H)        Dose:  17 g   Take 17 g by mouth daily as needed for constipation   Quantity:  7 packet   Refills:  0        PRESERVISION AREDS PO        Dose:  1 capsule   Take 1 capsule by mouth daily   Refills:  0                Summary of Visit     Reason for your hospital stay       Sepsis             After Care     Activity - Up ad ce           Advance Diet as Tolerated       Follow this diet upon discharge: Orders Placed This Encounter      Dysphagia Diet Level 1 Pureed Nectar Thickened Liquids (pre-thickened or use instant food thickener)       General info for SNF       Length of Stay Estimate: Long Term Care  Condition at Discharge: Stable  Level of care:skilled   Rehabilitation Potential: Fair  Admission H&P remains valid and up-to-date: Yes  Recent Chemotherapy: N/A  Use Nursing Home Standing Orders: Yes       Mantoux instructions       Give two-step Mantoux (PPD) Per Facility Policy Yes             Referrals     Occupational Therapy Adult Consult       Evaluate and treat as clinically indicated.       Physical  Therapy Adult Consult       Evaluate and treat as clinically indicated.       Speech Language Path Adult Consult       Evaluate and treat as clinically indicated.    Reason:  Dysphagia             Follow-Up Appointment Instructions     Future Labs/Procedures    Follow Up and recommended labs and tests     Comments:    Follow up with long-term physician.  The following labs/tests are recommended: INR in two day .  Follow up with PCP in 1-2 weeks      Follow-Up Appointment Instructions     Follow Up and recommended labs and tests       Follow up with long-term physician.  The following labs/tests are recommended: INR in two day .  Follow up with PCP in 1-2 weeks             Statement of Approval     Ordered          11/03/18 0931  I have reviewed and agree with all the recommendations and orders detailed in this document.  EFFECTIVE NOW     Approved and electronically signed by:  Porter Mahmood MD

## 2018-10-31 NOTE — PLAN OF CARE
Problem: Patient Care Overview  Goal: Plan of Care/Patient Progress Review  PT: Received orders for eval and treat.  Attempted to see patient for eval, patient lethargic.  Will reschedule for tomorrow.

## 2018-10-31 NOTE — CONSULTS
Consult Date:  10/31/2018      INFECTIOUS DISEASE CONSULTATION       LOCATION:  Room 300, [facility].      REFERRING PHYSICIAN:  Venessa Jackman MD      IMPRESSION:   1.  A 89-year-old male with acute sepsis, nausea and multiple vomiting episodes and some respiratory failure thought to be possible aspiration pneumonia, but now 1 of 2 admission blood cultures growing enterococcus, this is not likely a respiratory pathogen, so focus shifts to either abdomen or urine.   2.  Nausea with possible aspiration event, some hypoxia, so possibly even that is a second diagnosis.   3.  Abnormal urinalysis, now has a Constantino catheter in place, question urine infection is source of enterococcus.  Await urine culture.   4.  Encephalopathy, improved.   5.  Aortic stenosis fairly prominent murmur, some risk of endocarditis given enterococcus in the blood.   6.  Atrial fibrillation with prolonged QT, but no pacemaker in place.   7.  Congestive heart failure.   8.  Acute renal insufficiency.      RECOMMENDATIONS:   1.  Zosyn for now, not worth vancomycin toxicity, will address the issue if it is a penicillin-resistant enterococcus.   2.  Await the second admission blood culture and follow-up cultures that have been done to see how prominent the Enterococcus bacteremia, further workup directed at that issue as it is a prominent high-grade bacteremia.      HISTORY:  This 89-year-old male is seen in consultation due to bacteremia, encephalopathy and sepsis.  The patient has a history of several underlying medical problems including some congestive heart failure, atrial fibrillation, and congestive heart failure.  He has also had an abdominal aneurysm and aortic stenosis.  He was admitted with vague general deterioration that included nausea, encephalopathy and general deterioration.  He was noted to have vomited and his x-ray suggested some lower lobe infiltrates, and he had hypoxia, so possible aspiration event.  His  admission urinalysis is abnormal, not clear whether he is having urinary symptoms and he does not recall either.  He now has a Constantino catheter in place which is bothering him quite a bit.  He denies any abdominal pain or other focal symptoms.  His admission blood cultures 1 out of 2 are growing enterococcus.      PAST MEDICAL HISTORY:  Congestive heart failure, history of atrial fibrillation, history of aortic stenosis, history of abdominal aneurysm.      ALLERGIES:  NONE.      SOCIAL HISTORY:  He is DNR/DNI.  No recent travels or exposures.  No known historical resistant pathogens.      MEDICATIONS:  As listed.      REVIEW OF SYSTEMS:  Currently, somewhat confused but able to communicate reasonably well.  Denies any particular pain or discomfort, denies any prior urinary tract symptoms.  Does not think he has been coughing, does feel slightly short of breath.      PHYSICAL EXAMINATION:   GENERAL:  The patient appears his stated age, is fully awake and alert but mildly confused and not a good historian.   VITAL SIGNS:  Currently are normal except for pulse of 130.  He is afebrile at present, but his admission temperature was 101.8.   HEENT:  No thrush or intraoral lesions.  Pupils reactive.  No facial skin rashes.   NECK:  Supple and nontender, no meningismus, no lymphadenopathy or thyromegaly.   HEART:  Irregular rhythm, tachycardic, has a fairly prominent systolic murmur along the left sternal border.   LUNGS:  Crackles at both bases, okay air movement, is hypoxic.   ABDOMEN:  Soft, nontender even to relatively deep palpation.  No flank tenderness.  Constantino catheter now in place.   EXTREMITIES:  A trace of edema.   NEUROLOGIC:  Fairly intact except for the mild confusion.      LABORATORY DATA:  Blood culture 1 of 2 admission blood cultures growing enterococcus, creatinine up to 1.51, which is above normal prior baseline, procalcitonin elevated white blood cell count 11,700, normal in the 3975-4156 range.      Thank  you very much for this consultation.  I will follow the patient with you.         MELISSA CARROLL MD             D: 10/31/2018   T: 10/31/2018   MT: CC      Name:     RUBIO MCCARTNEY   MRN:      [id number]        Account:       [id number]   :      1920           Consult Date:  10/31/2018      Document: [id number]       cc: Venessa ROSS, CNP

## 2018-10-31 NOTE — PLAN OF CARE
Problem: Patient Care Overview  Goal: Plan of Care/Patient Progress Review  Outcome: No Change  VSS.  Tele-AFib SVR with BBB.  Positive blood cultures see nursing notes for more information.  Patient slept well throughout the night.

## 2018-10-31 NOTE — PROGRESS NOTES
Care Coordination:  CTS following due to noted elevated BNP on admission with possible aspiration PNA. Pt comes in from [facility] where he receives full cares. Call placed to [facility] to verify pt residence. Verified that pt lives at [facility] and has a bed hold. He is currently a LTC pt in the TCU waiting to move to a LTC bed. Pt will discharge back to the TCU on discharge. CTS/SW to call Jojo at [facility] [phone] on discharge with plan of care and fax MD orders. He will continue to be followed by [facility] on discharge.    Destinee Rodriguez RN CTS

## 2018-10-31 NOTE — PROVIDER NOTIFICATION
MD notified r/t troponin 0.750. Patient INR 2.31,  no s/s, VS stable, troponin recheck ordered, continue to monitor per MD.

## 2018-10-31 NOTE — PROGRESS NOTES
10/31/18 0844   General Information   Onset Date 10/30/18   Start of Care Date 10/31/18   Referring Physician Kelly FULLER   Patient Profile Review/OT: Additional Occupational Profile Info See Profile for full history and prior level of function   Patient/Family Goals Statement Did not clearly state.  To be determined during course of Tx.   Swallowing Evaluation Bedside swallow evaluation   Behaviorial Observations Confused   Mode of current nutrition NPO   Respiratory Status O2 Supply   Type of O2 supply Nasal cannula   Comments Per MD note: Francesco Killian is a 89 year old man with a history of CAD s/p CABG in 1980s, AAA, moderate aortic stenosis, chronic a fib on warfarin w/ multiple prior CVAs, carotid stenosis, HTN, HLD, CKD, BPH, macular degeneration, memory impairment, and recurrent falls who resides in LTC and presented to ED w/ emesis x 4 (latter of which was blood-tinged) and dyspnea (w/ O2 sats found to be 80%). Being admitted w/ suspected aspiration pneumonia.      Clinical evaluation completed 2/21/18:   Bedside swallow eval completed today. Pt presents with moderate oropharyngeal and suspected esophageal dysphagia. Per RN, pt noted to regurgitate PO yesterday and pt with hx of esophageal strictures.Recommend pt cautiously initiate nectar thick full liquids. Pt should be fully upright and alert for all PO, take small single sips/bites, double swallow, alternate between consistenices, and remain upright for 30-60 minutes following PO. Hold PO should pt demonstrate overt s/sx of aspiration or if s/sx of regurigitation observed. Pending goals of care, pt may benefit from GI consult to assess esophageal integrity given hx of strictures and report of regurgitation of PO.      2/23/18 Tx note:   Pt with overt s/sx of aspiration on large sip of nectar thick liquids, however no other s/sx of aspiration with cues to take small sips and double swallow. Recommend advance to dysphagia diet 2 and nectar thick  liquids with intermittent supervision. Caregivers to encourage small sips/bites and double swallows       Clinical Swallow Evaluation   Oral Musculature generally intact  (dry tongue decreased liingual ROM)   Dentition (missing a few teeth)   Laryngeal Function Cough;Throat clear;Swallow;Voicing initiated;Dry swallow palpated  (decreased elevation)   Clinical Swallow Eval: Thin Liquid Texture Trial   Mode of Presentation, Thin Liquids cup   Volume of Liquid or Food Presented sips x 4   Oral Phase of Swallow Premature pharyngeal entry   Pharyngeal Phase of Swallow reduction in laryngeal movement;repeated swallows  (delay)   Diagnostic Statement throat clearing, cough, SOB   Clinical Swallow Eval: Nectar Thick Liquid Texture Trial   Mode of Presentation, Nectar spoon;fed by clinician   Volume of Nectar Presented tsps x 5   Oral Phase, Nectar Premature pharyngeal entry   Pharyngeal Phase, Nectar reduction in laryngeal movement  (delay)   Diagnostic Statement no overt signs of aspiration   Clinical Swallow Eval: Puree Solid Texture Trial   Mode of Presentation, Puree spoon   Volume of Puree Presented tsps x 10   Oral Phase, Puree Premature pharyngeal entry   Pharyngeal Phase, Puree reduction in laryngeal movement;repeated swallows  (delay)   Diagnostic Statement cough x 1 - ? related, hard double swallows used without cough noted   Swallow Compensations   Swallow Compensations Effortful swallow;Pacing;Reduce amounts;Multiple swallow   Esophageal Phase of Swallow   Patient reports or presents with symptoms of esophageal dysphagia Yes   Esophageal comments Burping at times; hx of esophageal strictures   Swallow Eval: Clinical Impressions   Skilled Criteria for Therapy Intervention Skilled criteria met.  Treatment indicated.   Functional Assessment Scale (FAS) 2   Treatment Diagnosis moderate-severe oral-pharyngeal dysphagia    Diet texture recommendations Dysphagia diet level 1;Nectar thick liquids   Recommended  Feeding/Eating Techniques (see below)   Therapy Frequency 5 times/wk   Predicted Duration of Therapy Intervention (days/wks) 1 week   Anticipated Discharge Disposition extended care facility   Risks and Benefits of Treatment have been explained. Yes   Patient, family and/or staff in agreement with Plan of Care Yes   Clinical Impression Comments Patient presents with moderate-severe oral-pharyngeal dysphagia at bedside.  Deficits/risk factors include decreased awareness/confusion, delayed swallow response with decreased elevation, need for multiple swallows, throat clear and coughing with thin liquids, and cough x 1 during puree trial.  Patient also has a hx of esophageal dysphagia, esophageal strictures.  Patient tolerated nectar by spoon and additional puree trials with mod-max cues to use precautions and with feeding assist.  Safety for po intake appears borderline if strategies are not effective.  Recommend close monitoring of po tolerance and hold po if increased aspiration signs/decreased respiratory status noted.  Recommend a dysphagia diet level 1 and nectar thick liquids with 1:1 supervision/assist, sit up at 90 degrees, liquids by spoon, double hard swallows, slow rate, crush meds and give with puree, remain upright for 30-60 minutes after eating.  Plan to continue swallow Tx to assess diet tolerance and trial upgrades as indicated.   Total Evaluation Time   Total Evaluation Time (Minutes) 10

## 2018-10-31 NOTE — CONSULTS
ID consult dictated IMP 1 89 yo male fever/sepsis/hypoxia/vomiting, ? Aspiration, some infiltrate but now 1 BC enterococcus, ? Urine vs other source    REC 1 zosyn alone await cxs and adjust

## 2018-10-31 NOTE — PLAN OF CARE
Problem: Patient Care Overview  Goal: Plan of Care/Patient Progress Review  Outcome: No Change  Patient NPO, lethargic, but easy to arouse, VS stable. Constantino in place. Turned and reposition Q2H.IV ABX for pneumonia.

## 2018-10-31 NOTE — PHARMACY-ANTICOAGULATION SERVICE
Clinical Pharmacy - Warfarin Dosing Consult     Pharmacy has been consulted to manage this patient s warfarin therapy.  Indication: Atrial Fibrillation  Therapy Goal: Other - see comments  Warfarin Prior to Admission: Yes  Warfarin PTA Regimen: 1mg Monday and Friday, 2mg ROW  Significant drug interactions: aspirin  Recent documented change in oral intake/nutrition: Yes, recently switched from NPO to dysphagia  Dose Comments: INR goal 1.8-2.5 per consult    INR   Date Value Ref Range Status   10/31/2018 2.51 (H) 0.86 - 1.14 Final   10/30/2018 2.31 (H) 0.86 - 1.14 Final       Recommend warfarin 1 mg today.  Pharmacy will monitor Francesco Killian daily and order warfarin doses to achieve specified goal.      Please contact pharmacy as soon as possible if the warfarin needs to be held for a procedure or if the warfarin goals change.      Stefany Finnegan, PharmD IV Student

## 2018-10-31 NOTE — PROGRESS NOTES
[facility]  Hospitalist Progress Note for 10/31/2018:          Assessment and Plan:   Francesco Killian is a 89 year old man with a history of CAD s/p CABG in 1980s, AAA, moderate aortic stenosis, chronic a fib on warfarin w/ multiple prior CVAs, carotid stenosis, HTN, HLD, CKD, BPH, macular degeneration, memory impairment, and recurrent falls who resides in LTC and presented to ED w/ emesis x 4 (latter of which was blood-tinged) and dyspnea (w/ O2 sats found to be 80%). Being admitted w/ suspected aspiration pneumonia.         Sepsis 2/2 Aspiration Pneumonia:   Possible Enterococcus faecalis bacteremia:  Pt presented with recurrent emesis,and dyspnea. Found to haveTemp 101.8,O2 sat 80% WBC 11.7, RR 24. Lactate wnl. Altered mental status. Requiring 2L NC to maintain O2 sats. CXR w/ interstitial prominence thought suspicious for pulmonary edema but difficult to exclude infiltrate/pneumonia in left lung base. High aspiration risk w/ known dysphagia in the past and emesis x 4 today. UA w/ trace LE, 26 WBC. Received 1L NS in ED and appears hemodynamically stable.    - BC fron L arm on 10/30 +ve for Enterococcus faecalis. BC from R arm -ve so far  -  urine cultures pending. Repeat BC today & follow cultures.  - Continue Zosyn started in EDfor now .  - Request ID consult for further AB plan  - SLP consulted, started him on a dysphagia diet with nectar thickend liq..        Septic Encephalopathy.   On admission pt was somnolent and unable to assess orientation. Opens eyes to voice, groans. Per chart has some memory impairment but normally oriented to self, wife, place and sits up in w/c (ambulates w/ assist). Likely d/t infection.    On admission, close monitoring of mentation. Currently able to protect airway. Fall precautions. Q4hr neuro checks.   - CT head w/o -ve for acute pathology  - more awake this AM ,but confused, ? If this is his baseline  - continue current treatment       N/V w/ Hematemesis:    Hematemesis could be d/t Abril-Harris tear given report of onset after 4 episodes of emesis. Is anticoagulated on warfarin w/ INR 2.31 and plt 136K.   Does appear to have some abdominal discomfort on exam & concern for ongoing adynamic ileus or pseudo-obstruction (based on 10/22 x-ray and 10/23 NH visit) as the initial etiology of his N/V.   X-ray abd on admis  - was NPO and BID IV PPI . PTA ASA & warfarin held yesterday.  - abdominal x-ray- showed nonobstructive bowel gas pattern.  - no further N/V, -ve abd exam today, starting po diet per SLP  - resume PTA ASA & warfarin when able(INR 2.51 today)   - monitor for bleeding & hgb. No need for GI consult for now.      Elevated troponin 2/2 Demand Ischemia:   Hx CAD s/p CABG in 1980's:   Did not appear to be having chest pain but is not a reliable historian. EKG w/o ST changes (RBBB is not new). No ASA given in ED d/t hematemesis.   Trop 0.087-> peaked to 0.750-> 0.708.   - ECHO shows LVEF 60-65%,advanced grade 3  diastolic dysfunction, severe pulmonary hypertension.  - continue Tele.    - this AM resumed PTA ASA, BB as no evidence for GIB,BP better & pt cleared to take po diet       Acute on Chronic diastolic CHF Exacerbation.   Last echo 2/2017 w/ normal LV size, EF 65-70%, severe concentric LVH, likely diastolic dysfunction, RV mild to moderately dilated w/ mildly decreased RV systolic function, trace MR, trace-mild TR, moderate aortic stenosis. BNP 10632 from 3710 in 2/2017. Requiring O2 w/ CXR as above although do suspect acute aspiration event contributing. Weight 153 lb stable from 10/22 but up 6 lbs from 10/13 (had been attributed to improved nutrition).   - received Lasix 20 mg IV given in ED (takes 10 mg PO BID at home). Diuresed, -ve 1,125 ml  -  hold PTA Lasix for now due to worsening creatinine & pt with improved symptoms  - continue Daily weights.       Acute Hypoxia:  2/2 pneumonia & CHF exacerbation:  - plan as above     Prolonged QT Interval:   518 ms  in May --> 546 ms today. On Celexa 20 mg. K 3.7.   - continue Tele.   - Check Mg.   - Hold home Celexa for now, follow QT interval.       HTN:   PTA on metoprolol 12.5 mg BID and hydralazine 25 mg QID   SBP 90-150s. Son (neurologist) favors keeping SBP at least around 120 to avoid hypotension/ensure cerebral perfusion w/ extensive neuro/stroke hx.   - BP soft last evening after receiving 20 mg IV lasix. This morning /80  - cleared by SLP to start dysphagia diet  - resume  PTA  metoprolol 12.5 mg BID, hydralazine 25 mg TID instead of QID for now & hold Lasix due to [place].  - has additional IV Hydralazine prn for SBP> 180        BPH:  - Holding home Flomax pending swallow eval. Currently voiding via Alcaraz.       Hypothyroidism:  - on IV Synthroid for now pending swallow eval.      Diet: NPO until cleared by SLP  DVT Prophylaxis: on chronic warfarin  Code Status: DNR/DNI - has POLST on file  Disposition: PTA was at [facility] memory care unit. Request PT  Eval. Pt will require 2-3 or more days in the hospital.    Son Dr. Killian who is a neurologist is out of town. Daughter in law Sherman who works in palliative care at [phone] is the  while son out of town.She was updated on Pt's condition..    Amy Corey MD.  Hospitalist [phone] (7am -6 pm)                 Interval History:   Awake. Pt trying to pull at his alcaraz this morning & needed directions to not pull at it.              Medications:       [START ON 11/2/2018] levothyroxine  50 mcg Intravenous Daily     pantoprazole (PROTONIX) IV  40 mg Intravenous Q12H     piperacillin-tazobactam  2.25 g Intravenous Q6H     sodium chloride (PF)  3 mL Intracatheter Q8H     acetaminophen, HOLD MEDICATION, hydrALAZINE, lidocaine 4%, lidocaine (buffered or not buffered), melatonin, naloxone, ondansetron, - MEDICATION INSTRUCTIONS -, sodium chloride (PF)               Physical Exam:   Blood pressure 172/80, pulse 60, temperature  98  F (36.7  C), temperature source Axillary, resp. rate 18, weight 66.5 kg (146 lb 9.6 oz), SpO2 96 %.  Wt Readings from Last 4 Encounters:   10/31/18 66.5 kg (146 lb 9.6 oz)   10/22/18 69.6 kg (153 lb 6.4 oz)   10/11/18 65.1 kg (143 lb 8 oz)   10/02/18 54.3 kg (119 lb 11.2 oz)         Vital Sign Ranges  Temperature Temp  Av.2  F (37.3  C)  Min: 97.8  F (36.6  C)  Max: 101.8  F (38.8  C)   Blood pressure Systolic (24hrs), Av , Min:100 , Max:195        Diastolic (24hrs), Av, Min:49, Max:103      Pulse Pulse  Av.5  Min: 60  Max: 93   Respirations Resp  Av.5  Min: 18  Max: 24   Pulse oximetry SpO2  Av.4 %  Min: 92 %  Max: 99 %         Intake/Output Summary (Last 24 hours) at 10/31/18 0752  Last data filed at 10/31/18 0700   Gross per 24 hour   Intake                0 ml   Output             1125 ml   Net            -1125 ml       Constitutional: Awake, alert, confused, thinks we are using him as a guinea pig . In no apparent resp distress   Lungs: Diminished but clear to auscultation bilaterally, no crackles or wheezing   Cardiovascular: Regular rate and rhythm, normal S1 and S2, and no murmur noted   Abdomen: Flat, soft,non-tender. Normal bowel sounds. Indwelling alcaraz in place.   Skin: No rashes, no cyanosis, no edema               Data:          Lab Results   Component Value Date     10/31/2018     10/30/2018     10/10/2018    Lab Results   Component Value Date    CHLORIDE 105 10/31/2018    CHLORIDE 103 10/30/2018    CHLORIDE 106 10/10/2018    Lab Results   Component Value Date    BUN 27 10/31/2018    BUN 25 10/30/2018    BUN 28 10/10/2018      Lab Results   Component Value Date    POTASSIUM 3.9 10/31/2018    POTASSIUM 3.7 10/30/2018    POTASSIUM 4.3 10/10/2018    Lab Results   Component Value Date    CO2 26 10/31/2018    CO2 23 10/30/2018    CO2 25 10/10/2018    Lab Results   Component Value Date    CR 1.51 10/31/2018    CR 1.15 10/30/2018    CR 1.23 10/10/2018         Lab Results   Component Value Date    NTBNPI 56947 (H) 10/30/2018    NTBNPI 46268 (H) 10/30/2018    NTBNPI 3710 (H) 02/03/2017     Lab Results   Component Value Date    WBC 8.6 10/31/2018    WBC 11.7 (H) 10/30/2018    WBC 4.3 09/06/2018    HGB 11.0 (L) 10/31/2018    HGB 11.5 (L) 10/30/2018    HGB 11.0 (L) 10/12/2018    HCT 33.6 (L) 10/31/2018    HCT 35.4 (L) 10/30/2018    HCT 29.0 (L) 09/06/2018    MCV 99 10/31/2018    MCV 99 10/30/2018    MCV 96 09/06/2018     (L) 10/31/2018     (L) 10/30/2018     (L) 09/06/2018

## 2018-10-31 NOTE — PLAN OF CARE
Problem: Patient Care Overview  Goal: Plan of Care/Patient Progress Review  OT: Chart reviewed, order received for deconditioning; per chart pt lives in a nursing home, he was found vomiting in supine position, O2 sats of 80%; this morning pt resting in bed able to arouse to verbal cues, oriented to self only, stated he was tired and wanted to stay in bed, said to leave him alone, will check back later as schedule allows

## 2018-10-31 NOTE — PROVIDER NOTIFICATION
10/31/18 0100   Significant Event   Significant Event Other (see comments)  (10/30 left arm gram positive cocci in pairs and chains)       300 KH 10/30 left arm blood culture positive with gram positive cocci in pairs and chains.  Patient on Zosyn.  Any new orders? Thank you

## 2018-10-31 NOTE — PLAN OF CARE
Problem: Patient Care Overview  Goal: Plan of Care/Patient Progress Review    Discharge Planner SLP   Patient plan for discharge: Did not state  Current status: A bedside swallow evaluation was completed this am.  Patient presents with moderate-severe oral-pharyngeal dysphagia at bedside.  Deficits/risk factors include decreased awareness/confusion, delayed swallow response with decreased elevation, need for multiple swallows, throat clear and coughing with thin liquids, and cough x 1 during puree trial.  Patient also has a hx of esophageal dysphagia, esophageal strictures.  Patient tolerated nectar by spoon and additional puree trials with mod-max cues to use precautions and with feeding assist.  Safety for po intake appears borderline if strategies are not effective.  Recommend close monitoring of po tolerance and hold po if increased aspiration signs/decreased respiratory status noted.  Recommend a dysphagia diet level 1 and nectar thick liquids with 1:1 supervision/assist, sit up at 90 degrees, liquids by spoon, double hard swallows, slow rate, crush meds and give with puree, remain upright for 30-60 minutes after eating.  Plan to continue swallow Tx to assess diet tolerance and trial upgrades as indicated.  Barriers to return to prior living situation: No SLP barriers  Recommendations for discharge: Return to LTC, short term SLP swallow Tx  Rationale for recommendations: SLP swallow Tx to maximize safety for a least restrictive diet       Entered by: Destinee Molina 10/31/2018 8:39 AM

## 2018-10-31 NOTE — PROGRESS NOTES
Paged regarding blood culture growing enterococcus faecalis    Patient is currently on Zosyn    For amp sensitive enterococcus, Zosyn should provide adequate coverage.  Would continue the same for now until sensitivities return

## 2018-10-31 NOTE — PROVIDER NOTIFICATION
10/31/18 0342   Significant Event   Significant Event Other (see comments)  (10/31 left arm  enterococcus faecalis)       300 KH 10/31 left arm blood cultures positive for enterococcus faecalis.

## 2018-10-31 NOTE — PLAN OF CARE
Problem: Patient Care Overview  Goal: Plan of Care/Patient Progress Review  Outcome: No Change  Disoriented to place and situation. Lethargic throughout shift. Diet - DDI with nectar thick liquids - remains upright for 30 - 60 min after meals. Constantino in place - 150 mL out. Mostly blind and [place]. Tele - afib, CVR, BBB. ID, speech, OT, and PT following. Meds crushed in apple sauce. Abx - Zosyn. Denies pain. Discharge date TBD.

## 2018-11-01 NOTE — PROGRESS NOTES
"   11/01/18 1623   Quick Adds   Type of Visit Initial PT Evaluation   Living Environment   Lives With facility resident   Living Arrangements assisted living  (memory care facility)   Home Accessibility no concerns   Living Environment Comment Appears pt resides in a MCU, pt unable to provide accurate info.  Per chart \"PTA was at [facility] memory care unit.\"   Self-Care   Current Activity Tolerance fair   Equipment Currently Used at Home (unclear)   Activity/Exercise/Self-Care Comment Pt unable to provide reliable PLOF data, does state he uses a walker and a wc, unclear accuracy of this.   Functional Level Prior   Fall history within last six months yes  (recurrent falls per chart review)   Which of the above functional risks had a recent onset or change? ambulation;transferring;toileting;bathing;cognition   Prior Functional Level Comment appears pt is ambulatory at baseline, unclear if uses AD consistently   General Information   Onset of Illness/Injury or Date of Surgery - Date 10/30/18   Referring Physician Kelly Reynolds PA   Patient/Family Goals Statement pt does not state.  Per chart review, \"He is currently a LTC pt in the TCU waiting to move to a LTC bed. Pt will discharge back to the TCU on discharge.\"   Pertinent History of Current Problem (include personal factors and/or comorbidities that impact the POC) Francesco Killian is a 89 year old man with a history of CAD s/p CABG in 1980s, AAA, moderate aortic stenosis, chronic a fib on warfarin w/ multiple prior CVAs, carotid stenosis, HTN, HLD, CKD, BPH, macular degeneration, memory impairment, and recurrent falls who resides in LTC and presented to ED w/ emesis x 4 (latter of which was blood-tinged) and dyspnea (w/ O2 sats found to be 80%). Being admitted w/ suspected aspiration pneumonia.   Low vision and [place] at baseline.   Precautions/Limitations fall precautions  (currently on 2L nc O2)   General Observations pt resting in " bed, pleasantly confused, SaO2 96% on 2lpm O2, BP elevated once sitting /85.   Cognitive Status Examination   Orientation person   Cognitive Comment Pt disoriented, confused, able to follow single step commands   Pain Assessment   Patient Currently in Pain No   Integumentary/Edema   Integumentary/Edema Comments mild generalized swelling B lower legs, brusing noted posterior distal L thigh   Posture    Posture Forward head position;Protracted shoulders;Kyphosis   Range of Motion (ROM)   ROM Comment WFL BLEs PROM, WFL BUEs AROM > 90 deg, mild kyphotic posture   Strength   Strength Comments Able to demo SLR each leg, at least 4/5 strength available, unable to further MMT due to confusion.   Bed Mobility   Bed Mobility Comments SBA-min A supine > sit EOB.   Transfer Skills   Transfer Comments Sit <> stand with FWW and min A x 1   Gait   Gait Comments Amb at bedside with FWW and min A x 1 for path negotiation due to low vision, generalized unsteadiness noted, unclear if uses FWW consistently at baseline.   Balance   Balance Comments Impaired standing balance and gait stability, confusion noted, fall risk, needing Ax1 for safety with FWW.   Sensory Examination   Sensory Perception Comments pt denies n/t in feet   General Therapy Interventions   Planned Therapy Interventions balance training;gait training;neuromuscular re-education;strengthening;stretching;ROM;transfer training;risk factor education;home program guidelines;progressive activity/exercise   Clinical Impression   Criteria for Skilled Therapeutic Intervention yes, treatment indicated   PT Diagnosis Impaired functional mobility safety and tolerance   Influenced by the following impairments Generalized weakness, low vision, confusion, impaired balance, high BP and impaired pulmonary status   Functional limitations due to impairments Impaired safety and tolerance with functional transfers, gait skills, fall risk, limited activity tolerance   Clinical  "Presentation Evolving/Changing   Clinical Presentation Rationale fluctuating medical status, elevated BP, PMH, unclear baseline status/function   Clinical Decision Making (Complexity) Moderate complexity   Therapy Frequency` 5 times/week  (decrease frequency if appropriate)   Predicted Duration of Therapy Intervention (days/wks) 3-5 days   Anticipated Equipment Needs at Discharge (consider FWW if not already using one?)   Anticipated Discharge Disposition Long Term Care Facility   Risk & Benefits of therapy have been explained Yes   Patient, Family & other staff in agreement with plan of care Yes   [facility] TM \"6 Clicks\"   2016, Trustees of [facility], under license to [employer].  All rights reserved.   6 Clicks Short Forms Basic Mobility Inpatient Short Form   [facility]  \"6 Clicks\" V.2 Basic Mobility Inpatient Short Form   1. Turning from your back to your side while in a flat bed without using bedrails? 3 - A Little   2. Moving from lying on your back to sitting on the side of a flat bed without using bedrails? 3 - A Little   3. Moving to and from a bed to a chair (including a wheelchair)? 3 - A Little   4. Standing up from a chair using your arms (e.g., wheelchair, or bedside chair)? 3 - A Little   5. To walk in hospital room? 3 - A Little   6. Climbing 3-5 steps with a railing? 3 - A Little   Basic Mobility Raw Score (Score out of 24.Lower scores equate to lower levels of function) 18   Total Evaluation Time   Total Evaluation Time (Minutes) 15     "

## 2018-11-01 NOTE — PROGRESS NOTES
[facility]  Hospitalist Progress Note for 10/31/2018:     Reason for hospital stay : sepsis          Assessment and Plan:   Francesco Killian is a 89 year old man with a history of CAD s/p CABG in 1980s, AAA, moderate aortic stenosis, chronic a fib on warfarin w/ multiple prior CVAs, carotid stenosis, HTN, HLD, CKD, BPH, macular degeneration, memory impairment, and recurrent falls who resides in LTC and presented to ED w/ emesis x 4 (latter of which was blood-tinged) and dyspnea (w/ O2 sats found to be 80%). Patient was  admitted w/ suspected aspiration pneumonia.         Sepsis 2/2 Aspiration Pneumonia:    Pt presented with recurrent emesis,and dyspnea. Found to haveTemp 101.8,O2 sat 80% WBC 11.7, RR 24. Lactate wnl. Altered mental status. Requiring 2L NC to maintain O2 sats. CXR w/ interstitial prominence thought suspicious for pulmonary edema but difficult to exclude infiltrate/pneumonia in left lung base. High aspiration risk w/ known dysphagia in the past and emesis x 4 today. UA w/ trace LE, 26 WBC.  -- on zosyn   -- afebrile , improving   -- continue zosy   -- ID following   -    Enterococcus faecalis bacteremia likely from UTI     - BC fron L arm on 10/30 +ve for Enterococcus faecalis. BC from R arm -ve so far  -  urine cultures  >100,000 colonies/mL  Enterococcus faecalis  -- ID assisting with abx     Dysphagia   - SLP consulted, started him on a dysphagia diet with nectar thickend liq..        Septic Encephalopathy.   - CT head w/o -ve for acute pathology  - suspect baseline cognitive dysfunction       N/V w/ Hematemesis:   -- resolved , monitor       Elevated troponin 2/2 Demand Ischemia:   Hx CAD s/p CABG in 1980's:   Did not appear to be having chest pain but is not a reliable historian. EKG w/o ST changes (RBBB is not new). No ASA given in ED d/t hematemesis.   Trop 0.087-> peaked to 0.750-> 0.708.   - ECHO shows LVEF 60-65%,advanced grade 3  diastolic dysfunction, severe pulmonary  hypertension.  - continue Tele.        Acute on Chronic diastolic CHF Exacerbation     Last echo 2/2017 w/ normal LV size, EF 65-70%, severe concentric LVH, likely diastolic dysfunction, RV mild to moderately dilated w/ mildly decreased RV systolic function, trace MR, trace-mild TR, moderate aortic stenosis. BNP 32377 from 3710 in 2/2017. Requiring O2 w/ CXR as above although do suspect acute aspiration event contributing. Weight 153 lb stable from 10/22 but up 6 lbs from 10/13 (had been attributed to improved nutrition).   - received Lasix 20 mg IV given in ED (takes 10 mg PO BID at home). Diuresed, -ve 1,125 ml  -  hold PTA Lasix for now due to worsening creatinine & pt with improved symptoms  - continue Daily weights.       Acute Hypoxia:  2/2 pneumonia & CHF exacerbation:  - plan as above     Prolonged QT Interval:   518 ms in May --> 546 ms today. On Celexa 20 mg. K 3.7.   - continue Tele.   - Check Mg.   - continue to Hold home Celexa for now, follow QT interval.       HTN:   PTA on metoprolol 12.5 mg BID and hydralazine 25 mg QID   SBP 90-150s. Son (neurologist) favors keeping SBP at least around 120 to avoid hypotension/ensure cerebral perfusion w/ extensive neuro/stroke hx.   - BP not well controlled   -- continue current medications   metoprolol 12.5 mg BID, hydralazine 25 mg TID instead of QID for now & hold Lasix due to [place].  - has additional IV Hydralazine prn for SBP> 180        BPH:  - may resume  home Flomax      Hypothyroidism:  -  on IV Synthroid for now , will change to po       Diet: ADAT     DVT Prophylaxis: on chronic warfarin  Code Status: DNR/DNI - has POLST on file  Disposition: PTA was at [facility] memory care unit.  Son Dr. Killian who is a neurologist is out of town. Daughter in law Sherman who works in palliative care at [phone] is the  while son out of town.She will be  updated on Pt's condition..    Porter Mahmood              Interval  History:      Patient is seen and examined by me today and medical record reviewed.Overnight events noted and care discussed with nursing staff.    no new complaints, denies chest pain   No fever                           Medications:       hydrALAZINE  10 mg Oral TID     levothyroxine  100 mcg Oral QAM AC     metoprolol tartrate  12.5 mg Oral BID     pantoprazole  40 mg Oral BID AC     piperacillin-tazobactam  2.25 g Intravenous Q6H     sodium chloride (PF)  3 mL Intracatheter Q8H     tamsulosin  0.4 mg Oral At Bedtime     acetaminophen, HOLD MEDICATION, hydrALAZINE, lidocaine 4%, lidocaine (buffered or not buffered), melatonin, naloxone, ondansetron, - MEDICATION INSTRUCTIONS -, sodium chloride (PF), Warfarin Therapy Reminder               Physical Exam:   Blood pressure 160/56, pulse 60, temperature 97.1  F (36.2  C), temperature source Oral, resp. rate 18, weight 64.2 kg (141 lb 9.6 oz), SpO2 96 %.  Wt Readings from Last 4 Encounters:   18 64.2 kg (141 lb 9.6 oz)   10/22/18 69.6 kg (153 lb 6.4 oz)   10/11/18 65.1 kg (143 lb 8 oz)   10/02/18 54.3 kg (119 lb 11.2 oz)         Vital Sign Ranges  Temperature Temp  Av.2  F (37.3  C)  Min: 97.8  F (36.6  C)  Max: 101.8  F (38.8  C)   Blood pressure Systolic (24hrs), Av , Min:100 , Max:195        Diastolic (24hrs), Av, Min:49, Max:103      Pulse Pulse  Av.5  Min: 60  Max: 93   Respirations Resp  Av.5  Min: 18  Max: 24   Pulse oximetry SpO2  Av.4 %  Min: 92 %  Max: 99 %           Constitutional: Awake, alert, confused,. In no apparent resp distress   Lungs: Diminished but clear to auscultation bilaterally, no crackles or wheezing   Cardiovascular: Regular rate and rhythm, normal S1 and S2, and no murmur noted   Abdomen: Flat, soft,non-tender. Normal bowel sounds. Indwelling alcaraz in place.   Skin: No rashes, no cyanosis, no edema               Data:          All laboratory and imaging data in the past 24 hours reviewed       Recent  Labs  Lab 11/01/18  0623 10/31/18  0637 10/30/18  1032   WBC 5.1 8.6 11.7*   HGB 10.7* 11.0* 11.5*   HCT 32.8* 33.6* 35.4*    99 99   * 104* 136*       Recent Labs  Lab 10/31/18  0748 10/31/18  0733 10/30/18  1121 10/30/18  1041 10/30/18  1031   CULT No growth after 17 hours No growth after 17 hours No growth after 2 days >100,000 colonies/mLEnterococcus faecalisSusceptibility testing in progress*  Culture in progress Cultured on the 1st day of incubation:Enterococcus faecalis*  Critical Value/Significant Value, preliminary result only, called to and read back bySAURAV CORREA RN @0055 10/31/18. SCG  (Note)POSITIVE for ENTEROCOCCUS FAECALIS and NEGATIVE for Uma/vanB genesby [employer]ig[employer] multiplex nucleic acid test. Final identification andantimicrobial susceptibility testing will be verified by standardmethods.Specimen tested with [employer]ene multiplex, gram-positive blood culturenucleic acid test for the following targets: Staph aureus, Staphepidermidis, Staph lugdunensis, other Staph species, Enterococcusfaecalis, Enterococcus faecium, Streptococcus species, S. agalactiae,S. anginosus grp., S. pneumoniae, S. pyogenes, Listeria sp., mecA(methicillin resistance) and Uma/B (vancomycin resistance).Critical Value/Significant Value called to and read back by  LOU FINE ([email or web address]).  10.31.18  0341 GJS       Recent Labs  Lab 11/01/18  0623 10/31/18  0637 10/30/18  1032    138 135   POTASSIUM 3.5 3.9 3.7   CHLORIDE 107 105 103   CO2 28 26 23   ANIONGAP 4 7 9   GLC 94 88 100*   BUN 27 27 25   CR 1.57* 1.51* 1.15   GFRESTIMATED 41* 43* 59*   GFRESTBLACK 50* 52* 71   KEZIA 8.2* 8.2* 8.5   MAG  --   --  1.8   PHOS  --   --  2.6   PROTTOTAL  --   --  7.3   ALBUMIN  --   --  3.6   BILITOTAL  --   --  1.1   ALKPHOS  --   --  104   AST  --   --  27   ALT  --   --  16         Recent Labs  Lab 11/01/18  0623 10/31/18  0637 10/30/18  1032   GLC 94 88 100*             Recent Labs  Lab 11/01/18  0623 10/31/18  0637  10/30/18  1032   INR 2.32* 2.51* 2.31*             Recent Labs  Lab 10/30/18  2227 10/30/18  1619 10/30/18  1122   TROPI 0.708* 0.750* 0.231*       Recent Results (from the past 48 hour(s))   XR Abdomen Port 1 View    Narrative    ABDOMEN ONE VIEW PORTABLE October 30, 2018 2:35 PM     HISTORY: Concern for adynamic ileus on 10/22 [facility] x-ray now presenting  w/ vomiting and has tenderness on abdominal exam. Evaluate for  obstruction.     COMPARISON: February 20, 2018.       Impression    IMPRESSION: Small amount of stool. Nonobstructed bowel gas pattern.    JASKARAN DOSS MD

## 2018-11-01 NOTE — PLAN OF CARE
Problem: Patient Care Overview  Goal: Plan of Care/Patient Progress Review  Outcome: Improving  VSS on 1L O2 NC. Alert and Oriented to self. Assist x2 with gait belt and walker, each step explained as pt is blind. [place]. Tele: Afib SVR/CVR, BBB, long QT. DD1 diet with nectar thick fluids. 1:1 supervision with meals and  Remain upright for  30-60 min after meals. Meds crushed in applesauce. PT/STID following. Constantino in place. Multiple loose BM's this shift, incontinent of stool at baseline. Pt up to chair this am. Continue POC.

## 2018-11-01 NOTE — PLAN OF CARE
Problem: Patient Care Overview  Goal: Plan of Care/Patient Progress Review    PT:  Limited eval complete.  Pt is admitted with suspected aspiration pneumonia.   Low vision and [place] at baseline.  Appears pt is currently residing at [facility] memory care unit.  Unclear baseline functional mobility status, but appears he is ambulatory.    Discharge Planner PT   Patient plan for discharge: pt does not state, per chart review appears plan is for pt to return to LTC  Current status: Limited mobility assessment due to high BP, 192/85 sitting EOB, pt denies dizziness or HA.  Mobility limited by generalized unsteadiness, low vision, and confusion.  Needing min A x 1 with transfers and amb in room x 15ft with FWW.  Barriers to return to prior living situation: none anticipated to LTC, will likely need Ax1 for safety  Recommendations for discharge: return to LTC  Rationale for recommendations: May benefit from skilled PT intervention, pending clarification of baseline functional mobility status.  Will check back in next 1-2 days.  Will likely require 24/7 supervision and assist with mobility and ADLs skills to ensure safety.       Entered by: Dieudonne Rodriguez 11/01/2018 4:44 PM

## 2018-11-01 NOTE — PLAN OF CARE
Problem: Patient Care Overview  Goal: Plan of Care/Patient Progress Review  OT:Orders/chart reviewed. Pt.admitted w/ aspiration pneumonia from  [facility] TCU, usually lives in [facility] LTC. Attempted evaluation--pt. up in  chair, sleeping upon attempt. Pt. unable to engage/keep eyes open. Notified nursing--will reschedule.

## 2018-11-01 NOTE — PROGRESS NOTES
CM contacted [facility] ([phone]) to inquire about pt's level of care (TCU vs LTC vs [facility]). LM with [facility] - Admission to return call. Provided her with CM's contact information.     CM will continue to follow patient until discharge for any additional needs.     Laina Thorpe RN, BSN, [facility] [place]  [phone]

## 2018-11-01 NOTE — PLAN OF CARE
Problem: Patient Care Overview  Goal: Plan of Care/Patient Progress Review  Outcome: Improving  Up to chair with assist of two, good appetite. VS stable.Constantino in place. Continue IV ABX.

## 2018-11-01 NOTE — PROGRESS NOTES
[facility]  Infectious Disease Progress Note          Assessment and Plan:   IMPRESSION:   1.  A 89-year-old male with acute sepsis, nausea and multiple vomiting episodes and some respiratory failure thought to be possible aspiration pneumonia, but now 1 of 2 admission blood cultures growing enterococcus, this is not likely a respiratory pathogen, so focus shifts to either abdomen or urine.   2.  Nausea with possible aspiration event, some hypoxia, so possibly even that is a second diagnosis.   3.  Abnormal urinalysis, now has a Constantino catheter in place, question urine infection is source of enterococcus.  Await urine culture.   4.  Encephalopathy, improved.   5.  Aortic stenosis fairly prominent murmur, some risk of endocarditis given enterococcus in the blood.   6.  Atrial fibrillation with prolonged QT, but no pacemaker in place.   7.  Congestive heart failure.   8.  Acute renal insufficiency.       RECOMMENDATIONS:   1.  Zosyn for now, covers bacteremia/UTI and ? aspiration .   2.  Neg second admission blood culture and follow-up cultures , + UC enterococcus Enterococcus bacteremia very likely from UTI and not endovascular infection, IV while here but 2 weeks po augmentin likely OK if does well        Interval History:   no new complaints mild confusion, no pain, enterococcus sens, 1/2 BC over 100K UC, O2 stable              Medications:       hydrALAZINE  10 mg Oral TID     levothyroxine  100 mcg Oral QAM AC     metoprolol tartrate  12.5 mg Oral BID     pantoprazole  40 mg Oral BID AC     piperacillin-tazobactam  2.25 g Intravenous Q6H     sodium chloride (PF)  3 mL Intracatheter Q8H     tamsulosin  0.4 mg Oral At Bedtime                  Physical Exam:   Blood pressure 160/56, pulse 60, temperature 97.1  F (36.2  C), temperature source Oral, resp. rate 18, weight 64.2 kg (141 lb 9.6 oz), SpO2 96 %.  Wt Readings from Last 2 Encounters:   11/01/18 64.2 kg (141 lb 9.6 oz)   10/22/18 69.6 kg  (153 lb 6.4 oz)     Vital Signs with Ranges  Temp:  [96.7  F (35.9  C)-99.3  F (37.4  C)] 97.1  F (36.2  C)  Heart Rate:  [50-79] 50  Resp:  [18-24] 18  BP: (142-160)/(56-79) 160/56  SpO2:  [96 %-98 %] 96 %    Constitutional: Awake, alert, cooperative, no apparent distress confusion same   Lungs: Clear to auscultation bilaterally, few crackles or wheezing   Cardiovascular: Regular rate and rhythm, normal S1 and S2, and same murmur noted   Abdomen: Normal bowel sounds, soft, non-distended, non-tender   Skin: No rashes, no cyanosis, no edema   Other:           Data:   All microbiology laboratory data reviewed.  Recent Labs   Lab Test  11/01/18   0623  10/31/18   0637  10/30/18   1032   WBC  5.1  8.6  11.7*   HGB  10.7*  11.0*  11.5*   HCT  32.8*  33.6*  35.4*   MCV  100  99  99   PLT  102*  104*  136*     Recent Labs   Lab Test  11/01/18   0623  10/31/18   0637  10/30/18   1032   CR  1.57*  1.51*  1.15     No lab results found.  Recent Labs   Lab Test  10/31/18   0748  10/31/18   0733  10/30/18   1121  10/30/18   1041  10/30/18   1031  02/03/17   2013  02/03/17   1950   CULT  No growth after 17 hours  No growth after 17 hours  No growth after 2 days  >100,000 colonies/mL  Enterococcus faecalis  Susceptibility testing in progress  *  Culture in progress  Cultured on the 1st day of incubation:  Enterococcus faecalis  *  Critical Value/Significant Value, preliminary result only, called to and read back by  SAURAV CORREA RN @0055 10/31/18. SCG    (Note)  POSITIVE for ENTEROCOCCUS FAECALIS and NEGATIVE for Uma/vanB genes  by Verigene multiplex nucleic acid test. Final identification and  antimicrobial susceptibility testing will be verified by standard  methods.    Specimen tested with Verigene multiplex, gram-positive blood culture  nucleic acid test for the following targets: Staph aureus, Staph  epidermidis, Staph lugdunensis, other Staph species, Enterococcus  faecalis, Enterococcus faecium, Streptococcus species,  S. agalactiae,  S. anginosus grp., S. pneumoniae, S. pyogenes, Listeria sp., mecA  (methicillin resistance) and Uma/B (vancomycin resistance).    Critical Value/Significant Value called to and read back by  SAURAV CORREA RN ([email or web address]).  10.31.18  0341 GJS    No growth  No growth

## 2018-11-01 NOTE — PLAN OF CARE
Attempted to see pt per POC/at scheduled treatment time, however pt currently reporting having a BM and requesting to get cleaned up before any PO. Will re-attempt today and/or tomorrow as schedule permits. Continue current recommendations.

## 2018-11-01 NOTE — PLAN OF CARE
Problem: Patient Care Overview  Goal: Plan of Care/Patient Progress Review  Outcome: No Change  Alert, disoriented to place and situation, confused, [place], blind. BP elevated, 97% on 1L NC, other VSS. Tele Afib SVR/CVR with BBB, ST depression, and prolonged QT. LS diminished, crackles to bases. 1+ BLE edema. BM x1 this shift. Constantino catheter intact. Assist of 2, turned and repositioned q 2 hours. ID, speech, PT, OT following. Continue with POC.    /64 (BP Location: Right arm)  Pulse 60  Temp 96.7  F (35.9  C) (Oral)  Resp 20  Wt 66.5 kg (146 lb 9.6 oz)  SpO2 97%  BMI 22.29 kg/m2

## 2018-11-02 NOTE — PROGRESS NOTES
[facility]  Infectious Disease Progress Note          Assessment and Plan:   IMPRESSION:   1.  A 89-year-old male with acute sepsis, nausea and multiple vomiting episodes and some respiratory failure thought to be possible aspiration pneumonia, but now 1 of 2 admission blood cultures growing enterococcus, this is not likely a respiratory pathogen, so focus shifts to either abdomen or urine.   2.  Nausea with possible aspiration event, some hypoxia, so possibly even that is a second diagnosis.   3.  Abnormal urinalysis, now has a Constantino catheter in place, question urine infection is source of enterococcus.  Await urine culture.   4.  Encephalopathy, improved.   5.  Aortic stenosis fairly prominent murmur, some risk of endocarditis given enterococcus in the blood.   6.  Atrial fibrillation with prolonged QT, but no pacemaker in place.   7.  Congestive heart failure.   8.  Acute renal insufficiency.       RECOMMENDATIONS:   1.  Zosyn for now, covers bacteremia/UTI and ? aspiration .   2.  Neg second admission blood culture and follow-up cultures , + UC enterococcus and aerococcus both covered by zosyn, Enterococcus bacteremia very likely from UTI and not endovascular infection, IV while here but 2 weeks po augmentin likely OK if does well        Interval History:   no new complaints mild confusion, no pain, enterococcus sens, 1/2 BC over 100K UC, O2 stable              Medications:       hydrALAZINE  25 mg Oral TID     levothyroxine  100 mcg Oral QAM AC     metoprolol tartrate  6.25 mg Oral BID     pantoprazole  40 mg Oral BID AC     piperacillin-tazobactam  2.25 g Intravenous Q6H     sodium chloride (PF)  3 mL Intracatheter Q8H     tamsulosin  0.4 mg Oral At Bedtime     warfarin  1 mg Oral ONCE at 18:00                  Physical Exam:   Blood pressure 130/52, pulse 56, temperature 97  F (36.1  C), temperature source Oral, resp. rate 18, weight 65.3 kg (143 lb 14.4 oz), SpO2 97 %.  Wt Readings  from Last 2 Encounters:   11/02/18 65.3 kg (143 lb 14.4 oz)   10/22/18 69.6 kg (153 lb 6.4 oz)     Vital Signs with Ranges  Temp:  [96.7  F (35.9  C)-97.7  F (36.5  C)] 97  F (36.1  C)  Pulse:  [51-56] 56  Heart Rate:  [47-58] 58  Resp:  [16-18] 18  BP: (130-192)/(52-85) 130/52  SpO2:  [94 %-98 %] 97 %    Constitutional: Awake, alert, cooperative, no apparent distress confusion same   Lungs: Clear to auscultation bilaterally, few crackles or wheezing   Cardiovascular: Regular rate and rhythm, normal S1 and S2, and same murmur noted   Abdomen: Normal bowel sounds, soft, non-distended, non-tender   Skin: No rashes, no cyanosis, no edema   Other:           Data:   All microbiology laboratory data reviewed.  Recent Labs   Lab Test  11/02/18   0629 11/01/18   0623  10/31/18   0637   WBC  5.3  5.1  8.6   HGB  10.7*  10.7*  11.0*   HCT  34.3*  32.8*  33.6*   MCV  102*  100  99   PLT  108*  102*  104*     Recent Labs   Lab Test  11/02/18   0629  11/01/18   0623  10/31/18   0637   CR  1.45*  1.57*  1.51*     No lab results found.  Recent Labs   Lab Test  10/31/18   0748  10/31/18   0733  10/30/18   1121  10/30/18   1041  10/30/18   1031  02/03/17   2013  02/03/17   1950   CULT  No growth after 2 days  No growth after 2 days  No growth after 3 days  >100,000 colonies/mL  Enterococcus faecalis  *  50,000 to 100,000 colonies/mL  Aerococcus urinae  Identification obtained by MALDI-TOF mass spectrometry research use only database. Test   characteristics determined and verified by the [facility]   ([facility]) [place], [place].  Susceptibility testing in progress  *  <10,000 colonies/mL  urogenital irina  Susceptibility testing not routinely done    Cultured on the 1st day of incubation:  Enterococcus faecalis  *  Critical Value/Significant Value, preliminary result only, called to and read back by  SAURAV CORREA RN @0055 10/31/18. SCG    (Note)  POSITIVE for ENTEROCOCCUS FAECALIS and NEGATIVE for  Uma/vanB genes  by [employer] multiplex nucleic acid test. Final identification and  antimicrobial susceptibility testing will be verified by standard  methods.    Specimen tested with [employer]igene multiplex, gram-positive blood culture  nucleic acid test for the following targets: Staph aureus, Staph  epidermidis, Staph lugdunensis, other Staph species, Enterococcus  faecalis, Enterococcus faecium, Streptococcus species, S. agalactiae,  S. anginosus grp., S. pneumoniae, S. pyogenes, Listeria sp., mecA  (methicillin resistance) and Uma/B (vancomycin resistance).    Critical Value/Significant Value called to and read back by  SAURAV CORREA RN ([email or web address]).  10.31.18  0341 GJS    No growth  No growth

## 2018-11-02 NOTE — PROGRESS NOTES
[facility]  Hospitalist Progress Note for 10/31/2018:     Reason for hospital stay : sepsis          Assessment and Plan:   Francesco Killian is a 89 year old man with a history of CAD s/p CABG in 1980s, AAA, moderate aortic stenosis, chronic a fib on warfarin w/ multiple prior CVAs, carotid stenosis, HTN, HLD, CKD, BPH, macular degeneration, memory impairment, and recurrent falls who resides in LTC and presented to ED w/ emesis x 4 (latter of which was blood-tinged) and dyspnea (w/ O2 sats found to be 80%). Patient was  admitted w/ suspected aspiration pneumonia.         Sepsis 2/2 Aspiration Pneumonia:    Pt presented with recurrent emesis,and dyspnea. Found to haveTemp 101.8,O2 sat 80% WBC 11.7, RR 24. Lactate wnl. Altered mental status. Requiring 2L NC to maintain O2 sats. CXR w/ interstitial prominence thought suspicious for pulmonary edema but difficult to exclude infiltrate/pneumonia in left lung base. High aspiration risk w/ known dysphagia in the past and emesis x 4 today. UA w/ trace LE, 26 WBC.  -- on zosyn   -- afebrile , improving   -- continue zosy for now and may transition to oral Augmentin on discharge per ID recommendation      Enterococcus faecalis bacteremia likely from UTI     - BC fron L arm on 10/30 +ve for Enterococcus faecalis. BC from R arm -ve so far  -  urine cultures  >100,000 colonies/mL  Enterococcus faecalis  -- ID assisting with abx     Dysphagia   - SLP consulted, started him on a dysphagia diet with nectar thickend liq..   --May need a speech therapy evaluation on discharge      Septic Encephalopathy.   - CT head w/o -ve for acute pathology  - suspect baseline cognitive dysfunction   --Appears to be improving and patient is stable without any evidence of acute delirium   N/V w/ Hematemesis:   -- resolved , monitor       Elevated troponin 2/2 Demand Ischemia:   Hx CAD s/p CABG in 1980's:   Did not appear to be having chest pain but is not a reliable historian. EKG w/o  ST changes (RBBB is not new). No ASA given in ED d/t hematemesis.   Trop 0.087-> peaked to 0.750-> 0.708.   - ECHO shows LVEF 60-65%,advanced grade 3  diastolic dysfunction, severe pulmonary hypertension.  - continue Tele.        Acute on Chronic diastolic CHF Exacerbation     Last echo 2/2017 w/ normal LV size, EF 65-70%, severe concentric LVH, likely diastolic dysfunction, RV mild to moderately dilated w/ mildly decreased RV systolic function, trace MR, trace-mild TR, moderate aortic stenosis. BNP 07241 from 3710 in 2/2017. Requiring O2 w/ CXR as above although do suspect acute aspiration event contributing. Weight 153 lb stable from 10/22 but up 6 lbs from 10/13 (had been attributed to improved nutrition).   - received Lasix 20 mg IV given in ED (takes 10 mg PO BID at home). Diuresed, -ve 1,125 ml  -May resume Lasix on discharge       Acute Hypoxia:  2/2 pneumonia & CHF exacerbation:  - plan as above    Abnormal telemetry strips: Patient had nonsustained V. tach 4 beats, cardiac pause of 2.3 seconds as well as bradycardia intermittently in 40s.  Patient is asymptomatic.  Care plan is discussed with his family  --Plan is to decrease metoprolol to 6.5 mg twice a day, monitor on telemetry for now  ast prolonged QT Interval:  - continue to Hold home Celexa for now, follow QT interval.   -Electrolyte monitoring and replacement     HTN:   PTA on metoprolol 12.5 mg BID and hydralazine 25 mg QID   SBP 90-150s. Son (neurologist) favors keeping SBP at least around 120 to avoid hypotension/ensure cerebral perfusion w/ extensive neuro/stroke hx.   - BP not well controlled   -- continue current medications   - has additional IV Hydralazine prn for SBP> 180        BPH:  - may resume  home Flomax      Hypothyroidism:  -  on IV Synthroid for now , will change to po       Diet: ADAT     DVT Prophylaxis: on chronic warfarin  Code Status: DNR/DNI - has POLST on file  Disposition: Patient may discharge to a long-term care facility  over the weekend if he continues to do well.      Porter Mahmood              Interval History:      Patient is seen and examined by me today and medical record reviewed.Overnight events noted and care discussed with nursing staff.    rFancesco Killian is  doing well; no cp, sob, n/v/d, or abd pain.    No fever or chills     Constantino in place     Some loose stool     Labs noted        --Care plan discussed with family, please review my previous progress note earlier today                Medications:       hydrALAZINE  25 mg Oral TID     levothyroxine  100 mcg Oral QAM AC     metoprolol tartrate  6.25 mg Oral BID     pantoprazole  40 mg Oral BID AC     piperacillin-tazobactam  2.25 g Intravenous Q6H     sodium chloride (PF)  3 mL Intracatheter Q8H     tamsulosin  0.4 mg Oral At Bedtime     acetaminophen, hydrALAZINE, lidocaine 4%, lidocaine (buffered or not buffered), melatonin, miconazole, naloxone, ondansetron, - MEDICATION INSTRUCTIONS -, sodium chloride (PF), Warfarin Therapy Reminder               Physical Exam:   Blood pressure 130/52, pulse 56, temperature 97  F (36.1  C), temperature source Oral, resp. rate 18, weight 65.3 kg (143 lb 14.4 oz), SpO2 97 %.  Wt Readings from Last 4 Encounters:   18 65.3 kg (143 lb 14.4 oz)   10/22/18 69.6 kg (153 lb 6.4 oz)   10/11/18 65.1 kg (143 lb 8 oz)   10/02/18 54.3 kg (119 lb 11.2 oz)         Vital Sign Ranges  Temperature Temp  Av.2  F (37.3  C)  Min: 97.8  F (36.6  C)  Max: 101.8  F (38.8  C)   Blood pressure Systolic (24hrs), Av , Min:100 , Max:195        Diastolic (24hrs), Av, Min:49, Max:103      Pulse Pulse  Av.5  Min: 60  Max: 93   Respirations Resp  Av.5  Min: 18  Max: 24   Pulse oximetry SpO2  Av.4 %  Min: 92 %  Max: 99 %           Constitutional: Awake, alert, confused,. In no apparent resp distress   Lungs: Diminished but clear to auscultation bilaterally, no crackles or wheezing   Cardiovascular: Regular rate and rhythm, normal  S1 and S2, and no murmur noted   Abdomen: Flat, soft,non-tender. Normal bowel sounds. Indwelling alcaraz in place.   Skin: No rashes, no cyanosis, no edema               Data:          All laboratory and imaging data in the past 24 hours reviewed       Recent Labs  Lab 11/02/18  0629 11/01/18  0623 10/31/18  0637   WBC 5.3 5.1 8.6   HGB 10.7* 10.7* 11.0*   HCT 34.3* 32.8* 33.6*   * 100 99   * 102* 104*       Recent Labs  Lab 10/31/18  0748 10/31/18  0733 10/30/18  1121 10/30/18  1041 10/30/18  1031   CULT No growth after 2 days No growth after 2 days No growth after 3 days >100,000 colonies/mLEnterococcus faecalis*  Culture in progress Cultured on the 1st day of incubation:Enterococcus faecalis*  Critical Value/Significant Value, preliminary result only, called to and read back bySAURAV CORREA RN @0055 10/31/18. SCG  (Note)POSITIVE for ENTEROCOCCUS FAECALIS and NEGATIVE for Uma/vanB genesby Verigene multiplex nucleic acid test. Final identification andantimicrobial susceptibility testing will be verified by standardmethods.Specimen tested with Verigene multiplex, gram-positive blood culturenucleic acid test for the following targets: Staph aureus, Staphepidermidis, Staph lugdunensis, other Staph species, Enterococcusfaecalis, Enterococcus faecium, Streptococcus species, S. agalactiae,S. anginosus grp., S. pneumoniae, S. pyogenes, Listeria sp., mecA(methicillin resistance) and Uma/B (vancomycin resistance).Critical Value/Significant Value called to and read back by  LOU FINE ([email or web address]).  10.31.18  0341 GJS       Recent Labs  Lab 11/02/18  0629 11/01/18  0623 10/31/18  0637 10/30/18  1032    139 138 135   POTASSIUM 3.5 3.5 3.9 3.7   CHLORIDE 108 107 105 103   CO2 28 28 26 23   ANIONGAP 7 4 7 9   GLC 84 94 88 100*   BUN 24 27 27 25   CR 1.45* 1.57* 1.51* 1.15   GFRESTIMATED 45* 41* 43* 59*   GFRESTBLACK 54* 50* 52* 71   KEZIA 7.8* 8.2* 8.2* 8.5   MAG 2.2  --   --  1.8   PHOS  --   --   --  2.6    PROTTOTAL  --   --   --  7.3   ALBUMIN  --   --   --  3.6   BILITOTAL  --   --   --  1.1   ALKPHOS  --   --   --  104   AST  --   --   --  27   ALT  --   --   --  16         Recent Labs  Lab 11/02/18  0629 11/01/18  0623 10/31/18  0637 10/30/18  1032   GLC 84 94 88 100*             Recent Labs  Lab 11/02/18  0629 11/01/18  0623 10/31/18  0637   INR 1.95* 2.32* 2.51*             Recent Labs  Lab 10/30/18  2227 10/30/18  1619 10/30/18  1122   TROPI 0.708* 0.750* 0.231*       Recent Results (from the past 48 hour(s))   XR Abdomen Port 1 View    Narrative    ABDOMEN ONE VIEW PORTABLE October 30, 2018 2:35 PM     HISTORY: Concern for adynamic ileus on 10/22 [facility] x-ray now presenting  w/ vomiting and has tenderness on abdominal exam. Evaluate for  obstruction.     COMPARISON: February 20, 2018.       Impression    IMPRESSION: Small amount of stool. Nonobstructed bowel gas pattern.    JASKARAN DOSS MD

## 2018-11-02 NOTE — PROGRESS NOTES
Patient was seen and examined by me today and medical record reviewed.Plan of care was discussed with patient/family and staff.  Complete progress note will follow.Please refer to my note for detail progress at a later time     Interval History/objective :    Francesco Killian is  doing well; no cp, sob, n/v/d, or abd pain.    No fever or chills     Alcaraz in place     Some loose stool     Labs noted         Current problem/Assessment:      Improving     HTN       Plan for today       Remove alcaraz     Increase hydralazine to 25 mg 3 times a day    Enterococcus urinary tract infection which can be transitioned to oral Augmentin as recommended by ID on discharge    Discharge planning  Update care plan with family     Addendum   -- rubi in 40's at times   -- NSVT 4 beats , pause 2.3 sec   -- asymptomatic   Plan   --decrease bb to 6.25 mg   --spoke with family -Ortiz

## 2018-11-02 NOTE — PLAN OF CARE
Problem: Patient Care Overview  Goal: Plan of Care/Patient Progress Review  Discharge Planner SLP   Patient plan for discharge: pt did not state  Current status: Ongoing swallow intervention completed with end portion of meal consisting of DD1 solids,  nectar thick liquids. Pt alert, cooperative, upright in chair - total assist with feeding but pt able to independently take small single sips via cup with min cues. Swallow notable for suspect premature spillage and reduced hyolaryngeal elevation/excursion to palpation. Pt denies any sensation of food sticking, however did frequently belch which may be indicative of decreased/slow esophageal clearance (consistent with pt's hx of esphageal strictures). No overt signs/sx aspiration noted. Recommend continue DD1 solids, nectar thick liquids (tsp or cup OK - small single sips at a time) with 1:1 supervision/assist given cognition/vision and to cue for strategy use - strategies: sit up at 90 degrees for PO and 30-60 minutes post, small single sips, double hard swallows, slow rate, crush meds and give with puree. Will continue to follow per POC.   Barriers to return to prior living situation: No SLP barriers  Recommendations for discharge: Return to LTC, short term SLP swallow Tx  Rationale for recommendations: SLP swallow Tx to maximize safety for a least restrictive diet       Entered by: Stephanie Reed 11/02/2018 9:54 AM

## 2018-11-02 NOTE — PLAN OF CARE
Problem: Patient Care Overview  Goal: Plan of Care/Patient Progress Review  Outcome: Improving  Patient tolerated diet, no coughing when eat. Constantino in place. Incontinent of bowel x4, loose stool, no cramping or abdominal pain. MD notified, per MD continue to monitor.Patient on IV ABX. Denies pain, VS stable.  Continue to monitor.

## 2018-11-02 NOTE — PLAN OF CARE
"Problem: Patient Care Overview  Goal: Plan of Care/Patient Progress Review  Discharge Planner OT   Patient plan for discharge: Per chart review, return to LTC  Current status: Order received, chart reviewed. Per chart review, pt admitted from LTC with plan to return to LTC where patient \"receives full cares.\" PT following for mobility/transfers. Most appropriate to defer OT eval to next level of care. Will complete IP OT order. Please re-consult if needs arise.   Barriers to return to prior living situation: None to return to LTC w/ resumption of \"full cares\"  Recommendations for discharge: Defer OT eval to next level of care  Rationale for recommendations: Pt receives full cares at baseline, IP OT eval not indicated.        Entered by: Belkis Monroe 11/02/2018 9:52 AM           "

## 2018-11-02 NOTE — PLAN OF CARE
Problem: Patient Care Overview  Goal: Plan of Care/Patient Progress Review  Outcome: No Change  Alert to self only, confused, [place], blind. BP elevated-did not meet parameters for PRN hydralazine, bradycardic, other VSS. Tele Afib SVR with BBB and prolonged QT, HR mostly 40s-50, but dropped down to 38 x1-MD notified. Pt had 2.3 second pause, asymptomatic, MD notified at 0541. LS diminished, crackles to bases. On IV zosyn. Denies pain, SOB. Incontinent of stool, loose BM x1. Constantino catheter intact. DD1, nectar thick liquids diet, 1:1 supervision with meals. Assist of 2, turned and repositioned q2 hours. Continue with POC.    /70 (BP Location: Right arm)  Pulse 54  Temp 97.1  F (36.2  C) (Oral)  Resp 16  Wt 65.3 kg (143 lb 14.4 oz)  SpO2 96%  BMI 21.88 kg/m2    Addendum: MD ordered magnesium lab add on for AM lab draw for pause.     Addendum, 0700: /80 on re-check, PRN IV hydralazine given.

## 2018-11-03 NOTE — PLAN OF CARE
Problem: Patient Care Overview  Goal: Plan of Care/Patient Progress Review  PT: Per chart, unclear if pt was in LTC/TCU/MC PTA and unclear baseline mobility status. Attempted to call [facility] for clarification and admissions  reports she cannot answer. Will connect with SW/CC here in the IP setting to determine if continued IP PT is indicated.     Addendum: Spoke with RN staff at [facility]. They report at baseline pt needs Ax1 with a walker/GB for short distance mobility to/from the chair. She reports pt needs heavy cues with mobility due to confusion and low vision. Pt appears to be at baseline for mobility. Will complete IP PT order.     Physical Therapy Discharge Summary    Reason for therapy discharge:    No further expectations of functional progress.  Patient at baseline for mobility. Ax1 with walker with heavy cues.     Progress towards therapy goal(s). See goals on Care Plan in [facility] electronic health record for goal details.  Goals not met.  Barriers to achieving goals:   Goals were written for above pt's baseline level.    Therapy recommendation(s):    No further therapy is recommended.  Patient is at his mobility baseline.

## 2018-11-03 NOTE — PHARMACY-ANTICOAGULATION SERVICE
Clinical Pharmacy- Warfarin Discharge Note  This patient is currently on warfarin for the treatment of Atrial fibrillation.  INR Goal= 1.8-2.5  Expected length of therapy lifetime.    Warfarin PTA Regimen: 1mg Monday and Friday, 2mg ROW    Anticoagulation Dose History     Recent Dosing and Labs Latest Ref Rng & Units 2/23/2018 5/9/2018 10/30/2018 10/31/2018 11/1/2018 11/2/2018 11/3/2018    Warfarin 1 mg - 1 mg - - - 1 mg 1 mg -    INR 0.86 - 1.14 3.07(H) 2.50(H) 2.31(H) 2.51(H) 2.32(H) 1.95(H) 1.70(H)    INR 0.86 - 1.14 - - - - - - -          Vitamin K doses administered during the last 7 days: none  FFP administered during the last 7 days: none  Recommend discharging the patient on PTA regimen with no prescription for warfarin tablets (pt from [facility])  - INR therapeutic on admission. Down today to 1.7 due to no warfarin given on 10/30 and 10/31. Recommendation to continue PTA regimen discussed with discharging provider. Noted discharge Rx for 1.5 mg daily x 2 then rpt INR - also seems reasonable.    The patient should have an INR checked in 3 days.

## 2018-11-03 NOTE — PLAN OF CARE
Problem: Patient Care Overview  Goal: Plan of Care/Patient Progress Review  Discharge Planner SLP   Patient plan for discharge: None stated  Current status: Pt was seen for swallowing tx. Upon SLP arrival, pt was sitting upright in chair and had completed breakfast but for drinks. LPN reported that patient took meds in applesauce with no difficulties and tolerated his breakfast with only one instance of coughing. Observed patient with sips of nectar-thick liquid and bites of applesauce. No s/sx of aspiration. Pt independently taking small, single cup sips. Suspect patient is close to his baseline swallow function. RECOMMENDATIONS: Continue NDD-I diet with nectar-thick liquids (tsp or cup OK - small single sips at a time) with 1:1 supervision/assist given cognition/vision and to cue for strategy use. Safe swallowing strategies for patient: sit up at 90 degrees for PO and 30-60 minutes post, small single sips, double hard swallows, slow rate, crush meds and give with puree. Will continue to follow per POC.  Barriers to return to prior living situation: No speech/swallowing barriers  Recommendations for discharge: Return to LTC, ongoing short-term course of dysphagia tx pending progress  Rationale for recommendations: To maximize swallow function and safety       Entered by: Dana Hendrickson 11/03/2018 9:54 AM

## 2018-11-03 NOTE — PLAN OF CARE
Problem: Patient Care Overview  Goal: Plan of Care/Patient Progress Review  Outcome: No Change  VSS alert to self and sometime place disoriented to time and situation intermittent confusion and getting out of bed. On IV  zosyn for PNA/UTI incontinence of bowel and bladder. Beta blocker held due to heart rate.  On DD1 pureed with nectar thick liquids. Tele Afib BBB PVC. PT,OT speech following.

## 2018-11-03 NOTE — DISCHARGE SUMMARY
Physician Discharge Summary     Name: Francesco Killian    MRN: [id number]     YOB: 1920    Age: 89 year old                                                 Primary care provider: Joann Quintero      Admit date:  10/30/2018      Discharge date and time: 11/3/2018       Discharge Physician:  Porter Mahmood          Primary Discharge Diagnosis          Aspiration pneumonia    Sepsis secondary to enterococcus bacteremia    Urinary tract infection with Enterococcus faecalis: Not related to the Constantino catheterization    Enterococcus bacteremia    Dysphagia    Septic encephalopathy    Elevated troponins    Acute on chronic diastolic congestive heart failure exacerbation    Acute hypoxic history failure secondary to pneumonia and CHF    Cardiac pauses    Bradycardia, asymptomatic    Prolonged QT    Hypertension              Secondary Diagnosis /chronic medical conditions:    Past Medical History:   Diagnosis Date     AAA (abdominal aortic aneurysm) (H) 6/3/2013     Aortic stenosis      BPH (benign prostatic hyperplasia)      Bradycardia     Dr Stanford didn't think pacer needed at this time     CAD (coronary artery disease)     s/p CABG     Carotid occlusion, right     see above note     Chronic atrial fibrillation (H)      Compression fracture of L1 lumbar vertebra (H)      CVA (cerebral infarction)     DANILO and both vertebral art blocked-family son (neurologist) requests BP to run a bit higher since flow is via L ICA     Depression      Dysphagia      HTN (hypertension)     and RA stenosis, s/p stents at [place]     Hyperlipidemia LDL goal <130      Hypothyroidism      Ischemic cardiomyopathy      Macular degeneration of both eyes      Recurrent falls~occulovestibular syndrom      Right bundle branch block (RBBB)      Squamous cell carcinoma     skin     Unspecified cerebral artery occlusion with cerebral infarction     x 2, uses a cane       Past Surgical History:    Past Surgical History:   Procedure  Laterality Date     C NONSPECIFIC PROCEDURE      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 [facility] at time of RA stenting     CARDIAC SURGERY  1980s    CABG     COLONOSCOPY  11/4/2013    Procedure: COLONOSCOPY;  COLONOSCOPY ;  Surgeon: Aguilar Arechiga MD;  Location:  GI     ESOPHAGOSCOPY, GASTROSCOPY, DUODENOSCOPY (EGD), COMBINED  10/14/2013    Procedure: COMBINED ESOPHAGOSCOPY, GASTROSCOPY, DUODENOSCOPY (EGD);  COMBINED ESOPHAGOSCOPY, GASTROSCOPY, DUODENOSCOPY (EGD) ;  Surgeon: Aguilar Arechiga MD;  Location:  GI     EYE SURGERY      bilat cataracts     GI SURGERY  1970s    duodenal ulcer repair     HERNIA REPAIR      bilat inguinal     IR RENAL/VISCERAL STENT/ATHERECT/PTA       TURP                     Brief Summary of Hospital stay :       Please refer to  Admission H&P note for full details of patient presentation.        Reason for Hospitalization(C/C,HPI and brief patient summary):      in 1980s, AAA, moderate aortic stenosis, chronic a fib on warfarin w/ multiple prior CVAs, carotid stenosis, HTN, HLD, CKD, BPH, macular degeneration, memory impairment, and recurrent falls who resides in LTC and presented to ED w/ emesis x 4 (latter of which was blood-tinged) and dyspnea (w/ O2 sats found to be 80%). Patient was  admitted w/ suspected aspiration pneumonia      Significant findings(Primary diagnosis )Procedures and treatment provided(Hospital course ,consults procedures):Please see bellow for details  Patient was admitted and was closely monitored.  He was treated with intravenous antibiotic covering for aspiration pneumonia and he became afebrile.  Chest x-ray showed interstitial prominence concerning for congestive heart failure as well.  Patient was treated with Zosyn and he became afebrile with resolution of his hypoxia.  He was seen by speech therapist and dysphagia diet was ordered.  Sepsis with enterococcus urinary tract infection: Patient was found to have positive blood culture with  Enterococcus faecalis and infectious disposition consulted for further assessment and recommendation.  Patient is continued on Zosyn and discharged on Augmentin for 2 more weeks.  He was afebrile and had no symptoms of fever chills.  Nausea vomiting and hematemesis: No evidence of significant blood loss and symptoms resolved  Abnormal telemetry rhythm: Patient had prolonged QT as well as cardiac pauses and sinus bradycardia ranging from 39-50s.  Patient was closely monitored and echocardiogram showed evidence of preserved EF at 60-65% with grade 3 diastolic dysfunction and severe pulmonary hypertension.  Medications were adjusted including decreasing beta-blocker and advised to follow with his primary care physician and cardiologist outpatient    Patient was treated with intravenous Lasix for acute on chronic diastolic congestive heart failure exacerbation and was off oxygen and much better on the day discharge.    Uncontrolled hypertension which was treated with IV hydralazine and medication adjustments were made and plan is to follow with his primary care provider for further titration of medications.    Elevated troponin most likely secondary to demand ischemia without evidence of acute coronary syndrome here in the hospital.        Positive discharge patient was comfortable but remained confused due to his underlying cognitive dysfunction.  No evidence of agitation, fever chills or chest pain.  Patient was discharged to long-term care facility with further follow-up on physical therapy, occupational therapy and speech therapy consultations.    Consultations during hospital stay:    SPEECH LANGUAGE PATH ADULT IP CONSULT  PHYSICAL THERAPY ADULT IP CONSULT  OCCUPATIONAL THERAPY ADULT IP CONSULT  INFECTIOUS DISEASES IP CONSULT  PHARMACY TO DOSE WARFARIN  SOCIAL WORK IP CONSULT  SPEECH LANGUAGE PATH ADULT IP CONSULT  PHYSICAL THERAPY ADULT IP CONSULT  OCCUPATIONAL THERAPY ADULT IP CONSULT      Patient discharge  Condition:     stable    /45 (BP Location: Right arm)  Pulse 56  Temp 97.1  F (36.2  C) (Oral)  Resp 20  Wt 66 kg (145 lb 9.6 oz)  SpO2 92%  BMI 22.14 kg/m2       Discharge Instructions:       Patient/family instructions:    Written discharge instruction given to patient/family.       Review of your medicines      START taking       Dose / Directions    amoxicillin-clavulanate 875-125 MG per tablet   Commonly known as:  AUGMENTIN   Used for:  Sepsis, due to unspecified organism (H)        Dose:  1 tablet   Take 1 tablet by mouth 2 times daily   Quantity:  28 tablet   Refills:  0         CONTINUE these medicines which may have CHANGED, or have new prescriptions. If we are uncertain of the size of tablets/capsules you have at home, strength may be listed as something that might have changed.       Dose / Directions    COUMADIN 1 MG tablet   This may have changed:    - how much to take  - additional instructions   Generic drug:  warfarin        Dose:  1.5 mg   Take 1.5 tablets (1.5 mg) by mouth daily 1.5 mg po daily for 2 days then INR on 11/5 for further dosing.Goal INR around 2   Quantity:  30 tablet   Refills:  0       metoprolol tartrate 25 MG tablet   Commonly known as:  LOPRESSOR   This may have changed:    - how much to take  - additional instructions   Used for:  Sepsis, due to unspecified organism (H)        Dose:  6.25 mg   Take 0.25 tablets (6.25 mg) by mouth 2 times daily   Quantity:  60 tablet   Refills:  0         CONTINUE these medicines which have NOT CHANGED       Dose / Directions    ACETAMINOPHEN PO   Indication:  Pain        Dose:  500 mg   Take 500 mg by mouth 2 times daily   Refills:  0       aspirin 81 MG tablet        Dose:  81 mg   Take 81 mg by mouth daily (children's aspirin).   Refills:  0       BANATROL PLUS Pack        Dose:  1 packet   Take 1 packet by mouth 3 times daily   Refills:  0       Calcium Carbonate Antacid 400 MG Chew   Indication:  Stomach Upset        Dose:  400  mg   Take 400 mg by mouth 2 times daily as needed   Refills:  0       CELEXA PO        Dose:  20 mg   Take 20 mg by mouth daily   Refills:  0       FLOMAX 0.4 MG capsule   Indication:  Benign Enlargement of Prostate   Generic drug:  tamsulosin        Dose:  0.4 mg   Take 0.4 mg by mouth At Bedtime   Refills:  0       FUROSEMIDE PO   Indication:  High Blood Pressure Disorder        Dose:  10 mg   Take 10 mg by mouth 2 times daily   Refills:  0       HYDRALAZINE HCL PO        Dose:  25 mg   Take 25 mg by mouth 4 times daily GIVE AT 6-7am, 12n, 5pm,  for HTN Hold for SBP of less than or equal to 120   Refills:  0       ketoconazole 2 % cream   Commonly known as:  NIZORAL   Used for:  Dermatitis, seborrheic        Apply to face BID PRN   Quantity:  30 g   Refills:  1       levothyroxine 100 MCG tablet   Commonly known as:  SYNTHROID/LEVOTHROID   Used for:  Other specified hypothyroidism        TAKE 1 TABLET BY MOUTH EVERY DAY.   Quantity:  90 tablet   Refills:  0       loperamide 2 MG capsule   Commonly known as:  IMODIUM        Dose:  2 mg   Take 2 mg by mouth every 6 hours as needed for diarrhea   Refills:  0       polyethylene glycol Packet   Commonly known as:  MIRALAX/GLYCOLAX   Used for:  Closed compression fracture of first lumbar vertebra, initial encounter (H)        Dose:  17 g   Take 17 g by mouth daily as needed for constipation   Quantity:  7 packet   Refills:  0       PRESERVISION AREDS PO        Dose:  1 capsule   Take 1 capsule by mouth daily   Refills:  0            Where to get your medicines      Some of these will need a paper prescription and others can be bought over the counter. Ask your nurse if you have questions.     You don't need a prescription for these medications      amoxicillin-clavulanate 875-125 MG per tablet     metoprolol tartrate 25 MG tablet              Discharge diet:  Active Diet Order      Dysphagia Diet Level 1 Pureed Nectar Thickened Liquids (pre-thickened or use  instant food thickener)      Advance Diet as Tolerated        Discharge activity:Activity as tolerated      Discharge follow-up:    Follow up with primary care provider in 7-14 days or earlier if symptoms return or gets worse.    Follow up with consultant as instructed         Other instructions:    We discussed with Patient/family about detail discharge instructions as well as discharge medications above including potential risks,side effects and benefits.Patient/family understood benefits and potential serious side effects of taking these medications and need to follow up with PCP if the patient develops complications.  Patient is also advised to see a doctor immediately for severe symptoms.        Major procedure performed/  Significant Diagnostic Studies:            Results for orders placed or performed during the hospital encounter of 10/30/18   Chest XR,  PA & LAT    Narrative    CHEST TWO VIEWS October 30, 2018 11:06 AM     HISTORY: Cough, fever.     COMPARISON: Chest x-ray from 2/19/2018.      Impression    IMPRESSION: Previous sternotomy. Stable mild to moderate cardiomegaly.  Interstitial prominence throughout suspicious for congestive heart  failure/pulmonary edema. There may be small pleural effusion seen on  the lateral view. Although the findings suggest congestive heart  failure it would be difficult to exclude infiltrate/pneumonia in the  left lung base. The above findings are slightly more prominent than on  the comparison study.    MELISSA AGUILLON MD   CT Head w/o Contrast    Narrative    CT OF THE HEAD WITHOUT CONTRAST 10/30/2018 11:02 AM     COMPARISON: Head CT 5/9/2018    HISTORY: Altered mental status.     TECHNIQUE: 5 mm thick axial CT images of the head were acquired  without IV contrast material.    FINDINGS:  There is moderate diffuse cerebral volume loss. There are  extensive confluent areas of decreased density in the cerebral white  matter bilaterally that are consistent with sequela of  chronic small  vessel ischemic disease. Chronic ischemic infarct involving portions  of the right parietal and right occipital lobes are again noted  without change.    The ventricles and basal cisterns are within normal limits in  configuration given the degree of cerebral volume loss.  There is no  midline shift. There are no extra-axial fluid collections.     No intracranial hemorrhage, mass or recent infarct.    The visualized paranasal sinuses are well aerated. There is no  mastoiditis. There are no fractures of the visualized bones.       Impression    IMPRESSION:  Diffuse cerebral volume loss and cerebral white matter  changes consistent with chronic small vessel ischemic disease. No  change from the comparison study.     Radiation dose for this scan was reduced using automated exposure  control, adjustment of the mA and/or kV according to patient size, or  iterative reconstruction technique.    SAMUEL ELLISON MD   XR Abdomen Port 1 View    Narrative    ABDOMEN ONE VIEW PORTABLE October 30, 2018 2:35 PM     HISTORY: Concern for adynamic ileus on 10/22 [facility] x-ray now presenting  w/ vomiting and has tenderness on abdominal exam. Evaluate for  obstruction.     COMPARISON: February 20, 2018.       Impression    IMPRESSION: Small amount of stool. Nonobstructed bowel gas pattern.    JASKARAN DOSS MD         Recent Labs  Lab 11/02/18  0629 11/01/18  0623 10/31/18  0637   WBC 5.3 5.1 8.6   HGB 10.7* 10.7* 11.0*   HCT 34.3* 32.8* 33.6*   * 100 99   * 102* 104*       Recent Labs  Lab 10/31/18  0748 10/31/18  0733 10/30/18  1121 10/30/18  1041 10/30/18  1031   CULT No growth after 3 days No growth after 3 days No growth after 4 days >100,000 colonies/mLEnterococcus faecalis*  50,000 to 100,000 colonies/mLAerococcus urinaeIdentification obtained by MALDI-TOF mass spectrometry research use only database. Test characteristics determined and verified by the [facility] ([facility])  [place], [place].*  <10,000 colonies/mLurogenital floraSusceptibility testing not routinely done Cultured on the 1st day of incubation:Enterococcus faecalis*  Critical Value/Significant Value, preliminary result only, called to and read back bySAURAV CORREA RN @0055 10/31/18. SCG  (Note)POSITIVE for ENTEROCOCCUS FAECALIS and NEGATIVE for Uma/vanB genesby Verigene multiplex nucleic acid test. Final identification andantimicrobial susceptibility testing will be verified by standardmethods.Specimen tested with Verigene multiplex, gram-positive blood culturenucleic acid test for the following targets: Staph aureus, Staphepidermidis, Staph lugdunensis, other Staph species, Enterococcusfaecalis, Enterococcus faecium, Streptococcus species, S. agalactiae,S. anginosus grp., S. pneumoniae, S. pyogenes, Listeria sp., mecA(methicillin resistance) and Uma/B (vancomycin resistance).Critical Value/Significant Value called to and read back by  LOU FINE ([email or web address]).  10.31.18  0341 GJS       Recent Labs  Lab 11/02/18  0629 11/01/18  0623 10/31/18  0637 10/30/18  1032    139 138 135   POTASSIUM 3.5 3.5 3.9 3.7   CHLORIDE 108 107 105 103   CO2 28 28 26 23   ANIONGAP 7 4 7 9   GLC 84 94 88 100*   BUN 24 27 27 25   CR 1.45* 1.57* 1.51* 1.15   GFRESTIMATED 45* 41* 43* 59*   GFRESTBLACK 54* 50* 52* 71   KEZIA 7.8* 8.2* 8.2* 8.5   MAG 2.2  --   --  1.8   PHOS  --   --   --  2.6   PROTTOTAL  --   --   --  7.3   ALBUMIN  --   --   --  3.6   BILITOTAL  --   --   --  1.1   ALKPHOS  --   --   --  104   AST  --   --   --  27   ALT  --   --   --  16         Recent Labs  Lab 11/02/18  0629 11/01/18  0623 10/31/18  0637 10/30/18  1032   GLC 84 94 88 100*             Recent Labs  Lab 11/03/18  0708 11/02/18  0629 11/01/18  0623   INR 1.70* 1.95* 2.32*       Incidental findings that need follow up:     Pending Results:       Unresulted Labs Ordered in the Past 30 Days of this Admission     Date and Time Order Name Status Description     10/31/2018 0710 Blood culture Preliminary     10/31/2018 0710 Blood culture Preliminary     10/30/2018 1028 Blood culture ONE site Preliminary              Patient Allergies:       No Known Allergies      Disposition:   long term care facility           I saw and evaluated the patient on day of discharge and  discharge instructions reviewed  and  all the patient's questions and concerns addressed.Over 30 minutes spent on discharge and coordination of discharge process for this patient.           Disclaimer: This note consists of symbols derived from keyboarding, dictation and/or voice recognition software. As a result, there may be errors in the script that have gone undetected. Please consider this when interpreting information found in this chart

## 2018-11-03 NOTE — DISCHARGE INSTRUCTIONS
Discharged back to [facility] with [facility] as transport. AVS given to daughter Whit, discharge packet given to [facility]. IV removed, tele removed.

## 2018-11-03 NOTE — CONSULTS
Care Transition Initial Assessment - DEREJE  Reason For Consult: discharge planning  Active Problems:    Aspiration pneumonia (H)       DATA  Lives With: facility resident  Living Arrangements: assisted living (memory care facility)  Description of Support System: Supportive, Involved  Who is your support system?: Children  Support Assessment: Adequate family and caregiver support.   Identified issues/concerns regarding health management: Pt is a resident of [facility] and needs to return today.     Quality Of Family Relationships: supportive, helpful, involved  Transportation Available: agency transportation    ASSESSMENT  Concerns to be addressed: Pt is a resident of [facility] and needs to return today. DEREJE spoke to Jojo at [facility] [phone], pt can return after 3:00pm. Per nursing pt is an assit of two and needs WC transportation at discharge. DEREJE arranged WC transport with  at 3:30pm. DEREJE spoke to pt's nurse and RAMIRO Sherman to provide an update. DEREJE sent the discharge order to [facility].     PLAN  Patient anticipates discharging to:  [facility] at 3:30 via .     DEBBIE Reynolds  Casual  [id number]

## 2018-11-03 NOTE — PLAN OF CARE
Problem: Patient Care Overview  Goal: Plan of Care/Patient Progress Review  Outcome: No Change  /57 (BP Location: Right arm)  Pulse 56  Temp 97  F (36.1  C) (Oral)  Resp 18  Wt 65.3 kg (143 lb 14.4 oz)  SpO2 97%  BMI 21.88 kg/m2    Tele: Afib with BBB, PVC's, 4 bts Vtach this AM-asymptomatic. Total feed. Loose incont BM's Constantino dc'd-yet to void-bladder scanned for 116cc. Denies sensation to urinate. Up with assist x2 to chair. Continues on 1L NC. New blood cx NTD. Metoprolol dose decreased d/t low HR.

## 2018-11-05 NOTE — PROGRESS NOTES
HPI:    Francesco Killian is a 89 year old  (12/26/1920), who is being seen today for an episodic care visit at [facility]. Today's concern: INR/Coumadin management for A. Fib    Bleeding or Thromboembolic Signs/Symptoms: None   No Medication Changes, Dietary Changes, or  Activity Changes.   Bacterial/Viral Infection:  No    Missed Coumadin Doses:  None    On ASA: 81 mg     OBJECTIVE: Alert, eating BF, minimal conversant, NAD. Lungs CTA except a few  crackles in bases, non labored. IRRR, + systolic murmur.HSA trace edema.  Abdomen soft, nontender, +BT'S.    /70  Pulse 52  Temp 97.2  F (36.2  C)  Resp 18  Wt 146 lb (66.2 kg)  SpO2 94%  BMI 22.2 kg/m2    LABS: INR Today: 1.4 today.   Recent Labs  Lab 11/03/18  0708 11/02/18  0629 11/01/18  0623   INR 1.70* 1.95* 2.32*        Subtherapeutic INR for goal of 2-3    ASSESSMENT / PLAN:  (I48.2) Chronic atrial fibrillation (H)  (primary encounter diagnosis)   (Z51.81,  Z79.01) Anticoagulated on Coumadin  Comment/Plan: low INR, coumadin 5mg for 2 days and then 2 mg daily   Check INR on 11/13    (J69.0) Aspiration pneumonia, unspecified aspiration pneumonia type, unspecified laterality, unspecified part of lung (H)  Comment/Plan: lungs with a few crackles but not distressed. Monitor. No changes today      Jesica Bloom RN, CNP

## 2018-11-05 NOTE — PROGRESS NOTES
Speech Language Therapy Discharge Summary    Reason for therapy discharge:    Discharged to Assisted Living/Memory Care Facility    Progress towards therapy goal(s). See goals on Care Plan in Epic electronic health record for goal details.  Goals partially met.  Barriers to achieving goals:   discharge from facility.    Therapy recommendation(s):    Continued therapy is recommended.  Rationale/Recommendations:  Recommend brief follow up SLP services, as able to ensure diet tolerance.  At the time of discharge, the patient tolerated Dysphagia diet level 1 with nectar-thick liquids (tsp or cup OK - small single sips at a time) with 1:1 supervision/assist given cognition/vision and to cue for strategy use. Safe swallowing strategies for patient: sit up at 90 degrees for PO and 30-60 minutes post, small single sips, double hard swallows, slow rate, crush meds and give with puree.

## 2018-11-13 NOTE — MR AVS SNAPSHOT
After Visit Summary   11/13/2018    Francesco Killian    MRN: [id number]           Patient Information     Date Of Birth          12/26/1920        Visit Information        Provider Department      11/13/2018 12:00 PM Joann Quintero APRN CNP [facility]        Today's Diagnoses     Aspiration pneumonitis (H)    -  1    Dysphagia, unspecified type        Sepsis secondary to UTI (H)        Nausea and vomiting, intractability of vomiting not specified, unspecified vomiting type        Acute on chronic diastolic heart failure (H)        Cardiac arrhythmia, unspecified cardiac arrhythmia type        Elevated troponin        Uncontrolled hypertension        Cognitive impairment        Physical deconditioning           Follow-ups after your visit        Your next 10 appointments already scheduled     Nov 13, 2018 12:00 PM [facility]   Nursing Home with CODY Medley CNP   [facility] ([facility]    [address] [place] [id number] [phone]              Who to contact     If you have questions or need follow up information about today's clinic visit or your schedule please contact [facility] directly at [phone].  Normal or non-critical lab and imaging results will be communicated to you by MyChart, letter or phone within 4 business days after the clinic has received the results. If you do not hear from us within 7 days, please contact the clinic through Pogoseathart or phone. If you have a critical or abnormal lab result, we will notify you by phone as soon as possible.  Submit refill requests through Yedda or call your pharmacy and they will forward the refill request to us. Please allow 3 business days for your refill to be completed.          Additional Information About Your Visit        MyChart Information     Yedda gives you secure access to your electronic health record. If you see a primary care provider,  you can also send messages to your care team and make appointments. If you have questions, please call your primary care clinic.  If you do not have a primary care provider, please call [phone] and they will assist you.        Care EveryWhere ID     This is your Care EveryWhere ID. This could be used by other organizations to access your [place] medical records  [id number]        Your Vitals Were     Pulse Temperature Respirations Pulse Oximetry BMI (Body Mass Index)       61 97  F (36.1  C) 20 95% 22.2 kg/m2        Blood Pressure from Last 3 Encounters:   11/12/18 151/70   11/05/18 132/70   11/03/18 111/45    Weight from Last 3 Encounters:   11/12/18 146 lb (66.2 kg)   11/05/18 146 lb (66.2 kg)   11/03/18 145 lb 9.6 oz (66 kg)              Today, you had the following     No orders found for display         Today's Medication Changes          These changes are accurate as of 11/13/18  9:16 AM.  If you have any questions, ask your nurse or doctor.               These medicines have changed or have updated prescriptions.        Dose/Directions    HYDRALAZINE HCL PO   Indication:  High Blood Pressure Disorder   This may have changed:  Another medication with the same name was removed. Continue taking this medication, and follow the directions you see here.   Changed by:  Joann Quintero APRN CNP        Take by mouth every 8 hours Take 50 mg PO at 0600 and 2200 daily and 25 mg PO at 1400 daily   Refills:  0                Primary Care Provider Office Phone # Fax #    CODY Medley [phone]       [address] [place] [address]        Equal Access to Services     [facility]: Hadii aditi ku hadasho Soomaali, waaxda luqadaha, qaybta kaalmada singh [place]. So [facility] [phone].    ATENCIÓN: Si habla español, tiene a costa disposición servicios gratuitos de asistencia lingüística. Llame al [phone].    We comply with applicable federal  civil rights laws and [place] laws. We do not discriminate on the basis of race, color, national origin, age, disability, sex, sexual orientation, or gender identity.            Thank you!     Thank you for choosing [facility]  for your care. Our goal is always to provide you with excellent care. Hearing back from our patients is one way we can continue to improve our services. Please take a few minutes to complete the written survey that you may receive in the mail after your visit with us. Thank you!             Your Updated Medication List - Protect others around you: Learn how to safely use, store and throw away your medicines at www.disposemymeds.org.          This list is accurate as of 11/13/18  9:16 AM.  Always use your most recent med list.                   Brand Name Dispense Instructions for use Diagnosis    ACETAMINOPHEN PO      Take 500 mg by mouth 2 times daily        amoxicillin-clavulanate 875-125 MG per tablet    AUGMENTIN    28 tablet    Take 1 tablet by mouth 2 times daily    Sepsis, due to unspecified organism (H)       aspirin 81 MG tablet      Take 81 mg by mouth daily (children's aspirin).        BANATROL PLUS Pack      Take 1 packet by mouth 3 times daily        Calcium Carbonate Antacid 400 MG Chew      Take 400 mg by mouth 2 times daily as needed        CELEXA PO      Take 20 mg by mouth daily        COUMADIN 1 MG tablet   Generic drug:  warfarin     30 tablet    Take by mouth daily INR today 1.4. Coumadin 2 mg po daily  Recheck INR on 11/13/18.        FLOMAX 0.4 MG capsule   Generic drug:  tamsulosin      Take 0.4 mg by mouth At Bedtime        FUROSEMIDE PO      Take 10 mg by mouth 2 times daily        HYDRALAZINE HCL PO      Take by mouth every 8 hours Take 50 mg PO at 0600 and 2200 daily and 25 mg PO at 1400 daily        ketoconazole 2 % cream    NIZORAL    30 g    Apply to face BID PRN    Dermatitis, seborrheic       levothyroxine 100 MCG tablet     SYNTHROID/LEVOTHROID    90 tablet    TAKE 1 TABLET BY MOUTH EVERY DAY.    Other specified hypothyroidism       loperamide 2 MG capsule    IMODIUM     Take 2 mg by mouth every 6 hours as needed for diarrhea        metoprolol tartrate 25 MG tablet    LOPRESSOR    60 tablet    Take 0.25 tablets (6.25 mg) by mouth 2 times daily    Sepsis, due to unspecified organism (H)       polyethylene glycol Packet    MIRALAX/GLYCOLAX    7 packet    Take 17 g by mouth daily as needed for constipation    Closed compression fracture of first lumbar vertebra, initial encounter (H)       PRESERVISION AREDS PO      Take 1 capsule by mouth daily

## 2018-11-13 NOTE — LETTER
11/13/2018        RE: Francesco Killian  [facility]  [address]  [facility] [address]        [facility]    Chief Complaint   Patient presents with     long term Acute       [facility] Medical Record Number:  [id number]  Place of Service where encounter took place:  [facility] ([facility] [[id number]]    HPI:    Francesco Killian is a 89 year old  (12/26/1920), who is being seen today for an episodic care visit.  HPI information obtained from: facility chart records, facility staff, patient report and [facility] chart review.    Hospital course:  Patient with hx AAA, moderate aortic stenosis, chronic a fib on warfarin w/ multiple prior CVAs, carotid stenosis, HTN, HLD, CKD, BPH, macular degeneration, memory impairment, and recurrent falls who resides in LTC and presented to ED w/ emesis x 4 (latter of which was blood-tinged) and dyspnea (w/ O2 sats found to be 80%). Patient was  admitted w/ suspected aspiration pneumonia    Suspected aspiration pneumonia:  Patient was admitted and was closely monitored.  He was treated with intravenous antibiotic covering for aspiration pneumonia and he became afebrile.  Chest x-ray showed interstitial prominence concerning for congestive heart failure as well.  Patient was treated with Zosyn and he became afebrile with resolution of his hypoxia.  He was seen by speech therapist and dysphagia diet was ordered. Patient was treated with intravenous Lasix for acute on chronic diastolic congestive heart failure exacerbation and was off oxygen and much better on the day discharge.  Sepsis with enterococcus urinary tract infection: Patient was found to have positive blood culture with Enterococcus faecalis and infectious disposition consulted for further assessment and recommendation.  Patient is continued on Zosyn and discharged on Augmentin for 2 more weeks.  He was afebrile and had no symptoms of fever  chills.  Nausea vomiting and hematemesis: No evidence of significant blood loss and symptoms resolved  Abnormal telemetry rhythm: Patient had prolonged QT as well as cardiac pauses and sinus bradycardia ranging from 39-50s.  Patient was closely monitored and echocardiogram showed evidence of preserved EF at 60-65% with grade 3 diastolic dysfunction and severe pulmonary hypertension.  Medications were adjusted including decreasing beta-blocker and advised to follow with his primary care physician and cardiologist outpatient  Uncontrolled hypertension: which was treated with IV hydralazine and medication adjustments were made and plan is to follow with his primary care provider for further titration of medications.  Elevated troponin:  most likely secondary to demand ischemia without evidence of acute coronary syndrome here in the hospital.      Positive discharge patient was comfortable but remained confused due to his underlying cognitive dysfunction.  No evidence of agitation, fever chills or chest pain.  Patient was discharged to long-term care facility with further follow-up on physical therapy, occupational therapy and speech therapy consultations.    Today's concern is:  Aspiration pneumonia, unspecified laterality, unspecified part of lung  Dysphagia, unspecified type  Patient back in LTC, appears to be at baseline. On room air and saturations are 95%. Denies cough. Completing Augmentin course. Continues ST and is currently on dysphagia level 1 diet. No fevers.   Lab Results   Component Value Date    WBC 5.3 11/02/2018    WBC 5.1 11/01/2018     Sepsis secondary to UTI (H)  Denies urinary symptoms. No fevers, dysuria. WBC as above.     Nausea and vomiting, intractability of vomiting not specified, unspecified vomiting type  No further issues at LTC. Denies GI complaints. Monitoring. Has PRN loperamide and is on scheduled Banatrol for hx loose stools, denies currently having diarrhea.     Acute on chronic  diastolic heart failure (H)  Cardiac arrhythmia, unspecified cardiac arrhythmia type  Elevated troponin  Uncontrolled hypertension  No chest pain SBP ranges 133-197 since admission, typically over 140s. HR runs 48-65. Managed with metoprolol 6.25 mg PO BID, ASA daily, lasix 10 mg PO BID, hydralazine 25 mg PO QID. He is on coumadin daily. INR due today. LSC today, no edema.   Lab Results   Component Value Date    CR 1.45 11/02/2018    CR 1.57 11/01/2018     Lab Results   Component Value Date    POTASSIUM 3.5 11/02/2018    POTASSIUM 3.5 11/01/2018       Cognitive impairment  Physical deconditioning  Acute on chronic issues - back at LTC, in therapies - appears to be able to walk 10 feet with CGA and walker.     INR's:   Lab 11/03/18  0708 11/02/18  0629 11/01/18  0623   INR 1.70* 1.95* 2.32*       ALLERGIES: Review of patient's allergies indicates no known allergies.  Past Medical, Surgical, Family and Social History reviewed and updated in [facility].    Current Outpatient Prescriptions   Medication Sig Dispense Refill     ACETAMINOPHEN PO Take 500 mg by mouth 2 times daily        amoxicillin-clavulanate (AUGMENTIN) 875-125 MG per tablet Take 1 tablet by mouth 2 times daily 28 tablet 0     aspirin 81 MG tablet Take 81 mg by mouth daily (children's aspirin).       Banana Flakes (BANATROL PLUS) PACK Take 1 packet by mouth 3 times daily       Calcium Carbonate Antacid 400 MG CHEW Take 400 mg by mouth 2 times daily as needed       Citalopram Hydrobromide (CELEXA PO) Take 20 mg by mouth daily       FUROSEMIDE PO Take 10 mg by mouth 2 times daily        HYDRALAZINE HCL PO Take 25 mg by mouth 4 times daily GIVE AT 6-7am, 12n, 5pm,  for HTN Hold for SBP of less than or equal to 120       ketoconazole (NIZORAL) 2 % cream Apply to face BID PRN 30 g 1     levothyroxine (SYNTHROID/LEVOTHROID) 100 MCG tablet TAKE 1 TABLET BY MOUTH EVERY DAY. 90 tablet 0     loperamide (IMODIUM) 2 MG capsule Take 2 mg by mouth every 6 hours as  needed for diarrhea       metoprolol tartrate (LOPRESSOR) 25 MG tablet Take 0.25 tablets (6.25 mg) by mouth 2 times daily 60 tablet      Multiple Vitamins-Minerals (PRESERVISION AREDS PO) Take 1 capsule by mouth daily       polyethylene glycol (MIRALAX/GLYCOLAX) Packet Take 17 g by mouth daily as needed for constipation 7 packet      tamsulosin (FLOMAX) 0.4 MG capsule Take 0.4 mg by mouth At Bedtime        warfarin (COUMADIN) 1 MG tablet Take by mouth daily INR today 1.4. Coumadin 2 mg po daily  Recheck INR on 11/13/18. 30 tablet      Medications reviewed:  Medications reconciled to facility chart and changes were made to reflect current medications as identified as above med list. Below are the changes that were made:   Medications stopped since last EPIC medication reconciliation:   There are no discontinued medications.    Medications started since last [facility] medication reconciliation:  No orders of the defined types were placed in this encounter.    REVIEW OF SYSTEMS:  10 point ROS of systems including Constitutional, Eyes, Respiratory, Cardiovascular, Gastroenterology, Genitourinary, Integumentary, Musculoskeletal, Psychiatric were all negative except for pertinent positives noted in my HPI.    Physical Exam:  /70  Pulse 61  Temp 97  F (36.1  C)  Resp 20  Wt 146 lb (66.2 kg)  SpO2 95%  BMI 22.2 kg/m2  GENERAL APPEARANCE:  Alert, in no distress, pleasant, cooperative, oriented x self, place  EYES:  EOM, lids, pupils and irises normal, sclera clear and conjunctiva normal, no discharge or mattering on lids or lashes noted  ENT:  Mouth normal, moist mucous membranes, nose normal without drainage or crusting, external ears without lesions, hearing acuity impaired bilaterally.   RESP:  respiratory effort and palpation of chest normal, no chest wall tenderness, no respiratory distress, Lung sounds clear, patient is on room air  CV:  Palpation and auscultation of heart done, rate and rhythm controlled and  irregularly irregular, grade 3/6 systolic murmur, no rub or gallop. Edema none bilateral lower extremities.   ABDOMEN:  normal bowel sounds, soft, nontender.  M/S:   Gait and station walks with assist  and unsafe without assistance, no tenderness or swelling of the joints; able to move all extremities   NEURO: cranial nerves 2-12 grossly intact, no facial asymmetry, no speech deficits and able to follow directions, moves all extremities symmetrically  PSYCH:  insight and judgement impaired, memory forgetful, affect and mood normal     Recent Labs:   CBC RESULTS:   Recent Labs   Lab Test  11/02/18   0629  11/01/18   0623   WBC  5.3  5.1   RBC  3.38*  3.29*   HGB  10.7*  10.7*   HCT  34.3*  32.8*   MCV  102*  100   MCH  31.7  32.5   MCHC  31.2*  32.6   RDW  13.5  13.4   PLT  108*  102*       Last Basic Metabolic Panel:  Recent Labs   Lab Test  11/02/18 0629 11/01/18   0623   NA  143  139   POTASSIUM  3.5  3.5   CHLORIDE  108  107   KEZIA  7.8*  8.2*   CO2  28  28   BUN  24  27   CR  1.45*  1.57*   GLC  84  94       Liver Function Studies -   Recent Labs   Lab Test  10/30/18   1032  09/07/17   1512   PROTTOTAL  7.3  8.1   ALBUMIN  3.6  3.6   BILITOTAL  1.1  0.4   ALKPHOS  104  105   AST  27  26   ALT  16  22       TSH   Date Value Ref Range Status   03/29/2018 3.91 0.30 - 5.00 uIU/mL Final   07/11/2017 0.26 (L) 0.40 - 4.00 mU/L Final       Assessment/Plan:  Aspiration pneumonitis (H)  Resolving - complete antibiotics, monitor.     Dysphagia, unspecified type  Ongoing - ST, diet per their recommendation.     Sepsis secondary to UTI (H)  Appears resolved. Monitor.     Nausea and vomiting, intractability of vomiting not specified, unspecified vomiting type  Resolved, monitor.     Acute on chronic diastolic heart failure (H)  Acute on chronic - appears fluid balanced at this time. Meds as above. VS, wt per routine. BMP this week.     Cardiac arrhythmia, unspecified cardiac arrhythmia type  Elevated troponin  Uncontrolled  hypertension  Chronic issues, acute changes as above. Cardiology f/u per routine. Meds as above. Increase Hydralazine dose and monitor. Staff to update provider if not effective.     Cognitive impairment  Physical deconditioning  Acute on chronic issues. Therapies, staff to monitor for safety. F/U with progress at next visit.     Orders:  Change hydralazine to 50 mg PO BID and 25 mg PO daily (give every 8 hrs scheduled). Monitor BP  DNR/DNI  BMP on 11/20/18 diagnosis HF    Total time spent with patient visit was 40 min including patient visit and review of past records. Greater than 50% of total time spent with counseling and coordinating care due to review of history, current issues and concerns as well as status and POC to address concerns as noted above.      Electronically signed by  CODY Hunt CNP                      Sincerely,        CODY Hunt CNP

## 2018-11-13 NOTE — PROGRESS NOTES
[facility]    Chief Complaint   Patient presents with     assisted Acute       [facility] Record Number:  [id number]  Place of Service where encounter took place:  [facility]) [[id number]]    HPI:    Francesco Killian is a 89 year old  (12/26/1920), who is being seen today for an episodic care visit.  HPI information obtained from: facility chart records, facility staff, patient report and [facility] chart review.    Hospital course:  Patient with hx AAA, moderate aortic stenosis, chronic a fib on warfarin w/ multiple prior CVAs, carotid stenosis, HTN, HLD, CKD, BPH, macular degeneration, memory impairment, and recurrent falls who resides in LTC and presented to ED w/ emesis x 4 (latter of which was blood-tinged) and dyspnea (w/ O2 sats found to be 80%). Patient was  admitted w/ suspected aspiration pneumonia    Suspected aspiration pneumonia:  Patient was admitted and was closely monitored.  He was treated with intravenous antibiotic covering for aspiration pneumonia and he became afebrile.  Chest x-ray showed interstitial prominence concerning for congestive heart failure as well.  Patient was treated with Zosyn and he became afebrile with resolution of his hypoxia.  He was seen by speech therapist and dysphagia diet was ordered. Patient was treated with intravenous Lasix for acute on chronic diastolic congestive heart failure exacerbation and was off oxygen and much better on the day discharge.  Sepsis with enterococcus urinary tract infection: Patient was found to have positive blood culture with Enterococcus faecalis and infectious disposition consulted for further assessment and recommendation.  Patient is continued on Zosyn and discharged on Augmentin for 2 more weeks.  He was afebrile and had no symptoms of fever chills.  Nausea vomiting and hematemesis: No evidence of significant blood loss and symptoms resolved  Abnormal telemetry rhythm:  Patient had prolonged QT as well as cardiac pauses and sinus bradycardia ranging from 39-50s.  Patient was closely monitored and echocardiogram showed evidence of preserved EF at 60-65% with grade 3 diastolic dysfunction and severe pulmonary hypertension.  Medications were adjusted including decreasing beta-blocker and advised to follow with his primary care physician and cardiologist outpatient  Uncontrolled hypertension: which was treated with IV hydralazine and medication adjustments were made and plan is to follow with his primary care provider for further titration of medications.  Elevated troponin:  most likely secondary to demand ischemia without evidence of acute coronary syndrome here in the hospital.      Positive discharge patient was comfortable but remained confused due to his underlying cognitive dysfunction.  No evidence of agitation, fever chills or chest pain.  Patient was discharged to long-term care facility with further follow-up on physical therapy, occupational therapy and speech therapy consultations.    Today's concern is:  Aspiration pneumonia, unspecified laterality, unspecified part of lung  Dysphagia, unspecified type  Patient back in LTC, appears to be at baseline. On room air and saturations are 95%. Denies cough. Completing Augmentin course. Continues ST and is currently on dysphagia level 1 diet. No fevers.   Lab Results   Component Value Date    WBC 5.3 11/02/2018    WBC 5.1 11/01/2018     Sepsis secondary to UTI (H)  Denies urinary symptoms. No fevers, dysuria. WBC as above.     Nausea and vomiting, intractability of vomiting not specified, unspecified vomiting type  No further issues at LTC. Denies GI complaints. Monitoring. Has PRN loperamide and is on scheduled Banatrol for hx loose stools, denies currently having diarrhea.     Acute on chronic diastolic heart failure (H)  Cardiac arrhythmia, unspecified cardiac arrhythmia type  Elevated troponin  Uncontrolled hypertension  No  chest pain SBP ranges 133-197 since admission, typically over 140s. HR runs 48-65. Managed with metoprolol 6.25 mg PO BID, ASA daily, lasix 10 mg PO BID, hydralazine 25 mg PO QID. He is on coumadin daily. INR due today. LSC today, no edema.   Lab Results   Component Value Date    CR 1.45 11/02/2018    CR 1.57 11/01/2018     Lab Results   Component Value Date    POTASSIUM 3.5 11/02/2018    POTASSIUM 3.5 11/01/2018       Cognitive impairment  Physical deconditioning  Acute on chronic issues - back at LT, in therapies - appears to be able to walk 10 feet with CGA and walker.     INR's:   Lab 11/03/18  0708 11/02/18  0629 11/01/18  0623   INR 1.70* 1.95* 2.32*       ALLERGIES: Review of patient's allergies indicates no known allergies.  Past Medical, Surgical, Family and Social History reviewed and updated in [email or web address].    Current Outpatient Prescriptions   Medication Sig Dispense Refill     ACETAMINOPHEN PO Take 500 mg by mouth 2 times daily        amoxicillin-clavulanate (AUGMENTIN) 875-125 MG per tablet Take 1 tablet by mouth 2 times daily 28 tablet 0     aspirin 81 MG tablet Take 81 mg by mouth daily (children's aspirin).       Banana Flakes (BANATROL PLUS) PACK Take 1 packet by mouth 3 times daily       Calcium Carbonate Antacid 400 MG CHEW Take 400 mg by mouth 2 times daily as needed       Citalopram Hydrobromide (CELEXA PO) Take 20 mg by mouth daily       FUROSEMIDE PO Take 10 mg by mouth 2 times daily        HYDRALAZINE HCL PO Take 25 mg by mouth 4 times daily GIVE AT 6-7am, 12n, 5pm,  for HTN Hold for SBP of less than or equal to 120       ketoconazole (NIZORAL) 2 % cream Apply to face BID PRN 30 g 1     levothyroxine (SYNTHROID/LEVOTHROID) 100 MCG tablet TAKE 1 TABLET BY MOUTH EVERY DAY. 90 tablet 0     loperamide (IMODIUM) 2 MG capsule Take 2 mg by mouth every 6 hours as needed for diarrhea       metoprolol tartrate (LOPRESSOR) 25 MG tablet Take 0.25 tablets (6.25 mg) by mouth 2 times daily 60 tablet       Multiple Vitamins-Minerals (PRESERVISION AREDS PO) Take 1 capsule by mouth daily       polyethylene glycol (MIRALAX/GLYCOLAX) Packet Take 17 g by mouth daily as needed for constipation 7 packet      tamsulosin (FLOMAX) 0.4 MG capsule Take 0.4 mg by mouth At Bedtime        warfarin (COUMADIN) 1 MG tablet Take by mouth daily INR today 1.4. Coumadin 2 mg po daily  Recheck INR on 11/13/18. 30 tablet      Medications reviewed:  Medications reconciled to facility chart and changes were made to reflect current medications as identified as above med list. Below are the changes that were made:   Medications stopped since last EPIC medication reconciliation:   There are no discontinued medications.    Medications started since last [facility] medication reconciliation:  No orders of the defined types were placed in this encounter.    REVIEW OF SYSTEMS:  10 point ROS of systems including Constitutional, Eyes, Respiratory, Cardiovascular, Gastroenterology, Genitourinary, Integumentary, Musculoskeletal, Psychiatric were all negative except for pertinent positives noted in my HPI.    Physical Exam:  /70  Pulse 61  Temp 97  F (36.1  C)  Resp 20  Wt 146 lb (66.2 kg)  SpO2 95%  BMI 22.2 kg/m2  GENERAL APPEARANCE:  Alert, in no distress, pleasant, cooperative, oriented x self, place  EYES:  EOM, lids, pupils and irises normal, sclera clear and conjunctiva normal, no discharge or mattering on lids or lashes noted  ENT:  Mouth normal, moist mucous membranes, nose normal without drainage or crusting, external ears without lesions, hearing acuity impaired bilaterally.   RESP:  respiratory effort and palpation of chest normal, no chest wall tenderness, no respiratory distress, Lung sounds clear, patient is on room air  CV:  Palpation and auscultation of heart done, rate and rhythm controlled and irregularly irregular, grade 3/6 systolic murmur, no rub or gallop. Edema none bilateral lower extremities.   ABDOMEN:  normal bowel  sounds, soft, nontender.  M/S:   Gait and station walks with assist  and unsafe without assistance, no tenderness or swelling of the joints; able to move all extremities   NEURO: cranial nerves 2-12 grossly intact, no facial asymmetry, no speech deficits and able to follow directions, moves all extremities symmetrically  PSYCH:  insight and judgement impaired, memory forgetful, affect and mood normal     Recent Labs:   CBC RESULTS:   Recent Labs   Lab Test  11/02/18   0629 11/01/18   0623   WBC  5.3  5.1   RBC  3.38*  3.29*   HGB  10.7*  10.7*   HCT  34.3*  32.8*   MCV  102*  100   MCH  31.7  32.5   MCHC  31.2*  32.6   RDW  13.5  13.4   PLT  108*  102*       Last Basic Metabolic Panel:  Recent Labs   Lab Test  11/02/18 0629 11/01/18   0623   NA  143  139   POTASSIUM  3.5  3.5   CHLORIDE  108  107   KEZIA  7.8*  8.2*   CO2  28  28   BUN  24  27   CR  1.45*  1.57*   GLC  84  94       Liver Function Studies -   Recent Labs   Lab Test  10/30/18   1032  09/07/17   1512   PROTTOTAL  7.3  8.1   ALBUMIN  3.6  3.6   BILITOTAL  1.1  0.4   ALKPHOS  104  105   AST  27  26   ALT  16  22       TSH   Date Value Ref Range Status   03/29/2018 3.91 0.30 - 5.00 uIU/mL Final   07/11/2017 0.26 (L) 0.40 - 4.00 mU/L Final       Assessment/Plan:  Aspiration pneumonitis (H)  Resolving - complete antibiotics, monitor.     Dysphagia, unspecified type  Ongoing - ST, diet per their recommendation.     Sepsis secondary to UTI (H)  Appears resolved. Monitor.     Nausea and vomiting, intractability of vomiting not specified, unspecified vomiting type  Resolved, monitor.     Acute on chronic diastolic heart failure (H)  Acute on chronic - appears fluid balanced at this time. Meds as above. VS, wt per routine. BMP this week.     Cardiac arrhythmia, unspecified cardiac arrhythmia type  Elevated troponin  Uncontrolled hypertension  Chronic issues, acute changes as above. Cardiology f/u per routine. Meds as above. Increase Hydralazine dose and  monitor. Staff to update provider if not effective.     Cognitive impairment  Physical deconditioning  Acute on chronic issues. Therapies, staff to monitor for safety. F/U with progress at next visit.     Orders:  Change hydralazine to 50 mg PO BID and 25 mg PO daily (give every 8 hrs scheduled). Monitor BP  DNR/DNI  BMP on 11/20/18 diagnosis HF    Total time spent with patient visit was 40 min including patient visit and review of past records. Greater than 50% of total time spent with counseling and coordinating care due to review of history, current issues and concerns as well as status and POC to address concerns as noted above.      Electronically signed by  CODY Hunt CNP

## 2018-11-22 NOTE — TELEPHONE ENCOUNTER
Nursing tried multiple times today to do INR and reading came back ERROR.  Tried another machine from another floor and still error  Asked the nurse to try around 11am and call back this NP.    Again at 1115am, error on the machine.  Coumadin held x2 days due to INR was 3.7     Orders:  Give coumadin 1mg tonight and have lab do INR tomorrow.    Electronically signed by Yahaira Vega RN, CNP

## 2018-11-30 NOTE — LETTER
11/30/2018        RE: Francesco Killian  [facility]  [address]  [facility] [address]        [facility]    Chief Complaint   Patient presents with     senior care Acute       [facility] Record Number:  [id number]  Place of Service where encounter took place:  [facility] ([facility] [[id number]]    HPI:    Francesco Killian is a 89 year old  (12/26/1920), who is being seen today for an episodic care visit.  HPI information obtained from: facility chart records, facility staff, patient report and [facility] chart review.    Today's concern is:  Aspiration pneumonitis (H)  Patient now completed antibiotics. No cough or dyspnea. Oxygen sats 93% on room air. No fevers.   Lab Results   Component Value Date    WBC 5.3 11/02/2018    WBC 5.1 11/01/2018       Chronic congestive heart failure, unspecified heart failure type (H)  Essential hypertension with goal blood pressure less than 140/90  Appears fluid balanced at this time. No edema. On asa, lasix, hydralazine.   Wt Readings from Last 5 Encounters:   11/30/18 135 lb 12.8 oz (61.6 kg)   11/12/18 146 lb (66.2 kg)   11/05/18 146 lb (66.2 kg)   11/03/18 145 lb 9.6 oz (66 kg)   10/22/18 153 lb 6.4 oz (69.6 kg)         Chronic atrial fibrillation (H)  On coumadin. INR today 1.8. Goal range 1.5-2.0. On coumadin and took a total of 2 mg in the past seven days. No s/s bleeding or bruising. SBP range 126-161. HR 52-92.     Last 3 BPs:       ALLERGIES: Review of patient's allergies indicates no known allergies.  Past Medical, Surgical, Family and Social History reviewed and updated in [facility].    Current Outpatient Prescriptions   Medication Sig Dispense Refill     ACETAMINOPHEN PO Take 500 mg by mouth 2 times daily        aspirin 81 MG tablet Take 81 mg by mouth daily (children's aspirin).       Banana Flakes (BANATROL PLUS) PACK Take 1 packet by mouth 3 times daily       Calcium Carbonate Antacid 400 MG  CHEW Take 400 mg by mouth 2 times daily as needed       Citalopram Hydrobromide (CELEXA PO) Take 20 mg by mouth daily       FUROSEMIDE PO Take 10 mg by mouth 2 times daily        HYDRALAZINE HCL PO Take by mouth every 8 hours Take 50 mg PO at 0600 and 2200 daily and 25 mg PO at 1400 daily       ketoconazole (NIZORAL) 2 % cream Apply to face BID PRN 30 g 1     levothyroxine (SYNTHROID/LEVOTHROID) 100 MCG tablet TAKE 1 TABLET BY MOUTH EVERY DAY. 90 tablet 0     loperamide (IMODIUM) 2 MG capsule Take 2 mg by mouth every 6 hours as needed for diarrhea       metoprolol tartrate (LOPRESSOR) 25 MG tablet Take 0.25 tablets (6.25 mg) by mouth 2 times daily 60 tablet      Multiple Vitamins-Minerals (PRESERVISION AREDS PO) Take 1 capsule by mouth daily       polyethylene glycol (MIRALAX/GLYCOLAX) Packet Take 17 g by mouth daily as needed for constipation 7 packet      tamsulosin (FLOMAX) 0.4 MG capsule Take 0.4 mg by mouth At Bedtime        warfarin (COUMADIN) 1 MG tablet Take by mouth daily INR today  1.7  . Give 0.5 mg by mouth in the evening every Wed,  Thu for treating/preventing blood clots/ A- Fib until 11/29/2018 23:59, Give 0.5 mg by mouth in the evening every Mon, Tue for A- Fib until 11/27/18. 30 tablet      Medications reviewed:  Medications reconciled to facility chart and changes were made to reflect current medications as identified as above med list. Below are the changes that were made:   Medications stopped since last EPIC medication reconciliation:   There are no discontinued medications.    Medications started since last [facility] medication reconciliation:  No orders of the defined types were placed in this encounter.      REVIEW OF SYSTEMS:  Limited secondary to cognitive impairment but today pt reports no pain or dyspnea.     Physical Exam:  /76  Pulse 93  Temp 96.9  F (36.1  C)  Resp 19  Wt 135 lb 12.8 oz (61.6 kg)  SpO2 93%  BMI 20.65 kg/m2  GENERAL APPEARANCE:  sleepy, in no distress, pleasant,  cooperative, oriented x self, place  EYES:  EOM, lids, pupils and irises normal, sclera clear and conjunctiva normal, no discharge or mattering on lids or lashes noted  ENT:  Mouth normal, moist mucous membranes, nose normal without drainage or crusting, external ears without lesions, hearing acuity impaired bilaterally.   RESP:  respiratory effort and palpation of chest normal, no chest wall tenderness, no respiratory distress, Lung sounds clear, patient is on room air  CV:  Palpation and auscultation of heart done, rate and rhythm controlled and irregularly irregular, grade 3/6 systolic murmur, no rub or gallop. Edema none bilateral lower extremities.   ABDOMEN:  normal bowel sounds, soft, nontender.  NEURO: no facial asymmetry, no speech deficits and able to follow directions, moves all extremities symmetrically  PSYCH:  insight and judgement impaired, memory forgetful, affect and mood normal       Recent Labs:   CBC RESULTS:   Recent Labs   Lab Test  11/02/18   0629  11/01/18   0623   WBC  5.3  5.1   RBC  3.38*  3.29*   HGB  10.7*  10.7*   HCT  34.3*  32.8*   MCV  102*  100   MCH  31.7  32.5   MCHC  31.2*  32.6   RDW  13.5  13.4   PLT  108*  102*       Last Basic Metabolic Panel:  Recent Labs   Lab Test  11/02/18   0629  11/01/18   0623   NA  143  139   POTASSIUM  3.5  3.5   CHLORIDE  108  107   KEZIA  7.8*  8.2*   CO2  28  28   BUN  24  27   CR  1.45*  1.57*   GLC  84  94       Liver Function Studies -   Recent Labs   Lab Test  10/30/18   1032  09/07/17   1512   PROTTOTAL  7.3  8.1   ALBUMIN  3.6  3.6   BILITOTAL  1.1  0.4   ALKPHOS  104  105   AST  27  26   ALT  16  22       TSH   Date Value Ref Range Status   03/29/2018 3.91 0.30 - 5.00 uIU/mL Final   07/11/2017 0.26 (L) 0.40 - 4.00 mU/L Final     Lab Results   Component Value Date    INR 1.70 11/03/2018    INR 1.95 11/02/2018    INR 2.32 11/01/2018    INR 2.51 10/31/2018     Assessment/Plan:  Aspiration pneumonitis (H)  Resolved. Monitor.     Chronic  congestive heart failure, unspecified heart failure type (H)  Essential hypertension with goal blood pressure less than 140/90  Chronic, stable, fluid balanced. Meds, vs, wt as ordered. BMP every 6-12 months as needed.     Chronic atrial fibrillation (H)  Chronic, rate controlled and INR in good range. Coumadin as ordered and INR on 12/6.     Orders:  INR 1.8 in acceptable range.   Coumadin 0.5 mg PO daily. INR on 12/6    Electronically signed by  CODY Hunt CNP                      Sincerely,        CODY Hunt CNP

## 2018-11-30 NOTE — MR AVS SNAPSHOT
After Visit Summary   11/30/2018    Francesco Killian    MRN: [id number]           Patient Information     Date Of Birth          12/26/1920        Visit Information        Provider Department      11/30/2018 12:00 PM Joann Quintero APRN CNP [facility]        Today's Diagnoses     Aspiration pneumonitis (H)    -  1    Chronic congestive heart failure, unspecified heart failure type (H)        Chronic atrial fibrillation (H)        Essential hypertension with goal blood pressure less than 140/90           Follow-ups after your visit        Your next 10 appointments already scheduled     Dec 05, 2018  3:45 PM [facility] with Bonnie Lama MD   [facility] ([facility])    [address] [address] [phone]              Who to contact     If you have questions or need follow up information about today's clinic visit or your schedule please contact [facility] directly at [phone].  Normal or non-critical lab and imaging results will be communicated to you by [employer], letter or phone within 4 business days after the clinic has received the results. If you do not hear from us within 7 days, please contact the clinic through [employer] or phone. If you have a critical or abnormal lab result, we will notify you by phone as soon as possible.  Submit refill requests through [employer] or call your pharmacy and they will forward the refill request to us. Please allow 3 business days for your refill to be completed.          Additional Information About Your Visit        [facility] Information     [employer] gives you secure access to your electronic health record. If you see a primary care provider, you can also send messages to your care team and make appointments. If you have questions, please call your primary care clinic.  If you do not have a primary care provider, please call [phone] and they will assist  you.        Care EveryWhere ID     This is your Care EveryWhere ID. This could be used by other organizations to access your [facility] medical records  [id number]        Your Vitals Were     Pulse Temperature Respirations Pulse Oximetry BMI (Body Mass Index)       93 96.9  F (36.1  C) 19 93% 20.65 kg/m2        Blood Pressure from Last 3 Encounters:   11/30/18 136/76   11/12/18 151/70   11/05/18 132/70    Weight from Last 3 Encounters:   11/30/18 135 lb 12.8 oz (61.6 kg)   11/12/18 146 lb (66.2 kg)   11/05/18 146 lb (66.2 kg)              Today, you had the following     No orders found for display       Primary Care Provider Office Phone # Fax #    CODY Medley INDY [phone]       [address]  [place] [address]        Equal Access to Services     [facility] : Hadii aad ku hadasho Soomaali, waaxda luqadaha, qaybta kaalmada adeegyada, waxay kamaljitin hayblainen loretta allan . So [facility] [phone].    ATENCIÓN: Si habla español, tiene a costa disposición servicios gratuitos de asistencia lingüística. Lesia al [phone].    We comply with applicable federal civil rights laws and [place] laws. We do not discriminate on the basis of race, color, national origin, age, disability, sex, sexual orientation, or gender identity.            Thank you!     Thank you for choosing [facility]  for your care. Our goal is always to provide you with excellent care. Hearing back from our patients is one way we can continue to improve our services. Please take a few minutes to complete the written survey that you may receive in the mail after your visit with us. Thank you!             Your Updated Medication List - Protect others around you: Learn how to safely use, store and throw away your medicines at www.disposemymeds.org.          This list is accurate as of 11/30/18 11:59 PM.  Always use your most recent med list.                   Brand Name Dispense Instructions for use  Diagnosis    ACETAMINOPHEN PO      Take 500 mg by mouth 2 times daily        aspirin 81 MG tablet    ASA     Take 81 mg by mouth daily (children's aspirin).        BANATROL PLUS Pack      Take 1 packet by mouth 3 times daily        Calcium Carbonate Antacid 400 MG Chew      Take 400 mg by mouth 2 times daily as needed        CELEXA PO      Take 20 mg by mouth daily        COUMADIN 1 MG tablet   Generic drug:  warfarin     30 tablet    Take by mouth daily INR today  1.7  . Give 0.5 mg by mouth in the evening every Wed, Thu for treating/preventing blood clots/ A- Fib until 11/29/2018 23:59, Give 0.5 mg by mouth in the evening every Mon, Tue for A- Fib until 11/27/18.        FLOMAX 0.4 MG capsule   Generic drug:  tamsulosin      Take 0.4 mg by mouth At Bedtime        FUROSEMIDE PO      Take 10 mg by mouth 2 times daily        HYDRALAZINE HCL PO      Take by mouth every 8 hours Take 50 mg PO at 0600 and 2200 daily and 25 mg PO at 1400 daily        ketoconazole 2 % external cream    NIZORAL    30 g    Apply to face BID PRN    Dermatitis, seborrheic       levothyroxine 100 MCG tablet    SYNTHROID/LEVOTHROID    90 tablet    TAKE 1 TABLET BY MOUTH EVERY DAY.    Other specified hypothyroidism       loperamide 2 MG capsule    IMODIUM     Take 2 mg by mouth every 6 hours as needed for diarrhea        metoprolol tartrate 25 MG tablet    LOPRESSOR    60 tablet    Take 0.25 tablets (6.25 mg) by mouth 2 times daily    Sepsis, due to unspecified organism (H)       polyethylene glycol packet    MIRALAX/GLYCOLAX    7 packet    Take 17 g by mouth daily as needed for constipation    Closed compression fracture of first lumbar vertebra, initial encounter (H)       PRESERVISION AREDS PO      Take 1 capsule by mouth daily

## 2018-11-30 NOTE — PROGRESS NOTES
[place] [facility]    Chief Complaint   Patient presents with     [facility]       [facility] Record Number:  [id number]  Place of Service where encounter took place:  [facility] ([facility] [[id number]]    HPI:    Francesco Killian is a 89 year old  (12/26/1920), who is being seen today for an episodic care visit.  HPI information obtained from: facility chart records, facility staff, patient report and [facility] chart review.    Today's concern is:  Aspiration pneumonitis (H)  Patient now completed antibiotics. No cough or dyspnea. Oxygen sats 93% on room air. No fevers.   Lab Results   Component Value Date    WBC 5.3 11/02/2018    WBC 5.1 11/01/2018       Chronic congestive heart failure, unspecified heart failure type (H)  Essential hypertension with goal blood pressure less than 140/90  Appears fluid balanced at this time. No edema. On asa, lasix, hydralazine.   Wt Readings from Last 5 Encounters:   11/30/18 135 lb 12.8 oz (61.6 kg)   11/12/18 146 lb (66.2 kg)   11/05/18 146 lb (66.2 kg)   11/03/18 145 lb 9.6 oz (66 kg)   10/22/18 153 lb 6.4 oz (69.6 kg)         Chronic atrial fibrillation (H)  On coumadin. INR today 1.8. Goal range 1.5-2.0. On coumadin and took a total of 2 mg in the past seven days. No s/s bleeding or bruising. SBP range 126-161. HR 52-92.     Last 3 BPs:       ALLERGIES: Review of patient's allergies indicates no known allergies.  Past Medical, Surgical, Family and Social History reviewed and updated in [facility].    Current Outpatient Prescriptions   Medication Sig Dispense Refill     ACETAMINOPHEN PO Take 500 mg by mouth 2 times daily        aspirin 81 MG tablet Take 81 mg by mouth daily (children's aspirin).       Banana Flakes (BANATROL PLUS) PACK Take 1 packet by mouth 3 times daily       Calcium Carbonate Antacid 400 MG CHEW Take 400 mg by mouth 2 times daily as needed       Citalopram Hydrobromide (CELEXA PO) Take 20 mg by mouth daily        FUROSEMIDE PO Take 10 mg by mouth 2 times daily        HYDRALAZINE HCL PO Take by mouth every 8 hours Take 50 mg PO at 0600 and 2200 daily and 25 mg PO at 1400 daily       ketoconazole (NIZORAL) 2 % cream Apply to face BID PRN 30 g 1     levothyroxine (SYNTHROID/LEVOTHROID) 100 MCG tablet TAKE 1 TABLET BY MOUTH EVERY DAY. 90 tablet 0     loperamide (IMODIUM) 2 MG capsule Take 2 mg by mouth every 6 hours as needed for diarrhea       metoprolol tartrate (LOPRESSOR) 25 MG tablet Take 0.25 tablets (6.25 mg) by mouth 2 times daily 60 tablet      Multiple Vitamins-Minerals (PRESERVISION AREDS PO) Take 1 capsule by mouth daily       polyethylene glycol (MIRALAX/GLYCOLAX) Packet Take 17 g by mouth daily as needed for constipation 7 packet      tamsulosin (FLOMAX) 0.4 MG capsule Take 0.4 mg by mouth At Bedtime        warfarin (COUMADIN) 1 MG tablet Take by mouth daily INR today  1.7  . Give 0.5 mg by mouth in the evening every Wed,  Thu for treating/preventing blood clots/ A- Fib until 11/29/2018 23:59, Give 0.5 mg by mouth in the evening every Mon, Tue for A- Fib until 11/27/18. 30 tablet      Medications reviewed:  Medications reconciled to facility chart and changes were made to reflect current medications as identified as above med list. Below are the changes that were made:   Medications stopped since last EPIC medication reconciliation:   There are no discontinued medications.    Medications started since last [facility] medication reconciliation:  No orders of the defined types were placed in this encounter.      REVIEW OF SYSTEMS:  Limited secondary to cognitive impairment but today pt reports no pain or dyspnea.     Physical Exam:  /76  Pulse 93  Temp 96.9  F (36.1  C)  Resp 19  Wt 135 lb 12.8 oz (61.6 kg)  SpO2 93%  BMI 20.65 kg/m2  GENERAL APPEARANCE:  sleepy, in no distress, pleasant, cooperative, oriented x self, place  EYES:  EOM, lids, pupils and irises normal, sclera clear and conjunctiva normal, no  discharge or mattering on lids or lashes noted  ENT:  Mouth normal, moist mucous membranes, nose normal without drainage or crusting, external ears without lesions, hearing acuity impaired bilaterally.   RESP:  respiratory effort and palpation of chest normal, no chest wall tenderness, no respiratory distress, Lung sounds clear, patient is on room air  CV:  Palpation and auscultation of heart done, rate and rhythm controlled and irregularly irregular, grade 3/6 systolic murmur, no rub or gallop. Edema none bilateral lower extremities.   ABDOMEN:  normal bowel sounds, soft, nontender.  NEURO: no facial asymmetry, no speech deficits and able to follow directions, moves all extremities symmetrically  PSYCH:  insight and judgement impaired, memory forgetful, affect and mood normal       Recent Labs:   CBC RESULTS:   Recent Labs   Lab Test  11/02/18   0629 11/01/18   0623   WBC  5.3  5.1   RBC  3.38*  3.29*   HGB  10.7*  10.7*   HCT  34.3*  32.8*   MCV  102*  100   MCH  31.7  32.5   MCHC  31.2*  32.6   RDW  13.5  13.4   PLT  108*  102*       Last Basic Metabolic Panel:  Recent Labs   Lab Test  11/02/18   0629  11/01/18   0623   NA  143  139   POTASSIUM  3.5  3.5   CHLORIDE  108  107   KEZIA  7.8*  8.2*   CO2  28  28   BUN  24  27   CR  1.45*  1.57*   GLC  84  94       Liver Function Studies -   Recent Labs   Lab Test  10/30/18   1032  09/07/17   1512   PROTTOTAL  7.3  8.1   ALBUMIN  3.6  3.6   BILITOTAL  1.1  0.4   ALKPHOS  104  105   AST  27  26   ALT  16  22       TSH   Date Value Ref Range Status   03/29/2018 3.91 0.30 - 5.00 uIU/mL Final   07/11/2017 0.26 (L) 0.40 - 4.00 mU/L Final     Lab Results   Component Value Date    INR 1.70 11/03/2018    INR 1.95 11/02/2018    INR 2.32 11/01/2018    INR 2.51 10/31/2018     Assessment/Plan:  Aspiration pneumonitis (H)  Resolved. Monitor.     Chronic congestive heart failure, unspecified heart failure type (H)  Essential hypertension with goal blood pressure less than  140/90  Chronic, stable, fluid balanced. Meds, vs, wt as ordered. BMP every 6-12 months as needed.     Chronic atrial fibrillation (H)  Chronic, rate controlled and INR in good range. Coumadin as ordered and INR on 12/6.     Orders:  INR 1.8 in acceptable range.   Coumadin 0.5 mg PO daily. INR on 12/6    Electronically signed by  CODY Hunt CNP

## 2018-12-05 NOTE — LETTER
12/5/2018        RE: Francesco Killian  [facility]  [address]  [facility] [address]        Francesco Killian is a 89 year old male seen December 5, 2018 at [facility] where he has resided for 2 months (admit 10/2018).   He was previously at [facility], moved here to be with his wife who is in Memory Care unit, but pt moved to third floor now because his wife was interfering unsafely with his care   Patient is seen in his room, resting abed late morning.    Awakens, but does not give much history, reports he feels okay.   Nursing staff reports he sleeps often during the day, but no other concerns.     He was hospitalized for aspiration pneumonia last month, following hematemesis.   He was tx'd with Zosyn and O2, and also found to have BC+ for enterococcus from a UTI.  He was diuresed for acute on chronic diastolic CHF exacerbation, and metoprolol dose decreased secondary to symptomatic bradycardia with prolonged QT   Pt had a 2 week course of Augmentin after discharge, and no further s/s infection.     By chart review, patient had [facility] hospitalization in February 2018 for L1 compression fracture with back pain.   He discharged to [facility] T[facility] and was there for one month, not able to regain prior mobility.   Now assist for transfers to .       Patient has a h/o atrial fib for which he is anticoagulated with warfarin+ASA, has a h/o multiple strokes when not on both of those.    Also has CHFpEF, RHF severe CORDELL and moderate AS.       Past Medical History:   Diagnosis Date     AAA (abdominal aortic aneurysm) (H) 6/3/2013     Aortic stenosis      Bradycardia     Dr Stanford didn't think pacer needed at this time     Carotid occlusion, right     see above note     Chronic atrial fibrillation (H) 6/17/2013     CVA (cerebral infarction) 6/3/2013    DANILO and both vertebral art blocked-family son (neurologist) requests BP to run a bit  "higher since flow is via L ICA     Dysphagia 6/3/2013     HTN (hypertension) 6/3/2013    and RA stenosis, s/p stents at [place]     Hyperlipidemia LDL goal <130 6/3/2013     Hypothyroidism 6/3/2013     Macular degeneration of both eyes      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    Three children: Son Dr Wilder Killian is POA.     ROS:  Limited, but negative other than above.   SLUMS 3/24    EXAM:  Up to WC, NAD  /72   Resp 18   Ht 1.727 m (5' 8\")   Wt 63.7 kg (140 lb 6.4 oz)   BMI 21.35 kg/m      Neck supple without adenopathy  Lungs decreased BS, few bibasilar rales  Heart irreg s1s2, 3/6 SINCERE across the precordium, 2/6 HSM at left axillary line  Abd soft, NT, no distention, +BS  Ext trace ankle edema  Neuro: non-focal, generalized weakness  Psych: affect okay      Last Comprehensive Metabolic Panel:  Sodium   Date Value Ref Range Status   11/02/2018 143 133 - 144 mmol/L Final     Potassium   Date Value Ref Range Status   11/02/2018 3.5 3.4 - 5.3 mmol/L Final     Chloride   Date Value Ref Range Status   11/02/2018 108 94 - 109 mmol/L Final     Carbon Dioxide   Date Value Ref Range Status   11/02/2018 28 20 - 32 mmol/L Final     Anion Gap   Date Value Ref Range Status   11/02/2018 7 3 - 14 mmol/L Final     Glucose   Date Value Ref Range Status   11/02/2018 84 70 - 99 mg/dL Final     Urea Nitrogen   Date Value Ref Range Status   11/02/2018 24 7 - 30 mg/dL Final     Creatinine   Date Value Ref Range Status   11/02/2018 1.45 (H) 0.66 - 1.25 mg/dL Final     GFR Estimate   Date Value Ref Range Status   11/02/2018 45 (L) >60 mL/min/1.7m2 Final     Comment:     Non  GFR Calc     Calcium   Date Value Ref Range Status   11/02/2018 7.8 (L) 8.5 - 10.1 mg/dL Final     ECHO 10/30/18  Interpretation " Summary  Right ventricular systolic pressure is elevated, consistent with severe  pulmonary hypertension.  There is moderate (2+) tricuspid regurgitation.  Mild to moderate valvular aortic stenosis.  The ascending aorta is Mildly dilated.  Grade III or advanced diastolic dysfunction.  Pulmonary pressures are significantly higher than before.      IMP/PLAN:   (J69.0) Aspiration pneumonitis (H)  (primary encounter diagnosis)  Comment: and enterococcal UTI with bacteremia    Finished course of abx  Plan: ST and dysphagia diet.      (D53.9) Macrocytic anemia  Comment: hgb stable at 10.7, no further bleeding episodes.     He is on warfarin and ASA     Plan: continue omeprazole, follow hgb           (S32.010D) Closed compression fracture of L1 lumbar vertebra with routine healing, subsequent encounter   Comment: less back pain, improving mobility     Plan: scheduled acetaminophen, local measures, activity as tolerated.       (I48.2) Chronic atrial fibrillation (H)  Comment: controlled VR on lower dose metoprolol  Pulse Readings from Last 4 Encounters:   11/30/18 93   11/12/18 61   11/05/18 52   11/02/18 56    Plan: warfarin per INR for stroke prophylaxis; would aim to keep INR 1.5-2.0 given bleeding risks.       (Z86.73) History of CVA (cerebrovascular accident)  (F01.51,  F32.9) Vascular dementia with depressed mood  Comment: low SLUMS score, decreased functional status   Cognitive impairment compounded by loss of vision and hearing.      Plan: LTC support for meds, meals, activity     Continue citalopram which seems to be somewhat effective.       (I25.10) Coronary artery disease involving native heart without angina pectoris, unspecified vessel or lesion type  (I50.32) Chronic diastolic congestive heart failure (H)  Comment: stable volume status today, AS on exam  Plan: daily ASA, beta blocker for secondary prevention; remains on furosemide.        (I12.9,  N18.3) Benign hypertension with chronic kidney disease, stage  III  Comment:   BP Readings from Last 3 Encounters:   12/04/18 138/72   11/30/18 136/76   11/12/18 151/70      Plan: continue hydralazine, metoprolol    (I71.4) Abdominal aortic aneurysm (AAA) without rupture (H)  Comment: 5.6 cm  Plan: patient and family disinclined to repair.   No further surveillance.        (E03.9) Hypothyroidism, unspecified type  Comment:   TSH   Date Value Ref Range Status   03/29/2018 3.91 0.30 - 5.00 uIU/mL Final    Plan: same dose levothyroxine.       AD: patient is DNR/DNI    Bonnie Lama MD       Sincerely,        Bonnie Lama MD

## 2018-12-09 NOTE — TELEPHONE ENCOUNTER
[place] [facility] TELEPHONE ENCOUNTER    Chief Complaint   Patient presents with     Lab Result Notice       Francesco Killian is a 89 year old  (12/26/1920),Nurse called today to report: Hgb level/INR    ASSESSMENT/PLAN  Patient's Hgb - >11; nursing unsure of prior Hgb level - reviewed in Epic previously; INR today 1.5 (prior 1.4) - received alternating doses of 1/0.5mg - INR Goal of 1.5-2.0  Hemoglobin   Date Value Ref Range Status   11/02/2018 10.7 (L) 13.3 - 17.7 g/dL Final     Hgb improving    Coumadin 0.5mg tonight    INR tomorrow    CODY Jones CNP

## 2018-12-10 NOTE — PROGRESS NOTES
Francesco Killian is a 89 year old male seen December 5, 2018 at [facility] where he has resided for 2 months (admit 10/2018).   He was previously at [facility], moved here to be with his wife who is in Memory Care unit, but pt moved to third floor now because his wife was interfering unsafely with his care   Patient is seen in his room, resting abed late morning.    Awakens, but does not give much history, reports he feels okay.   Nursing staff reports he sleeps often during the day, but no other concerns.     He was hospitalized for aspiration pneumonia last month, following hematemesis.   He was tx'd with Zosyn and O2, and also found to have BC+ for enterococcus from a UTI.  He was diuresed for acute on chronic diastolic CHF exacerbation, and metoprolol dose decreased secondary to symptomatic bradycardia with prolonged QT   Pt had a 2 week course of Augmentin after discharge, and no further s/s infection.     By chart review, patient had [facility] hospitalization in February 2018 for L1 compression fracture with back pain.   He discharged to [facility] and was there for one month, not able to regain prior mobility.   Now assist for transfers to .       Patient has a h/o atrial fib for which he is anticoagulated with warfarin+ASA, has a h/o multiple strokes when not on both of those.    Also has CHFpEF, RHF severe CORDELL and moderate AS.       Past Medical History:   Diagnosis Date     AAA (abdominal aortic aneurysm) (H) 6/3/2013     Aortic stenosis      Bradycardia     Dr Stanford didn't think pacer needed at this time     Carotid occlusion, right     see above note     Chronic atrial fibrillation (H) 6/17/2013     CVA (cerebral infarction) 6/3/2013    DANILO and both vertebral art blocked-family son (neurologist) requests BP to run a bit higher since flow is via L ICA     Dysphagia 6/3/2013     HTN (hypertension) 6/3/2013    and RA stenosis, s/p stents at [place]  "    Hyperlipidemia LDL goal <130 6/3/2013     Hypothyroidism 6/3/2013     Macular degeneration of both eyes      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    Three children: Son Dr Wilder Killian is POA.     ROS:  Limited, but negative other than above.   SLUMS 3/24    EXAM:  Up to WC, NAD  /72   Resp 18   Ht 1.727 m (5' 8\")   Wt 63.7 kg (140 lb 6.4 oz)   BMI 21.35 kg/m     Neck supple without adenopathy  Lungs decreased BS, few bibasilar rales  Heart irreg s1s2, 3/6 SINCERE across the precordium, 2/6 HSM at left axillary line  Abd soft, NT, no distention, +BS  Ext trace ankle edema  Neuro: non-focal, generalized weakness  Psych: affect okay      Last Comprehensive Metabolic Panel:  Sodium   Date Value Ref Range Status   11/02/2018 143 133 - 144 mmol/L Final     Potassium   Date Value Ref Range Status   11/02/2018 3.5 3.4 - 5.3 mmol/L Final     Chloride   Date Value Ref Range Status   11/02/2018 108 94 - 109 mmol/L Final     Carbon Dioxide   Date Value Ref Range Status   11/02/2018 28 20 - 32 mmol/L Final     Anion Gap   Date Value Ref Range Status   11/02/2018 7 3 - 14 mmol/L Final     Glucose   Date Value Ref Range Status   11/02/2018 84 70 - 99 mg/dL Final     Urea Nitrogen   Date Value Ref Range Status   11/02/2018 24 7 - 30 mg/dL Final     Creatinine   Date Value Ref Range Status   11/02/2018 1.45 (H) 0.66 - 1.25 mg/dL Final     GFR Estimate   Date Value Ref Range Status   11/02/2018 45 (L) >60 mL/min/1.7m2 Final     Comment:     Non  GFR Calc     Calcium   Date Value Ref Range Status   11/02/2018 7.8 (L) 8.5 - 10.1 mg/dL Final     ECHO 10/30/18  Interpretation Summary  Right ventricular systolic pressure is elevated, consistent with severe  pulmonary hypertension.  There is moderate (2+) " tricuspid regurgitation.  Mild to moderate valvular aortic stenosis.  The ascending aorta is Mildly dilated.  Grade III or advanced diastolic dysfunction.  Pulmonary pressures are significantly higher than before.      IMP/PLAN:   (J69.0) Aspiration pneumonitis (H)  (primary encounter diagnosis)  Comment: and enterococcal UTI with bacteremia    Finished course of abx  Plan: ST and dysphagia diet.      (D53.9) Macrocytic anemia  Comment: hgb stable at 10.7, no further bleeding episodes.     He is on warfarin and ASA     Plan: continue omeprazole, follow hgb           (S32.010D) Closed compression fracture of L1 lumbar vertebra with routine healing, subsequent encounter   Comment: less back pain, improving mobility     Plan: scheduled acetaminophen, local measures, activity as tolerated.       (I48.2) Chronic atrial fibrillation (H)  Comment: controlled VR on lower dose metoprolol  Pulse Readings from Last 4 Encounters:   11/30/18 93   11/12/18 61   11/05/18 52   11/02/18 56    Plan: warfarin per INR for stroke prophylaxis; would aim to keep INR 1.5-2.0 given bleeding risks.       (Z86.73) History of CVA (cerebrovascular accident)  (F01.51,  F32.9) Vascular dementia with depressed mood  Comment: low SLUMS score, decreased functional status   Cognitive impairment compounded by loss of vision and hearing.      Plan: LTC support for meds, meals, activity     Continue citalopram which seems to be somewhat effective.       (I25.10) Coronary artery disease involving native heart without angina pectoris, unspecified vessel or lesion type  (I50.32) Chronic diastolic congestive heart failure (H)  Comment: stable volume status today, AS on exam  Plan: daily ASA, beta blocker for secondary prevention; remains on furosemide.        (I12.9,  N18.3) Benign hypertension with chronic kidney disease, stage III  Comment:   BP Readings from Last 3 Encounters:   12/04/18 138/72   11/30/18 136/76   11/12/18 151/70      Plan: continue  hydralazine, metoprolol    (I71.4) Abdominal aortic aneurysm (AAA) without rupture (H)  Comment: 5.6 cm  Plan: patient and family disinclined to repair.   No further surveillance.        (E03.9) Hypothyroidism, unspecified type  Comment:   TSH   Date Value Ref Range Status   03/29/2018 3.91 0.30 - 5.00 uIU/mL Final    Plan: same dose levothyroxine.       AD: patient is DNR/DNI    Bonnie Lama MD

## 2018-12-11 NOTE — TELEPHONE ENCOUNTER
Called by nursing staff at [facility] about patient who had urinary retention this past weekend and a Constantino catheter was placed.    Subsequent hematuria over several days, now clearing.     Also found to have a nontender, soft LLQ mass  Has been eating okay, no GI symptoms or abd pain.      hgb 11.5    IMP:  Urinary retention  Hematuria likely from traumatic catheterization in setting of warfarin +ASA.       LLQ mass    PLAN:  Increase Flomax to 0.4 mg bid  Voiding trial in 1-2 days  Warfarin with INR goal 1.5-2.0       Check abdominal U/S for possible clarification of mass.     I talked with his son  TAMIKA Killian, and he agrees with this plan.      Bonnie Lama MD

## 2018-12-13 PROBLEM — R33.9 URINARY RETENTION: Status: ACTIVE | Noted: 2018-01-01

## 2018-12-13 PROBLEM — R79.1 SUPRATHERAPEUTIC INR: Status: RESOLVED | Noted: 2018-02-27 | Resolved: 2018-01-01

## 2018-12-13 PROBLEM — R19.00 ABDOMINAL WALL BULGE: Status: ACTIVE | Noted: 2018-01-01

## 2018-12-13 PROBLEM — R31.9 HEMATURIA, UNSPECIFIED TYPE: Status: ACTIVE | Noted: 2018-01-01

## 2018-12-13 NOTE — TELEPHONE ENCOUNTER
RN called to report INR today was 1.5  Reports was getting Warfarin 0.5 mg q daily, when last INR was 1.5 on 10 Dec.  Reports VSS.   RN does report they been monitoring him for ongoing hematuria, which she reports as scant.    Hgb was 11.5 on 9 Dec, was 11.0 in Oct.     Orders:  -Warfarin 1 mg on Tue/Thurs/Sat, and 0.5 mg all other days.   -Next INR 17 Dec.  -Will also get CBC no diff on 17 Dec to monitor Hgb.

## 2018-12-13 NOTE — PROGRESS NOTES
[place] [facility]    Chief Complaint   Patient presents with     USP Acute       [place] Medical Record Number:  [id number]  Place of Service where encounter took place:  [facility] ([facility] [[id number]]    HPI:    Francesco Killian is a 89 year old  (12/26/1920), who is being seen today for an episodic care visit.  HPI information obtained from: facility chart records, facility staff, patient report and [facility] chart review.Today's concern is: asked to see pt has MD called on 12/11 that pt had urinary retention, a alcaraz placed on the wkd.    He has had some hematuria and also found to have a non tender LLQ mass.       Abdominal wall bulge  Urinary retention  Hematuria, unspecified type  History of CVA (cerebrovascular accident)  Chronic atrial fibrillation (H)  Anticoagulated on Coumadin:  INR 1.5 today        HPI/ROS: limited due to dementia.   No CP, SOB, Cough.  No pain during exam or rest--see below exam    ALLERGIES: Patient has no known allergies.  Past Medical, Surgical, Family and Social History reviewed and updated in [facility].    Current Outpatient Medications   Medication Sig Dispense Refill     ACETAMINOPHEN PO Take 500 mg by mouth 2 times daily        aspirin 81 MG tablet Take 81 mg by mouth daily (children's aspirin).       Banana Flakes (BANATROL PLUS) PACK Take 1 packet by mouth 3 times daily       Calcium Carbonate Antacid 400 MG CHEW Take 400 mg by mouth 2 times daily as needed       Citalopram Hydrobromide (CELEXA PO) Take 20 mg by mouth daily       FUROSEMIDE PO Take 10 mg by mouth 2 times daily        HYDRALAZINE HCL PO Take by mouth every 8 hours Take 50 mg PO at 0600 and 2200 daily and 25 mg PO at 1400 daily       ketoconazole (NIZORAL) 2 % cream Apply to face BID PRN 30 g 1     levothyroxine (SYNTHROID/LEVOTHROID) 100 MCG tablet TAKE 1 TABLET BY MOUTH EVERY DAY. 90 tablet 0     loperamide (IMODIUM) 2 MG capsule Take 2 mg by mouth every 6 hours  "as needed for diarrhea       metoprolol tartrate (LOPRESSOR) 25 MG tablet Take 0.25 tablets (6.25 mg) by mouth 2 times daily 60 tablet      Multiple Vitamins-Minerals (PRESERVISION AREDS PO) Take 1 capsule by mouth daily       polyethylene glycol (MIRALAX/GLYCOLAX) Packet Take 17 g by mouth daily as needed for constipation 7 packet      tamsulosin (FLOMAX) 0.4 MG capsule Take 0.4 mg by mouth At Bedtime        warfarin (COUMADIN) 1 MG tablet Take by mouth daily Give 0.5 mg by mouth at bedtime for treating/preventing blood clots until 12/05/2018 23:59 30 tablet      Medications reviewed:  Medications reconciled to facility chart and changes were made to reflect current medications as identified as above med list. Below are the changes that were made:   Medications stopped since last EPIC medication reconciliation:   There are no discontinued medications.    Medications started since last [facility] medication reconciliation:  No orders of the defined types were placed in this encounter.         REVIEW OF SYSTEMS:  limited secondary to cognitive impairment.     Physical Exam:  /66   Pulse 70   Temp 97.7  F (36.5  C)   Resp 18   Wt 64 kg (141 lb)   SpO2 93%   BMI 21.44 kg/m       GENERAL APPEARANCE:  Alert, in no distress, pleasant, cooperative, oriented x person,globally place  ENT:  Mouth with moist mucous membranes: moist.   [place]  RESP:  respiratory effort  of chest normal,  no respiratory distress, Lung sounds clear to auscultation.  CV: Auscultation of heart done, IRRR with  systolic murmur.  No rub or gallop.  Trace LE edema.    ABDOMEN:  normal bowel sounds, soft, nontender. He has a few abd scars both lower quads and midline.  There are two slightly raised oval areas both lower quads with the left one being a bit \"higher\" than right.  They are soft, non tender, ?reducible. No redness.  : Constantino draining mitesh clear urine--no  obvious hematuria  M/S:    ADAME equally--transfers with assist.  NEURO: cranial " "nerves 2-12 grossly intact, flat facies, no speech deficits.  PSYCH:  insight and judgement impaired, memory forgetful, affect and mood flat       Last 3 Bps:      Recent Labs:  CBC RESULTS:   Recent Labs   Lab Test 12/09/18 11/02/18  0629 11/01/18  0623   WBC  --  5.3 5.1   RBC  --  3.38* 3.29*   HGB 11.5* 10.7* 10.7*   HCT  --  34.3* 32.8*   MCV  --  102* 100   MCH  --  31.7 32.5   MCHC  --  31.2* 32.6   RDW  --  13.5 13.4   PLT  --  108* 102*       Last Basic Metabolic Panel:  Recent Labs   Lab Test 11/02/18  0629 11/01/18  0623    139   POTASSIUM 3.5 3.5   CHLORIDE 108 107   KEZIA 7.8* 8.2*   CO2 28 28   BUN 24 27   CR 1.45* 1.57*   GLC 84 94       Liver Function Studies -   Recent Labs   Lab Test 10/30/18  1032 09/07/17  1512   PROTTOTAL 7.3 8.1   ALBUMIN 3.6 3.6   BILITOTAL 1.1 0.4   ALKPHOS 104 105   AST 27 26   ALT 16 22       TSH   Date Value Ref Range Status   03/29/2018 3.91 0.30 - 5.00 uIU/mL Final   07/11/2017 0.26 (L) 0.40 - 4.00 mU/L Final       Lab Results   Component Value Date    A1C 5.5 09/14/2015     ASSESSMENT / PLAN:  (R19.00) Abdominal wall bulge  (primary encounter diagnosis)  Comment/Plan: US ordered for tomorrow. It appears he has had surgery there in the past--?abd hernias?.  Are these small hernia or scar tissue with his overall decline and wt loss?    (R33.9) Urinary retention   (R31.9) Hematuria, unspecified type  Comment/Plan: urine clear, Flomax increased on 12/11. Would hold off on discontinue of alcaraz for now. Review next week.    (Z86.73) History of CVA (cerebrovascular accident)    (Z51.81,  Z79.01) Anticoagulated on Coumadin  Comment/Plan: goal INR is 1.5-2.0 per DR Lama and chart.. Recheck in on 12/17      Electronically signed by  CODY Yarbrough CNP      12/14/18:  Results for US this am:   \"peristalsis noted in bulge in abd.  Left ventral hernia present which is reducible at times\"--called to me by Nurse Perez.   I updated Dr Lama. And also gave order to NOT remove " kieran until regular NP/PCP sees next week as pt is tolerating it.

## 2018-12-19 PROBLEM — Z79.01 ANTICOAGULATION GOAL OF INR 1.5 TO 2.5: Status: ACTIVE | Noted: 2018-01-01

## 2018-12-19 PROBLEM — Z51.81 ANTICOAGULATION GOAL OF INR 1.5 TO 2.5: Status: ACTIVE | Noted: 2018-01-01

## 2018-12-19 NOTE — PROGRESS NOTES
"[place] [facility]    Chief Complaint   Patient presents with     [facility] Acute       [place] Medical Record Number:  [id number]  Place of Service where encounter took place:  [facility] [[id number]]    HPI:    Francesco Killian is a 89 year old  (12/26/1920), who is being seen today for an episodic care visit.  HPI information obtained from: facility chart records, facility staff, patient report and [facility] chart review.    Today's concern is:  Chronic atrial fibrillation (H)  Anticoagulated on Coumadin  Patient with a fib, on coumadin. INR goal range is 1.5-2. Last INR 1. 49 on 12/17 and 1.5 on 12/13. It was 1.5 on 12.10.   Patient has taken total of 7.5 mg of coumadin in the past week:  12/18 coumadin 2 mg   12/17 coumadin 2 mg  12/16 coumadin 1 mg  12/15 coumadin 0.5 mg   12/14 coumadin 1 mg   12/13 coumadin 0.5 mg  12/12 coumadin 0.5 mg   Unable to get finger stick INR today despite multiple tries. No s/s bleeding or bruising.     Ventral hernia without obstruction or gangrene  Abdominal wall bulge  Patient noted to have abdominal \"mass or bulge\" over a week ago. Had abd US showing ventral wall hernia. No constipation - stools 1-2 times daily. No nausea, emesis, gi pain. Monitoring at this time.     Note wt up and down: admitted at 140 lbs, then up to 150 lbs, now back to 140 lbs in the span of 3-4 months.       Last 3 BPs:       HR Ranges: 70-93 bpm      Current Weight:         ALLERGIES: Patient has no known allergies.  Past Medical, Surgical, Family and Social History reviewed and updated in [facility].    Current Outpatient Medications   Medication Sig Dispense Refill     ACETAMINOPHEN PO Take 500 mg by mouth 2 times daily        aspirin 81 MG tablet Take 81 mg by mouth daily (children's aspirin).       Banana Flakes (BANATROL PLUS) PACK Take 1 packet by mouth 3 times daily       Calcium Carbonate Antacid 400 MG CHEW Take 400 mg by mouth 2 times daily as " needed       Citalopram Hydrobromide (CELEXA PO) Take 20 mg by mouth daily       FUROSEMIDE PO Take 10 mg by mouth 2 times daily        HYDRALAZINE HCL PO Take by mouth every 8 hours Take 50 mg PO at 0600 and 2200 daily and 25 mg PO at 1400 daily       ketoconazole (NIZORAL) 2 % cream Apply to face BID PRN 30 g 1     levothyroxine (SYNTHROID/LEVOTHROID) 100 MCG tablet TAKE 1 TABLET BY MOUTH EVERY DAY. 90 tablet 0     loperamide (IMODIUM) 2 MG capsule Take 2 mg by mouth every 6 hours as needed for diarrhea       metoprolol tartrate (LOPRESSOR) 25 MG tablet Take 0.25 tablets (6.25 mg) by mouth 2 times daily 60 tablet      Multiple Vitamins-Minerals (PRESERVISION AREDS PO) Take 1 capsule by mouth daily       polyethylene glycol (MIRALAX/GLYCOLAX) Packet Take 17 g by mouth daily as needed for constipation 7 packet      tamsulosin (FLOMAX) 0.4 MG capsule Take 0.4 mg by mouth At Bedtime        warfarin (COUMADIN) 1 MG tablet Take by mouth daily Give 0.5 mg by mouth at bedtime for treating/preventing blood clots until 12/05/2018 23:59 30 tablet      Medications reviewed:  Medications reconciled to facility chart and changes were made to reflect current medications as identified as above med list. Below are the changes that were made:   Medications stopped since last EPIC medication reconciliation:   There are no discontinued medications.    Medications started since last [facility] medication reconciliation:  No orders of the defined types were placed in this encounter.    REVIEW OF SYSTEMS:  Limited secondary to cognitive impairment but today patient reports no pain, GI issues or any other concerns.     Physical Exam:  /76   Pulse 70   Temp 97.7  F (36.5  C)   Resp 18   Wt 63.5 kg (140 lb)   SpO2 93%   BMI 21.29 kg/m    GENERAL APPEARANCE:  Alert, in no distress, pleasant, cooperative, oriented x person ad place  ENT:  Mouth with moist mucous membranes: moist.  [place]  RESP:  respiratory effort  of chest normal,  no  respiratory distress, Lung sounds clear to auscultation.  CV: Auscultation of heart done, IRRR with moderate systolic murmur.  No rub or gallop.  No LE edema.   ABDOMEN:  normal bowel sounds, soft, nontender. No abdominal masses or tenderness noted. Positive BS.   M/S:    ADAME equally and up in wheelchair.   NEURO: cranial nerves 2-12 grossly intact, no speech deficits.  PSYCH:  insight and judgement impaired, memory forgetful, affect and mood flat       Recent Labs:   CBC RESULTS:   Recent Labs   Lab Test 12/09/18 11/02/18  0629 11/01/18  0623   WBC  --  5.3 5.1   RBC  --  3.38* 3.29*   HGB 11.5* 10.7* 10.7*   HCT  --  34.3* 32.8*   MCV  --  102* 100   MCH  --  31.7 32.5   MCHC  --  31.2* 32.6   RDW  --  13.5 13.4   PLT  --  108* 102*       Last Basic Metabolic Panel:  Recent Labs   Lab Test 11/02/18 0629 11/01/18  0623    139   POTASSIUM 3.5 3.5   CHLORIDE 108 107   KEZIA 7.8* 8.2*   CO2 28 28   BUN 24 27   CR 1.45* 1.57*   GLC 84 94       Liver Function Studies -   Recent Labs   Lab Test 10/30/18  1032 09/07/17  1512   PROTTOTAL 7.3 8.1   ALBUMIN 3.6 3.6   BILITOTAL 1.1 0.4   ALKPHOS 104 105   AST 27 26   ALT 16 22       TSH   Date Value Ref Range Status   03/29/2018 3.91 0.30 - 5.00 uIU/mL Final   07/11/2017 0.26 (L) 0.40 - 4.00 mU/L Final       Assessment/Plan:  1. Chronic atrial fibrillation (H)    2. Anticoagulated on Coumadin    3. Ventral hernia without obstruction or gangrene    4. Abdominal wall bulge    5. Anticoagulation goal of INR 1.5 to 2    chronic issues - rate controlled, INR goal as above but unable to get results today. No abdominal symptoms - likely ventral hernia, monitoring.     Orders:  1. Coumadin 0.5 mg PO tonight. INR on 12/20 (lab draw please, not machine). Diagnosis a fib. Goal range 1.5-2 for INR    Electronically signed by  CODY Hunt CNP

## 2018-12-19 NOTE — LETTER
"    12/19/2018        RE: Francesco Killian  [facility]  [address]  [facility] [address]        [place] [facility]    Chief Complaint   Patient presents with     long-term Acute       [place] Medical Record Number:  [id number]  Place of Service where encounter took place:  [facility] ([facility] [[id number]]    HPI:    Francesco Killian is a 89 year old  (12/26/1920), who is being seen today for an episodic care visit.  HPI information obtained from: facility chart records, facility staff, patient report and [facility] chart review.    Today's concern is:  Chronic atrial fibrillation (H)  Anticoagulated on Coumadin  Patient with a fib, on coumadin. INR goal range is 1.5-2. Last INR 1. 49 on 12/17 and 1.5 on 12/13. It was 1.5 on 12.10.   Patient has taken total of 7.5 mg of coumadin in the past week:  12/18 coumadin 2 mg   12/17 coumadin 2 mg  12/16 coumadin 1 mg  12/15 coumadin 0.5 mg   12/14 coumadin 1 mg   12/13 coumadin 0.5 mg  12/12 coumadin 0.5 mg   Unable to get finger stick INR today despite multiple tries. No s/s bleeding or bruising.     Ventral hernia without obstruction or gangrene  Abdominal wall bulge  Patient noted to have abdominal \"mass or bulge\" over a week ago. Had abd US showing ventral wall hernia. No constipation - stools 1-2 times daily. No nausea, emesis, gi pain. Monitoring at this time.     Note wt up and down: admitted at 140 lbs, then up to 150 lbs, now back to 140 lbs in the span of 3-4 months.       Last 3 BPs:       HR Ranges: 70-93 bpm      Current Weight:         ALLERGIES: Patient has no known allergies.  Past Medical, Surgical, Family and Social History reviewed and updated in [facility].    Current Outpatient Medications   Medication Sig Dispense Refill     ACETAMINOPHEN PO Take 500 mg by mouth 2 times daily        aspirin 81 MG tablet Take 81 mg by mouth daily (children's aspirin).       Banana Flakes (BANATROL PLUS) " PACK Take 1 packet by mouth 3 times daily       Calcium Carbonate Antacid 400 MG CHEW Take 400 mg by mouth 2 times daily as needed       Citalopram Hydrobromide (CELEXA PO) Take 20 mg by mouth daily       FUROSEMIDE PO Take 10 mg by mouth 2 times daily        HYDRALAZINE HCL PO Take by mouth every 8 hours Take 50 mg PO at 0600 and 2200 daily and 25 mg PO at 1400 daily       ketoconazole (NIZORAL) 2 % cream Apply to face BID PRN 30 g 1     levothyroxine (SYNTHROID/LEVOTHROID) 100 MCG tablet TAKE 1 TABLET BY MOUTH EVERY DAY. 90 tablet 0     loperamide (IMODIUM) 2 MG capsule Take 2 mg by mouth every 6 hours as needed for diarrhea       metoprolol tartrate (LOPRESSOR) 25 MG tablet Take 0.25 tablets (6.25 mg) by mouth 2 times daily 60 tablet      Multiple Vitamins-Minerals (PRESERVISION AREDS PO) Take 1 capsule by mouth daily       polyethylene glycol (MIRALAX/GLYCOLAX) Packet Take 17 g by mouth daily as needed for constipation 7 packet      tamsulosin (FLOMAX) 0.4 MG capsule Take 0.4 mg by mouth At Bedtime        warfarin (COUMADIN) 1 MG tablet Take by mouth daily Give 0.5 mg by mouth at bedtime for treating/preventing blood clots until 12/05/2018 23:59 30 tablet      Medications reviewed:  Medications reconciled to facility chart and changes were made to reflect current medications as identified as above med list. Below are the changes that were made:   Medications stopped since last EPIC medication reconciliation:   There are no discontinued medications.    Medications started since last [facility] medication reconciliation:  No orders of the defined types were placed in this encounter.    REVIEW OF SYSTEMS:  Limited secondary to cognitive impairment but today patient reports no pain, GI issues or any other concerns.     Physical Exam:  /76   Pulse 70   Temp 97.7  F (36.5  C)   Resp 18   Wt 63.5 kg (140 lb)   SpO2 93%   BMI 21.29 kg/m     GENERAL APPEARANCE:  Alert, in no distress, pleasant, cooperative,  oriented x person ad place  ENT:  Mouth with moist mucous membranes: moist.  [place]  RESP:  respiratory effort  of chest normal,  no respiratory distress, Lung sounds clear to auscultation.  CV: Auscultation of heart done, IRRR with moderate systolic murmur.  No rub or gallop.  No LE edema.   ABDOMEN:  normal bowel sounds, soft, nontender. No abdominal masses or tenderness noted. Positive BS.   M/S:    ADAME equally and up in wheelchair.   NEURO: cranial nerves 2-12 grossly intact, no speech deficits.  PSYCH:  insight and judgement impaired, memory forgetful, affect and mood flat       Recent Labs:   CBC RESULTS:   Recent Labs   Lab Test 12/09/18 11/02/18  0629 11/01/18  0623   WBC  --  5.3 5.1   RBC  --  3.38* 3.29*   HGB 11.5* 10.7* 10.7*   HCT  --  34.3* 32.8*   MCV  --  102* 100   MCH  --  31.7 32.5   MCHC  --  31.2* 32.6   RDW  --  13.5 13.4   PLT  --  108* 102*       Last Basic Metabolic Panel:  Recent Labs   Lab Test 11/02/18  0629 11/01/18  0623    139   POTASSIUM 3.5 3.5   CHLORIDE 108 107   KEZIA 7.8* 8.2*   CO2 28 28   BUN 24 27   CR 1.45* 1.57*   GLC 84 94       Liver Function Studies -   Recent Labs   Lab Test 10/30/18  1032 09/07/17  1512   PROTTOTAL 7.3 8.1   ALBUMIN 3.6 3.6   BILITOTAL 1.1 0.4   ALKPHOS 104 105   AST 27 26   ALT 16 22       TSH   Date Value Ref Range Status   03/29/2018 3.91 0.30 - 5.00 uIU/mL Final   07/11/2017 0.26 (L) 0.40 - 4.00 mU/L Final       Assessment/Plan:  1. Chronic atrial fibrillation (H)    2. Anticoagulated on Coumadin    3. Ventral hernia without obstruction or gangrene    4. Abdominal wall bulge    5. Anticoagulation goal of INR 1.5 to 2    chronic issues - rate controlled, INR goal as above but unable to get results today. No abdominal symptoms - likely ventral hernia, monitoring.     Orders:  1. Coumadin 0.5 mg PO tonight. INR on 12/20 (lab draw please, not machine). Diagnosis a fib. Goal range 1.5-2 for INR    Electronically signed by  CODY Hunt  CNP                      Sincerely,        CODY Hunt CNP

## 2019-01-01 ENCOUNTER — NURSING HOME VISIT (OUTPATIENT)
Dept: GERIATRICS | Facility: CLINIC | Age: 84
End: 2019-01-01
Payer: MEDICARE

## 2019-01-01 ENCOUNTER — DOCUMENTATION ONLY (OUTPATIENT)
Dept: OTHER | Facility: CLINIC | Age: 84
End: 2019-01-01

## 2019-01-01 ENCOUNTER — NURSING HOME VISIT (OUTPATIENT)
Dept: GERIATRICS | Facility: CLINIC | Age: 84
End: 2019-01-01
Payer: COMMERCIAL

## 2019-01-01 ENCOUNTER — MEDICAL CORRESPONDENCE (OUTPATIENT)
Dept: HEALTH INFORMATION MANAGEMENT | Facility: CLINIC | Age: 84
End: 2019-01-01

## 2019-01-01 ENCOUNTER — RECORDS - HEALTHEAST (OUTPATIENT)
Dept: LAB | Facility: CLINIC | Age: 84
End: 2019-01-01

## 2019-01-01 VITALS
RESPIRATION RATE: 18 BRPM | HEART RATE: 80 BPM | TEMPERATURE: 97.5 F | DIASTOLIC BLOOD PRESSURE: 75 MMHG | BODY MASS INDEX: 21.4 KG/M2 | SYSTOLIC BLOOD PRESSURE: 168 MMHG | WEIGHT: 141.2 LBS | HEIGHT: 68 IN | OXYGEN SATURATION: 96 %

## 2019-01-01 VITALS
HEIGHT: 68 IN | HEART RATE: 68 BPM | BODY MASS INDEX: 19 KG/M2 | TEMPERATURE: 97.2 F | DIASTOLIC BLOOD PRESSURE: 70 MMHG | OXYGEN SATURATION: 95 % | WEIGHT: 125.4 LBS | RESPIRATION RATE: 18 BRPM | SYSTOLIC BLOOD PRESSURE: 160 MMHG

## 2019-01-01 DIAGNOSIS — I71.40 ABDOMINAL AORTIC ANEURYSM (AAA) WITHOUT RUPTURE (H): ICD-10-CM

## 2019-01-01 DIAGNOSIS — Z51.5 HOSPICE CARE PATIENT: ICD-10-CM

## 2019-01-01 DIAGNOSIS — D53.9 MACROCYTIC ANEMIA: ICD-10-CM

## 2019-01-01 DIAGNOSIS — I25.10 CORONARY ARTERY DISEASE INVOLVING NATIVE HEART WITHOUT ANGINA PECTORIS, UNSPECIFIED VESSEL OR LESION TYPE: ICD-10-CM

## 2019-01-01 DIAGNOSIS — I50.9 CHRONIC CONGESTIVE HEART FAILURE, UNSPECIFIED HEART FAILURE TYPE (H): ICD-10-CM

## 2019-01-01 DIAGNOSIS — R62.7 FAILURE TO THRIVE IN ADULT: ICD-10-CM

## 2019-01-01 DIAGNOSIS — R33.9 URINARY RETENTION: Primary | ICD-10-CM

## 2019-01-01 DIAGNOSIS — Z00.00 VISIT FOR ANNUAL HEALTH EXAMINATION: Primary | ICD-10-CM

## 2019-01-01 DIAGNOSIS — E46 PROTEIN-CALORIE MALNUTRITION, UNSPECIFIED SEVERITY (H): ICD-10-CM

## 2019-01-01 DIAGNOSIS — I48.20 CHRONIC ATRIAL FIBRILLATION (H): ICD-10-CM

## 2019-01-01 DIAGNOSIS — Z79.01 ANTICOAGULATED ON COUMADIN: ICD-10-CM

## 2019-01-01 DIAGNOSIS — K22.2 ESOPHAGEAL STRICTURE: ICD-10-CM

## 2019-01-01 DIAGNOSIS — I50.33 ACUTE ON CHRONIC DIASTOLIC HEART FAILURE (H): ICD-10-CM

## 2019-01-01 DIAGNOSIS — R33.9 URINARY RETENTION: ICD-10-CM

## 2019-01-01 DIAGNOSIS — I10 ESSENTIAL HYPERTENSION WITH GOAL BLOOD PRESSURE LESS THAN 140/90: ICD-10-CM

## 2019-01-01 DIAGNOSIS — D69.6 THROMBOCYTOPENIA (H): ICD-10-CM

## 2019-01-01 DIAGNOSIS — F32.0 MAJOR DEPRESSIVE DISORDER, SINGLE EPISODE, MILD (H): ICD-10-CM

## 2019-01-01 DIAGNOSIS — Z86.73 HISTORY OF CVA (CEREBROVASCULAR ACCIDENT): ICD-10-CM

## 2019-01-01 DIAGNOSIS — E03.8 OTHER SPECIFIED HYPOTHYROIDISM: ICD-10-CM

## 2019-01-01 DIAGNOSIS — H35.3290 EXUDATIVE AGE-RELATED MACULAR DEGENERATION, UNSPECIFIED LATERALITY, UNSPECIFIED STAGE (H): ICD-10-CM

## 2019-01-01 LAB
ANION GAP SERPL CALCULATED.3IONS-SCNC: 10 MMOL/L (ref 5–18)
BASOPHILS # BLD AUTO: 0.1 THOU/UL (ref 0–0.2)
BASOPHILS NFR BLD AUTO: 1 % (ref 0–2)
BUN SERPL-MCNC: 91 MG/DL (ref 8–28)
CALCIUM SERPL-MCNC: 9.7 MG/DL (ref 8.5–10.5)
CHLORIDE BLD-SCNC: 119 MMOL/L (ref 98–107)
CO2 SERPL-SCNC: 24 MMOL/L (ref 22–31)
CREAT SERPL-MCNC: 3.18 MG/DL (ref 0.7–1.3)
EOSINOPHIL # BLD AUTO: 0.5 THOU/UL (ref 0–0.4)
EOSINOPHIL NFR BLD AUTO: 6 % (ref 0–6)
ERYTHROCYTE [DISTWIDTH] IN BLOOD BY AUTOMATED COUNT: 18.8 % (ref 11–14.5)
GFR SERPL CREATININE-BSD FRML MDRD: 18 ML/MIN/1.73M2
GLUCOSE BLD-MCNC: 92 MG/DL (ref 70–125)
HCT VFR BLD AUTO: 39.5 % (ref 40–54)
HGB BLD-MCNC: 12.3 G/DL (ref 14–18)
LYMPHOCYTES # BLD AUTO: 1 THOU/UL (ref 0.8–4.4)
LYMPHOCYTES NFR BLD AUTO: 13 % (ref 20–40)
MCH RBC QN AUTO: 30.6 PG (ref 27–34)
MCHC RBC AUTO-ENTMCNC: 31.1 G/DL (ref 32–36)
MCV RBC AUTO: 98 FL (ref 80–100)
MONOCYTES # BLD AUTO: 0.7 THOU/UL (ref 0–0.9)
MONOCYTES NFR BLD AUTO: 10 % (ref 2–10)
NEUTROPHILS # BLD AUTO: 5 THOU/UL (ref 2–7.7)
NEUTROPHILS NFR BLD AUTO: 70 % (ref 50–70)
PLATELET # BLD AUTO: 144 THOU/UL (ref 140–440)
PMV BLD AUTO: 11.5 FL (ref 8.5–12.5)
POTASSIUM BLD-SCNC: 4.7 MMOL/L (ref 3.5–5)
RBC # BLD AUTO: 4.02 MILL/UL (ref 4.4–6.2)
SODIUM SERPL-SCNC: 153 MMOL/L (ref 136–145)
TSH SERPL DL<=0.005 MIU/L-ACNC: 4.74 UIU/ML (ref 0.3–5)
WBC: 7.2 THOU/UL (ref 4–11)

## 2019-01-01 PROCEDURE — 99309 SBSQ NF CARE MODERATE MDM 30: CPT | Performed by: NURSE PRACTITIONER

## 2019-01-01 PROCEDURE — 99318 ZZC ANNUAL NURSING FAC ASSESSMNT, STABLE: CPT | Mod: GW | Performed by: NURSE PRACTITIONER

## 2019-01-01 RX ORDER — CALCIUM CARBONATE 500 MG/1
1 TABLET, CHEWABLE ORAL 2 TIMES DAILY PRN
COMMUNITY
End: 2019-01-01

## 2019-01-01 ASSESSMENT — MIFFLIN-ST. JEOR
SCORE: 1234.98
SCORE: 1163.31

## 2019-01-10 PROBLEM — D69.6 THROMBOCYTOPENIA (H): Status: ACTIVE | Noted: 2019-01-01

## 2019-01-10 NOTE — PROGRESS NOTES
[place] [facility]    Chief Complaint   Patient presents with     custodial Acute       [facility] Medical Record Number:  [id number]  Place of Service where encounter took place:  [facility] ([facility] [[id number]]    HPI:    Francesco Killian is a 89 year old  (12/26/1920), who is being seen today for an episodic care visit.  HPI information obtained from: facility chart records, facility staff, patient report and [facility] chart review.    Today's concern is:  Urinary retention  Constantino removed on 1/8 and attempting voiding trial. Had retention of 700 ml once, then retained 250 ml and able to void 200 ml. Preference would be to avoid Constantino, cath for comfort. Denies bladder symptoms today.     Acute on chronic diastolic heart failure (H)  Chronic atrial fibrillation (H)  Abdominal aortic aneurysm (AAA) without rupture (H)  On Coumadin and INR today 1.9. Goal INR is 1.5-2.0. Wt up and down, now back to admit weight. SBP ranges 130-182 and takes Lasix, Hydralazine. Systolic murmur present as per baseline. BP: 130-189/65-94 and P: 70-88  Wt Readings from Last 5 Encounters:   01/10/19 64 kg (141 lb 3.2 oz)   12/19/18 63.5 kg (140 lb)   12/13/18 64 kg (141 lb)   12/04/18 63.7 kg (140 lb 6.4 oz)   11/30/18 61.6 kg (135 lb 12.8 oz)       Major depressive disorder, single episode, mild (H)  On Celexa and asymptomatic.     Exudative age-related macular degeneration, unspecified laterality, unspecified stage (H)  Vision impaired but no complaints of recent changes.     Thrombocytopenia (H)  Chronic issue. No aggressive work up in view of goals of care are comfort.   Lab Results   Component Value Date     11/02/2018     11/01/2018     Protein-calorie malnutrition, unspecified severity (H)  Failure to thrive in adult  Body mass index is 21.47 kg/m .   Lab Results   Component Value Date    ALBUMIN 3.6 10/30/2018    ALBUMIN 3.6 09/07/2017   Patient loosing muscle mass and some  weight loss. In general failing - refer to hospice for eval per family wishes with diagnosis of HF, CKD.       ALLERGIES: Patient has no known allergies.  Past Medical, Surgical, Family and Social History reviewed and updated in [facility].    Current Outpatient Medications   Medication Sig Dispense Refill     ACETAMINOPHEN PO Take 500 mg by mouth 2 times daily        aspirin 81 MG tablet Take 81 mg by mouth daily (children's aspirin).       Banana Flakes (BANATROL PLUS) PACK Take 1 packet by mouth daily        calcium carbonate (TUMS) 500 MG chewable tablet Take 1 chew tab by mouth 2 times daily as needed for heartburn       Citalopram Hydrobromide (CELEXA PO) Take 20 mg by mouth daily       FUROSEMIDE PO Take 10 mg by mouth 2 times daily        HYDRALAZINE HCL PO Take 50 mg by mouth every 8 hours        ketoconazole (NIZORAL) 2 % cream Apply to face BID PRN 30 g 1     levothyroxine (SYNTHROID/LEVOTHROID) 100 MCG tablet TAKE 1 TABLET BY MOUTH EVERY DAY. 90 tablet 0     loperamide (IMODIUM) 2 MG capsule Take 2 mg by mouth every 6 hours as needed for diarrhea       metoprolol tartrate (LOPRESSOR) 25 MG tablet Take 0.25 tablets (6.25 mg) by mouth 2 times daily 60 tablet      Multiple Vitamins-Minerals (PRESERVISION AREDS PO) Take 1 capsule by mouth daily       polyethylene glycol (MIRALAX/GLYCOLAX) Packet Take 17 g by mouth daily as needed for constipation 7 packet      tamsulosin (FLOMAX) 0.4 MG capsule Take 0.4 mg by mouth At Bedtime        Warfarin Sodium (COUMADIN PO) Per INR goal of 1.5-2       Medications reviewed:  Medications reconciled to facility chart and changes were made to reflect current medications as identified as above med list. Below are the changes that were made:   Medications stopped since last EPIC medication reconciliation:   Medications started since last [facility] medication reconciliation:      REVIEW OF SYSTEMS:  Limited secondary to cognitive impairment but today patient reports no pain, dyspnea,  "bowel or bladder concerns.     Physical Exam:  /75   Pulse 80   Temp 97.5  F (36.4  C)   Resp 18   Ht 1.727 m (5' 8\")   Wt 64 kg (141 lb 3.2 oz)   SpO2 96%   BMI 21.47 kg/m    GENERAL APPEARANCE:  Alert, in no distress, pleasant, cooperative, oriented x self only  EYES:  EOM, lids, pupils and irises normal, sclera clear and conjunctiva normal, no discharge or mattering on lids or lashes noted  ENT:  Mouth normal, moist mucous membranes, nose normal without drainage or crusting, external ears without lesions, hearing acuity [place] both ears  NECK: supple, symmetrical  RESP:  respiratory effort and palpation of chest normal, no chest wall tenderness, no respiratory distress, Lung sounds clear, patient is on room air  CV:  Palpation and auscultation of heart done, rate and rhythm controlled and irregular, grade 3/6 systolic murmur, no rub or gallop. Edema none bilateral lower extremities. VASCULAR: no open areas lower legs  ABDOMEN:  normal bowel sounds, soft, nontender.  M/S:   Gait and station wheelchair bound and walks with assist , no tenderness or swelling of the joints; able to move all extremities   NEURO: cranial nerves 2-12 grossly intact, no facial asymmetry, no speech deficits and able to follow directions, moves all extremities symmetrically  PSYCH:  insight and judgement impaired, memory impaired, affect and mood normal     Recent Labs:   CBC RESULTS:   Recent Labs   Lab Test 12/09/18 11/02/18  0629 11/01/18  0623   WBC  --  5.3 5.1   RBC  --  3.38* 3.29*   HGB 11.5* 10.7* 10.7*   HCT  --  34.3* 32.8*   MCV  --  102* 100   MCH  --  31.7 32.5   MCHC  --  31.2* 32.6   RDW  --  13.5 13.4   PLT  --  108* 102*       Last Basic Metabolic Panel:  Recent Labs   Lab Test 11/02/18  0629 11/01/18  0623    139   POTASSIUM 3.5 3.5   CHLORIDE 108 107   KEZIA 7.8* 8.2*   CO2 28 28   BUN 24 27   CR 1.45* 1.57*   GLC 84 94       Liver Function Studies -   Recent Labs   Lab Test 10/30/18  1032 09/07/17  1512 "   PROTTOTAL 7.3 8.1   ALBUMIN 3.6 3.6   BILITOTAL 1.1 0.4   ALKPHOS 104 105   AST 27 26   ALT 16 22       TSH   Date Value Ref Range Status   03/29/2018 3.91 0.30 - 5.00 uIU/mL Final   07/11/2017 0.26 (L) 0.40 - 4.00 mU/L Final       Lab Results   Component Value Date    A1C 5.5 09/14/2015       Assessment/Plan:  Urinary retention  Ongoing issue. Catheter out - monitor PVR as needed for comfort and str cath if unable to void. Will try to keep Constantino out if possible.     Acute on chronic diastolic heart failure (H)  Chronic atrial fibrillation (H)  Abdominal aortic aneurysm (AAA) without rupture (H)  Chronic issues, stable at this time. BP elevated at times - increase afternoon hydralazine dose and f/u with BPs as needed.     Major depressive disorder, single episode, mild (H)  Chronic, stable. Med effective - no symptoms. F/U next routine visit.     Exudative age-related macular degeneration, unspecified laterality, unspecified stage (H)  By history, no current complaints. Ophthalmology PRN.     Thrombocytopenia (H)  Chronic, stable. Monitor per routine.     Protein-calorie malnutrition, unspecified severity (H)  Failure to thrive in adult  Ongoing decline - family interested in hospice consult. Monitor intake, wt - decline expected.     Orders:  1. Coumadin 1 mg PO M, W and F and 1.5 mg PO other days. INR in 2 weeks 1/24  2. Change Hydralazine to 50 mg PO every 8 hrs diagnosis HTN  3. Hospice consult per family request    25 min    Electronically signed by  CODY Hunt CNP

## 2019-01-10 NOTE — LETTER
1/10/2019        RE: Francesco Killian  [facility]  [address]  [facility] [address]        [place] [facility]    Chief Complaint   Patient presents with     half-way Acute       [place] [facility] Medical Record Number:  [id number]  Place of Service where encounter took place:  [facility] ([facility] [[id number]]    HPI:    Francesco Killian is a 89 year old  (12/26/1920), who is being seen today for an episodic care visit.  HPI information obtained from: facility chart records, facility staff, patient report and [facility] chart review.    Today's concern is:  Urinary retention  Constantino removed on 1/8 and attempting voiding trial. Had retention of 700 ml once, then retained 250 ml and able to void 200 ml. Preference would be to avoid Constantino, cath for comfort. Denies bladder symptoms today.     Acute on chronic diastolic heart failure (H)  Chronic atrial fibrillation (H)  Abdominal aortic aneurysm (AAA) without rupture (H)  On Coumadin and INR today 1.9. Goal INR is 1.5-2.0. Wt up and down, now back to admit weight. SBP ranges 130-182 and takes Lasix, Hydralazine. Systolic murmur present as per baseline. BP: 130-189/65-94 and P: 70-88  Wt Readings from Last 5 Encounters:   01/10/19 64 kg (141 lb 3.2 oz)   12/19/18 63.5 kg (140 lb)   12/13/18 64 kg (141 lb)   12/04/18 63.7 kg (140 lb 6.4 oz)   11/30/18 61.6 kg (135 lb 12.8 oz)       Major depressive disorder, single episode, mild (H)  On Celexa and asymptomatic.     Exudative age-related macular degeneration, unspecified laterality, unspecified stage (H)  Vision impaired but no complaints of recent changes.     Thrombocytopenia (H)  Chronic issue. No aggressive work up in view of goals of care are comfort.   Lab Results   Component Value Date     11/02/2018     11/01/2018     Protein-calorie malnutrition, unspecified severity (H)  Failure to thrive in adult  Body mass index is 21.47 kg/m .   Lab  Results   Component Value Date    ALBUMIN 3.6 10/30/2018    ALBUMIN 3.6 09/07/2017   Patient loosing muscle mass and some weight loss. In general failing - refer to hospice for eval per family wishes with diagnosis of HF, CKD.       ALLERGIES: Patient has no known allergies.  Past Medical, Surgical, Family and Social History reviewed and updated in [facility].    Current Outpatient Medications   Medication Sig Dispense Refill     ACETAMINOPHEN PO Take 500 mg by mouth 2 times daily        aspirin 81 MG tablet Take 81 mg by mouth daily (children's aspirin).       Banana Flakes (BANATROL PLUS) PACK Take 1 packet by mouth daily        calcium carbonate (TUMS) 500 MG chewable tablet Take 1 chew tab by mouth 2 times daily as needed for heartburn       Citalopram Hydrobromide (CELEXA PO) Take 20 mg by mouth daily       FUROSEMIDE PO Take 10 mg by mouth 2 times daily        HYDRALAZINE HCL PO Take 50 mg by mouth every 8 hours        ketoconazole (NIZORAL) 2 % cream Apply to face BID PRN 30 g 1     levothyroxine (SYNTHROID/LEVOTHROID) 100 MCG tablet TAKE 1 TABLET BY MOUTH EVERY DAY. 90 tablet 0     loperamide (IMODIUM) 2 MG capsule Take 2 mg by mouth every 6 hours as needed for diarrhea       metoprolol tartrate (LOPRESSOR) 25 MG tablet Take 0.25 tablets (6.25 mg) by mouth 2 times daily 60 tablet      Multiple Vitamins-Minerals (PRESERVISION AREDS PO) Take 1 capsule by mouth daily       polyethylene glycol (MIRALAX/GLYCOLAX) Packet Take 17 g by mouth daily as needed for constipation 7 packet      tamsulosin (FLOMAX) 0.4 MG capsule Take 0.4 mg by mouth At Bedtime        Warfarin Sodium (COUMADIN PO) Per INR goal of 1.5-2       Medications reviewed:  Medications reconciled to facility chart and changes were made to reflect current medications as identified as above med list. Below are the changes that were made:   Medications stopped since last EPIC medication reconciliation:   Medications started since last EPIC medication  "reconciliation:      REVIEW OF SYSTEMS:  Limited secondary to cognitive impairment but today patient reports no pain, dyspnea, bowel or bladder concerns.     Physical Exam:  /75   Pulse 80   Temp 97.5  F (36.4  C)   Resp 18   Ht 1.727 m (5' 8\")   Wt 64 kg (141 lb 3.2 oz)   SpO2 96%   BMI 21.47 kg/m     GENERAL APPEARANCE:  Alert, in no distress, pleasant, cooperative, oriented x self only  EYES:  EOM, lids, pupils and irises normal, sclera clear and conjunctiva normal, no discharge or mattering on lids or lashes noted  ENT:  Mouth normal, moist mucous membranes, nose normal without drainage or crusting, external ears without lesions, hearing acuity [place] both ears  NECK: supple, symmetrical  RESP:  respiratory effort and palpation of chest normal, no chest wall tenderness, no respiratory distress, Lung sounds clear, patient is on room air  CV:  Palpation and auscultation of heart done, rate and rhythm controlled and irregular, grade 3/6 systolic murmur, no rub or gallop. Edema none bilateral lower extremities. VASCULAR: no open areas lower legs  ABDOMEN:  normal bowel sounds, soft, nontender.  M/S:   Gait and station wheelchair bound and walks with assist , no tenderness or swelling of the joints; able to move all extremities   NEURO: cranial nerves 2-12 grossly intact, no facial asymmetry, no speech deficits and able to follow directions, moves all extremities symmetrically  PSYCH:  insight and judgement impaired, memory impaired, affect and mood normal     Recent Labs:   CBC RESULTS:   Recent Labs   Lab Test 12/09/18 11/02/18  0629 11/01/18  0623   WBC  --  5.3 5.1   RBC  --  3.38* 3.29*   HGB 11.5* 10.7* 10.7*   HCT  --  34.3* 32.8*   MCV  --  102* 100   MCH  --  31.7 32.5   MCHC  --  31.2* 32.6   RDW  --  13.5 13.4   PLT  --  108* 102*       Last Basic Metabolic Panel:  Recent Labs   Lab Test 11/02/18  0629 11/01/18  0623    139   POTASSIUM 3.5 3.5   CHLORIDE 108 107   KEZIA 7.8* 8.2*   CO2 28 28 "   BUN 24 27   CR 1.45* 1.57*   GLC 84 94       Liver Function Studies -   Recent Labs   Lab Test 10/30/18  1032 09/07/17  1512   PROTTOTAL 7.3 8.1   ALBUMIN 3.6 3.6   BILITOTAL 1.1 0.4   ALKPHOS 104 105   AST 27 26   ALT 16 22       TSH   Date Value Ref Range Status   03/29/2018 3.91 0.30 - 5.00 uIU/mL Final   07/11/2017 0.26 (L) 0.40 - 4.00 mU/L Final       Lab Results   Component Value Date    A1C 5.5 09/14/2015       Assessment/Plan:  Urinary retention  Ongoing issue. Catheter out - monitor PVR as needed for comfort and str cath if unable to void. Will try to keep Constantino out if possible.     Acute on chronic diastolic heart failure (H)  Chronic atrial fibrillation (H)  Abdominal aortic aneurysm (AAA) without rupture (H)  Chronic issues, stable at this time. BP elevated at times - increase afternoon hydralazine dose and f/u with BPs as needed.     Major depressive disorder, single episode, mild (H)  Chronic, stable. Med effective - no symptoms. F/U next routine visit.     Exudative age-related macular degeneration, unspecified laterality, unspecified stage (H)  By history, no current complaints. Ophthalmology PRN.     Thrombocytopenia (H)  Chronic, stable. Monitor per routine.     Protein-calorie malnutrition, unspecified severity (H)  Failure to thrive in adult  Ongoing decline - family interested in hospice consult. Monitor intake, wt - decline expected.     Orders:  1. Coumadin 1 mg PO M, W and F and 1.5 mg PO other days. INR in 2 weeks 1/24  2. Change Hydralazine to 50 mg PO every 8 hrs diagnosis HTN  3. Hospice consult per family request    25 min    Electronically signed by  CODY Hunt CNP                      Sincerely,        CODY Hunt CNP

## 2019-02-06 NOTE — PROGRESS NOTES
[facility]  Chief Complaint   Patient presents with     Annual Comprehensive Nursing Home       [facility] Record Number:  [id number]  Place of Service where encounter took place:  [facility] [[id number]]      HPI:    Francesco Killian is a 89 year old  (12/26/1920), who is being seen today for an annual comprehensive visit.  HPI information obtained from: facility chart records, facility staff, patient report and [facility] chart review.      Today's concerns are:  Visit for annual health examination  Completed today 2/7/19    Protein-calorie malnutrition, unspecified severity (H)  Hospice care patient   Patient with 15 lbs weight loss in the past 2 months. Now on hospice and focusing on comfort. Encourage intake, monitor.     Esophageal stricture  No symptoms at this time.     Other specified hypothyroidism  On synthroid. No routine labs.   Lab Results   Component Value Date    TSH 3.91 03/29/2018       Abdominal aortic aneurysm (AAA) without rupture (H)  Chronic congestive heart failure, unspecified heart failure type (H)  Chronic atrial fibrillation (H)  Coronary artery disease involving native heart without angina pectoris, unspecified vessel or lesion type  Essential hypertension with goal blood pressure less than 140/90  History of CVA (cerebrovascular accident)  Anticoagulated on Coumadin  Recent sbp range 144-175, takes lasix 10 mg BID and hydralazine 50 mg TID as well as lopressor 6.25 mg PO BID. Spoke with patient's son and POA (who is also a physician) - plan to decrease lasix to 10 mg daily, increase hydralazine to 50 mg QID. Stop coumadin and INR checks and start ASA at increased dose of 325 mg daily.   BP goals are <150/90 mm Hg.This is higher than ACC and AHA recommendations and will increase frequency of hydralazine Noted patients BP is higher than goal and will adjust plan of care by increasing hydralazine to QID dosing.    Urinary  retention  Voiding without issues at this time. Continues Flomax.     Macrocytic anemia  On last check stable, no routine labs needed since now on hospice.   Lab Results   Component Value Date    HGB 11.5 12/09/2018    HGB 10.7 11/02/2018       Major depressive disorder, single episode, mild (H)  Mood appears stable. Continues Celexa.     Last 3 BPs:       HR Ranges:68-88 bpm    Current Weight:         ALLERGIES: Patient has no known allergies.  PROBLEM LIST:  Patient Active Problem List   Diagnosis     AAA (abdominal aortic aneurysm) (H)     Dysphagia     Chronic atrial fibrillation (H)     Health Care Home     Esophageal stricture     Macrocytic anemia     Other specified hypothyroidism     Right bundle branch block (RBBB)     Recurrent falls~occulovestibular syndrom     Wet senile macular degeneration (H)     Major depressive disorder, single episode, mild (H)     Essential hypertension with goal blood pressure less than 140/90     Acquired hypothyroidism     CHF (congestive heart failure) (H)     Acute kidney failure (H)     Compression fracture of L1 lumbar vertebra (H)     Supratherapeutic INR     Anticoagulated on Coumadin     History of CVA (cerebrovascular accident)     Coronary artery disease involving native heart without angina pectoris, unspecified vessel or lesion type     Protein-calorie malnutrition (H)     Hematuria, unspecified type     Urinary retention     Abdominal wall bulge     Anticoagulation goal of INR 1.5 to 2     Thrombocytopenia (H)     PAST MEDICAL HISTORY:  has a past medical history of AAA (abdominal aortic aneurysm) (H) (6/3/2013), Aortic stenosis, Aspiration pneumonia (H) (10/30/2018), BPH (benign prostatic hyperplasia), Bradycardia, CAD (coronary artery disease), Carotid occlusion, right, Chronic atrial fibrillation (H), Compression fracture of L1 lumbar vertebra (H), CVA (cerebral infarction), Depression, Dysphagia, HTN (hypertension), Hyperlipidemia LDL goal <130, Hypothyroidism,  Ischemic cardiomyopathy, Macular degeneration of both eyes, Recurrent falls~occulovestibular syndrom, Right bundle branch block (RBBB), Squamous cell carcinoma, and Unspecified cerebral artery occlusion with cerebral infarction. He also has no past medical history of Basal cell carcinoma or Malignant melanoma (H).  PAST SURGICAL HISTORY:  has a past surgical history that includes GI surgery (1970s); Cardiac surgery (1980s); Eye surgery; hernia repair; IR Renal/Visceral Stent/Atherect/PTA; turp; Esophagoscopy, gastroscopy, duodenoscopy (EGD), combined (10/14/2013); Colonoscopy (11/4/2013); and NONSPECIFIC PROCEDURE.  FAMILY HISTORY: family history includes C.A.D. in his brother, father, maternal grandfather, maternal grandmother, mother, paternal grandfather, paternal grandmother, and sister; Coronary Artery Disease in his mother.  SOCIAL HISTORY:  reports that  has never smoked. he has never used smokeless tobacco. He reports that he does not drink alcohol or use drugs.  IMMUNIZATIONS:  Most Recent Immunizations   Administered Date(s) Administered     Influenza (High Dose) 3 valent vaccine 09/25/2017     Influenza (IIV3) PF 10/22/2012     Influenza (intradermal) 10/17/2018     Pneumo Conj 13-V (2010&after) 04/18/2016     Pneumococcal 23 valent 12/09/2004     TD (ADULT, 7+) 05/09/2018     Tdap (Adacel,Boostrix) 04/29/2009     Zoster vaccine, live 03/23/2011     Above immunizations pulled from [facility]. [facility] and facility records also reconciled. Outstanding information sent to  to update [facility].  Future immunizations are not needed at this point as all recommended immunizations are up to date.   MEDICATIONS:  Current Outpatient Medications   Medication Sig Dispense Refill     ACETAMINOPHEN PO Take 500 mg by mouth 2 times daily        aspirin (ASA) 325 MG EC tablet Take 325 mg by mouth daily       Banana Flakes (BANATROL PLUS) PACK Take 1 packet by mouth daily        Citalopram Hydrobromide  (CELEXA PO) Take 20 mg by mouth daily       FUROSEMIDE PO Take 10 mg by mouth daily        HYDRALAZINE HCL PO Take 50 mg by mouth every 8 hours        ketoconazole (NIZORAL) 2 % cream Apply to face BID PRN 30 g 1     levothyroxine (SYNTHROID/LEVOTHROID) 100 MCG tablet TAKE 1 TABLET BY MOUTH EVERY DAY. 90 tablet 0     loperamide (IMODIUM) 2 MG capsule Take 2 mg by mouth every 6 hours as needed for diarrhea       metoprolol tartrate (LOPRESSOR) 25 MG tablet Take 0.25 tablets (6.25 mg) by mouth 2 times daily 60 tablet      Multiple Vitamins-Minerals (PRESERVISION AREDS PO) Take 1 capsule by mouth daily       polyethylene glycol (MIRALAX/GLYCOLAX) Packet Take 17 g by mouth daily as needed for constipation 7 packet      tamsulosin (FLOMAX) 0.4 MG capsule Take 0.4 mg by mouth At Bedtime        Medications reviewed:  Medications reconciled to facility chart and changes were made to reflect current medications as identified as above med list. Below are the changes that were made:   Medications stopped since last EPIC medication reconciliation:   There are no discontinued medications.    Medications started since last [facility] medication reconciliation:  No orders of the defined types were placed in this encounter.      Case Management:  I have reviewed the facility/SNF care plan/MDS which was done 12/27/18, including the falls risk, nutrition and pain screening. I also reviewed the current immunizations, and preventive care..Future cancer screening is not clinically indicated secondary to age/goals of care Patient's desire to return to the community is not assessible due to cognitive impairment. Current Level of Care is appropriate.    Advance Directive Discussion:    I reviewed the current advanced directives as reflected in EPIC, the POLST and the facility chart, and verified the congruency of orders. I contacted the first party patient's son  Deepak Killian and discussed the plan of Care.  I did not due to cognitive impairment  "review the advance directives with the resident.     Team Discussion:  I communicated with the appropriate disciplines involved with the Plan of Care:   Nursing      Patient Goal:  Patient's goal is pain control and comfort.    Information reviewed:  Medications, vital signs, orders, and nursing notes.    ROS:  Limited secondary to cognitive impairment but today patient reports no pain, dyspnea, bowel or bladder issues.     Exam:  /70   Pulse 68   Temp 97.2  F (36.2  C)   Resp 18   Ht 1.727 m (5' 8\")   Wt 56.9 kg (125 lb 6.4 oz)   SpO2 95%   BMI 19.07 kg/m    GENERAL APPEARANCE:  Alert, in no distress, pleasant, cooperative, oriented x self only  EYES:  EOM, lids, pupils and irises normal, sclera clear and conjunctiva normal, no discharge or mattering on lids or lashes noted  ENT:  Mouth normal, moist mucous membranes, nose normal without drainage or crusting, external ears without lesions, hearing acuity BÖHM both ears  NECK: supple, symmetrical  RESP:  respiratory effort and palpation of chest normal, no chest wall tenderness, no respiratory distress, Lung sounds clear, patient is on room air  CV:  Palpation and auscultation of heart done, rate and rhythm controlled and irregular, grade 3/6 systolic murmur, no rub or gallop. Edema none bilateral lower extremities. VASCULAR: no open areas lower legs  ABDOMEN:  normal bowel sounds, soft, nontender.  M/S:   Gait and station wheelchair bound and walks with assist , no tenderness or swelling of the joints; able to move all extremities   NEURO: cranial nerves 2-12 grossly intact, no facial asymmetry, no speech deficits and able to follow directions, moves all extremities symmetrically  PSYCH:  insight and judgement impaired, memory impaired, affect and mood normal     Lab/Diagnostic data:   CBC RESULTS:   Recent Labs   Lab Test 12/09/18 11/02/18  0629 11/01/18  0623   WBC  --  5.3 5.1   RBC  --  3.38* 3.29*   HGB 11.5* 10.7* 10.7*   HCT  --  34.3* 32.8* "   MCV  --  102* 100   MCH  --  31.7 32.5   MCHC  --  31.2* 32.6   RDW  --  13.5 13.4   PLT  --  108* 102*       Last Basic Metabolic Panel:  Recent Labs   Lab Test 11/02/18  0629 11/01/18  0623    139   POTASSIUM 3.5 3.5   CHLORIDE 108 107   KEZIA 7.8* 8.2*   CO2 28 28   BUN 24 27   CR 1.45* 1.57*   GLC 84 94       Liver Function Studies -   Lab Test 10/30/18  1032   PROTTOTAL 7.3   ALBUMIN 3.6   BILITOTAL 1.1   ALKPHOS 104   AST 27   ALT 16       TSH   Date Value Ref Range Status   03/29/2018 3.91 0.30 - 5.00 uIU/mL Final   07/11/2017 0.26 (L) 0.40 - 4.00 mU/L Final       ASSESSMENT/PLAN  Visit for annual health examination  Completed today.     Protein-calorie malnutrition, unspecified severity (H)  Chronic, expected due to end of life. Monitor, encourage intake.     Esophageal stricture  Chronic, monitor.     Other specified hypothyroidism  Chronic, meds as above. Monitor.     Abdominal aortic aneurysm (AAA) without rupture (H)  Chronic congestive heart failure, unspecified heart failure type (H)  Chronic atrial fibrillation (H)  Coronary artery disease involving native heart without angina pectoris, unspecified vessel or lesion type  Essential hypertension with goal blood pressure less than 140/90  History of CVA (cerebrovascular accident)  Anticoagulated on Coumadin  Chronic, BP elevated. Decrease lasix dose since no s/s edema and increase hydralazine frequency. F/U with BP, edema next week. Discussed coumadin therapy with son - will stop coumadin and INR checks, increase asa to 325 mg daily and monitor.     Urinary retention  Chronic, stable with Flomax. Monitor.     Macrocytic anemia  By history, no recent issues, no routine labs.     Major depressive disorder, single episode, mild (H)  Chronic, stable with meds. Monitor.     Hospice care patient  Focus is on comfort.     Orders:  1. discontinue Coumadin and INR checks  2. Increase ASA to 325 mg PO daily  3. Decrease Lasix to 10 mg PO daily diagnosis  edema  4. Increase Hydralazine to 50 mg PO QID diagnosis HTN    The health plan new enrollment has happened. I have reviewed the  MDS, the preventative needs,  and facility care plan. The level of care is appropriate. I have reviewed the code status/advanced directives.     Electronically signed by:  CODY Hunt CNP

## 2019-02-07 PROBLEM — J69.0 ASPIRATION PNEUMONIA (H): Status: RESOLVED | Noted: 2018-01-01 | Resolved: 2019-01-01

## 2019-02-07 PROBLEM — Z51.5 HOSPICE CARE PATIENT: Status: ACTIVE | Noted: 2019-01-01

## 2019-02-07 NOTE — LETTER
2/7/2019        RE: Francesco Killian  [facility]  [address]  [facility] [address]        [facility]  Chief Complaint   Patient presents with     Annual Comprehensive Nursing Home       [facility] Record Number:  [id number]  Place of Service where encounter took place:  [facility] [[id number]]      HPI:    Francesco Killian is a 89 year old  (12/26/1920), who is being seen today for an annual comprehensive visit.  HPI information obtained from: facility chart records, facility staff, patient report and [facility] chart review.      Today's concerns are:  Visit for annual health examination  Completed today 2/7/19    Protein-calorie malnutrition, unspecified severity (H)  Hospice care patient   Patient with 15 lbs weight loss in the past 2 months. Now on hospice and focusing on comfort. Encourage intake, monitor.     Esophageal stricture  No symptoms at this time.     Other specified hypothyroidism  On synthroid. No routine labs.   Lab Results   Component Value Date    TSH 3.91 03/29/2018       Abdominal aortic aneurysm (AAA) without rupture (H)  Chronic congestive heart failure, unspecified heart failure type (H)  Chronic atrial fibrillation (H)  Coronary artery disease involving native heart without angina pectoris, unspecified vessel or lesion type  Essential hypertension with goal blood pressure less than 140/90  History of CVA (cerebrovascular accident)  Anticoagulated on Coumadin  Recent sbp range 144-175, takes lasix 10 mg BID and hydralazine 50 mg TID as well as lopressor 6.25 mg PO BID. Spoke with patient's son and POA (who is also a physician) - plan to decrease lasix to 10 mg daily, increase hydralazine to 50 mg QID. Stop coumadin and INR checks and start ASA at increased dose of 325 mg daily.   BP goals are <150/90 mm Hg.This is higher than ACC and AHA recommendations and will increase frequency of hydralazine  Noted patients BP is higher than goal and will adjust plan of care by increasing hydralazine to QID dosing.    Urinary retention  Voiding without issues at this time. Continues Flomax.     Macrocytic anemia  On last check stable, no routine labs needed since now on hospice.   Lab Results   Component Value Date    HGB 11.5 12/09/2018    HGB 10.7 11/02/2018       Major depressive disorder, single episode, mild (H)  Mood appears stable. Continues Celexa.     Last 3 BPs:       HR Ranges:68-88 bpm    Current Weight:         ALLERGIES: Patient has no known allergies.  PROBLEM LIST:  Patient Active Problem List   Diagnosis     AAA (abdominal aortic aneurysm) (H)     Dysphagia     Chronic atrial fibrillation (H)     Health Care Home     Esophageal stricture     Macrocytic anemia     Other specified hypothyroidism     Right bundle branch block (RBBB)     Recurrent falls~occulovestibular syndrom     Wet senile macular degeneration (H)     Major depressive disorder, single episode, mild (H)     Essential hypertension with goal blood pressure less than 140/90     Acquired hypothyroidism     CHF (congestive heart failure) (H)     Acute kidney failure (H)     Compression fracture of L1 lumbar vertebra (H)     Supratherapeutic INR     Anticoagulated on Coumadin     History of CVA (cerebrovascular accident)     Coronary artery disease involving native heart without angina pectoris, unspecified vessel or lesion type     Protein-calorie malnutrition (H)     Hematuria, unspecified type     Urinary retention     Abdominal wall bulge     Anticoagulation goal of INR 1.5 to 2     Thrombocytopenia (H)     PAST MEDICAL HISTORY:  has a past medical history of AAA (abdominal aortic aneurysm) (H) (6/3/2013), Aortic stenosis, Aspiration pneumonia (H) (10/30/2018), BPH (benign prostatic hyperplasia), Bradycardia, CAD (coronary artery disease), Carotid occlusion, right, Chronic atrial fibrillation (H), Compression fracture of L1 lumbar vertebra  (H), CVA (cerebral infarction), Depression, Dysphagia, HTN (hypertension), Hyperlipidemia LDL goal <130, Hypothyroidism, Ischemic cardiomyopathy, Macular degeneration of both eyes, Recurrent falls~occulovestibular syndrom, Right bundle branch block (RBBB), Squamous cell carcinoma, and Unspecified cerebral artery occlusion with cerebral infarction. He also has no past medical history of Basal cell carcinoma or Malignant melanoma (H).  PAST SURGICAL HISTORY:  has a past surgical history that includes GI surgery (1970s); Cardiac surgery (1980s); Eye surgery; hernia repair; IR Renal/Visceral Stent/Atherect/PTA; turp; Esophagoscopy, gastroscopy, duodenoscopy (EGD), combined (10/14/2013); Colonoscopy (11/4/2013); and NONSPECIFIC PROCEDURE.  FAMILY HISTORY: family history includes C.A.D. in his brother, father, maternal grandfather, maternal grandmother, mother, paternal grandfather, paternal grandmother, and sister; Coronary Artery Disease in his mother.  SOCIAL HISTORY:  reports that  has never smoked. he has never used smokeless tobacco. He reports that he does not drink alcohol or use drugs.  IMMUNIZATIONS:  Most Recent Immunizations   Administered Date(s) Administered     Influenza (High Dose) 3 valent vaccine 09/25/2017     Influenza (IIV3) PF 10/22/2012     Influenza (intradermal) 10/17/2018     Pneumo Conj 13-V (2010&after) 04/18/2016     Pneumococcal 23 valent 12/09/2004     TD (ADULT, 7+) 05/09/2018     Tdap (Adacel,Boostrix) 04/29/2009     Zoster vaccine, live 03/23/2011     Above immunizations pulled from [facility]. [facility] and facility records also reconciled. Outstanding information sent to  to update [facility].  Future immunizations are not needed at this point as all recommended immunizations are up to date.   MEDICATIONS:  Current Outpatient Medications   Medication Sig Dispense Refill     ACETAMINOPHEN PO Take 500 mg by mouth 2 times daily        aspirin (ASA) 325 MG EC tablet Take  325 mg by mouth daily       Banana Flakes (BANATROL PLUS) PACK Take 1 packet by mouth daily        Citalopram Hydrobromide (CELEXA PO) Take 20 mg by mouth daily       FUROSEMIDE PO Take 10 mg by mouth daily        HYDRALAZINE HCL PO Take 50 mg by mouth every 8 hours        ketoconazole (NIZORAL) 2 % cream Apply to face BID PRN 30 g 1     levothyroxine (SYNTHROID/LEVOTHROID) 100 MCG tablet TAKE 1 TABLET BY MOUTH EVERY DAY. 90 tablet 0     loperamide (IMODIUM) 2 MG capsule Take 2 mg by mouth every 6 hours as needed for diarrhea       metoprolol tartrate (LOPRESSOR) 25 MG tablet Take 0.25 tablets (6.25 mg) by mouth 2 times daily 60 tablet      Multiple Vitamins-Minerals (PRESERVISION AREDS PO) Take 1 capsule by mouth daily       polyethylene glycol (MIRALAX/GLYCOLAX) Packet Take 17 g by mouth daily as needed for constipation 7 packet      tamsulosin (FLOMAX) 0.4 MG capsule Take 0.4 mg by mouth At Bedtime        Medications reviewed:  Medications reconciled to facility chart and changes were made to reflect current medications as identified as above med list. Below are the changes that were made:   Medications stopped since last EPIC medication reconciliation:   There are no discontinued medications.    Medications started since last [facility] medication reconciliation:  No orders of the defined types were placed in this encounter.      Case Management:  I have reviewed the facility/SNF care plan/MDS which was done 12/27/18, including the falls risk, nutrition and pain screening. I also reviewed the current immunizations, and preventive care..Future cancer screening is not clinically indicated secondary to age/goals of care Patient's desire to return to the community is not assessible due to cognitive impairment. Current Level of Care is appropriate.    Advance Directive Discussion:    I reviewed the current advanced directives as reflected in EPIC, the POLST and the facility chart, and verified the congruency of orders. I  "contacted the first party patient's son Dr Deepak Killian and discussed the plan of Care.  I did not due to cognitive impairment review the advance directives with the resident.     Team Discussion:  I communicated with the appropriate disciplines involved with the Plan of Care:   Nursing      Patient Goal:  Patient's goal is pain control and comfort.    Information reviewed:  Medications, vital signs, orders, and nursing notes.    ROS:  Limited secondary to cognitive impairment but today patient reports no pain, dyspnea, bowel or bladder issues.     Exam:  /70   Pulse 68   Temp 97.2  F (36.2  C)   Resp 18   Ht 1.727 m (5' 8\")   Wt 56.9 kg (125 lb 6.4 oz)   SpO2 95%   BMI 19.07 kg/m     GENERAL APPEARANCE:  Alert, in no distress, pleasant, cooperative, oriented x self only  EYES:  EOM, lids, pupils and irises normal, sclera clear and conjunctiva normal, no discharge or mattering on lids or lashes noted  ENT:  Mouth normal, moist mucous membranes, nose normal without drainage or crusting, external ears without lesions, hearing acuity BÖHM  both ears  NECK: supple, symmetrical  RESP:  respiratory effort and palpation of chest normal, no chest wall tenderness, no respiratory distress, Lung sounds clear, patient is on room air  CV:  Palpation and auscultation of heart done, rate and rhythm controlled and irregular, grade 3/6 systolic murmur, no rub or gallop. Edema none bilateral lower extremities. VASCULAR: no open areas lower legs  ABDOMEN:  normal bowel sounds, soft, nontender.  M/S:   Gait and station wheelchair bound and walks with assist , no tenderness or swelling of the joints; able to move all extremities   NEURO: cranial nerves 2-12 grossly intact, no facial asymmetry, no speech deficits and able to follow directions, moves all extremities symmetrically  PSYCH:  insight and judgement impaired, memory impaired, affect and mood normal     Lab/Diagnostic data:   CBC RESULTS:   Recent Labs   Lab Test " 12/09/18 11/02/18  0629 11/01/18  0623   WBC  --  5.3 5.1   RBC  --  3.38* 3.29*   HGB 11.5* 10.7* 10.7*   HCT  --  34.3* 32.8*   MCV  --  102* 100   MCH  --  31.7 32.5   MCHC  --  31.2* 32.6   RDW  --  13.5 13.4   PLT  --  108* 102*       Last Basic Metabolic Panel:  Recent Labs   Lab Test 11/02/18  0629 11/01/18  0623    139   POTASSIUM 3.5 3.5   CHLORIDE 108 107   KEZIA 7.8* 8.2*   CO2 28 28   BUN 24 27   CR 1.45* 1.57*   GLC 84 94       Liver Function Studies -   Lab Test 10/30/18  1032   PROTTOTAL 7.3   ALBUMIN 3.6   BILITOTAL 1.1   ALKPHOS 104   AST 27   ALT 16       TSH   Date Value Ref Range Status   03/29/2018 3.91 0.30 - 5.00 uIU/mL Final   07/11/2017 0.26 (L) 0.40 - 4.00 mU/L Final       ASSESSMENT/PLAN  Visit for annual health examination  Completed today.     Protein-calorie malnutrition, unspecified severity (H)  Chronic, expected due to end of life. Monitor, encourage intake.     Esophageal stricture  Chronic, monitor.     Other specified hypothyroidism  Chronic, meds as above. Monitor.     Abdominal aortic aneurysm (AAA) without rupture (H)  Chronic congestive heart failure, unspecified heart failure type (H)  Chronic atrial fibrillation (H)  Coronary artery disease involving native heart without angina pectoris, unspecified vessel or lesion type  Essential hypertension with goal blood pressure less than 140/90  History of CVA (cerebrovascular accident)  Anticoagulated on Coumadin  Chronic, BP elevated. Decrease lasix dose since no s/s edema and increase hydralazine frequency. F/U with BP, edema next week. Discussed coumadin therapy with son - will stop coumadin and INR checks, increase asa to 325 mg daily and monitor.     Urinary retention  Chronic, stable with Flomax. Monitor.     Macrocytic anemia  By history, no recent issues, no routine labs.     Major depressive disorder, single episode, mild (H)  Chronic, stable with meds. Monitor.     Hospice care patient  Focus is on comfort.      Orders:  1. discontinue Coumadin and INR checks  2. Increase ASA to 325 mg PO daily  3. Decrease Lasix to 10 mg PO daily diagnosis edema  4. Increase Hydralazine to 50 mg PO QID diagnosis HTN    The health plan new enrollment has happened. I have reviewed the  MDS, the preventative needs,  and facility care plan. The level of care is appropriate. I have reviewed the code status/advanced directives.     Electronically signed by:  CODY Hunt CNP          Sincerely,        CODY Hunt CNP

## 2023-01-24 NOTE — IP AVS SNAPSHOT
MRN:[id number]                      After Visit Summary   2/20/2018    Francesco Killian    MRN: [id number]           Thank you!     Thank you for choosing [facility] for your care. Our goal is always to provide you with excellent care. Hearing back from our patients is one way we can continue to improve our services. Please take a few minutes to complete the written survey that you may receive in the mail after you visit. If you would like to speak to someone directly about your visit please contact Patient Relations at [phone]. Thank you!          Patient Information     Date Of Birth          12/26/1920        Designated Caregiver       Most Recent Value    Caregiver    Will someone help with your care after discharge? yes    Name of designated caregiver -- [assisted living facility]      About your hospital stay     You were admitted on:  February 20, 2018 You last received care in the:  [facility]    You were discharged on:  February 23, 2018        Reason for your hospital stay       You were admitted for a fall leading to a lumbar vertebral compression fracture.  You were seen by our Neurosurgery team who recommended you only consider bracing if needed for comfort.      You were seen by physical therapy and due to your mobility being worse than at baseline you will discharge to a transitional care unit.                  Who to Call     For medical emergencies, please call 911.  For non-urgent questions about your medical care, please call your primary care provider or clinic, [phone]          Attending Provider     Provider Specialty    Bassam Berg MD Emergency Medicine    [facility], Goldy Blanchard MD Internal Medicine       Primary Care Provider Office Phone # Fax #    Angel Corea [phone]      After Care Instructions     Activity - Up with nursing assistance           Advance Diet as Tolerated       Follow this diet upon  discharge: Orders Placed This Encounter      Combination Diet Dysphagia Diet Level 2: Mechan Altered; Nectar Thickened Liquids (pre-thickened or use instant food thickener)            Bladder scan       X 2 for post void residual, consider alcaraz placement vs straight cath for PVR > 500            Daily weights       Call Provider for weight gain of more than 2 pounds per day or 5 pounds per week.            Fall precautions           General info for SNF       Length of Stay Estimate: Short Term Care: Estimated # of Days <30  Condition at Discharge: Stable  Level of care:skilled   Rehabilitation Potential: Good  Admission H&P remains valid and up-to-date: Yes  Recent Chemotherapy: N/A  Use Nursing Home Standing Orders: N/A            Mantoux instructions       Give two-step Mantoux (PPD) Per Facility Policy Yes                  Follow-up Appointments     Follow Up and recommended labs and tests       Follow up with assisted physician.  The following labs/tests are recommended: review blood pressure readings, review bladder scan readings, recheck basic metabolic panel in 3 days.    Please recheck INR in 2 days to see if dose adjustment is needed.                  Your next 10 appointments already scheduled     Feb 26, 2018  7:30 AM [facility]   Nursing Home with CODY Medley CNP   [facility] ([facility])    [address] [id number] [phone]            Mar 15, 2018 12:00 PM CDT   Anticoagulation Visit with [facility]   [facility] ([facility]    [address] [id number] [phone]              Additional Services     Occupational Therapy Adult Consult       Evaluate and treat as clinically indicated.    Reason:  deconditioning            Physical Therapy Adult Consult       Evaluate and treat as clinically indicated.    Reason:  deconditioning            Speech  "Language Path Adult Consult       Evaluate and treat as clinically indicated.    Reason:  deconditioning                  Warfarin Instruction     You have started taking a medicine called warfarin. This is a blood-thinning medicine (anticoagulant). It helps prevent and treat blood clots.      Before leaving the hospital, make sure you know how much to take and how long to take it.      You will need regular blood tests to make sure your blood is clotting safely. It is very important to see your doctor for regular blood tests.    Talk to your doctor before taking any new medicine (this includes over-the-counter drugs and herbal products). Many medicines can interact with warfarin. This may cause more bleeding or too much clotting.     Eating a lot of vitamin K--found in green, leafy vegetables--can change the way warfarin works in your body. Do NOT avoid these foods. Instead, try to eat the same amount each day.     Bleeding is the most common side-effect of warfarin. You may notice bleeding gums, a bloody nose, bruises and bleeding longer when you cut yourself. See a doctor at once if:   o You cough up blood  o You find blood in your stool (poop)  o You have a deep cut, or a cut that bleeds longer than 10 minutes   o You have a bad cut, hard fall, accident or hit your head (go to urgent care or the emergency room).    For women who can get pregnant: This medicine can harm an unborn baby. Be very careful not to get pregnant while taking this medicine. If you think you might be pregnant, call your doctor right away.    For more information, read \"Guide to Warfarin Therapy,  the booklet you received in the hospital.        Pending Results     No orders found from 2/18/2018 to 2/21/2018.            Statement of Approval     Ordered          02/23/18 1054  I have reviewed and agree with all the recommendations and orders detailed in this document.  EFFECTIVE NOW     Approved and electronically signed by:  Kelli, " Favian Killian MD             Admission Information     Date & Time Provider Department Dept. Phone    2/20/2018 Goldy Larsen MD [facility] [phone]      Your Vitals Were     Blood Pressure Temperature Respirations Weight Pulse Oximetry BMI (Body Mass Index)    170/72 (BP Location: Right arm) 97.8  F (36.6  C) (Oral) 14 64.8 kg (142 lb 12.8 oz) 90% 21.71 kg/m2      MyChart Information     [facility] gives you secure access to your electronic health record. If you see a primary care provider, you can also send messages to your care team and make appointments. If you have questions, please call your primary care clinic.  If you do not have a primary care provider, please call [phone] and they will assist you.        Care EveryWhere ID     This is your Care EveryWhere ID. This could be used by other organizations to access your [facility] medical records  [id number]        Equal Access to Services     IMAN MULTANI AH: Anant salazaro Sokathleen, waaxda luqadaha, qaybta kaalmada ademayelin, singh lemon. So [facility] [phone].    ATENCIÓN: Si habla español, tiene a costa disposición servicios gratuitos de asistencia lingüística. Llame al [phone].    We comply with applicable federal civil rights laws and [place] laws. We do not discriminate on the basis of race, color, national origin, age, disability, sex, sexual orientation, or gender identity.               Review of your medicines      START taking        Dose / Directions    diclofenac 1 % Gel topical gel   Commonly known as:  VOLTAREN        Dose:  4 g   Place 4 g onto the skin 4 times daily as needed for moderate pain Please have container at bedside sent to pharmacy to re-label for dismissal   Quantity:  100 g   Refills:  3       Lidocaine-Menthol 4-1 % Ptch        Dose:  1 patch   Externally apply 1 patch topically daily   Quantity:  15 patch   Refills:  3       polyethylene glycol Packet    Commonly known as:  MIRALAX/GLYCOLAX        Dose:  17 g   Take 17 g by mouth daily as needed for constipation   Quantity:  7 packet   Refills:  0       tamsulosin 0.4 MG capsule   Commonly known as:  FLOMAX   Used for:  Urinary retention        Dose:  0.4 mg   Start taking on:  2/24/2018   Take 1 capsule (0.4 mg) by mouth daily   Quantity:  60 capsule   Refills:  0         CONTINUE these medicines which may have CHANGED, or have new prescriptions. If we are uncertain of the size of tablets/capsules you have at home, strength may be listed as something that might have changed.        Dose / Directions    furosemide 20 MG tablet   Commonly known as:  LASIX   This may have changed:  when to take this   Used for:  Essential hypertension, malignant        Dose:  20 mg   Take 1 tablet (20 mg) by mouth daily   Quantity:  180 tablet   Refills:  3       warfarin 2 MG tablet   Commonly known as:  COUMADIN   This may have changed:    - when to take this  - additional instructions  - Another medication with the same name was removed. Continue taking this medication, and follow the directions you see here.   Used for:  Chronic atrial fibrillation (H)        Dose:  2 mg   Take 1 tablet (2 mg) by mouth daily Then recheck an INR within 2 days for dose adjustment   Quantity:  30 tablet   Refills:  0         CONTINUE these medicines which have NOT CHANGED        Dose / Directions    aspirin 81 MG tablet        Dose:  81 mg   Take 81 mg by mouth daily   Refills:  0       calcium carbonate 500 MG chewable tablet   Commonly known as:  TUMS        Dose:  1-2 chew tab   Take 1-2 chew tab by mouth 2 times daily as needed for heartburn   Refills:  0       citalopram 10 MG tablet   Commonly known as:  celeXA   Used for:  Episodic mood disorder (H)        TAKE 1 TABLET BY MOUTH DAILY   Quantity:  90 tablet   Refills:  1       diphenhydrAMINE-acetaminophen  MG tablet   Commonly known as:  TYLENOL PM        Dose:  1 tablet   Take 1  tablet by mouth At Bedtime   Refills:  0       ICAPS PO        Dose:  1 capsule   Take 1 capsule by mouth 2 times daily   Refills:  0       iron 325 (65 FE) MG tablet        Dose:  1 tablet   Take 1 tablet by mouth 2 times daily   Refills:  0       ketoconazole 2 % cream   Commonly known as:  NIZORAL   Used for:  Dermatitis, seborrheic        Apply to face BID PRN   Quantity:  30 g   Refills:  1       levothyroxine 100 MCG tablet   Commonly known as:  SYNTHROID/LEVOTHROID   Used for:  Other specified hypothyroidism        TAKE 1 TABLET BY MOUTH EVERY DAY.   Quantity:  90 tablet   Refills:  0       order for DME   Used for:  Chronic atrial fibrillation (H)        Testing being ordered: INR orders as directed to be done at [facility] To be done monthly as directed.   Quantity:  1 Month   Refills:  orn       TYLENOL 8 HOUR 650 MG CR tablet   Generic drug:  acetaminophen        Dose:  650 mg   Take 650 mg by mouth every evening as needed for mild pain or fever   Refills:  0            Where to get your medicines      These medications were sent to [place], [place] - [address] [id number] [facility] [address]     Phone:  [phone]     Lidocaine-Menthol 4-1 % Ptch         Some of these will need a paper prescription and others can be bought over the counter. Ask your nurse if you have questions.     Bring a paper prescription for each of these medications     diclofenac 1 % Gel topical gel       You don't need a prescription for these medications     polyethylene glycol Packet    tamsulosin 0.4 MG capsule    warfarin 2 MG tablet                Protect others around you: Learn how to safely use, store and throw away your medicines at www.disposemymeds.org.             Medication List: This is a list of all your medications and when to take them. Check marks below indicate your daily home schedule. Keep this list as a reference.      Medications            Morning Afternoon Evening Bedtime As Needed    aspirin 81 MG tablet   Take 81 mg by mouth daily                                calcium carbonate 500 MG chewable tablet   Commonly known as:  TUMS   Take 1-2 chew tab by mouth 2 times daily as needed for heartburn   Last time this was given:  1,000 mg on 2/22/2018  5:48 PM                                citalopram 10 MG tablet   Commonly known as:  celeXA   TAKE 1 TABLET BY MOUTH DAILY   Last time this was given:  10 mg on 2/23/2018  7:52 AM                                diclofenac 1 % Gel topical gel   Commonly known as:  VOLTAREN   Place 4 g onto the skin 4 times daily as needed for moderate pain Please have container at bedside sent to pharmacy to re-label for dismissal   Last time this was given:  4 g on 2/21/2018  9:16 PM                                diphenhydrAMINE-acetaminophen  MG tablet   Commonly known as:  TYLENOL PM   Take 1 tablet by mouth At Bedtime                                furosemide 20 MG tablet   Commonly known as:  LASIX   Take 1 tablet (20 mg) by mouth daily   Last time this was given:  20 mg on 2/23/2018  7:52 AM                                ICAPS PO   Take 1 capsule by mouth 2 times daily                                iron 325 (65 FE) MG tablet   Take 1 tablet by mouth 2 times daily   Last time this was given:  325 mg on 2/23/2018  7:53 AM                                ketoconazole 2 % cream   Commonly known as:  NIZORAL   Apply to face BID PRN                                levothyroxine 100 MCG tablet   Commonly known as:  SYNTHROID/LEVOTHROID   TAKE 1 TABLET BY MOUTH EVERY DAY.   Last time this was given:  100 mcg on 2/23/2018  7:53 AM                                Lidocaine-Menthol 4-1 % Ptch   Externally apply 1 patch topically daily                                order for DME   Testing being ordered: INR orders as directed to be done at [facility] To be done monthly as directed.                                 polyethylene glycol Packet   Commonly known as:  MIRALAX/GLYCOLAX   Take 17 g by mouth daily as needed for constipation                                tamsulosin 0.4 MG capsule   Commonly known as:  FLOMAX   Take 1 capsule (0.4 mg) by mouth daily   Start taking on:  2/24/2018   Last time this was given:  0.4 mg on 2/23/2018  8:23 AM                                TYLENOL 8 HOUR 650 MG CR tablet   Take 650 mg by mouth every evening as needed for mild pain or fever   Generic drug:  acetaminophen                                warfarin 2 MG tablet   Commonly known as:  COUMADIN   Take 1 tablet (2 mg) by mouth daily Then recheck an INR within 2 days for dose adjustment   Last time this was given:  1 mg on 2/23/2018  4:47 PM                                   No

## 2023-04-06 NOTE — PLAN OF CARE
"Problem: Goal Outcome Summary  Goal: Goal Outcome Summary  VS /46 mmHg  Temp(Src) 96.4  F (35.8  C) (Oral)  Resp 20  Ht 1.727 m (5' 8\")  Wt 71.668 kg (158 lb)  BMI 24.03 kg/m2  SpO2 92%  Lung sounds Clear bilaterally   O2 Room air  Bowel sounds Active, last BM 2/3  Tolerating 2gm NA, no caffine diet  IVF SL  Activity up with assist of 1 with walker  Pain Denies  Patient/family centered care Plan of care discussed with patient  Discharge plan Plan to d/c in 1-2 days pending renal function improvement.         " Detail Level: Zone

## 2024-01-19 NOTE — PROGRESS NOTES
Called and left voicemail for patient that Rolanda Hughes will be leaving the office early today so she would like him to come in anytime before 12. I told the patient to please return our call in order to be moved to prior to 12.    [facility]    Chief Complaint   Patient presents with     long-term Acute       [facility] Medical Record Number:  [id number]    HPI:    Francesco Killian is a 89 year old  (12/26/1920), who is being seen today for an episodic care visit at [facility]  HPI information obtained from: facility chart records, facility staff and [facility] Epic chart review.Today's concern is:    Anemia, unspecified type  Noted with hgb drop after ER visits in May 2018 due to fall and scalp laceration.  He is on dual anticoagulation with coumadin and asa.  On omeprazole for GI protection.  No recent reports of acute blood loss.  Hgb was trending up but recently starting to trend down again.  I ordered for occult stool, has not been collected by staff.  No reports of acute blood loss noted.  His Hgb was 11 end of June, last week noted to be 10, NCNC.  No reports of dizziness, lightheadedness, or shortness of breath.  Discussion with son, Dr. Killian.  Decline GI w/u at this time, even though patient with NCNC, per son with history of some improvement in the past when on iron supplementation.  In agreement to start this, titrate coumadin to lower INR level of 1.5-2.0 given his bleeding risk      Thrombocytopenia (H)  Lab work last week shows platelets at 117.  He is on asa and coumadin as  for anticoagulation.  He sustained a skin tear over the weekend and per staff bled easily, were able to stop with compression bandage. Reviewing chart, patient with dx of chronic thrombocytopenia.  Discussion with son Dr. Killian, aware of chronic thrombocytopenia, not interested in further w/u at this time.  He is at high risk for repeat stroke/clot, so not wanting to stop coumadin or asa at this time.  In agreement, to titrate coumadin to keep INR levels lower at 1.5-2.0 given his anemia and chronic thrombocytopenia         Chronic atrial fibrillation (H)  Encounter for therapeutic drug  monitoring  Long-term (current) use of anticoagulants  Chronic, rate controlled with recent bradycardia (although patient appears asymptomatic of this) on metoprolol 12.5mg BID (reduced last week).  He is anticoagulated with coumadin and asa per patient's son's preference who is a neurologist.  Denies/no reports of palpitations, dizziness, lightheadedness or shortness of breath.  INR today is 2.6.  As noted above, due to anemia, we are changing his INR goal to 1.5-2.0, his son Dr. Killian is in agreement with this.   Currently on coumadin 3mg po Thurs, Fri, 2mg po sat, Sun        CKD Stage III  CKD stage III with ORLANDO noted in the past.  His lasix has been reduced some due to increase in creatinine and BUN.  Discussion with patient's son, Penny Killian and Dr. Lama.  In agreement to leave lasix at current dose, given his aortic stenosis, very sensitive to any s/s of fluid overload.  Family is aware of patient's kidney function.  Currently with lasix 10mg po BID    Last 3 BP's: 135/59, 140/54, 131/76 mmHg  Pulse Readings from Last 4 Encounters:   08/27/18 62   08/23/18 91   08/19/18 68   08/13/18 (!) 40     Wt Readings from Last 5 Encounters:   08/27/18 137 lb 6.4 oz (62.3 kg)   08/23/18 137 lb 6.4 oz (62.3 kg)   08/19/18 137 lb 6.4 oz (62.3 kg)   08/13/18 135 lb 8 oz (61.5 kg)   08/05/18 135 lb 6.4 oz (61.4 kg)       ALLERGIES: Review of patient's allergies indicates no known allergies.  Past Medical, Surgical, Family and Social History reviewed and updated in [facility].    Current Outpatient Prescriptions   Medication Sig Dispense Refill     ACETAMINOPHEN PO Take 500 mg by mouth 2 times daily        aspirin 81 MG tablet Take 81 mg by mouth daily        calcium carbonate (TUMS) 500 MG chewable tablet Take 2 chew tab by mouth 2 times daily as needed for heartburn        Citalopram Hydrobromide (CELEXA PO) Take 20 mg by mouth daily       ferrous sulfate (IRON) 325 (65 Fe) MG tablet Take 325 mg by mouth Twice a day every other day  "      FUROSEMIDE PO Take 10 mg by mouth 2 times daily        HYDRALAZINE HCL PO Take 25 mg by mouth 4 times daily GIVE AT 6-7am, 12n, 5pm,  for HTN Hold for SBP of less than or equal to 120       ketoconazole (NIZORAL) 2 % cream Apply to face BID PRN 30 g 1     levothyroxine (SYNTHROID/LEVOTHROID) 100 MCG tablet TAKE 1 TABLET BY MOUTH EVERY DAY. 90 tablet 0     METOPROLOL TARTRATE PO Take 12.5 mg by mouth 2 times daily for HTN Hold for SBP is<120 and HR is <60, plz call if hold x 2 in a row for any of these reasons.       Multiple Vitamins-Minerals (ICAPS PO) Take 1 capsule by mouth daily        polyethylene glycol (MIRALAX/GLYCOLAX) Packet Take 17 g by mouth daily as needed for constipation 7 packet      tamsulosin (FLOMAX) 0.4 MG capsule Take by mouth At Bedtime       Warfarin Sodium (COUMADIN PO) Take as directed       Medications reviewed:  Medications reconciled to facility chart and changes were made to reflect current medications as identified as above med list. Below are the changes that were made:   Medications stopped since last EPIC medication reconciliation:   There are no discontinued medications.    Medications started since last [facility] medication reconciliation:  Orders Placed This Encounter   Medications     ferrous sulfate (IRON) 325 (65 Fe) MG tablet     Sig: Take 325 mg by mouth Twice a day every other day         REVIEW OF SYSTEMS:  4 point ROS including Respiratory, CV, GI and , other than that noted in the HPI,  is negative    Physical Exam:  /59  Pulse 62  Temp 97.8  F (36.6  C)  Resp 15  Ht 5' 8\" (1.727 m)  Wt 137 lb 6.4 oz (62.3 kg)  SpO2 94%  BMI 20.89 kg/m2  GENERAL APPEARANCE:  Alert, in no distress  EYES:  EOM, conjunctivae, lids, pupils and irises normal  NECK:  No adenopathy,masses or thyromegaly  RESP:  respiratory effort and palpation of chest normal, lungs clear to auscultation , no respiratory distress, decreased at bases  CV:  Palpation and auscultation of " heart done , irregular rhythm, rate controlled, no murmur, rub, or gallop, no edema, grade 3/6 murmur  M/S:   Gait and station abnormal generalized weakness, no edema/erythema joints, CMS intact  NEURO:   Cranial nerves 2-12 are normal tested and grossly at patient's baseline, no tremor, normal muscle tone   PSYCH:  oriented to person only, mood and affect normal     Recent Labs:     CBC RESULTS:   Recent Labs   Lab Test 08/20/18 08/16/18 06/22/18   WBC  5.1   --    --   4.9   RBC  3.09*   --    --   3.39*   HGB  10.0*  10.4*   < >  10.7*   HCT  30.9*   --    --   33.6*   MCV  100   --    --   99   MCH  32.4   --    --   31.6   MCHC  32.4   --    --   31.8*   RDW  13.7   --    --   13.2   PLT  117*   --    --   140    < > = values in this interval not displayed.       Last Basic Metabolic Panel:  Recent Labs   Lab Test 08/20/18 08/16/18   NA  138  137   POTASSIUM  4.4  3.7   CHLORIDE  109*  108*   KEZIA  8.9  8.9   CO2  24  20*   BUN  40*  42*   CR  1.19  1.46*   GLC  72  155*       TSH   Date Value Ref Range Status   03/29/2018 3.91 0.30 - 5.00 uIU/mL Final   07/11/2017 0.26 (L) 0.40 - 4.00 mU/L Final       Assessment/Plan:  (D64.9) Anemia, unspecified type  (primary encounter diagnosis)  Comment: normocytic, normochromic, no current s/s of acute blood loss, Hgb stable, family aware.  Discussed with Dr. Lama (collaborating physician) and patient's son, Dr. Killian  Plan:   - close monitoring while on coumadin and asa   -although NCNC, family would like to try iron supplementation, has been helpful in past.  Ferrous sulfate BID given every other day    (D69.6) Thrombocytopenia (H)  Comment: noted last week, per chart review history of chronic thrombocytopenia, no s/s of abnormal bleeding, was discussed with patient's son, Dr. Killian and Dr. Lama (collaborating physician)   Plan: need to monitor closely, on coumadin and asa  -INR goal 1.5-2.0      (I48.2) Chronic atrial fibrillation (H)  (Z51.81) Encounter for  therapeutic drug monitoring  (Z79.01) Long-term (current) use of anticoagulants  Comment: INR supratherapeutic given new goal of 1.5-2.0  Plan: hold coumadin today  -coumadin 2mg po Tues, Wed  -INR recheck Thursday    (N18.3) CKD (chronic kidney disease) stage 3, GFR 30-59 ml/min  Comment: chronic with ORLANDO noted in past and last week, discussed with Dr. Lama and patient's son,  Constantin   Plan: all in agreement with continue with lasix at current dose 10mg BID, given patient very sensitive to fluid overload with aortic stenosis  -BMP pending today       Electronically signed by  CODY Diaz CNP